# Patient Record
Sex: MALE | Race: WHITE | NOT HISPANIC OR LATINO | Employment: UNEMPLOYED | URBAN - METROPOLITAN AREA
[De-identification: names, ages, dates, MRNs, and addresses within clinical notes are randomized per-mention and may not be internally consistent; named-entity substitution may affect disease eponyms.]

---

## 2018-08-14 ENCOUNTER — OFFICE VISIT (OUTPATIENT)
Dept: URGENT CARE | Facility: CLINIC | Age: 78
End: 2018-08-14
Payer: MEDICARE

## 2018-08-14 VITALS
HEART RATE: 78 BPM | DIASTOLIC BLOOD PRESSURE: 97 MMHG | RESPIRATION RATE: 18 BRPM | BODY MASS INDEX: 32.93 KG/M2 | SYSTOLIC BLOOD PRESSURE: 194 MMHG | OXYGEN SATURATION: 96 % | WEIGHT: 230 LBS | TEMPERATURE: 100 F | HEIGHT: 70 IN

## 2018-08-14 DIAGNOSIS — M62.838 MUSCLE SPASM: Primary | ICD-10-CM

## 2018-08-14 DIAGNOSIS — Z87.448: ICD-10-CM

## 2018-08-14 DIAGNOSIS — F10.10 ALCOHOL ABUSE: ICD-10-CM

## 2018-08-14 LAB
GLUCOSE SERPL-MCNC: 118 MG/DL (ref 70–110)
POC ANION GAP: 13 MMOL/L (ref 10–20)
POC BUN: 15 MMOL/L (ref 8–26)
POC CHLORIDE: 103 MMOL/L (ref 98–109)
POC CREATININE: 1.1 MG/DL (ref 0.6–1.3)
POC HEMATOCRIT: 45 %PCV (ref 42–52)
POC HEMOGLOBIN: 15.3 G/DL (ref 13.5–18)
POC ICA: 1.16 MMOL/L (ref 1.12–1.32)
POC POTASSIUM: 3.8 MMOL/L (ref 3.5–4.9)
POC SODIUM: 137 MMOL/L (ref 138–146)
POC TCO2: 25 MMOL/L (ref 24–29)

## 2018-08-14 PROCEDURE — 96372 THER/PROPH/DIAG INJ SC/IM: CPT | Mod: S$GLB,,, | Performed by: SURGERY

## 2018-08-14 PROCEDURE — 80047 BASIC METABLC PNL IONIZED CA: CPT | Mod: QW,S$GLB,, | Performed by: SURGERY

## 2018-08-14 PROCEDURE — 99203 OFFICE O/P NEW LOW 30 MIN: CPT | Mod: 25,S$GLB,, | Performed by: SURGERY

## 2018-08-14 RX ORDER — FUROSEMIDE 40 MG/1
40 TABLET ORAL DAILY
Refills: 1 | Status: ON HOLD | COMMUNITY
Start: 2018-05-09 | End: 2022-06-02 | Stop reason: SDUPTHER

## 2018-08-14 RX ORDER — AMLODIPINE AND BENAZEPRIL HYDROCHLORIDE 5; 10 MG/1; MG/1
CAPSULE ORAL
Refills: 0 | Status: ON HOLD | COMMUNITY
Start: 2018-06-04 | End: 2022-06-02 | Stop reason: HOSPADM

## 2018-08-14 RX ORDER — LOVASTATIN 20 MG/1
20 TABLET ORAL NIGHTLY
Status: ON HOLD | COMMUNITY
End: 2023-01-28 | Stop reason: HOSPADM

## 2018-08-14 RX ORDER — CYCLOBENZAPRINE HCL 5 MG
5 TABLET ORAL 3 TIMES DAILY PRN
Qty: 21 TABLET | Refills: 0 | Status: SHIPPED | OUTPATIENT
Start: 2018-08-14 | End: 2018-08-21

## 2018-08-14 RX ORDER — BETAMETHASONE SODIUM PHOSPHATE AND BETAMETHASONE ACETATE 3; 3 MG/ML; MG/ML
9 INJECTION, SUSPENSION INTRA-ARTICULAR; INTRALESIONAL; INTRAMUSCULAR; SOFT TISSUE ONCE
Status: COMPLETED | OUTPATIENT
Start: 2018-08-14 | End: 2018-08-14

## 2018-08-14 RX ADMIN — BETAMETHASONE SODIUM PHOSPHATE AND BETAMETHASONE ACETATE 9 MG: 3; 3 INJECTION, SUSPENSION INTRA-ARTICULAR; INTRALESIONAL; INTRAMUSCULAR; SOFT TISSUE at 08:08

## 2018-08-14 NOTE — PROGRESS NOTES
"Subjective:       Patient ID: Timothy Roldan is a 78 y.o. male.    Vitals:  height is 5' 10" (1.778 m) and weight is 104.3 kg (230 lb). His tympanic temperature is 100.1 °F (37.8 °C). His blood pressure is 194/97 (abnormal) and his pulse is 78. His respiration is 18 and oxygen saturation is 96%.     Chief Complaint: Back Pain and Abdominal Pain    Back and abdominal pain in spasms. Similar episode a year ago with extensive workup at [facility] reported to have stomach and kidney problems. Treated with pain medications. Started on diuretics by primary care. Reports chronic alcohol abuse with beer and whiskey. No nausea, vomiting, diarrhea or constipation.       Back Pain   This is a recurrent problem. The current episode started today. The problem occurs intermittently. The problem has been rapidly worsening since onset. The pain is present in the lumbar spine. The quality of the pain is described as cramping and stabbing. The pain is at a severity of 9/10. The pain is severe. The symptoms are aggravated by standing. Associated symptoms include abdominal pain. Pertinent negatives include no bladder incontinence, bowel incontinence, chest pain, dysuria, fever or numbness.   Abdominal Pain   This is a new problem. The current episode started today. The onset quality is sudden. The problem has been rapidly worsening. The pain is located in the periumbilical region and generalized abdominal region. The pain is at a severity of 9/10. The pain is severe. The quality of the pain is cramping. The abdominal pain radiates to the back. Pertinent negatives include no constipation, diarrhea, dysuria, fever, hematochezia, hematuria, melena, nausea or vomiting. The pain is aggravated by certain positions and movement. The pain is relieved by being still. He has tried antacids for the symptoms. The treatment provided no relief.     Review of Systems   Constitution: Negative for chills, fever and malaise/fatigue.   Cardiovascular: " Negative for chest pain.   Respiratory: Negative for shortness of breath.    Skin: Negative for rash.   Musculoskeletal: Positive for back pain and muscle cramps. Negative for muscle weakness and stiffness.   Gastrointestinal: Positive for abdominal pain. Negative for bowel incontinence, constipation, diarrhea, hematochezia, melena, nausea and vomiting.   Genitourinary: Negative for bladder incontinence, dysuria, hematuria and urgency.   Neurological: Negative for disturbances in coordination and numbness.       Objective:      Physical Exam   Constitutional: He is oriented to person, place, and time. He appears well-developed and well-nourished. He is cooperative.  Non-toxic appearance. He does not appear ill. No distress.   HENT:   Head: Normocephalic and atraumatic.   Right Ear: Hearing, tympanic membrane, external ear and ear canal normal.   Left Ear: Hearing, tympanic membrane, external ear and ear canal normal.   Nose: Nose normal. No mucosal edema, rhinorrhea or nasal deformity. No epistaxis. Right sinus exhibits no maxillary sinus tenderness and no frontal sinus tenderness. Left sinus exhibits no maxillary sinus tenderness and no frontal sinus tenderness.   Mouth/Throat: Uvula is midline, oropharynx is clear and moist and mucous membranes are normal. No trismus in the jaw. Normal dentition. No uvula swelling. No posterior oropharyngeal erythema.   Eyes: Conjunctivae and lids are normal. No scleral icterus.   Sclera clear bilat   Neck: Trachea normal, full passive range of motion without pain and phonation normal. Neck supple.   Cardiovascular: Normal rate, regular rhythm, normal heart sounds, intact distal pulses and normal pulses.   Pulmonary/Chest: Effort normal and breath sounds normal. No respiratory distress.   Abdominal: Soft. Normal appearance and bowel sounds are normal. He exhibits no distension. There is no tenderness.   Musculoskeletal: Normal range of motion. He exhibits no edema or deformity.    Neurological: He is alert and oriented to person, place, and time. He exhibits normal muscle tone. Coordination normal.   Skin: Skin is warm, dry and intact. He is not diaphoretic. No pallor.   Psychiatric: He has a normal mood and affect. His speech is normal and behavior is normal. Judgment and thought content normal. Cognition and memory are normal.   Nursing note and vitals reviewed.      Assessment:       1. Muscle spasm    2. Hx of renal insufficiency syndrome    3. Alcohol abuse        Plan:         Muscle spasm  -     POCT Chemistry Panel  -     betamethasone acetate-betamethasone sodium phosphate injection 9 mg; Inject 1.5 mLs (9 mg total) into the muscle once.  -     cyclobenzaprine (FLEXERIL) 5 MG tablet; Take 1 tablet (5 mg total) by mouth 3 (three) times daily as needed for Muscle spasms.  Dispense: 21 tablet; Refill: 0    Hx of renal insufficiency syndrome    Alcohol abuse      Results for orders placed or performed in visit on 08/14/18   POCT Chemistry Panel   Result Value Ref Range    POC Sodium 137 (A) 138 - 146 MMOL/L    POC Potassium 3.8 3.5 - 4.9 MMOL/L    POC Chloride 103 98 - 109 MMOL/L    POC BUN 15 8 - 26 MMOL/L    POC Glucose 118 (A) 70 - 110 MG/DL    POC Creatinine 1.1 0.6 - 1.3 mg/dL    POC iCA 1.16 1.12 - 1.32 MMOL/L    POC TCO2 25 24 - 29 MMOL/L    POC Hematocrit 45 42 - 52 %PCV    POC Hemoglobin 15.3 13.5 - 18 g/dL    POC Anion Gap 13 10.0 - 20 MMOL/L           Patient Instructions     Muscle Spasm  A muscle spasm is a sudden tightening of the muscle you cant control. This may be caused by strain, overworking the muscle, or injury. It can also be caused by dehydration, electrolyte imbalance, diabetes, alcohol use, and certain medicines. If it goes on long enough the muscle spasm causes pain. Common areas for muscle spasm are the legs, neck, and back.  Home care  · Heat, massage, and stretching will help relax muscle spasm.  · When the spasm is in your arm or leg, stretch the  muscle passively. To do this, have someone bend or straighten the joint above or below the muscle until you feel the stretch on the sore muscle. You can stretch the muscle actively by moving the affected body part. This will stretch the muscle that is in spasm. For example, if the spasm is in your calf, bend the ankle so your toes point upward toward your knee. This will stretch your calf muscle.  · You may use over-the-counter pain medicine to control pain, unless another medicine was prescribed. If you have chronic liver or kidney disease or ever had a stomach ulcer or GI bleeding, talk with your healthcare provider before using these medicines.  Follow-up care  Follow up with your healthcare provider, or as advised.    When to seek medical advice  Call your healthcare provider right away if any of the following occur:  · Fingers or toes become swollen, cold, blue, numb, or tingly  · You develop weakness in the affected arm or leg  · Pain increases and is not controlled by the above measures  Date Last Reviewed: 11/21/2015  © 4288-0198 [employer] [place]. [address] [place] [address], [place] [address]. All rights reserved. This information is not intended as a substitute for professional medical care. Always follow your healthcare professional's instructions.

## 2018-08-15 NOTE — PATIENT INSTRUCTIONS
Muscle Spasm  A muscle spasm is a sudden tightening of the muscle you cant control. This may be caused by strain, overworking the muscle, or injury. It can also be caused by dehydration, electrolyte imbalance, diabetes, alcohol use, and certain medicines. If it goes on long enough the muscle spasm causes pain. Common areas for muscle spasm are the legs, neck, and back.  Home care  · Heat, massage, and stretching will help relax muscle spasm.  · When the spasm is in your arm or leg, stretch the muscle passively. To do this, have someone bend or straighten the joint above or below the muscle until you feel the stretch on the sore muscle. You can stretch the muscle actively by moving the affected body part. This will stretch the muscle that is in spasm. For example, if the spasm is in your calf, bend the ankle so your toes point upward toward your knee. This will stretch your calf muscle.  · You may use over-the-counter pain medicine to control pain, unless another medicine was prescribed. If you have chronic liver or kidney disease or ever had a stomach ulcer or GI bleeding, talk with your healthcare provider before using these medicines.  Follow-up care  Follow up with your healthcare provider, or as advised.    When to seek medical advice  Call your healthcare provider right away if any of the following occur:  · Fingers or toes become swollen, cold, blue, numb, or tingly  · You develop weakness in the affected arm or leg  · Pain increases and is not controlled by the above measures  Date Last Reviewed: 11/21/2015  © 2189-7178 [employer]. [address] [place] [address], [place] [address]. All rights reserved. This information is not intended as a substitute for professional medical care. Always follow your healthcare professional's instructions.

## 2021-12-31 ENCOUNTER — HOSPITAL ENCOUNTER (INPATIENT)
Facility: HOSPITAL | Age: 81
LOS: 10 days | Discharge: HOME HEALTH | DRG: 690 | End: 2022-01-10
Attending: EMERGENCY MEDICINE | Admitting: INTERNAL MEDICINE
Payer: MEDICARE

## 2021-12-31 DIAGNOSIS — N30.01 ACUTE CYSTITIS WITH HEMATURIA: ICD-10-CM

## 2021-12-31 DIAGNOSIS — R53.1 WEAKNESS: ICD-10-CM

## 2021-12-31 DIAGNOSIS — F02.80 ALZHEIMER DISEASE: Primary | ICD-10-CM

## 2021-12-31 DIAGNOSIS — E04.1 THYROID NODULE GREATER THAN OR EQUAL TO 1 CM IN DIAMETER INCIDENTALLY NOTED ON IMAGING STUDY: ICD-10-CM
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
## 2021-12-31 DIAGNOSIS — T14.8XXA MULTIPLE SKIN TEARS: ICD-10-CM

## 2021-12-31 DIAGNOSIS — G30.9 ALZHEIMER DISEASE: Primary | ICD-10-CM

## 2021-12-31 DIAGNOSIS — I48.0 PAROXYSMAL ATRIAL FIBRILLATION: ICD-10-CM

## 2021-12-31 DIAGNOSIS — R53.1 RAPIDLY PROGRESSIVE WEAKNESS: ICD-10-CM

## 2021-12-31 PROBLEM — N17.9 ACUTE KIDNEY INJURY SUPERIMPOSED ON CKD: Status: ACTIVE | Noted: 2021-12-31

## 2021-12-31 PROBLEM — I10 PRIMARY HYPERTENSION: Status: ACTIVE | Noted: 2021-12-31

## 2021-12-31 PROBLEM — R29.898 WEAKNESS OF BOTH LOWER EXTREMITIES: Status: ACTIVE | Noted: 2021-12-31

## 2021-12-31 PROBLEM — N18.9 ACUTE KIDNEY INJURY SUPERIMPOSED ON CKD: Status: ACTIVE | Noted: 2021-12-31

## 2021-12-31 PROBLEM — N30.00 ACUTE CYSTITIS WITHOUT HEMATURIA: Status: ACTIVE | Noted: 2021-12-31

## 2021-12-31 LAB
ALBUMIN SERPL BCP-MCNC: 3.5 G/DL (ref 3.5–5.2)
ALP SERPL-CCNC: 103 U/L (ref 55–135)
ALT SERPL W/O P-5'-P-CCNC: 24 U/L (ref 10–44)
ANION GAP SERPL CALC-SCNC: 9 MMOL/L (ref 8–16)
AST SERPL-CCNC: 58 U/L (ref 10–40)
BACTERIA #/AREA URNS HPF: ABNORMAL /HPF
BASOPHILS # BLD AUTO: 0.01 K/UL (ref 0–0.2)
BASOPHILS NFR BLD: 0.1 % (ref 0–1.9)
BILIRUB SERPL-MCNC: 1 MG/DL (ref 0.1–1)
BILIRUB UR QL STRIP: NEGATIVE
BUN SERPL-MCNC: 27 MG/DL (ref 8–23)
CALCIUM SERPL-MCNC: 9.9 MG/DL (ref 8.7–10.5)
CHLORIDE SERPL-SCNC: 105 MMOL/L (ref 95–110)
CLARITY UR: ABNORMAL
CO2 SERPL-SCNC: 29 MMOL/L (ref 23–29)
COLOR UR: ABNORMAL
CREAT SERPL-MCNC: 1.5 MG/DL (ref 0.5–1.4)
CTP QC/QA: YES
CTP QC/QA: YES
DIFFERENTIAL METHOD: ABNORMAL
EOSINOPHIL # BLD AUTO: 0 K/UL (ref 0–0.5)
EOSINOPHIL NFR BLD: 0 % (ref 0–8)
ERYTHROCYTE [DISTWIDTH] IN BLOOD BY AUTOMATED COUNT: 13.5 % (ref 11.5–14.5)
EST. GFR  (AFRICAN AMERICAN): 50 ML/MIN/1.73 M^2
EST. GFR  (NON AFRICAN AMERICAN): 44 ML/MIN/1.73 M^2
GLUCOSE SERPL-MCNC: 110 MG/DL (ref 70–110)
GLUCOSE UR QL STRIP: NEGATIVE
HCT VFR BLD AUTO: 45.3 % (ref 40–54)
HGB BLD-MCNC: 14.6 G/DL (ref 14–18)
HGB UR QL STRIP: ABNORMAL
HYALINE CASTS #/AREA URNS LPF: 0 /LPF
IMM GRANULOCYTES # BLD AUTO: 0.04 K/UL (ref 0–0.04)
IMM GRANULOCYTES NFR BLD AUTO: 0.4 % (ref 0–0.5)
KETONES UR QL STRIP: ABNORMAL
LACTATE SERPL-SCNC: 1 MMOL/L (ref 0.5–2.2)
LEUKOCYTE ESTERASE UR QL STRIP: ABNORMAL
LYMPHOCYTES # BLD AUTO: 0.9 K/UL (ref 1–4.8)
LYMPHOCYTES NFR BLD: 9.2 % (ref 18–48)
MCH RBC QN AUTO: 29.6 PG (ref 27–31)
MCHC RBC AUTO-ENTMCNC: 32.2 G/DL (ref 32–36)
MCV RBC AUTO: 92 FL (ref 82–98)
MICROSCOPIC COMMENT: ABNORMAL
MONOCYTES # BLD AUTO: 0.6 K/UL (ref 0.3–1)
MONOCYTES NFR BLD: 6.1 % (ref 4–15)
NEUTROPHILS # BLD AUTO: 8 K/UL (ref 1.8–7.7)
NEUTROPHILS NFR BLD: 84.2 % (ref 38–73)
NITRITE UR QL STRIP: NEGATIVE
NRBC BLD-RTO: 0 /100 WBC
PH UR STRIP: 6 [PH] (ref 5–8)
PLATELET # BLD AUTO: 148 K/UL (ref 150–450)
PMV BLD AUTO: 11.1 FL (ref 9.2–12.9)
POC MOLECULAR INFLUENZA A AGN: NEGATIVE
POC MOLECULAR INFLUENZA B AGN: NEGATIVE
POTASSIUM SERPL-SCNC: 3.7 MMOL/L (ref 3.5–5.1)
PROCALCITONIN SERPL IA-MCNC: 0.2 NG/ML
PROT SERPL-MCNC: 7.4 G/DL (ref 6–8.4)
PROT UR QL STRIP: ABNORMAL
RBC # BLD AUTO: 4.94 M/UL (ref 4.6–6.2)
RBC #/AREA URNS HPF: >100 /HPF (ref 0–4)
SARS-COV-2 RDRP RESP QL NAA+PROBE: NEGATIVE
SODIUM SERPL-SCNC: 143 MMOL/L (ref 136–145)
SP GR UR STRIP: 1.02 (ref 1–1.03)
URN SPEC COLLECT METH UR: ABNORMAL
UROBILINOGEN UR STRIP-ACNC: NEGATIVE EU/DL
WBC # BLD AUTO: 9.53 K/UL (ref 3.9–12.7)
WBC #/AREA URNS HPF: >100 /HPF (ref 0–5)

## 2021-12-31 PROCEDURE — 93010 ELECTROCARDIOGRAM REPORT: CPT | Mod: ,,, | Performed by: INTERNAL MEDICINE

## 2021-12-31 PROCEDURE — 96365 THER/PROPH/DIAG IV INF INIT: CPT

## 2021-12-31 PROCEDURE — 83605 ASSAY OF LACTIC ACID: CPT | Performed by: EMERGENCY MEDICINE

## 2021-12-31 PROCEDURE — 82607 VITAMIN B-12: CPT | Performed by: INTERNAL MEDICINE

## 2021-12-31 PROCEDURE — 81000 URINALYSIS NONAUTO W/SCOPE: CPT | Performed by: EMERGENCY MEDICINE

## 2021-12-31 PROCEDURE — 85025 COMPLETE CBC W/AUTO DIFF WBC: CPT | Performed by: EMERGENCY MEDICINE

## 2021-12-31 PROCEDURE — 11000001 HC ACUTE MED/SURG PRIVATE ROOM

## 2021-12-31 PROCEDURE — 63600175 PHARM REV CODE 636 W HCPCS: Performed by: EMERGENCY MEDICINE

## 2021-12-31 PROCEDURE — 93005 ELECTROCARDIOGRAM TRACING: CPT

## 2021-12-31 PROCEDURE — 87040 BLOOD CULTURE FOR BACTERIA: CPT | Performed by: EMERGENCY MEDICINE

## 2021-12-31 PROCEDURE — 80053 COMPREHEN METABOLIC PANEL: CPT | Performed by: EMERGENCY MEDICINE

## 2021-12-31 PROCEDURE — 36415 COLL VENOUS BLD VENIPUNCTURE: CPT | Performed by: INTERNAL MEDICINE

## 2021-12-31 PROCEDURE — 63600175 PHARM REV CODE 636 W HCPCS: Performed by: INTERNAL MEDICINE

## 2021-12-31 PROCEDURE — 84145 PROCALCITONIN (PCT): CPT | Performed by: EMERGENCY MEDICINE

## 2021-12-31 PROCEDURE — 87502 INFLUENZA DNA AMP PROBE: CPT

## 2021-12-31 PROCEDURE — U0002 COVID-19 LAB TEST NON-CDC: HCPCS | Performed by: EMERGENCY MEDICINE

## 2021-12-31 PROCEDURE — 87086 URINE CULTURE/COLONY COUNT: CPT | Performed by: EMERGENCY MEDICINE

## 2021-12-31 PROCEDURE — 96366 THER/PROPH/DIAG IV INF ADDON: CPT

## 2021-12-31 PROCEDURE — 25000003 PHARM REV CODE 250: Performed by: INTERNAL MEDICINE

## 2021-12-31 PROCEDURE — 93010 EKG 12-LEAD: ICD-10-PCS | Mod: ,,, | Performed by: INTERNAL MEDICINE

## 2021-12-31 PROCEDURE — 25000003 PHARM REV CODE 250: Performed by: EMERGENCY MEDICINE

## 2021-12-31 PROCEDURE — 83605 ASSAY OF LACTIC ACID: CPT | Mod: 91 | Performed by: INTERNAL MEDICINE

## 2021-12-31 PROCEDURE — 99291 CRITICAL CARE FIRST HOUR: CPT | Mod: 25

## 2021-12-31 PROCEDURE — 82550 ASSAY OF CK (CPK): CPT | Performed by: INTERNAL MEDICINE

## 2021-12-31 PROCEDURE — 82746 ASSAY OF FOLIC ACID SERUM: CPT | Performed by: INTERNAL MEDICINE

## 2021-12-31 RX ORDER — TAMSULOSIN HYDROCHLORIDE 0.4 MG/1
0.4 CAPSULE ORAL DAILY
Status: DISCONTINUED | OUTPATIENT
Start: 2022-01-01 | End: 2022-01-10 | Stop reason: HOSPADM

## 2021-12-31 RX ORDER — IBUPROFEN 200 MG
24 TABLET ORAL
Status: DISCONTINUED | OUTPATIENT
Start: 2021-12-31 | End: 2022-01-10 | Stop reason: HOSPADM

## 2021-12-31 RX ORDER — IBUPROFEN 200 MG
16 TABLET ORAL
Status: DISCONTINUED | OUTPATIENT
Start: 2021-12-31 | End: 2022-01-10 | Stop reason: HOSPADM

## 2021-12-31 RX ORDER — GLUCAGON 1 MG
1 KIT INJECTION
Status: DISCONTINUED | OUTPATIENT
Start: 2021-12-31 | End: 2022-01-10 | Stop reason: HOSPADM

## 2021-12-31 RX ORDER — AMLODIPINE BESYLATE 5 MG/1
5 TABLET ORAL DAILY
Status: DISCONTINUED | OUTPATIENT
Start: 2022-01-01 | End: 2022-01-10 | Stop reason: HOSPADM

## 2021-12-31 RX ORDER — PRAVASTATIN SODIUM 10 MG/1
20 TABLET ORAL DAILY
Status: DISCONTINUED | OUTPATIENT
Start: 2022-01-01 | End: 2022-01-10 | Stop reason: HOSPADM

## 2021-12-31 RX ORDER — LANOLIN ALCOHOL/MO/W.PET/CERES
800 CREAM (GRAM) TOPICAL
Status: DISCONTINUED | OUTPATIENT
Start: 2021-12-31 | End: 2022-01-10 | Stop reason: HOSPADM

## 2021-12-31 RX ORDER — DONEPEZIL HYDROCHLORIDE 10 MG/1
10 TABLET, FILM COATED ORAL NIGHTLY
Status: DISCONTINUED | OUTPATIENT
Start: 2021-12-31 | End: 2022-01-10 | Stop reason: HOSPADM

## 2021-12-31 RX ORDER — MEMANTINE HYDROCHLORIDE 10 MG/1
10 TABLET ORAL 2 TIMES DAILY
Status: DISCONTINUED | OUTPATIENT
Start: 2022-01-01 | End: 2022-01-10 | Stop reason: HOSPADM

## 2021-12-31 RX ORDER — HEPARIN SODIUM 5000 [USP'U]/ML
5000 INJECTION, SOLUTION INTRAVENOUS; SUBCUTANEOUS EVERY 8 HOURS
Status: DISCONTINUED | OUTPATIENT
Start: 2021-12-31 | End: 2022-01-10 | Stop reason: HOSPADM

## 2021-12-31 RX ORDER — SODIUM CHLORIDE 0.9 % (FLUSH) 0.9 %
10 SYRINGE (ML) INJECTION EVERY 12 HOURS PRN
Status: DISCONTINUED | OUTPATIENT
Start: 2021-12-31 | End: 2022-01-10 | Stop reason: HOSPADM

## 2021-12-31 RX ADMIN — PIPERACILLIN AND TAZOBACTAM 4.5 G: 4; .5 INJECTION, POWDER, LYOPHILIZED, FOR SOLUTION INTRAVENOUS; PARENTERAL at 03:12

## 2021-12-31 RX ADMIN — DONEPEZIL HYDROCHLORIDE 10 MG: 10 TABLET ORAL at 10:12

## 2021-12-31 RX ADMIN — SODIUM CHLORIDE 2000 ML: 0.9 INJECTION, SOLUTION INTRAVENOUS at 02:12

## 2021-12-31 RX ADMIN — HEPARIN SODIUM 5000 UNITS: 5000 INJECTION INTRAVENOUS; SUBCUTANEOUS at 10:12

## 2021-12-31 RX ADMIN — CEFTRIAXONE 1 G: 1 INJECTION, SOLUTION INTRAVENOUS at 10:12

## 2021-12-31 NOTE — ED PROVIDER NOTES
EM PHYSICIAN NOTE    SCRIBE NOTE: KODAK Minnie Vern, am scribing for, and in the presence of, Guero Box MD.    HPI  This patient presents with a complaint of   Chief Complaint   Patient presents with    Fall     The patient presents to the ED via [facility] from home. Per ems, the patient had 2 unwitnessed falls last night and was found face down on bathroom floor by family members. Patient states that he is unsure if he hit his head. Skin tears noted to left hand.        HPI:   This 81 year old male with history of Alzheimer's, hyperlipidemia, and hypertension presents to the ED via EMS s/p fall for evaluation of left shoulder pain. Per EMS, the patient had two unwitnessed falls last night with unknown down time. He was found face down in his bathroom this morning. In addition to the shoulder pain, he also endorses skin tears to his bilateral hands and his left elbow. He denies use of blood thinners. No chest pain, shortness of breath, or other symptoms reported at this time. No pain medications were taken prior to arrival. Per EMS, patient's blood pressure was 80/50 en route. All other vital signs were normal.     REVIEW of PMH, SOC History and Family History:  Past Medical History:   Diagnosis Date    Hyperlipidemia     Hypertension      Social history noncontributory  Family history noncontributory  Review of patient's allergies indicates:  No Known Allergies        REVIEW of SYSTEMS  Source: Patient, Family, EMS  The nurse's notes and triage vital signs were reviewed.  GENERAL/CONSTITUTIONAL: There is no report of fever, fatigue, weakness, or unexplained weight loss.  CARDIOVASCULAR: There is no report of chest pain   RESPIRATORY: There is no report of cough or SOB  GASTROINTESTINAL: There is no report of nausea, vomiting, diarrhea  MUSCULOSKELETAL: See HPI.  SKIN AND BREASTS: See HPI  HEMATOLOGIC/LYMPHATIC: There is no report of anemia, bleeding or clotting defects. There is no report of  "anticoagulant use.  The remainder of the ROS is negative.         PHYSICAL EXAMINATION    ED Triage Vitals [12/31/21 1139]   Enc Vitals Group      BP (!) 80/50      Pulse 102      Resp 18      Temp 98.2 °F (36.8 °C)      Temp src Oral      SpO2 98 %      Weight 210 lb      Height 5' 10"      Head Circumference       Peak Flow       Pain Score       Pain Loc       Pain Edu?       Excl. in GC?      Vital signs and Pulse Ox reviewed in clinical context. Abnormalities noted: Hypotension  Pt's level of consciousness is awake, and the patient is in mild distress.  Skin: warm, pink and dry.  Capillary refill is less than 2 seconds. Skin tears to the dorsum of the left and right hand and the left elbow.  Mucosa: dry  Head and Neck:  No JVD.  Patient endorses some minor midline C-spine tenderness to palpation.  Cardiac exam: Distant heart tones.  Occasional skipped beat otherwise regular. Normal rate.   Pulmonary exam:  Unlabored, clear  Abd Exam: soft nontender   Musculoskeletal:  Patient has pain with range of motion of the left shoulder.  Neurologic: GCS: GCS 15; 4 over 5 strength in all 4 extremities cranial nerves intact.  Patient is oriented.  He has an intention tremor which he relates to Parkinson's.       Initial Impression:  Shoulder pain, hip pain, Hypotension, Dehydration, fall, skin tears, malnutrition  Plan:  Imaging, hydration and reassess, sepsis workup, CT head and C-spine  Micelle SD Box MD, 12:16 PM 12/31/2021      Medical decision making:   Nurses notes and Vital Signs reviewed.  Orders Placed This Encounter   Procedures    Blood culture x two cultures. Draw prior to antibiotics.    Urine culture    X-Ray Shoulder Trauma Left    X-Ray Chest AP Portable    X-Ray Hips Bilateral 2 View Incl AP Pelvis    CT Head Without Contrast    CT Cervical Spine Without Contrast    CBC auto differential    Comprehensive metabolic panel    Lactic acid, plasma #1    Lactic acid, plasma #2    Urinalysis, " Reflex to Urine Culture Urine, Clean Catch    Procalcitonin    Urinalysis, Reflex to Urine Culture Urine, Clean Catch    Urinalysis Microscopic    ED Preference List Used to Initiate Sepsis Orders    Vital signs    Cardiac Monitoring - Adult    Strict intake and output    Dressing - wet to dry change    Measure post void residual    Inpatient consult to Neurology    Pulse Oximetry Continuous    POCT Influenza A/B Molecular    POCT COVID-19 Rapid Screening    EKG 12-lead       ED Course as of 12/31/21 1755   Fri Dec 31, 2021   1600 Chest x-ray negative [MH]   1607 UA reveals greater than 100 red cells white cells and many bacteria [MH]   1607 CMP reveals an elevated BUN and creatinine at 27 and 1.5 [MH]   1608 CBC is normal [MH]   1608 COVID is negative [MH]   1608 Influenza is negative [MH]   1608 DJD  noted on the hip films without fracture [MH]   1608 DJD noted on shoulder films with no fracture [MH]   1609 No acute cervical fracture.     Mild cervical spondylosis.     1.6 cm hypoattenuating nodule in the right lobe of the thyroid [MH]   1609 Impression:     No CT evidence of acute intracranial abnormality.     Generalized cerebral volume loss with ex vacuo dilation of the ventricles and sulci, though ventricular slides is more pronounced than expected for degree of volume loss.  This could be related to central volume loss or potentially normal pressure hydrocephalus in the appropriate clinical setting. [MH]   1611 Chart review reveals that the head CT was similar in 2019:  1.   Densely predominant pattern of atrophy. The temporal horns are mildly prominent although sulci are neither effaced. A component of mild or normal pressure hydrocephalus cannot be excluded. Correlation with clinical symptoms may be helpful.   2.   Supratentorial white matter chronic microvascular ischemic change confluent about the frontal horns and atria.   3.   Widening of extra-axial fluid along the posterior margin of the  posterior fossa, extending greater to the left of midline consistent with developmental magna cisterna magna.   [MH]   1646 Niece: Lakesha Ohara, checks on him daily.  Mobility fine until this month,  Fall x 4 this month.  Dx with dementia.  [phone] [MH]   1657 Platelets(!): 148  CBC wnl [MH]   Consult to Neurology: Normal pressure high hydrocephaly    Will obtain post void residual and consult hospitalist for progressive weakness and UTI  Patient is not able to walk even with assistance at presents due to generalized weakness.   ED Course User Index  [MH] Guero Box MD       Diagnoses that have been ruled out:   None   Diagnoses that are still under consideration:   Normal pressure hydrocephaly   Final diagnoses:   Fall   Weakness   Multiple skin tears   Thyroid nodule greater than or equal to 1 cm in diameter incidentally noted on imaging study   Acute cystitis with hematuria   Rapidly progressive weakness           This note was created using [employer].  This program may occasionally misinterpret certain words and phrases.        Scribe Attestation:   Scribe #1: I performed the above scribed service and the documentation accurately describes the services I performed. I attest to the accuracy of the note.      SCRIBE ATTESTATION NOTE: I attest that I personally performed the services documented by the scribe and acknowledged and confirm the content of the note.   Nurses notes were reviewed.  Gueor Box        Nurses notes were reviewed.      CRITICAL CARE TIME:  These services included the following: chart data review, reviewing nursing notes and researching old charts from internal and external sources, documentation time, consultant collaboration regarding findings and treatment options, medication orders and management, direct patient care, vital sign assessments, physical exam reassessments, and ordering, interpreting and reviewing diagnostic studies/lab tests.    Aggregate critical  care time was approximately 35 minutes, which includes only time during which I was engaged in work directly related to the patient's care, as described above, whether at the bedside or elsewhere in the Emergency Department.  It did not include time spent performing other reported procedures or the services of residents, students, nurses or physician assistants.     6:19 PM  The medical management of Timothy Roldan has been transferred to the admitting team.  At this time, the patient's condition is unchanged, and this was relayed to the accepting team.  Please see notes from the admitting team for continued care.  Guero Box 6:19 PM             Guero Box MD  12/31/21 1556       Guero Box MD  12/31/21 1604       Guero Box MD  12/31/21 1624       Guero Box MD  12/31/21 1757       Guero Box MD  12/31/21 1817       Guero Box MD  12/31/21 1817

## 2022-01-01 LAB
ALBUMIN SERPL BCP-MCNC: 2.8 G/DL (ref 3.5–5.2)
ALP SERPL-CCNC: 79 U/L (ref 55–135)
ALT SERPL W/O P-5'-P-CCNC: 27 U/L (ref 10–44)
ANION GAP SERPL CALC-SCNC: 10 MMOL/L (ref 8–16)
AST SERPL-CCNC: 46 U/L (ref 10–40)
BILIRUB SERPL-MCNC: 0.7 MG/DL (ref 0.1–1)
BUN SERPL-MCNC: 23 MG/DL (ref 8–23)
CALCIUM SERPL-MCNC: 8.8 MG/DL (ref 8.7–10.5)
CHLORIDE SERPL-SCNC: 109 MMOL/L (ref 95–110)
CK SERPL-CCNC: 2090 U/L (ref 20–200)
CK SERPL-CCNC: 879 U/L (ref 20–200)
CO2 SERPL-SCNC: 27 MMOL/L (ref 23–29)
CREAT SERPL-MCNC: 1.3 MG/DL (ref 0.5–1.4)
EST. GFR  (AFRICAN AMERICAN): 60 ML/MIN/1.73 M^2
EST. GFR  (NON AFRICAN AMERICAN): 52 ML/MIN/1.73 M^2
FOLATE SERPL-MCNC: 5.2 NG/ML (ref 4–24)
GLUCOSE SERPL-MCNC: 91 MG/DL (ref 70–110)
LACTATE SERPL-SCNC: 0.8 MMOL/L (ref 0.5–2.2)
POTASSIUM SERPL-SCNC: 4.1 MMOL/L (ref 3.5–5.1)
PROT SERPL-MCNC: 6.1 G/DL (ref 6–8.4)
SODIUM SERPL-SCNC: 146 MMOL/L (ref 136–145)
VIT B12 SERPL-MCNC: >2000 PG/ML (ref 210–950)

## 2022-01-01 PROCEDURE — 63600175 PHARM REV CODE 636 W HCPCS: Performed by: INTERNAL MEDICINE

## 2022-01-01 PROCEDURE — 36415 COLL VENOUS BLD VENIPUNCTURE: CPT | Performed by: INTERNAL MEDICINE

## 2022-01-01 PROCEDURE — 80053 COMPREHEN METABOLIC PANEL: CPT | Performed by: INTERNAL MEDICINE

## 2022-01-01 PROCEDURE — 82550 ASSAY OF CK (CPK): CPT | Performed by: INTERNAL MEDICINE

## 2022-01-01 PROCEDURE — 25000003 PHARM REV CODE 250: Performed by: INTERNAL MEDICINE

## 2022-01-01 PROCEDURE — 11000001 HC ACUTE MED/SURG PRIVATE ROOM

## 2022-01-01 RX ORDER — SODIUM CHLORIDE 9 MG/ML
INJECTION, SOLUTION INTRAVENOUS CONTINUOUS
Status: DISCONTINUED | OUTPATIENT
Start: 2022-01-01 | End: 2022-01-02

## 2022-01-01 RX ADMIN — PRAVASTATIN SODIUM 20 MG: 10 TABLET ORAL at 09:01

## 2022-01-01 RX ADMIN — MEMANTINE 10 MG: 10 TABLET ORAL at 09:01

## 2022-01-01 RX ADMIN — AMLODIPINE BESYLATE 5 MG: 5 TABLET ORAL at 09:01

## 2022-01-01 RX ADMIN — MEMANTINE 10 MG: 10 TABLET ORAL at 08:01

## 2022-01-01 RX ADMIN — CEFTRIAXONE 1 G: 1 INJECTION, SOLUTION INTRAVENOUS at 08:01

## 2022-01-01 RX ADMIN — HEPARIN SODIUM 5000 UNITS: 5000 INJECTION INTRAVENOUS; SUBCUTANEOUS at 05:01

## 2022-01-01 RX ADMIN — SODIUM CHLORIDE: 0.9 INJECTION, SOLUTION INTRAVENOUS at 10:01

## 2022-01-01 RX ADMIN — DONEPEZIL HYDROCHLORIDE 10 MG: 10 TABLET ORAL at 08:01

## 2022-01-01 RX ADMIN — HEPARIN SODIUM 5000 UNITS: 5000 INJECTION INTRAVENOUS; SUBCUTANEOUS at 02:01

## 2022-01-01 RX ADMIN — HEPARIN SODIUM 5000 UNITS: 5000 INJECTION INTRAVENOUS; SUBCUTANEOUS at 09:01

## 2022-01-01 RX ADMIN — TAMSULOSIN HYDROCHLORIDE 0.4 MG: 0.4 CAPSULE ORAL at 09:01

## 2022-01-01 NOTE — PLAN OF CARE
Problem: Adult Inpatient Plan of Care  Goal: Plan of Care Review  Outcome: Ongoing, Progressing  Flowsheets (Taken 1/1/2022 0343)  Plan of Care Reviewed With: patient       Pt free from falls, injury or any further trauma throughout shift. Pt AAO to self and time. Continued medications as ordered. No complaints of pain throughout shift. Pt removing IV and cardiac monitoring throughout shift. Needing frequent reorientation. Pt in no distress. Will cont to monitor.

## 2022-01-01 NOTE — NURSING
Pt transferred to [facility] via stretcher. VSS. White board updated and explained. PT AAO to self and time only. PT with multiple skin tears to bilateral upper extremities and blanchable redness to sacrum. Mepilex foam applied to skin tears and sacrum. Pt oriented to room and call light, bed locked and in lowest position, bedside table and call light in reach. Pt in no distress. Will cont to monitor.

## 2022-01-01 NOTE — PROGRESS NOTES
"[facility] Medicine  Progress Note    Patient Name: Timothy Roldan  MRN: [id number]  Patient Class: IP- Inpatient   Admission Date: 12/31/2021  Length of Stay: 1 days  Attending Physician: Amber Fonseca MD  Primary Care Provider: Martine Garces MD        Subjective:     Principal Problem:Acute cystitis without hematuria        HPI:  82 yo male with a PMHx of Alzheimer's disease (+/- Parkinson?), HTN, BPH with condom catheter who was brought here by his niece due to a ground level fall and significant weakness. Patient is a poor historian due to dementia. Was confused about "blood thinners" being a "type of animal". Per niece he has been progressively getting weaker over the last month. Went from walking without much help to using a cane to having multiple falls. He is now to the point that he needs almost full assistance to move. Otherwise, they haven't noticed much other symptoms. Confusion seems to be around his baseline.    In the ED, labs were remarkable for a UTI on U/A with extreme sediment in his urine and a mild CLEM (sCr 1.5 with BUN 27; baseline sCr is 1.2-1.3 per [facility] labs). CTH showing no acute abnormality but it was read as possible NPH. Neurology consulted and will see tomorrow. He was initially hypotensive but has become hypertensive with IVF boluses. Given IV Zosyn and admitted to floor.        Overview/Hospital Course:  No notes on file    Interval History: cultures so far no growth    Review of Systems   Respiratory: Negative.    Cardiovascular: Negative.    Gastrointestinal: Negative.      Objective:     Vital Signs (Most Recent):  Temp: 98.6 °F (37 °C) (01/01/22 1547)  Pulse: 97 (01/01/22 1547)  Resp: 17 (01/01/22 1547)  BP: 136/75 (01/01/22 1547)  SpO2: 98 % (01/01/22 1547) Vital Signs (24h Range):  Temp:  [98 °F (36.7 °C)-98.7 °F (37.1 °C)] 98.6 °F (37 °C)  Pulse:  [] 97  Resp:  [14-28] 17  SpO2:  [93 %-100 %] 98 %  BP: (110-173)/() 136/75     Weight: 76.1 kg " (167 lb 12.3 oz)  Body mass index is 24.07 kg/m².    Intake/Output Summary (Last 24 hours) at 1/1/2022 1634  Last data filed at 1/1/2022 1300  Gross per 24 hour   Intake 148.49 ml   Output 200 ml   Net -51.51 ml      Physical Exam  Vitals and nursing note reviewed.   Constitutional:       General: He is not in acute distress.     Appearance: He is not toxic-appearing.   Cardiovascular:      Rate and Rhythm: Normal rate. Rhythm irregular.      Pulses: Normal pulses.      Heart sounds: Normal heart sounds. No murmur heard.  No gallop.    Pulmonary:      Effort: Pulmonary effort is normal.      Breath sounds: Normal breath sounds. No wheezing or rales.   Abdominal:      General: Abdomen is flat. Bowel sounds are normal.      Palpations: Abdomen is soft.      Tenderness: There is no right CVA tenderness or left CVA tenderness.   Musculoskeletal:      Right lower leg: No edema.      Left lower leg: No edema.   Skin:     General: Skin is warm.      Capillary Refill: Capillary refill takes 2 to 3 seconds.      Findings: Bruising and rash present.   Neurological:      Mental Status: He is alert.      Comments: Disoriented to place, time and situation. Oriented to person         Significant Labs: All pertinent labs within the past 24 hours have been reviewed.    Significant Imaging: I have reviewed all pertinent imaging results/findings within the past 24 hours.  I have reviewed and interpreted all pertinent imaging results/findings within the past 24 hours.      Assessment/Plan:      * Acute cystitis without hematuria  Agree with empiric ceftriaxone pending urine and blood culture results        Paroxysmal atrial fibrillation  - ECG appears to be coarse Afib but rate controlled. However, lots of artifact and no history of it  - Hold on AC given falls and would benefit from discussion with family      Alzheimer disease  - Continue home donepezil and memantine      Acute kidney injury superimposed on CKD  With rhabdomyolysis.  Pending labs for today. Continue intravenous fluids      Primary hypertension  - Continue Norvasc 5mg daily  - Holding ACEi for CLEM  - PRNs for SBP > 180      Weakness of both lower extremities  - Having what sounds to be subacute lower extremity weakness. Now to the point of 100% assistance. In the ED, staff tried to walk patient, but he could not even stand with two people helping. Low concern for inflammatory disorder. Could be 2/2 UTI but prudent to discuss NPH (given CTh findings) and check reversible causes (CK, B12, folate)  - CPK high. B12 not low.   - Treat UTI as above  - Hold on autoimmune work-up  - Neurology consulted in ED; appreciate assistance  - High fall risk rpecautions        VTE Risk Mitigation (From admission, onward)         Ordered     heparin (porcine) injection 5,000 Units  Every 8 hours         12/31/21 1847     IP VTE HIGH RISK PATIENT  Once         12/31/21 1847     Place sequential compression device  Until discontinued         12/31/21 1847                Discharge Planning   JACQUELINE:      Code Status: Full Code   Is the patient medically ready for discharge?:     Reason for patient still in hospital (select all that apply): Treatment  Discharge Plan A: Home                  Amber So MD  Department of Hospital Medicine   [facility]

## 2022-01-01 NOTE — NURSING
Pt belonging noted at bedside. Necklace, 2 empty medication bottles and ID placed in pt valuable envelope. Pt also had multiple medications at the bedside. Pt unaware of what meds he is still taking. Medications collected and placed in medication envelope. Both envelopes signed and witness by 2nd nurse ROBBIN Herrera. House supervisor notified for .

## 2022-01-01 NOTE — ASSESSMENT & PLAN NOTE
- Hypotensive initially, U/A with WBC/many bacteria/RBC. Urine with visible sediment and cloudy   > Low concern for pyelonephritis or sepsis at this time  - S/p Zosyn in ED  - Start IV Rocephin daily  - UCx, BCx x 2 pending  - S/p 2L IVF bolus in ED  - Telemetry  - Lactate negative

## 2022-01-01 NOTE — NURSING
""Act of General Mandate and Medical Mandate and Procuration (Durable POA)"  document faxed from patient's POA, Lakesha Gonzáles. Document placed in chart. Ms. Crowder is asking that she be notified of patient's plan of care and be made aware of all changes in patient's status.  "

## 2022-01-01 NOTE — ED NOTES
80 yo male to ED via EMS for falls. Pt had multiple falls and was found face down by family. VSS, NAD

## 2022-01-01 NOTE — H&P
"[facility]  [facility] & Physical    Patient Name: Timothy Roldan  MRN: [id number]  Patient Class: IP- Inpatient  Admission Date: 12/31/2021  Attending Physician: Amber Fonseca MD   Primary Care Provider: Primary Doctor No         Patient information was obtained from relative(s), past medical records and ER records.     Subjective:     Principal Problem:Acute cystitis without hematuria    Chief Complaint:   Chief Complaint   Patient presents with    Fall     The patient presents to the ED via [place] ems from home. Per ems, the patient had 2 unwitnessed falls last night and was found face down on bathroom floor by family members. Patient states that he is unsure if he hit his head. Skin tears noted to left hand.         HPI: 82 yo male with a PMHx of Alzheimer's disease (+/- Parkinson?), HTN, BPH with condom catheter who was brought here by his niece due to a ground level fall and significant weakness. Patient is a poor historian due to dementia. Was confused about "blood thinners" being a "type of animal". Per niece he has been progressively getting weaker over the last month. Went from walking without much help to using a cane to having multiple falls. He is now to the point that he needs almost full assistance to move. Otherwise, they haven't noticed much other symptoms. Confusion seems to be around his baseline.    In the ED, labs were remarkable for a UTI on U/A with extreme sediment in his urine and a mild CLEM (sCr 1.5 with BUN 27; baseline sCr is 1.2-1.3 per [facility] labs). CTH showing no acute abnormality but it was read as possible NPH. Neurology consulted and will see tomorrow. He was initially hypotensive but has become hypertensive with IVF boluses. Given IV Zosyn and admitted to floor.        Past Medical History:   Diagnosis Date    Hyperlipidemia     Hypertension        Past Surgical History:   Procedure Laterality Date    TONSILLECTOMY      TOTAL KNEE ARTHROPLASTY   "       Review of patient's allergies indicates:  No Known Allergies    No current facility-administered medications on file prior to encounter.     Current Outpatient Medications on File Prior to Encounter   Medication Sig    amlodipine-benazepril 5-10 mg (LOTREL) 5-10 mg per capsule TK 1 C PO D    furosemide (LASIX) 40 MG tablet Take 40 mg by mouth once daily.    lovastatin (MEVACOR) 20 MG tablet Take 20 mg by mouth every evening.     Family History    None       Tobacco Use    Smoking status: Never Smoker    Smokeless tobacco: Never Used   Substance and Sexual Activity    Alcohol use: Not on file    Drug use: Not on file    Sexual activity: Not on file     Review of Systems   Unable to perform ROS: Dementia     Objective:     Vital Signs (Most Recent):  Temp: 98.2 °F (36.8 °C) (12/31/21 1139)  Pulse: 104 (12/31/21 1915)  Resp: 20 (12/31/21 1915)  BP: (!) 168/97 (12/31/21 1915)  SpO2: 98 % (12/31/21 1915) Vital Signs (24h Range):  Temp:  [98.2 °F (36.8 °C)] 98.2 °F (36.8 °C)  Pulse:  [] 104  Resp:  [14-28] 20  SpO2:  [87 %-99 %] 98 %  BP: ()/() 168/97     Weight: 95.3 kg (210 lb)  Body mass index is 30.13 kg/m².    Physical Exam  Vitals and nursing note reviewed.   Constitutional:       General: He is not in acute distress.     Appearance: He is normal weight. He is not toxic-appearing.   HENT:      Head: Normocephalic and atraumatic.      Mouth/Throat:      Mouth: Mucous membranes are dry.      Pharynx: Oropharynx is clear.   Eyes:      Extraocular Movements: Extraocular movements intact.      Pupils: Pupils are equal, round, and reactive to light.   Cardiovascular:      Rate and Rhythm: Normal rate. Rhythm irregular.      Pulses: Normal pulses.      Heart sounds: Normal heart sounds. No murmur heard.  No gallop.    Pulmonary:      Effort: Pulmonary effort is normal.      Breath sounds: Normal breath sounds. No wheezing or rales.   Abdominal:      General: Abdomen is flat. Bowel sounds are  normal.      Palpations: Abdomen is soft.      Tenderness: There is no right CVA tenderness or left CVA tenderness.      Comments: Positive suprapubic tenderness   Musculoskeletal:      Cervical back: Normal range of motion and neck supple. No rigidity.      Right lower leg: No edema.      Left lower leg: No edema.   Skin:     General: Skin is warm.      Capillary Refill: Capillary refill takes 2 to 3 seconds.      Findings: Bruising and rash present.   Neurological:      Mental Status: Mental status is at baseline.           CRANIAL NERVES     CN III, IV, VI   Pupils are equal, round, and reactive to light.       Significant Labs: All pertinent labs within the past 24 hours have been reviewed.    Significant Imaging: I have reviewed all pertinent imaging results/findings within the past 24 hours.    Assessment/Plan:     * Acute cystitis without hematuria  - Hypotensive initially, U/A with WBC/many bacteria/RBC. Urine with visible sediment and cloudy   > Low concern for pyelonephritis or sepsis at this time  - S/p Zosyn in ED  - Start IV Rocephin daily  - UCx, BCx x 2 pending  - S/p 2L IVF bolus in ED  - Telemetry  - Lactate negative      Weakness of both lower extremities  - Having what sounds to be subacute lower extremity weakness. Now to the point of 100% assistance. In the ED, staff tried to walk patient, but he could not even stand with two people helping. Low concern for inflammatory disorder. Could be 2/2 UTI but prudent to discuss NPH (given CTh findings) and check reversible causes (CK, B12, folate)  - Will obtain CK, B12, folate levels  - Treat UTI as above  - Hold on autoimmune work-up  - Neurology consulted in ED; appreciate assistance  - High fall risk rpecautions      Acute kidney injury superimposed on CKD  - Likely pre-renal; BUN 27/sCr 1.5. Baseline appears to be 1.2 based on care everywhere labs  - Now s/p 2L IVF in ED  - Repeat BMP in AM  - Avoiding nephrotoxic drugs and ACEi for now  - Treat UTI  as above      Paroxysmal atrial fibrillation  - ECG appears to be coarse Afib but rate controlled. However, lots of artifact and no history of it  - Will repeat ECG  - Telemetry  - Hold on AC given falls and would benefit from discussion with family      Alzheimer disease  - Continue home donepezil and memantine      Primary hypertension  - Continue Norvasc 5mg daily  - Holding ACEi for CLEM  - PRNs for SBP > 180      VTE Risk Mitigation (From admission, onward)         Ordered     heparin (porcine) injection 5,000 Units  Every 8 hours         12/31/21 1847     IP VTE HIGH RISK PATIENT  Once         12/31/21 1847     Place sequential compression device  Until discontinued         12/31/21 1847                   Oswaldo Roberto MD  Department of Hospital Medicine   [facility] [place] - Emergency Dept

## 2022-01-01 NOTE — ASSESSMENT & PLAN NOTE
- ECG appears to be coarse Afib but rate controlled. However, lots of artifact and no history of it  - Will repeat ECG  - Telemetry  - Hold on AC given falls and would benefit from discussion with family

## 2022-01-01 NOTE — SUBJECTIVE & OBJECTIVE
Past Medical History:   Diagnosis Date    Hyperlipidemia     Hypertension        Past Surgical History:   Procedure Laterality Date    TONSILLECTOMY      TOTAL KNEE ARTHROPLASTY         Review of patient's allergies indicates:  No Known Allergies    No current facility-administered medications on file prior to encounter.     Current Outpatient Medications on File Prior to Encounter   Medication Sig    amlodipine-benazepril 5-10 mg (LOTREL) 5-10 mg per capsule TK 1 C PO D    furosemide (LASIX) 40 MG tablet Take 40 mg by mouth once daily.    lovastatin (MEVACOR) 20 MG tablet Take 20 mg by mouth every evening.     Family History    None       Tobacco Use    Smoking status: Never Smoker    Smokeless tobacco: Never Used   Substance and Sexual Activity    Alcohol use: Not on file    Drug use: Not on file    Sexual activity: Not on file     Review of Systems   Unable to perform ROS: Dementia     Objective:     Vital Signs (Most Recent):  Temp: 98.2 °F (36.8 °C) (12/31/21 1139)  Pulse: 104 (12/31/21 1915)  Resp: 20 (12/31/21 1915)  BP: (!) 168/97 (12/31/21 1915)  SpO2: 98 % (12/31/21 1915) Vital Signs (24h Range):  Temp:  [98.2 °F (36.8 °C)] 98.2 °F (36.8 °C)  Pulse:  [] 104  Resp:  [14-28] 20  SpO2:  [87 %-99 %] 98 %  BP: ()/() 168/97     Weight: 95.3 kg (210 lb)  Body mass index is 30.13 kg/m².    Physical Exam  Vitals and nursing note reviewed.   Constitutional:       General: He is not in acute distress.     Appearance: He is normal weight. He is not toxic-appearing.   HENT:      Head: Normocephalic and atraumatic.      Mouth/Throat:      Mouth: Mucous membranes are dry.      Pharynx: Oropharynx is clear.   Eyes:      Extraocular Movements: Extraocular movements intact.      Pupils: Pupils are equal, round, and reactive to light.   Cardiovascular:      Rate and Rhythm: Normal rate. Rhythm irregular.      Pulses: Normal pulses.      Heart sounds: Normal heart sounds. No murmur heard.  No  gallop.    Pulmonary:      Effort: Pulmonary effort is normal.      Breath sounds: Normal breath sounds. No wheezing or rales.   Abdominal:      General: Abdomen is flat. Bowel sounds are normal.      Palpations: Abdomen is soft.      Tenderness: There is no right CVA tenderness or left CVA tenderness.      Comments: Positive suprapubic tenderness   Musculoskeletal:      Cervical back: Normal range of motion and neck supple. No rigidity.      Right lower leg: No edema.      Left lower leg: No edema.   Skin:     General: Skin is warm.      Capillary Refill: Capillary refill takes 2 to 3 seconds.      Findings: Bruising and rash present.   Neurological:      Mental Status: Mental status is at baseline.           CRANIAL NERVES     CN III, IV, VI   Pupils are equal, round, and reactive to light.       Significant Labs: All pertinent labs within the past 24 hours have been reviewed.    Significant Imaging: I have reviewed all pertinent imaging results/findings within the past 24 hours.

## 2022-01-01 NOTE — ASSESSMENT & PLAN NOTE
- Having what sounds to be subacute lower extremity weakness. Now to the point of 100% assistance. In the ED, staff tried to walk patient, but he could not even stand with two people helping. Low concern for inflammatory disorder. Could be 2/2 UTI but prudent to discuss NPH (given CTh findings) and check reversible causes (CK, B12, folate)  - Will obtain CK, B12, folate levels  - Treat UTI as above  - Hold on autoimmune work-up  - Neurology consulted in ED; appreciate assistance  - High fall risk rpecautions

## 2022-01-01 NOTE — PLAN OF CARE
[place] [facility]  Initial Discharge Assessment       Primary Care Provider: Martine Garces MD    Admission Diagnosis: Weakness [R53.1]  Fall [W19.XXXA]  Rapidly progressive weakness [R53.1]  Acute cystitis with hematuria [N30.01]  Multiple skin tears [T14.8XXA]  Thyroid nodule greater than or equal to 1 cm in diameter incidentally noted on imaging study [E04.1]    Admission Date: 12/31/2021  Expected Discharge Date:     Discharge Barriers Identified: None    Payor: "[employer] MEDICARE / Plan: [employer] MEDICARE HMO / Product Type: Capitation /     Extended Emergency Contact Information  Primary Emergency Contact: beryl gerrado  Mobile Phone: [phone]  Relation: Relative  Preferred language: English   needed? No    Discharge Plan A: Home  Discharge Plan B:  (tbd)      [id number] - [place] - [address] AT [facility] [address]  [address]  [place] [address]  Phone: [phone] Fax: [phone]      Initial Assessment (most recent)     Adult Discharge Assessment - 01/01/22 1556        Discharge Assessment    Assessment Type Discharge Planning Assessment     Confirmed/corrected address, phone number and insurance Yes     Confirmed Demographics Correct on Facesheet     Source of Information family     Communicated JACQUELINE with patient/caregiver Date not available/Unable to determine     Reason For Admission Acute cystitis without hematuria     Lives With alone     Do you expect to return to your current living situation? Yes     Do you have help at home or someone to help you manage your care at home? Yes     Who are your caregiver(s) and their phone number(s)? Beryl Gerardo (HCPOA) [phone]     Prior to hospitilization cognitive status: Alert/Oriented     Current cognitive status: Alert/Oriented     Walking or Climbing Stairs Difficulty ambulation difficulty, assistance 1 person     Mobility Management multiple falls     Dressing/Bathing  Difficulty bathing difficulty, assistance 1 person     Equipment Currently Used at Home none     Patient currently being followed by outpatient case management? No     Do you currently have service(s) that help you manage your care at home? No     Do you take prescription medications? Yes     Do you have prescription coverage? Yes     Coverage [employer]     Do you have any problems affording any of your prescribed medications? No     Is the patient taking medications as prescribed? yes     Who is going to help you get home at discharge? Family     How do you get to doctors appointments? family or friend will provide     Are you on dialysis? No     Discharge Plan A Home     Discharge Plan B --   tbd    DME Needed Upon Discharge  --   tbd    Discharge Plan discussed with: POA     Name(s) and Number(s) beryl Gonzáles [phone]     Discharge Barriers Identified None

## 2022-01-01 NOTE — CONSULTS
[facility]  Neurology  Consult Note    Patient Name: Timothy Roldan  MRN: [id number]  Admission Date: 12/31/2021  Hospital Length of Stay: 1 days  Code Status: Full Code   Attending Provider: Amber Fonseca MD   Consulting Provider: Alok Abraham MD  Primary Care Physician: Primary Doctor No  Principal Problem:Acute cystitis without hematuria    Inpatient consult to Neurology  Consult performed by: Alok Abraham MD  Consult ordered by: Guero Box MD        Subjective:     Chief Complaint: AMS     HPI: 82 y/o male was brought here by relative after falling at home. At baseline t has difficulty walking but it is reported that after fall he has not been able to stand. Ms. Roldan has had a decline in function for at least a month. No reported seizures, unilateral weakness, hallucinations    Past Medical History:   Diagnosis Date    Hyperlipidemia     Hypertension        Past Surgical History:   Procedure Laterality Date    TONSILLECTOMY      TOTAL KNEE ARTHROPLASTY         Review of patient's allergies indicates:  No Known Allergies    Current Neurological Medications:     No current facility-administered medications on file prior to encounter.     Current Outpatient Medications on File Prior to Encounter   Medication Sig    amlodipine-benazepril 5-10 mg (LOTREL) 5-10 mg per capsule TK 1 C PO D    furosemide (LASIX) 40 MG tablet Take 40 mg by mouth once daily.    lovastatin (MEVACOR) 20 MG tablet Take 20 mg by mouth every evening.      Family History    None       Tobacco Use    Smoking status: Never Smoker    Smokeless tobacco: Never Used   Substance and Sexual Activity    Alcohol use: Not on file    Drug use: Not on file    Sexual activity: Not on file     Review of Systems   Unable to perform ROS: Mental status change     Objective:     Vital Signs (Most Recent):  Temp: 98.3 °F (36.8 °C) (01/01/22 1120)  Pulse: 84 (01/01/22 1120)  Resp: 17 (01/01/22 1120)  BP: 110/70 (01/01/22  1120)  SpO2: 97 % (01/01/22 1120) Vital Signs (24h Range):  Temp:  [98 °F (36.7 °C)-98.7 °F (37.1 °C)] 98.3 °F (36.8 °C)  Pulse:  [] 84  Resp:  [14-28] 17  SpO2:  [87 %-100 %] 97 %  BP: ()/() 110/70     Weight: 76.1 kg (167 lb 12.3 oz)  Body mass index is 24.07 kg/m².    Physical Exam  Constitutional:       General: He is not in acute distress.  HENT:      Head: Normocephalic.      Right Ear: External ear normal.      Left Ear: External ear normal.   Eyes:      General:         Right eye: No discharge.         Left eye: No discharge.   Cardiovascular:      Rate and Rhythm: Normal rate.   Pulmonary:      Breath sounds: Normal breath sounds.   Abdominal:      Palpations: Abdomen is soft.   Musculoskeletal:         General: No tenderness.      Cervical back: Neck supple.   Skin:     Findings: Bruising present.         NEUROLOGICAL EXAMINATION:     MENTAL STATUS   Level of consciousness: alert       Oriented to self  Speech is unintelligible at times  Follows simple commands inconsistently     CRANIAL NERVES     CN III, IV, VI   Right pupil: Size: 2 mm.   Left pupil: Size: 2 mm.   Nystagmus: none   Conjugate gaze: present    CN VII   Right facial weakness: none  Left facial weakness: none    CN XII   Tongue deviation: none    MOTOR EXAM        AROM of UE's partially against gravity     SENSORY EXAM        Grimaces and partial withdraws UE's and LE's       Significant Labs:   CBC:   Recent Labs   Lab 12/31/21  1446   WBC 9.53   HGB 14.6   HCT 45.3   *     CMP:   Recent Labs   Lab 12/31/21  1446         K 3.7      CO2 29   BUN 27*   CREATININE 1.5*   CALCIUM 9.9   PROT 7.4   ALBUMIN 3.5   BILITOT 1.0   ALKPHOS 103   AST 58*   ALT 24   ANIONGAP 9   EGFRNONAA 44*     Urine Culture:   Recent Labs   Lab 12/31/21  1448   LABURIN No significant isolate to date     Urine Studies:   Recent Labs   Lab 12/31/21  1448   COLORU Orange*   APPEARANCEUA Cloudy*   PHUR 6.0   SPECGRAV 1.020    PROTEINUA 2+*   GLUCUA Negative   KETONESU 1+*   BILIRUBINUA Negative   OCCULTUA 3+*   NITRITE Negative   UROBILINOGEN Negative   LEUKOCYTESUR 3+*   RBCUA >100*   WBCUA >100*   BACTERIA Many*   HYALINECASTS 0       Significant Imaging: I have reviewed all pertinent imaging results/findings within the past 24 hours.    Head CT  Impression:     No CT evidence of acute intracranial abnormality.     Generalized cerebral volume loss with ex vacuo dilation of the ventricles and sulci, though ventricular slides is more pronounced than expected for degree of volume loss.  This could be related to central volume loss or potentially normal pressure hydrocephalus in the appropriate clinical setting.        Electronically signed by: Oswaldo Keita MD  Date:                                            12/31/2021  Time:                                           13:18    Assessment and Plan:     80 y/o male consulted for AMS    1. Acute encephalopathy: possibly from UTI.    Head CT showed no acute abnormalities but generalized volume loss and large ventricles. This would correlate with his Hx of dementia.   Exam is limited due AMS.   -Antibiotics per primary team.   -Avoid benzodiazepines, opioids.               Active Diagnoses:    Diagnosis Date Noted POA    PRINCIPAL PROBLEM:  Acute cystitis without hematuria [N30.00] 12/31/2021 Unknown    Weakness of both lower extremities [R29.898] 12/31/2021 Unknown    Primary hypertension [I10] 12/31/2021 Unknown    Acute kidney injury superimposed on CKD [N17.9, N18.9] 12/31/2021 Unknown    Alzheimer disease [G30.9, F02.80] 12/31/2021 Unknown    Paroxysmal atrial fibrillation [I48.0] 12/31/2021 Unknown      Problems Resolved During this Admission:       VTE Risk Mitigation (From admission, onward)         Ordered     heparin (porcine) injection 5,000 Units  Every 8 hours         12/31/21 1847     IP VTE HIGH RISK PATIENT  Once         12/31/21 1847     Place sequential compression  device  Until discontinued         12/31/21 4715                Thank you for your consult. I will follow-up with patient. Please contact us if you have any additional questions.    Alok Abraham MD  Neurology  [facility]

## 2022-01-01 NOTE — SUBJECTIVE & OBJECTIVE
Interval History: cultures so far no growth    Review of Systems   Respiratory: Negative.    Cardiovascular: Negative.    Gastrointestinal: Negative.      Objective:     Vital Signs (Most Recent):  Temp: 98.6 °F (37 °C) (01/01/22 1547)  Pulse: 97 (01/01/22 1547)  Resp: 17 (01/01/22 1547)  BP: 136/75 (01/01/22 1547)  SpO2: 98 % (01/01/22 1547) Vital Signs (24h Range):  Temp:  [98 °F (36.7 °C)-98.7 °F (37.1 °C)] 98.6 °F (37 °C)  Pulse:  [] 97  Resp:  [14-28] 17  SpO2:  [93 %-100 %] 98 %  BP: (110-173)/() 136/75     Weight: 76.1 kg (167 lb 12.3 oz)  Body mass index is 24.07 kg/m².    Intake/Output Summary (Last 24 hours) at 1/1/2022 1634  Last data filed at 1/1/2022 1300  Gross per 24 hour   Intake 148.49 ml   Output 200 ml   Net -51.51 ml      Physical Exam  Vitals and nursing note reviewed.   Constitutional:       General: He is not in acute distress.     Appearance: He is not toxic-appearing.   Cardiovascular:      Rate and Rhythm: Normal rate. Rhythm irregular.      Pulses: Normal pulses.      Heart sounds: Normal heart sounds. No murmur heard.  No gallop.    Pulmonary:      Effort: Pulmonary effort is normal.      Breath sounds: Normal breath sounds. No wheezing or rales.   Abdominal:      General: Abdomen is flat. Bowel sounds are normal.      Palpations: Abdomen is soft.      Tenderness: There is no right CVA tenderness or left CVA tenderness.   Musculoskeletal:      Right lower leg: No edema.      Left lower leg: No edema.   Skin:     General: Skin is warm.      Capillary Refill: Capillary refill takes 2 to 3 seconds.      Findings: Bruising and rash present.   Neurological:      Mental Status: He is alert.      Comments: Disoriented to place, time and situation. Oriented to person         Significant Labs: All pertinent labs within the past 24 hours have been reviewed.    Significant Imaging: I have reviewed all pertinent imaging results/findings within the past 24 hours.  I have reviewed and  interpreted all pertinent imaging results/findings within the past 24 hours.

## 2022-01-01 NOTE — ASSESSMENT & PLAN NOTE
- Likely pre-renal; BUN 27/sCr 1.5. Baseline appears to be 1.2 based on care everywhere labs  - Now s/p 2L IVF in ED  - Repeat BMP in AM  - Avoiding nephrotoxic drugs and ACEi for now  - Treat UTI as above

## 2022-01-02 LAB
ALBUMIN SERPL BCP-MCNC: 2.8 G/DL (ref 3.5–5.2)
ALP SERPL-CCNC: 82 U/L (ref 55–135)
ALT SERPL W/O P-5'-P-CCNC: 26 U/L (ref 10–44)
ANION GAP SERPL CALC-SCNC: 10 MMOL/L (ref 8–16)
AST SERPL-CCNC: 38 U/L (ref 10–40)
BACTERIA UR CULT: NORMAL
BILIRUB SERPL-MCNC: 0.7 MG/DL (ref 0.1–1)
BUN SERPL-MCNC: 22 MG/DL (ref 8–23)
CALCIUM SERPL-MCNC: 8.8 MG/DL (ref 8.7–10.5)
CHLORIDE SERPL-SCNC: 110 MMOL/L (ref 95–110)
CK SERPL-CCNC: 547 U/L (ref 20–200)
CO2 SERPL-SCNC: 27 MMOL/L (ref 23–29)
CREAT SERPL-MCNC: 1.1 MG/DL (ref 0.5–1.4)
EST. GFR  (AFRICAN AMERICAN): >60 ML/MIN/1.73 M^2
EST. GFR  (NON AFRICAN AMERICAN): >60 ML/MIN/1.73 M^2
GLUCOSE SERPL-MCNC: 95 MG/DL (ref 70–110)
POTASSIUM SERPL-SCNC: 3.7 MMOL/L (ref 3.5–5.1)
PROT SERPL-MCNC: 6.3 G/DL (ref 6–8.4)
SODIUM SERPL-SCNC: 147 MMOL/L (ref 136–145)

## 2022-01-02 PROCEDURE — 94761 N-INVAS EAR/PLS OXIMETRY MLT: CPT

## 2022-01-02 PROCEDURE — 36415 COLL VENOUS BLD VENIPUNCTURE: CPT | Performed by: INTERNAL MEDICINE

## 2022-01-02 PROCEDURE — 97161 PT EVAL LOW COMPLEX 20 MIN: CPT

## 2022-01-02 PROCEDURE — 25000003 PHARM REV CODE 250: Performed by: INTERNAL MEDICINE

## 2022-01-02 PROCEDURE — 97535 SELF CARE MNGMENT TRAINING: CPT

## 2022-01-02 PROCEDURE — 82550 ASSAY OF CK (CPK): CPT | Performed by: INTERNAL MEDICINE

## 2022-01-02 PROCEDURE — 11000001 HC ACUTE MED/SURG PRIVATE ROOM

## 2022-01-02 PROCEDURE — 92610 EVALUATE SWALLOWING FUNCTION: CPT

## 2022-01-02 PROCEDURE — 97165 OT EVAL LOW COMPLEX 30 MIN: CPT

## 2022-01-02 PROCEDURE — 63600175 PHARM REV CODE 636 W HCPCS: Performed by: INTERNAL MEDICINE

## 2022-01-02 PROCEDURE — 80053 COMPREHEN METABOLIC PANEL: CPT | Performed by: INTERNAL MEDICINE

## 2022-01-02 RX ORDER — AMOXICILLIN 250 MG
1 CAPSULE ORAL DAILY
Status: DISCONTINUED | OUTPATIENT
Start: 2022-01-02 | End: 2022-01-10 | Stop reason: HOSPADM

## 2022-01-02 RX ORDER — DEXTROSE MONOHYDRATE 50 MG/ML
INJECTION, SOLUTION INTRAVENOUS CONTINUOUS
Status: ACTIVE | OUTPATIENT
Start: 2022-01-02 | End: 2022-01-02

## 2022-01-02 RX ORDER — QUETIAPINE FUMARATE 25 MG/1
25 TABLET, FILM COATED ORAL NIGHTLY
Status: DISCONTINUED | OUTPATIENT
Start: 2022-01-02 | End: 2022-01-02

## 2022-01-02 RX ORDER — TALC
6 POWDER (GRAM) TOPICAL NIGHTLY
Status: DISCONTINUED | OUTPATIENT
Start: 2022-01-02 | End: 2022-01-10 | Stop reason: HOSPADM

## 2022-01-02 RX ADMIN — TAMSULOSIN HYDROCHLORIDE 0.4 MG: 0.4 CAPSULE ORAL at 09:01

## 2022-01-02 RX ADMIN — AMLODIPINE BESYLATE 5 MG: 5 TABLET ORAL at 09:01

## 2022-01-02 RX ADMIN — CEFTRIAXONE 1 G: 1 INJECTION, SOLUTION INTRAVENOUS at 08:01

## 2022-01-02 RX ADMIN — SENNOSIDES AND DOCUSATE SODIUM 1 TABLET: 50; 8.6 TABLET ORAL at 09:01

## 2022-01-02 RX ADMIN — HEPARIN SODIUM 5000 UNITS: 5000 INJECTION INTRAVENOUS; SUBCUTANEOUS at 10:01

## 2022-01-02 RX ADMIN — QUETIAPINE 12.5 MG: 25 TABLET ORAL at 08:01

## 2022-01-02 RX ADMIN — MEMANTINE 10 MG: 10 TABLET ORAL at 08:01

## 2022-01-02 RX ADMIN — HEPARIN SODIUM 5000 UNITS: 5000 INJECTION INTRAVENOUS; SUBCUTANEOUS at 02:01

## 2022-01-02 RX ADMIN — PRAVASTATIN SODIUM 20 MG: 10 TABLET ORAL at 09:01

## 2022-01-02 RX ADMIN — HEPARIN SODIUM 5000 UNITS: 5000 INJECTION INTRAVENOUS; SUBCUTANEOUS at 05:01

## 2022-01-02 RX ADMIN — MEMANTINE 10 MG: 10 TABLET ORAL at 09:01

## 2022-01-02 RX ADMIN — DEXTROSE: 5 SOLUTION INTRAVENOUS at 11:01

## 2022-01-02 RX ADMIN — SODIUM CHLORIDE: 0.9 INJECTION, SOLUTION INTRAVENOUS at 06:01

## 2022-01-02 RX ADMIN — Medication 6 MG: at 08:01

## 2022-01-02 RX ADMIN — DONEPEZIL HYDROCHLORIDE 10 MG: 10 TABLET ORAL at 08:01

## 2022-01-02 NOTE — ASSESSMENT & PLAN NOTE
With rhabdomyolysis. Renal function stable. CPK improving. Continue intravenous fluids. Hold diuretics. I cannot find reason for furosemide use at home. HCPOA is not sure either. Will look at [facility] documentation for an Echo

## 2022-01-02 NOTE — PT/OT/SLP EVAL
Physical Therapy Evaluation    Patient Name:  Timothy Roldan   MRN:  [id number]    Recommendations:     Discharge Recommendations:  nursing facility, skilled   Discharge Equipment Recommendations:  (Ongoing assessment pending pt progress)   Barriers to discharge: Inaccessible home, Decreased caregiver support and Pt is not functioning at baseline and is at increased risk fo falls, morbidity, and readmission if he returns home alone at present time    Assessment:     Timothy Roldan is a 81 y.o. male admitted with a medical diagnosis of Acute cystitis without hematuria.  He presents with the following impairments/functional limitations:  weakness,impaired functional mobilty,decreased safety awareness,impaired coordination,impaired cognition,impaired endurance,gait instability,decreased coordination,pain,impaired fine motor,impaired balance,decreased upper extremity function,impaired self care skills,decreased lower extremity function,decreased ROM .    Rehab Prognosis: Good; patient would benefit from acute skilled PT services to address these deficits and reach maximum level of function.    Recent Surgery: * No surgery found *      Plan:     During this hospitalization, patient to be seen 5 x/week to address the identified rehab impairments via gait training,therapeutic activities,therapeutic exercises and progress toward the following goals:    · Plan of Care Expires:  01/02/22    Subjective     Chief Complaint: pain in back from being mishandled by Nursing staff.  Patient/Family Comments/goals: Pt agreeable to eval  Pain/Comfort:  Pain Rating 1:  (Unable to rate)  Location 1: back  Pain Addressed 2: Reposition,Distraction,Cessation of Activity,Nurse notified    Patients cultural, spiritual, Gnosticism conflicts given the current situation: no    Living Environment:  Pt lives alone?  Son and Niece come and check on him? Pt is a poor historian  Prior to admission, patients level of function was unknown, pt states that  he has been getting weaker the last month.  Equipment used at home: rollator,cane, straight,walker, rolling.  DME owned (not currently used): single point cane.  Upon discharge, patient will have assistance from son and niece?.    Objective:     Communicated with nsg prior to session.  Patient found HOB elevated with bed alarm,peripheral IV,telemetry  upon PT entry to room.    General Precautions: Standard, fall   Orthopedic Precautions:N/A   Braces: N/A  Respiratory Status: Room air    Exams:  · Cognitive Exam:  Patient is oriented to Person and Place  · Fine Motor Coordination:    · -       Impaired  rapid alt toe tap  · Gross Motor Coordination:  impaired 2/2 rigidity, weakness, and deconditioning  · Postural Exam:  Patient presented with the following abnormalities:    · -       Rounded shoulders  · -       Forward head  · -       Abnormal trunk flexion  · -       B knee flexion  · Sensation:    · -       Intact  light/touch B LE's  · Skin Integrity/Edema:      · -       Skin integrity: Visible skin intact  · RLE ROM: knee ext limited approx 25*  · RLE Strength: WFL  · LLE ROM: knee ext limited approx 25*  · LLE Strength: WFL    Functional Mobility:  · Bed Mobility:     · Scooting: moderate assistance  · Supine to Sit: moderate assistance  · Sit to Supine: moderate assistance  · Transfers:     · Sit to Stand:  moderate assistance with rolling walker  · Gait: Approx 5 sidesteps at OB with RW and Mod A with VC/TC for distribution of weight through UE's to walker and icnreased trunk ext  · Balance: Fair+ sit, Fair/Fair- stand     Therapeutic Activities and Exercises:   Donned Heel protector boots    AM-PAC 6 CLICK MOBILITY  Total Score:10     Patient left HOB elevated with all lines intact, call button in reach, bed alarm on and nsg notified.    GOALS:   Multidisciplinary Problems     Physical Therapy Goals        Problem: Physical Therapy Goal    Goal Priority Disciplines Outcome Goal Variances Interventions    Physical Therapy Goal     PT, PT/OT Ongoing, Progressing     Description: Goals to be met by: 21     Patient will increase functional independence with mobility by performin. Supine to sit with Contact Guard Assistance  2. Sit to stand transfer with Contact Guard Assistance  3. Bed to chair transfer with Contact Guard Assistance   4. Gait  x 10 feet with Contact Guard Assistance using Rolling Walker.   5. Lower extremity exercise program x10 reps per handout, with assistance as needed                     History:     Past Medical History:   Diagnosis Date    Hyperlipidemia     Hypertension        Past Surgical History:   Procedure Laterality Date    TONSILLECTOMY      TOTAL KNEE ARTHROPLASTY         Time Tracking:     PT Received On: 22  PT Start Time: 1154     PT Stop Time: 1225  PT Total Time (min): 31 min     Billable Minutes: Evaluation 15 Co-evaluation with OT      2022

## 2022-01-02 NOTE — ASSESSMENT & PLAN NOTE
With dementia. Evaluated by neurology. No LP recommended   I am concerned about use of antihypertensives and diuretics at home contributing to his function. Also, sleep/wake cycle is off (sleeps most of the day, eats brunch and drinks coffee late a night. Goes to sleep after 1 AM usually). Will start melatonin and seroquel tonight to assist with sleep and hopefully help with appetite/function during daytime. On amlodipine for BP but holding diuretics. B12 WNL. Thiamine pending

## 2022-01-02 NOTE — PT/OT/SLP EVAL
"Occupational Therapy   Evaluation    Name: Timothy Roldan  MRN: [id number]  Admitting Diagnosis:  Acute cystitis without hematuria  Recent Surgery: * No surgery found *      Recommendations:     Discharge Recommendations: nursing facility, skilled  Discharge Equipment Recommendations:   (TBD)  Barriers to discharge:   (requires assist with self care and mobility)    Assessment:     Timothy Roldan is a 81 y.o. male with a medical diagnosis of Acute cystitis without hematuria. Performance deficits affecting function: weakness,impaired endurance,impaired self care skills,impaired functional mobilty,gait instability,impaired balance,decreased upper extremity function,decreased lower extremity function,decreased safety awareness,impaired skin,decreased ROM,impaired cardiopulmonary response to activity.    The patient participated in OT eval with encouragement. The patient required increased time to perform tasks and c/o "pain all over" from being turned from side to side. The patient required mod assist overall for mobility. The patient was able to feed himself with set up assist with tremors present in BUE. The patient will benefit from OT to address functional deficits.    Rehab Prognosis: Good and Fair; patient would benefit from acute skilled OT services to address these deficits and reach maximum level of function.       Plan:     Patient to be seen 5 x/week to address the above listed problems via self-care/home management,therapeutic activities,therapeutic exercises  · Plan of Care Expires: 01/16/22  · Plan of Care Reviewed with: patient    Subjective     Chief Complaint: sore from being rolled in bed  Patient/Family Comments/goals: agreeable to OT eval    Occupational Profile:  Living Environment: lives alone in a SS HOUSE, no [place]?  Previous level of function: Patient had difficulty giving a functional hx. The patient has assist from his niece or son for meals and shopping. The patient reports dressing " himself.  Roles and Routines: does not drive, history of falls  Equipment Used at Home:  cane, straight,rollator,walker, rolling  Assistance upon Discharge: son or niece    Pain/Comfort:  · Pain Rating 1: 0/10    Patients cultural, spiritual, Jew conflicts given the current situation: no    Objective:     Communicated with: Amy badillo prior to session.  Patient found HOB elevated with bed alarm,brower catheter,telemetry,peripheral IV upon OT entry to room.    General Precautions: Standard, fall   Orthopedic Precautions:N/A   Braces: N/A  Respiratory Status: Room air    Occupational Performance:    Bed Mobility:    · Patient completed Rolling/Turning to Left with  moderate assistance  · Patient completed Rolling/Turning to Right with moderate assistance  · Patient completed Scooting/Bridging with moderate assistance  · Patient completed Supine to Sit with moderate assistance  · Patient completed Sit to Supine with moderate assistance    Functional Mobility/Transfers:  · Patient completed Sit <> Stand Transfer with moderate assistance and of 2 persons  with  rolling walker   · Functional Mobility: The patient side stepped along the EOB with a RW and mod assist    Activities of Daily Living:  · Feeding:  set up assist and verbal cues to eat lunch in elevated bed    · Grooming: set up assist to wipe hands    · Upper Body Dressing: maximal assistance to don/doff back gown  · Lower Body Dressing: dependence to don socks    Cognitive/Visual Perceptual:  Cognitive/Psychosocial Skills:     -       Oriented to: Person and Place   -       Follows Commands/attention:Follows one-step commands and Follows two-step commands  -       Communication: clear/fluent  -       Memory: Impaired STM  -       Safety awareness/insight to disability: impaired   -       Mood/Affect/Coping skills/emotional control: Appropriate to situation    Physical Exam:  Balance: -       fair+/fair  Postural examination/scapula alignment:    -        Rounded shoulders  -       Forward head  Skin integrity: wounds with dressing to B elbow  Edema:  B elbows  Sensation:    -       Intact  BUE  Upper Extremity Range of Motion:     -       Right Upper Extremity: WFL  -       Left Upper Extremity: WFL  Upper Extremity Strength:    -       Right Upper Extremity: WFL  -       Left Upper Extremity: WFL   Strength:    -       Right Upper Extremity: WFL  -       Left Upper Extremity: WFL  Fine Motor Coordination:    -       Intact but delayed with tremors present    AMPAC 6 Click ADL:  AMPAC Total Score: 12    Treatment & Education:  · Participated in OT eval  · Educated re: OT role and POC  · Discussed assistance and DME availalable at home.   · Participated in self care tasks as noted above   Education:    Patient left HOB elevated with all lines intact, call button in reach, bed alarm on and nurseAmy notified    GOALS:   Multidisciplinary Problems     Occupational Therapy Goals        Problem: Occupational Therapy Goal    Goal Priority Disciplines Outcome Interventions   Occupational Therapy Goal     OT, PT/OT Ongoing, Progressing    Description: Goals to be met by: 1/16/22    Patient will increase functional independence with ADLs by performing:    Feeding with Modified [place].  UE Dressing with Modified [place].  LE Dressing with Modified [place].  Grooming while seated with Modified [place].  Toileting from toilet with Modified [place] and Set-up Assistance for hygiene and clothing management.   Rolling to Bilateral with Modified [place].   Supine to sit with Modified [place].  Step transfer with Modified [place]  Toilet transfer to toilet with Modified [place].  Upper extremity exercise program x15 reps per handout, with independence.                     History:     Past Medical History:   Diagnosis Date    Hyperlipidemia     Hypertension        Past Surgical History:   Procedure Laterality Date     TONSILLECTOMY      TOTAL KNEE ARTHROPLASTY         Time Tracking:     OT Date of Treatment: 01/02/22  OT Start Time: 1152  OT Stop Time: 1225  OT Total Time (min): 33 min    Billable Minutes:Evaluation 20 (with PT)  Self Care/Home Management 13  Total Time 33    1/2/2022

## 2022-01-02 NOTE — NURSING
Report received from Ana SIEGEL. Patient remains free from falls and injury. No distress noted. VSS.  Bed locked and in low position; safety maintained. Call light in reach. White board updated, and explained. No complaint of pain, n/v, diarrhea, or SOB.  Will continue to monitor, will continue with plan of care.

## 2022-01-02 NOTE — PROGRESS NOTES
"[facility]  Progress Note    Patient Name: Timothy Roldan  MRN: [id number]  Patient Class: IP- Inpatient   Admission Date: 12/31/2021  Length of Stay: 2 days  Attending Physician: Amber Fonseca MD  Primary Care Provider: Martine Garces MD        Subjective:     Principal Problem:Acute cystitis without hematuria        HPI:  82 yo male with a PMHx of Alzheimer's disease (+/- Parkinson?), HTN, BPH with condom catheter who was brought here by his niece due to a ground level fall and significant weakness. Patient is a poor historian due to dementia. Was confused about "blood thinners" being a "type of animal". Per niece he has been progressively getting weaker over the last month. Went from walking without much help to using a cane to having multiple falls. He is now to the point that he needs almost full assistance to move. Otherwise, they haven't noticed much other symptoms. Confusion seems to be around his baseline.    In the ED, labs were remarkable for a UTI on U/A with extreme sediment in his urine and a mild CLEM (sCr 1.5 with BUN 27; baseline sCr is 1.2-1.3 per [facility] labs). CTH showing no acute abnormality but it was read as possible NPH. Neurology consulted and will see tomorrow. He was initially hypotensive but has become hypertensive with IVF boluses. Given IV Zosyn and admitted to floor.        Overview/Hospital Course:  No notes on file    Interval History: more alert and conversational but unaware he is in the hospital nor what brought him here. Has no complaints. Ate breakfast    Review of Systems   Constitutional: Negative.    Respiratory: Negative.    Cardiovascular: Negative.    Gastrointestinal: Negative.      Objective:     Vital Signs (Most Recent):  Temp: 98 °F (36.7 °C) (01/02/22 1131)  Pulse: 91 (01/02/22 1131)  Resp: 16 (01/02/22 1131)  BP: (!) 161/81 (01/02/22 1131)  SpO2: 98 % (01/02/22 1244) Vital Signs (24h Range):  Temp:  [97.3 °F (36.3 °C)-98.6 °F (37 °C)] 98 " °F (36.7 °C)  Pulse:  [80-98] 91  Resp:  [14-17] 16  SpO2:  [96 %-100 %] 98 %  BP: (133-175)/(75-87) 161/81     Weight: 76.1 kg (167 lb 12.3 oz)  Body mass index is 24.07 kg/m².    Intake/Output Summary (Last 24 hours) at 1/2/2022 1531  Last data filed at 1/2/2022 1230  Gross per 24 hour   Intake 2806.12 ml   Output 1450 ml   Net 1356.12 ml      Physical Exam  Vitals and nursing note reviewed.   Constitutional:       General: He is not in acute distress.     Appearance: He is not toxic-appearing.   Cardiovascular:      Rate and Rhythm: Normal rate and regular rhythm.      Pulses: Normal pulses.      Heart sounds: Normal heart sounds. No murmur heard.  No gallop.    Pulmonary:      Effort: Pulmonary effort is normal.      Breath sounds: Normal breath sounds. No wheezing or rales.   Abdominal:      General: Abdomen is flat. Bowel sounds are normal.      Palpations: Abdomen is soft.      Tenderness: There is no right CVA tenderness or left CVA tenderness.   Musculoskeletal:      Right lower leg: No edema.      Left lower leg: No edema.   Skin:     General: Skin is warm.      Capillary Refill: Capillary refill takes less than 2 seconds.      Findings: Bruising present.   Neurological:      Mental Status: He is alert.      Comments: Disoriented to place, time and situation. Oriented to person   Psychiatric:         Attention and Perception: Attention normal.         Mood and Affect: Mood normal.         Speech: Speech normal.         Behavior: Behavior normal.         Thought Content: Thought content normal.         Cognition and Memory: Cognition is impaired. Memory is impaired.      Comments: Interactive and makes eye contact during conversation         Significant Labs: All pertinent labs within the past 24 hours have been reviewed.    Significant Imaging: I have reviewed all pertinent imaging results/findings within the past 24 hours.  I have reviewed and interpreted all pertinent imaging results/findings within the  past 24 hours.      Assessment/Plan:      * Acute cystitis without hematuria  Agree with empiric ceftriaxone pending urine and blood culture results. Thus far no growth        Paroxysmal atrial fibrillation  Currently NSR. Relative contraindication to anticoagulation given unstable ambulation and frequent falls at home, as per my conversation with NISHANT. Is on DVT prophylaxis      Alzheimer disease  - Continue home donepezil and memantine      Acute kidney injury superimposed on CKD  With rhabdomyolysis. Renal function stable. CPK improving. Continue intravenous fluids. Hold diuretics. I cannot find reason for furosemide use at home. NISHANT is not sure either. Will look at [facility] documentation for an Echo      Primary hypertension  - Continue Norvasc 5mg daily  - Holding ACEi for CLEM  - PRNs for SBP > 180      Weakness of both lower extremities  With dementia. Evaluated by neurology. No LP recommended   I am concerned about use of antihypertensives and diuretics at home contributing to his function. Also, sleep/wake cycle is off (sleeps most of the day, eats brunch and drinks coffee late a night. Goes to sleep after 1 AM usually). Will start melatonin and seroquel tonight to assist with sleep and hopefully help with appetite/function during daytime. On amlodipine for BP but holding diuretics. B12 WNL. Thiamine pending        VTE Risk Mitigation (From admission, onward)         Ordered     heparin (porcine) injection 5,000 Units  Every 8 hours         12/31/21 1847     IP VTE HIGH RISK PATIENT  Once         12/31/21 1847     Place sequential compression device  Until discontinued         12/31/21 1847                Discharge Planning   JACQUELINE:      Code Status: Full Code   Is the patient medically ready for discharge?:     Reason for patient still in hospital (select all that apply): Treatment  Discharge Plan A: Home            Discussed with Lakesha JENKINS) over the phone      Amber So MD  Department of Hospital  [facility]

## 2022-01-02 NOTE — PROGRESS NOTES
[facility] Surg  Neurology  Progress Note    Patient Name: Timothy Roldan  MRN: [id number]  Admission Date: 12/31/2021  Hospital Length of Stay: 2 days  Code Status: Full Code   Attending Provider: Amber Fonseca MD  Primary Care Physician: Martine Garces MD   Principal Problem:Acute cystitis without hematuria    Subjective:     Interval History: 82 y/o male was brought here by relative after falling at home. At baseline pt has difficulty walking but it is reported that after fall he has not been able to stand. Ms. Roldan has had a decline in function for at least a month. No reported seizures, unilateral weakness, hallucinations.    -1/2/22: Mr. Roldan is more responsive today. Tells e that he lives alone but has assistance from a relative.    Current Neurological Medications:     Current Facility-Administered Medications   Medication Dose Route Frequency Provider Last Rate Last Admin    amLODIPine tablet 5 mg  5 mg Oral Daily Oswaldo Roberto MD   5 mg at 01/02/22 0955    cefTRIAXone (ROCEPHIN) 1 g/50 mL D5W IVPB  1 g Intravenous Q24H Oswaldo Roberto MD   Stopped at 01/01/22 2121    dextrose 5 % infusion   Intravenous Continuous Amber Fonseca MD 75 mL/hr at 01/02/22 1148 New Bag at 01/02/22 1148    dextrose 50% injection 12.5 g  12.5 g Intravenous PRN Oswaldo Roberto MD        dextrose 50% injection 25 g  25 g Intravenous PRN Oswaldo Roberto MD        donepeziL tablet 10 mg  10 mg Oral QHS Oswaldo Roberto MD   10 mg at 01/01/22 2052    glucagon (human recombinant) injection 1 mg  1 mg Intramuscular PRN Oswaldo Roberto MD        glucose chewable tablet 16 g  16 g Oral PRN Oswaldo Roberto MD        glucose chewable tablet 24 g  24 g Oral PRN Oswaldo Roberto MD        heparin (porcine) injection 5,000 Units  5,000 Units Subcutaneous Q8H Oswaldo Roberto MD   5,000 Units at 01/02/22 1400    influenza (QUADRIVALENT ADJUVANTED PF) vaccine 0.5 mL  0.5 mL Intramuscular vaccine x 1  dose Amber Fonseca MD        magnesium oxide tablet 800 mg  800 mg Oral PRN Oswaldo Roberto MD        magnesium oxide tablet 800 mg  800 mg Oral PRN Oswaldo Roberto MD        melatonin tablet 6 mg  6 mg Oral Nightly Amber Fonseca MD        memantine tablet 10 mg  10 mg Oral BID Oswaldo Roberto MD   10 mg at 01/02/22 0955    pneumoc 13-anuj conj-dip cr(PF) (PREVNAR 13 (PF)) 0.5 mL  0.5 mL Intramuscular vaccine x 1 dose Amber Fonseca MD        potassium bicarbonate disintegrating tablet 35 mEq  35 mEq Oral PRN Oswaldo Roberto MD        potassium bicarbonate disintegrating tablet 50 mEq  50 mEq Oral PRN Oswaldo Roberto MD        pravastatin tablet 20 mg  20 mg Oral Daily Oswaldo Roberto MD   20 mg at 01/02/22 0955    quetiapine split tablet 12.5 mg  12.5 mg Oral QHS Amber Fonseca MD        senna-docusate 8.6-50 mg per tablet 1 tablet  1 tablet Oral Daily Amber Fonseca MD   1 tablet at 01/02/22 0900    sodium chloride 0.9% flush 10 mL  10 mL Intravenous Q12H PRN Oswaldo Roberto MD        tamsulosin 24 hr capsule 0.4 mg  0.4 mg Oral Daily Oswaldo Roberto MD   0.4 mg at 01/02/22 0955    Tdap (BOOSTRIX) vaccine injection 0.5 mL  0.5 mL Intramuscular vaccine x 1 dose Guero Box MD           Review of Systems   Constitutional: Negative for fever.   HENT: Negative for trouble swallowing.    Eyes: Negative for photophobia.   Respiratory: Negative for shortness of breath.    Cardiovascular: Negative for chest pain.   Gastrointestinal: Negative for abdominal pain.   Genitourinary: Negative for flank pain.   Musculoskeletal: Negative for myalgias.   Neurological: Negative for dizziness.     Objective:     Vital Signs (Most Recent):  Temp: 98 °F (36.7 °C) (01/02/22 1131)  Pulse: 91 (01/02/22 1131)  Resp: 16 (01/02/22 1131)  BP: (!) 161/81 (01/02/22 1131)  SpO2: 98 % (01/02/22 1244) Vital Signs (24h Range):  Temp:  [97.3 °F (36.3 °C)-98.6 °F (37 °C)] 98 °F (36.7 °C)  Pulse:   [80-98] 91  Resp:  [14-17] 16  SpO2:  [96 %-100 %] 98 %  BP: (133-175)/(75-87) 161/81     Weight: 76.1 kg (167 lb 12.3 oz)  Body mass index is 24.07 kg/m².    Physical Exam  Constitutional:       General: He is not in acute distress.  HENT:      Head: Normocephalic.      Right Ear: External ear normal.      Left Ear: External ear normal.   Eyes:      General:         Right eye: No discharge.         Left eye: No discharge.   Cardiovascular:      Rate and Rhythm: Normal rate.   Pulmonary:      Breath sounds: Normal breath sounds.   Abdominal:      Palpations: Abdomen is soft.   Musculoskeletal:         General: No tenderness.      Cervical back: Neck supple.   Skin:     Findings: Bruising present.          NEUROLOGICAL EXAMINATION:      MENTAL STATUS   Level of consciousness: alert       Oriented to self, place, time  Followscommands     CRANIAL NERVES      CN III, IV, VI   Right pupil: Size: 2 mm.   Left pupil: Size: 2 mm.   Nystagmus: none   Conjugate gaze: present     CN VII   Right facial weakness: none  Left facial weakness: none     CN XII   Tongue deviation: none     MOTOR EXAM        AROM of UE's partially against gravity      SENSORY EXAM        Grimaces and partial withdraws UE's and LE's        Significant Labs:   CBC: No results for input(s): WBC, HGB, HCT, PLT in the last 48 hours.  CMP:   Recent Labs   Lab 01/01/22  1705 01/02/22  0343   GLU 91 95   * 147*   K 4.1 3.7    110   CO2 27 27   BUN 23 22   CREATININE 1.3 1.1   CALCIUM 8.8 8.8   PROT 6.1 6.3   ALBUMIN 2.8* 2.8*   BILITOT 0.7 0.7   ALKPHOS 79 82   AST 46* 38   ALT 27 26   ANIONGAP 10 10   EGFRNONAA 52* >60       Significant Imaging: I have reviewed all pertinent imaging results/findings within the past 24 hours.    Assessment and Plan:     82 y/o male consulted for AMS     1. Acute encephalopathy: possibly from UTI. Much improved today.           Head CT showed no acute abnormalities but generalized volume loss and large ventricles.  This would correlate with his Hx of dementia.           Exam is limited due AMS.           -Antibiotics per primary team.           -Avoid benzodiazepines, opioids.       Active Diagnoses:    Diagnosis Date Noted POA    PRINCIPAL PROBLEM:  Acute cystitis without hematuria [N30.00] 12/31/2021 Yes    Weakness of both lower extremities [R29.898] 12/31/2021 Yes    Primary hypertension [I10] 12/31/2021 Yes    Acute kidney injury superimposed on CKD [N17.9, N18.9] 12/31/2021 Yes    Alzheimer disease [G30.9, F02.80] 12/31/2021 Yes    Paroxysmal atrial fibrillation [I48.0] 12/31/2021 Yes      Problems Resolved During this Admission:       VTE Risk Mitigation (From admission, onward)         Ordered     heparin (porcine) injection 5,000 Units  Every 8 hours         12/31/21 1847     IP VTE HIGH RISK PATIENT  Once         12/31/21 1847     Place sequential compression device  Until discontinued         12/31/21 1847                Alok Abraham MD  Neurology  [facility] - [place] - Med Surg

## 2022-01-02 NOTE — PT/OT/SLP EVAL
Speech Language Pathology Evaluation  Bedside Swallow    Patient Name:  Timothy Roldan   MRN:  [id number]  Admitting Diagnosis: Acute cystitis without hematuria    Recommendations:                 General Recommendations:  Dysphagia therapy  Diet recommendations:  Mechanical soft, Thin   Aspiration Precautions: 1 bite/sip at a time, Alternating bites/sips and Small bites/sips   General Precautions: Standard, other (see comments) (mechanical soft)  Communication strategies:  provide increased time to answer    History:     Past Medical History:   Diagnosis Date    Hyperlipidemia     Hypertension        Past Surgical History:   Procedure Laterality Date    TONSILLECTOMY      TOTAL KNEE ARTHROPLASTY         Social History: Patient reported living alone prior to hospitalization with people checking in on him during the day. Pt is poor historian 2/2 dementia, unsure accuracy of pt's report.    Chest X-Rays: 12/31/21: The lungs are symmetrically inflated with no mass, nodule, pneumothorax, airspace consolidation or pleural effusion.  The cardiomediastinal silhouette, osseous and soft tissue structures are normal.       Prior diet: soft foods per pt report 2/2 few dentition present      Subjective   Pt alert and awake upon SLP arrival. Pt reported hx of good appetite, eating soft foods and chewing slowly 2/2 few dentition present. Pt denied swallowing difficulties. No family present at time of eval. Pt agreeable to accept therapeutic trials for swallow eval with SLP.    Patient goals: To return home soon.    Pain/Comfort:  · Pain Rating 1: 0/10    Respiratory Status: Room air    Objective:     Oral Musculature Evaluation  · Oral Musculature: WFL  · Dentition: scattered dentition,other (see comments) (few dentition present)  · Secretion Management: adequate  · Mucosal Quality: good  · Mandibular Strength and Mobility: WFL  · Oral Labial Strength and Mobility: WFL  · Lingual Strength and Mobility: WFL  · Velar Elevation:  WFL  · Buccal Strength and Mobility: WFL  · Voice Prior to PO Intake: clear vocal quality; adequate volume  · Oral Musculature Comments: labial & lingual musculture is functional for mastication & deglutition    Bedside Swallow Eval:   Consistencies Assessed:  · Thin liquids via straw x4 oz  · Puree x3 oz applesauce  · Solids x 3 bites of cracker     Oral Phase:   · Prolonged mastication with solids  · Slow oral transit time wit pureed & solids  · Lingula pumping with pureed & solids      Pharyngeal Phase:   · delayed swallow initation with pureed & solids    Compensatory Strategies  · None    Treatment: Change diet to mechanical soft; con't thin liquids. ST will follow pt to ensure diet tolerance for 1-2 sessions  Please note silent aspiration cannot be ruled out at bedside.    Education: Patient  educated on aspiration precautions. (1 bite/sip at a time, Alternating bites/sips and Small bites/sips)    ROBBIN Reyes and Dr. So notified regarding diet recs. White board update in patient's room regarding diet recs.      Assessment:     Timothy Roldan is a 81 y.o. male with a dx of mild oropharyngeal dysphagia c/b slow mastication, slow A-P transport, and delayed swallow initiation of regular solids. ST will follow pt 1-2 session to ensure diet tolerance and provide ongoing education regarding aspiration precautions.    Goals:   Multidisciplinary Problems     SLP Goals        Problem: SLP Goal    Goal Priority Disciplines Outcome   SLP Goal     SLP Ongoing, Progressing   Description: ST. Pt will tolerate mechanical soft/thin liquids without overt s/s of aspiration over 1-2 sessions.                   Plan:     · Patient to be seen:  2 x/week,1 x/week   · Plan of Care expires:  22  · Plan of Care reviewed with:  patient   · SLP Follow-Up:  Yes       Discharge recommendations:  other (see comments) (No further ST is recommended post d/c from hospital)   Barriers to Discharge:  None    Time Tracking:     SLP  Treatment Date:   01/02/22  Speech Start Time:  1012  Speech Stop Time:  1036     Speech Total Time (min):  24 min    Billable Minutes: Eval Swallow and Oral Function 16 min and Self Care/Home Management Training 8 min    01/02/2022

## 2022-01-02 NOTE — PLAN OF CARE
Problem: Physical Therapy Goal  Goal: Physical Therapy Goal  Description: Goals to be met by: 21     Patient will increase functional independence with mobility by performin. Supine to sit with Contact Guard Assistance  2. Sit to stand transfer with Contact Guard Assistance  3. Bed to chair transfer with Contact Guard Assistance   4. Gait  x 10 feet with Contact Guard Assistance using Rolling Walker.   5. Lower extremity exercise program x10 reps per handout, with assistance as needed    Outcome: Ongoing, Progressing   Initial PT evaluation performed.  Pt could benefit from skilled PT services 5x/wk in order to maximize function prior to D/C.  SNF recommended

## 2022-01-02 NOTE — PLAN OF CARE
Problem: Adult Inpatient Plan of Care  Goal: Plan of Care Review  Outcome: Ongoing, Progressing  Goal: Patient-Specific Goal (Individualized)  Outcome: Ongoing, Progressing  Goal: Absence of Hospital-Acquired Illness or Injury  Outcome: Ongoing, Progressing  Goal: Optimal Comfort and Wellbeing  Outcome: Ongoing, Progressing  Goal: Readiness for Transition of Care  Outcome: Ongoing, Progressing     Problem: Fluid and Electrolyte Imbalance (Acute Kidney Injury/Impairment)  Goal: Fluid and Electrolyte Balance  Outcome: Ongoing, Progressing     Problem: Oral Intake Inadequate (Acute Kidney Injury/Impairment)  Goal: Optimal Nutrition Intake  Outcome: Ongoing, Progressing     Problem: Renal Function Impairment (Acute Kidney Injury/Impairment)  Goal: Effective Renal Function  Outcome: Ongoing, Progressing     Problem: Fall Injury Risk  Goal: Absence of Fall and Fall-Related Injury  Outcome: Ongoing, Progressing     Problem: Skin Injury Risk Increased  Goal: Skin Health and Integrity  Outcome: Ongoing, Progressing     Problem: Impaired Wound Healing  Goal: Optimal Wound Healing  Outcome: Ongoing, Progressing

## 2022-01-02 NOTE — SUBJECTIVE & OBJECTIVE
Interval History: more alert and conversational but unaware he is in the hospital nor what brought him here. Has no complaints. Ate breakfast    Review of Systems   Constitutional: Negative.    Respiratory: Negative.    Cardiovascular: Negative.    Gastrointestinal: Negative.      Objective:     Vital Signs (Most Recent):  Temp: 98 °F (36.7 °C) (01/02/22 1131)  Pulse: 91 (01/02/22 1131)  Resp: 16 (01/02/22 1131)  BP: (!) 161/81 (01/02/22 1131)  SpO2: 98 % (01/02/22 1244) Vital Signs (24h Range):  Temp:  [97.3 °F (36.3 °C)-98.6 °F (37 °C)] 98 °F (36.7 °C)  Pulse:  [80-98] 91  Resp:  [14-17] 16  SpO2:  [96 %-100 %] 98 %  BP: (133-175)/(75-87) 161/81     Weight: 76.1 kg (167 lb 12.3 oz)  Body mass index is 24.07 kg/m².    Intake/Output Summary (Last 24 hours) at 1/2/2022 1531  Last data filed at 1/2/2022 1230  Gross per 24 hour   Intake 2806.12 ml   Output 1450 ml   Net 1356.12 ml      Physical Exam  Vitals and nursing note reviewed.   Constitutional:       General: He is not in acute distress.     Appearance: He is not toxic-appearing.   Cardiovascular:      Rate and Rhythm: Normal rate and regular rhythm.      Pulses: Normal pulses.      Heart sounds: Normal heart sounds. No murmur heard.  No gallop.    Pulmonary:      Effort: Pulmonary effort is normal.      Breath sounds: Normal breath sounds. No wheezing or rales.   Abdominal:      General: Abdomen is flat. Bowel sounds are normal.      Palpations: Abdomen is soft.      Tenderness: There is no right CVA tenderness or left CVA tenderness.   Musculoskeletal:      Right lower leg: No edema.      Left lower leg: No edema.   Skin:     General: Skin is warm.      Capillary Refill: Capillary refill takes less than 2 seconds.      Findings: Bruising present.   Neurological:      Mental Status: He is alert.      Comments: Disoriented to place, time and situation. Oriented to person   Psychiatric:         Attention and Perception: Attention normal.         Mood and Affect:  Mood normal.         Speech: Speech normal.         Behavior: Behavior normal.         Thought Content: Thought content normal.         Cognition and Memory: Cognition is impaired. Memory is impaired.      Comments: Interactive and makes eye contact during conversation         Significant Labs: All pertinent labs within the past 24 hours have been reviewed.    Significant Imaging: I have reviewed all pertinent imaging results/findings within the past 24 hours.  I have reviewed and interpreted all pertinent imaging results/findings within the past 24 hours.

## 2022-01-02 NOTE — PLAN OF CARE
Problem: Occupational Therapy Goal  Goal: Occupational Therapy Goal  Description: Goals to be met by: 1/16/22    Patient will increase functional independence with ADLs by performing:    Feeding with Modified [place].  UE Dressing with Modified [place].  LE Dressing with Modified [place].  Grooming while seated with Modified [place].  Toileting from toilet with Modified [place] and Set-up Assistance for hygiene and clothing management.   Rolling to Bilateral with Modified [place].   Supine to sit with Modified [place].  Step transfer with Modified [place]  Toilet transfer to toilet with Modified [place].  Upper extremity exercise program x15 reps per handout, with independence.    Outcome: Ongoing, Progressing   The patient will benefit from SNF

## 2022-01-02 NOTE — ASSESSMENT & PLAN NOTE
Currently NSR. Relative contraindication to anticoagulation given unstable ambulation and frequent falls at home, as per my conversation with [facility]CP[facility]. Is on DVT prophylaxis

## 2022-01-02 NOTE — PLAN OF CARE
Problem: SLP Goal  Goal: SLP Goal  Description: ST. Pt will tolerate mechanical soft/thin liquids without overt s/s of aspiration over 1-2 sessions.  Outcome: Ongoing, Progressing     Swallow eval completed. Rec: change diet to mechanical soft/con't thin liquids. ST will follow.

## 2022-01-03 PROBLEM — M62.82 RHABDOMYOLYSIS: Status: ACTIVE | Noted: 2022-01-03

## 2022-01-03 LAB
ALBUMIN SERPL BCP-MCNC: 2.4 G/DL (ref 3.5–5.2)
ALP SERPL-CCNC: 71 U/L (ref 55–135)
ALT SERPL W/O P-5'-P-CCNC: 20 U/L (ref 10–44)
ANION GAP SERPL CALC-SCNC: 7 MMOL/L (ref 8–16)
AST SERPL-CCNC: 20 U/L (ref 10–40)
BILIRUB SERPL-MCNC: 0.6 MG/DL (ref 0.1–1)
BUN SERPL-MCNC: 18 MG/DL (ref 8–23)
CALCIUM SERPL-MCNC: 8.5 MG/DL (ref 8.7–10.5)
CHLORIDE SERPL-SCNC: 108 MMOL/L (ref 95–110)
CK SERPL-CCNC: 175 U/L (ref 20–200)
CO2 SERPL-SCNC: 27 MMOL/L (ref 23–29)
CREAT SERPL-MCNC: 1 MG/DL (ref 0.5–1.4)
EST. GFR  (AFRICAN AMERICAN): >60 ML/MIN/1.73 M^2
EST. GFR  (NON AFRICAN AMERICAN): >60 ML/MIN/1.73 M^2
GLUCOSE SERPL-MCNC: 104 MG/DL (ref 70–110)
POTASSIUM SERPL-SCNC: 3.4 MMOL/L (ref 3.5–5.1)
PROT SERPL-MCNC: 5.4 G/DL (ref 6–8.4)
SODIUM SERPL-SCNC: 142 MMOL/L (ref 136–145)

## 2022-01-03 PROCEDURE — 25000003 PHARM REV CODE 250: Performed by: INTERNAL MEDICINE

## 2022-01-03 PROCEDURE — 97535 SELF CARE MNGMENT TRAINING: CPT

## 2022-01-03 PROCEDURE — 97110 THERAPEUTIC EXERCISES: CPT

## 2022-01-03 PROCEDURE — 36415 COLL VENOUS BLD VENIPUNCTURE: CPT | Performed by: INTERNAL MEDICINE

## 2022-01-03 PROCEDURE — 11000001 HC ACUTE MED/SURG PRIVATE ROOM

## 2022-01-03 PROCEDURE — 92526 ORAL FUNCTION THERAPY: CPT

## 2022-01-03 PROCEDURE — 63600175 PHARM REV CODE 636 W HCPCS: Performed by: INTERNAL MEDICINE

## 2022-01-03 PROCEDURE — 97110 THERAPEUTIC EXERCISES: CPT | Mod: CQ

## 2022-01-03 PROCEDURE — 97530 THERAPEUTIC ACTIVITIES: CPT | Mod: CQ

## 2022-01-03 PROCEDURE — 82550 ASSAY OF CK (CPK): CPT | Performed by: INTERNAL MEDICINE

## 2022-01-03 PROCEDURE — 97530 THERAPEUTIC ACTIVITIES: CPT

## 2022-01-03 PROCEDURE — 80053 COMPREHEN METABOLIC PANEL: CPT | Performed by: INTERNAL MEDICINE

## 2022-01-03 PROCEDURE — 84425 ASSAY OF VITAMIN B-1: CPT | Performed by: INTERNAL MEDICINE

## 2022-01-03 RX ADMIN — TAMSULOSIN HYDROCHLORIDE 0.4 MG: 0.4 CAPSULE ORAL at 08:01

## 2022-01-03 RX ADMIN — MEMANTINE 10 MG: 10 TABLET ORAL at 08:01

## 2022-01-03 RX ADMIN — Medication 6 MG: at 08:01

## 2022-01-03 RX ADMIN — PRAVASTATIN SODIUM 20 MG: 10 TABLET ORAL at 08:01

## 2022-01-03 RX ADMIN — HEPARIN SODIUM 5000 UNITS: 5000 INJECTION INTRAVENOUS; SUBCUTANEOUS at 01:01

## 2022-01-03 RX ADMIN — HEPARIN SODIUM 5000 UNITS: 5000 INJECTION INTRAVENOUS; SUBCUTANEOUS at 10:01

## 2022-01-03 RX ADMIN — AMLODIPINE BESYLATE 5 MG: 5 TABLET ORAL at 08:01

## 2022-01-03 RX ADMIN — HEPARIN SODIUM 5000 UNITS: 5000 INJECTION INTRAVENOUS; SUBCUTANEOUS at 05:01

## 2022-01-03 RX ADMIN — SENNOSIDES AND DOCUSATE SODIUM 1 TABLET: 50; 8.6 TABLET ORAL at 08:01

## 2022-01-03 RX ADMIN — CEFTRIAXONE 1 G: 1 INJECTION, SOLUTION INTRAVENOUS at 08:01

## 2022-01-03 RX ADMIN — DONEPEZIL HYDROCHLORIDE 10 MG: 10 TABLET ORAL at 08:01

## 2022-01-03 RX ADMIN — QUETIAPINE 12.5 MG: 25 TABLET ORAL at 08:01

## 2022-01-03 NOTE — PLAN OF CARE
Problem: Adult Inpatient Plan of Care  Goal: Plan of Care Review  Outcome: Ongoing, Progressing  Goal: Patient-Specific Goal (Individualized)  Outcome: Ongoing, Progressing  Goal: Absence of Hospital-Acquired Illness or Injury  Outcome: Ongoing, Progressing  Goal: Optimal Comfort and Wellbeing  Outcome: Ongoing, Progressing  Goal: Readiness for Transition of Care  Outcome: Ongoing, Progressing     Problem: Fluid and Electrolyte Imbalance (Acute Kidney Injury/Impairment)  Goal: Fluid and Electrolyte Balance  Outcome: Ongoing, Progressing     Problem: Oral Intake Inadequate (Acute Kidney Injury/Impairment)  Goal: Optimal Nutrition Intake  Outcome: Ongoing, Progressing     Problem: Renal Function Impairment (Acute Kidney Injury/Impairment)  Goal: Effective Renal Function  Outcome: Ongoing, Progressing     Problem: Fall Injury Risk  Goal: Absence of Fall and Fall-Related Injury  Outcome: Ongoing, Progressing     Problem: Skin Injury Risk Increased  Goal: Skin Health and Integrity  Outcome: Ongoing, Progressing     Problem: Skin Injury Risk Increased  Goal: Skin Health and Integrity  Outcome: Ongoing, Progressing     Problem: Impaired Wound Healing  Goal: Optimal Wound Healing  Outcome: Ongoing, Progressing

## 2022-01-03 NOTE — SUBJECTIVE & OBJECTIVE
Interval History: stable. Very interactive and conversational     Review of Systems   Constitutional: Negative.    Respiratory: Negative.    Cardiovascular: Negative.    Gastrointestinal: Negative.      Objective:     Vital Signs (Most Recent):  Temp: 98 °F (36.7 °C) (01/03/22 1100)  Pulse: 88 (01/03/22 1100)  Resp: 19 (01/03/22 1100)  BP: 132/75 (01/03/22 1100)  SpO2: 100 % (01/03/22 1100) Vital Signs (24h Range):  Temp:  [97.6 °F (36.4 °C)-98.7 °F (37.1 °C)] 98 °F (36.7 °C)  Pulse:  [82-95] 88  Resp:  [17-19] 19  SpO2:  [96 %-100 %] 100 %  BP: (113-188)/() 132/75     Weight: 76.1 kg (167 lb 12.3 oz)  Body mass index is 24.07 kg/m².    Intake/Output Summary (Last 24 hours) at 1/3/2022 1457  Last data filed at 1/3/2022 0600  Gross per 24 hour   Intake 240 ml   Output 751 ml   Net -511 ml      Physical Exam  Vitals and nursing note reviewed.   Constitutional:       General: He is not in acute distress.     Appearance: He is not toxic-appearing.   Cardiovascular:      Rate and Rhythm: Normal rate and regular rhythm.      Pulses: Normal pulses.      Heart sounds: Normal heart sounds. No murmur heard.  No gallop.    Pulmonary:      Effort: Pulmonary effort is normal.      Breath sounds: Normal breath sounds. No wheezing or rales.   Abdominal:      General: Abdomen is flat. Bowel sounds are normal.      Palpations: Abdomen is soft.      Tenderness: There is no right CVA tenderness or left CVA tenderness.   Musculoskeletal:      Right lower leg: No edema.      Left lower leg: No edema.   Skin:     General: Skin is warm.      Capillary Refill: Capillary refill takes less than 2 seconds.      Findings: Bruising present.   Neurological:      Mental Status: He is alert.      Comments: Oriented to place and person   Psychiatric:         Attention and Perception: Attention normal.         Mood and Affect: Mood normal.         Speech: Speech normal.         Behavior: Behavior normal.         Thought Content: Thought  content normal.         Cognition and Memory: Cognition is impaired. Memory is impaired.      Comments: Interactive and makes eye contact during conversation         Significant Labs: All pertinent labs within the past 24 hours have been reviewed.    Significant Imaging: I have reviewed all pertinent imaging results/findings within the past 24 hours.  I have reviewed and interpreted all pertinent imaging results/findings within the past 24 hours.

## 2022-01-03 NOTE — PLAN OF CARE
Pt with poor PO intake for lunch, and he reports difficulty chewing mech soft diet items.  Pt agreeable to downgrade diet to puree, and receive assistance with meals as needed.  Pt tolerated puree and thin liquids via straw without overt s/s aspiration and adequate oral clearance.  Communicated recs with Pt's MD and RN.  to follow for diet tolerance.

## 2022-01-03 NOTE — PLAN OF CARE
Problem: Adult Inpatient Plan of Care  Goal: Plan of Care Review  Outcome: Ongoing, Progressing  Flowsheets (Taken 1/3/2022 0815)  Plan of Care Reviewed With:   patient   other (see comments)  Goal: Patient-Specific Goal (Individualized)  Outcome: Ongoing, Progressing     Problem: Fall Injury Risk  Goal: Absence of Fall and Fall-Related Injury  Outcome: Ongoing, Progressing  Intervention: Identify and Manage Contributors  Flowsheets (Taken 1/3/2022 0815)  Self-Care Promotion:   independence encouraged   BADL personal objects within reach   meal set-up provided  Medication Review/Management: medications reviewed  Intervention: Promote Injury-Free Environment  Flowsheets (Taken 1/3/2022 0815)  Safety Promotion/Fall Prevention:   bed alarm set   Fall Risk reviewed with patient/family   lighting adjusted   room near unit station   side rails raised x 2   instructed to call staff for mobility     Problem: Adult Inpatient Plan of Care  Goal: Plan of Care Review  Outcome: Ongoing, Progressing  Flowsheets (Taken 1/3/2022 0815)  Plan of Care Reviewed With:   patient   other (see comments)     Problem: Adult Inpatient Plan of Care  Goal: Plan of Care Review  Outcome: Ongoing, Progressing  Flowsheets (Taken 1/3/2022 0815)  Plan of Care Reviewed With:   patient   other (see comments)     Problem: Adult Inpatient Plan of Care  Goal: Patient-Specific Goal (Individualized)  Outcome: Ongoing, Progressing     Problem: Adult Inpatient Plan of Care  Goal: Patient-Specific Goal (Individualized)  Outcome: Ongoing, Progressing     Problem: Fall Injury Risk  Goal: Absence of Fall and Fall-Related Injury  Outcome: Ongoing, Progressing  Intervention: Identify and Manage Contributors  Flowsheets (Taken 1/3/2022 0815)  Self-Care Promotion:   independence encouraged   BADL personal objects within reach   meal set-up provided  Medication Review/Management: medications reviewed  Intervention: Promote Injury-Free Environment  Flowsheets (Taken  1/3/2022 0815)  Safety Promotion/Fall Prevention:   bed alarm set   Fall Risk reviewed with patient/family   lighting adjusted   room near unit station   side rails raised x 2   instructed to call staff for mobility     Problem: Fall Injury Risk  Goal: Absence of Fall and Fall-Related Injury  Outcome: Ongoing, Progressing     Problem: Fall Injury Risk  Goal: Absence of Fall and Fall-Related Injury  Intervention: Identify and Manage Contributors  Flowsheets (Taken 1/3/2022 0815)  Self-Care Promotion:   independence encouraged   BADL personal objects within reach   meal set-up provided  Medication Review/Management: medications reviewed     Problem: Fall Injury Risk  Goal: Absence of Fall and Fall-Related Injury  Intervention: Identify and Manage Contributors  Flowsheets (Taken 1/3/2022 0815)  Self-Care Promotion:   independence encouraged   BADL personal objects within reach   meal set-up provided  Medication Review/Management: medications reviewed     Problem: Fall Injury Risk  Goal: Absence of Fall and Fall-Related Injury  Intervention: Promote Injury-Free Environment  Flowsheets (Taken 1/3/2022 0815)  Safety Promotion/Fall Prevention:   bed alarm set   Fall Risk reviewed with patient/family   lighting adjusted   room near unit station   side rails raised x 2   instructed to call staff for mobility     Problem: Fall Injury Risk  Goal: Absence of Fall and Fall-Related Injury  Intervention: Promote Injury-Free Environment  Flowsheets (Taken 1/3/2022 0815)  Safety Promotion/Fall Prevention:   bed alarm set   Fall Risk reviewed with patient/family   lighting adjusted   room near unit station   side rails raised x 2   instructed to call staff for mobility

## 2022-01-03 NOTE — HOSPITAL COURSE
Mr Louis presented with frequent falls. Has inconsistent PO intake at home, goes to sleep late and dosing off during daytime, is on furosemide at home, rhabdomyolysis and potential with a UTI. Patient has dementia. Started on empiric ceftriaxone. Blood and urine cultures NGTD. Held furosemide and given fluids instead. No hypoxia or pulmonary edema with fluids. CLEM resolved and rhabdo resolved. PT/OT recommend SNF. Family, however, will have the patient discharged on 1/8 and take to assisted living. Will need hospital bed on discharge and H/H orders.  Will d/c to home - activity as tolerated. Diet low NA. Follow up with PCP in one week

## 2022-01-03 NOTE — PT/OT/SLP PROGRESS
Occupational Therapy   Treatment    Name: Timothy Roldan  MRN: [id number]  Admitting Diagnosis:  Acute cystitis without hematuria       Recommendations:     Discharge Recommendations: nursing facility, skilled   Discharge Equipment Recommendations:   (TBD)  Barriers to discharge:   (pt was ambulatory PTA; now, pt is MOD to MAX A with all aspects of functional mobility and increased assist with ADLs; high risk of falls, unplanned readmission, and morbidity if d/c home)    Assessment:     Timothy Roldan is a 81 y.o. male with a medical diagnosis of Acute cystitis without hematuria. Performance deficits affecting function are weakness,gait instability,decreased upper extremity function,decreased ROM,impaired endurance,impaired balance,decreased lower extremity function,impaired coordination,decreased safety awareness,impaired fine motor,pain,impaired cognition,impaired self care skills,impaired skin,impaired functional mobilty.     L posterior lean with static and dynamic sitting activities. Pt took 5 small sidesteps at EOB using RW with MOD A. Pt participatory with BUE AROM seated EOB focusing on big movements with loud counting.     Rehab Prognosis:  Fair +; patient would benefit from acute skilled OT services to address these deficits and reach maximum level of function.       Plan:     Patient to be seen 5 x/week to address the above listed problems via self-care/home management,therapeutic activities,therapeutic exercises,neuromuscular re-education  · Plan of Care Expires: 01/16/22  · Plan of Care Reviewed with: patient     Subjective     Chief complaint: back pain in bed   Patient/family comments/ goals: tired, but knows he needs to work with therapy to get stronger; hard to eat mechanical soft lunch (chicken, peas) d/t few teeth - dietary came and OT assisted pt with asking for soup for dinner then OT updated RENATO Amador      Pain/Comfort:  · Pain Rating 1:  (low back pain - did not rate)  · Pain Addressed 1:  Reposition,Distraction,Cessation of Activity    Objective:     Communicated with: nurseGwen, prior to session.  Patient found HOB elevated with bed alarm,peripheral IV,Condom Catheter upon OT entry to room.    General Precautions: Standard, fall   Orthopedic Precautions:N/A   Braces: N/A  Respiratory Status: Room air     Occupational Performance:   Pt presented with delayed processing, slow movements, and mild BUE tremors with session    Bed Mobility:    · Patient completed Rolling/Turning to Right with moderate assistance  · Patient completed Scooting anteriorly to EOB with minimum assistance  · Patient completed Supine to Sit with maximal assistance, with side rail and HOB elevated  · Patient completed Sit to Supine with maximal assistance   · Patient completed Scooting in supine to HOB with maximal assistance with bed in trendelenburg position     Functional Mobility/Transfers:  · Patient completed Sit <> Stand Transfer with moderate assistance and bed elevated  with  rolling walker - 2 trials   · Functional Mobility: pt demo'd forward flexed posture with flexed elbows and knees with both standing trials. Some improved upright posture on second stand with max cueing. Pt took 5 small sidesteps on 2nd stand to the R with MOD A using RW.     Activities of Daily Living:  · Feeding:  pt found with lunch tray in front of pt with chicken bits and peas on his gown. pt declined eating with OT at EOB     · Grooming: MIN to MOD A for dynamic sit balance while pt used BUE to open deodorant twist open cap. MIN A for balance while pt supported self with one UE on side rail while using other UE to don deodorant; max verbal cueing for anterior trunk lean to upright midline posture while pt brushed his hair in unsupported sit- MIN A at times for correction   · Upper Body Dressing: moderate assistance to change out of dirty gown into clean gown seated EOB   · Lower Body Dressing: total assistance to doff socks at end of session        [facility] 6 Click ADL: 11    Treatment & Education:  · Pt re-educated on OT role/POC  · Importance of OOB activity with therapy assistance  · Safety during functional t/f and mobility   · Seated EOB in unsupported sit, pt completed the following:   · x12 BUE simultaneous: elbow flex/ext AROM; shoulder horizontal ab/dduction AAROM (d/t difficulty copying movement demo'd by OT); shoulder flex/ext AAROM to ~90*   · focusing on big movements and loud counting   · Multiple self-care tasks/functional mobility completed- assistance level noted above   · R hand positioned on side rail frequently with MAX cueing for lateral lean to sit in upright position   · All questions/concerns answered within OT scope of practice       Patient left HOB elevated with B/L pressure relief boots on with all lines intact, call button in reach, bed alarm on, PCT notified and all needs met/within reach. bedside table in front of pt. PCT notified that pt will benefit from a wedge for pressure reliefEducation:      GOALS:   Multidisciplinary Problems     Occupational Therapy Goals        Problem: Occupational Therapy Goal    Goal Priority Disciplines Outcome Interventions   Occupational Therapy Goal     OT, PT/OT Ongoing, Progressing    Description: Goals to be met by: 1/16/22    Patient will increase functional independence with ADLs by performing:    Feeding with Modified [place].  UE Dressing with Modified [place].  LE Dressing with Modified [place].  Grooming while seated with Modified [place].  Toileting from toilet with Modified [place] and Set-up Assistance for hygiene and clothing management.   Rolling to Bilateral with Modified [place].   Supine to sit with Modified [place].  Step transfer with Modified [place]  Toilet transfer to toilet with Modified [place].  Upper extremity exercise program x15 reps per handout, with independence.                     Time Tracking:     OT Date of  Treatment: 01/03/22  OT Start Time: 1352  OT Stop Time: 1430  OT Total Time (min): 38 min    Billable Minutes:Self Care/Home Management 8 min  Therapeutic Activity 15 min  Therapeutic Exercise 15 min  Total Time 38 min    OT/STEWART: OT          1/3/2022

## 2022-01-03 NOTE — PLAN OF CARE
Problem: Occupational Therapy Goal  Goal: Occupational Therapy Goal  Description: Goals to be met by: 1/16/22    Patient will increase functional independence with ADLs by performing:    Feeding with Modified [place].  UE Dressing with Modified [place].  LE Dressing with Modified [place].  Grooming while seated with Modified [place].  Toileting from toilet with Modified [place] and Set-up Assistance for hygiene and clothing management.   Rolling to Bilateral with Modified [place].   Supine to sit with Modified [place].  Step transfer with Modified [place]  Toilet transfer to toilet with Modified [place].  Upper extremity exercise program x15 reps per handout, with independence.    Outcome: Ongoing, Progressing     L posterior lean with static and dynamic sitting activities. Pt took 5 small sidesteps at EOB using RW with MOD A.

## 2022-01-03 NOTE — ASSESSMENT & PLAN NOTE
With dementia. Evaluated by neurology. No LP recommended   I am concerned about use of antihypertensives and diuretics at home contributing to his function. Also, sleep/wake cycle is off (sleeps most of the day, eats brunch and drinks coffee late a night. Goes to sleep after 1 AM usually). Continue melatonin and seroquel QHS to assist with sleep and hopefully help with appetite/function during daytime. On amlodipine for BP but holding diuretics. B12 WNL. Thiamine pending  PT/OT: rec SNF. HCPOA will like to see how he does with therapy today to determine if HH vs SNF

## 2022-01-03 NOTE — PROGRESS NOTES
"[facility] Medicine  Progress Note    Patient Name: Timothy Roldan  MRN: [id number]  Patient Class: IP- Inpatient   Admission Date: 12/31/2021  Length of Stay: 3 days  Attending Physician: Amber Fonesca MD  Primary Care Provider: Martine Garces MD        Subjective:     Principal Problem:Acute cystitis without hematuria        HPI:  82 yo male with a PMHx of Alzheimer's disease (+/- Parkinson?), HTN, BPH with condom catheter who was brought here by his niece due to a ground level fall and significant weakness. Patient is a poor historian due to dementia. Was confused about "blood thinners" being a "type of animal". Per niece he has been progressively getting weaker over the last month. Went from walking without much help to using a cane to having multiple falls. He is now to the point that he needs almost full assistance to move. Otherwise, they haven't noticed much other symptoms. Confusion seems to be around his baseline.    In the ED, labs were remarkable for a UTI on U/A with extreme sediment in his urine and a mild CLEM (sCr 1.5 with BUN 27; baseline sCr is 1.2-1.3 per [facility] labs). CTH showing no acute abnormality but it was read as possible NPH. Neurology consulted and will see tomorrow. He was initially hypotensive but has become hypertensive with IVF boluses. Given IV Zosyn and admitted to floor.        Overview/Hospital Course:  Mr Louis presented with frequent falls. Has inconsistent PO intake at home, goes to sleep late and dosing off during daytime, is on furosemide at home, rhabdomyolysis and potential with a UTI. Patient has dementia. Started on empiric ceftriaxone. Blood and urine cultures NGTD. Held furosemide and given fluids instead. No hypoxia or pulmonary edema with fluids. CLEM resolved and rhabdo resolved. PT/OT recommend SNF. HCPOA thinking about SNF vs HH.       Interval History: stable. Very interactive and conversational     Review of Systems   Constitutional: Negative. "    Respiratory: Negative.    Cardiovascular: Negative.    Gastrointestinal: Negative.      Objective:     Vital Signs (Most Recent):  Temp: 98 °F (36.7 °C) (01/03/22 1100)  Pulse: 88 (01/03/22 1100)  Resp: 19 (01/03/22 1100)  BP: 132/75 (01/03/22 1100)  SpO2: 100 % (01/03/22 1100) Vital Signs (24h Range):  Temp:  [97.6 °F (36.4 °C)-98.7 °F (37.1 °C)] 98 °F (36.7 °C)  Pulse:  [82-95] 88  Resp:  [17-19] 19  SpO2:  [96 %-100 %] 100 %  BP: (113-188)/() 132/75     Weight: 76.1 kg (167 lb 12.3 oz)  Body mass index is 24.07 kg/m².    Intake/Output Summary (Last 24 hours) at 1/3/2022 1457  Last data filed at 1/3/2022 0600  Gross per 24 hour   Intake 240 ml   Output 751 ml   Net -511 ml      Physical Exam  Vitals and nursing note reviewed.   Constitutional:       General: He is not in acute distress.     Appearance: He is not toxic-appearing.   Cardiovascular:      Rate and Rhythm: Normal rate and regular rhythm.      Pulses: Normal pulses.      Heart sounds: Normal heart sounds. No murmur heard.  No gallop.    Pulmonary:      Effort: Pulmonary effort is normal.      Breath sounds: Normal breath sounds. No wheezing or rales.   Abdominal:      General: Abdomen is flat. Bowel sounds are normal.      Palpations: Abdomen is soft.      Tenderness: There is no right CVA tenderness or left CVA tenderness.   Musculoskeletal:      Right lower leg: No edema.      Left lower leg: No edema.   Skin:     General: Skin is warm.      Capillary Refill: Capillary refill takes less than 2 seconds.      Findings: Bruising present.   Neurological:      Mental Status: He is alert.      Comments: Oriented to place and person   Psychiatric:         Attention and Perception: Attention normal.         Mood and Affect: Mood normal.         Speech: Speech normal.         Behavior: Behavior normal.         Thought Content: Thought content normal.         Cognition and Memory: Cognition is impaired. Memory is impaired.      Comments: Interactive  and makes eye contact during conversation         Significant Labs: All pertinent labs within the past 24 hours have been reviewed.    Significant Imaging: I have reviewed all pertinent imaging results/findings within the past 24 hours.  I have reviewed and interpreted all pertinent imaging results/findings within the past 24 hours.      Assessment/Plan:      * Acute cystitis without hematuria  Agree with empiric ceftriaxone pending urine and blood culture results. Thus far no growth        Rhabdomyolysis  Resolved       Paroxysmal atrial fibrillation  Currently NSR. Relative contraindication to anticoagulation given unstable ambulation and frequent falls at home, as per my conversation with GLORIASARAH. Is on DVT prophylaxis      Alzheimer disease  - Continue home donepezil and memantine      Acute kidney injury superimposed on CKD  With rhabdomyolysis. Renal function stable. CPK improving. Continue intravenous fluids. Hold diuretics. I cannot find reason for furosemide use at home. NISHANT is not sure either. [place] [facility] [place]      Primary hypertension  - Continue Norvasc 5mg daily  - Holding ACEi for CLEM  - PRNs for SBP > 180      Weakness of both lower extremities  With dementia. Evaluated by neurology. No LP recommended   I am concerned about use of antihypertensives and diuretics at home contributing to his function. Also, sleep/wake cycle is off (sleeps most of the day, eats brunch and drinks coffee late a night. Goes to sleep after 1 AM usually). Continue melatonin and seroquel QHS to assist with sleep and hopefully help with appetite/function during daytime. On amlodipine for BP but holding diuretics. B12 WNL. Thiamine pending  PT/OT: rec SNF. GLORIASARAH will like to see how he does with therapy today to determine if  vs SNF      VTE Risk Mitigation (From admission, onward)         Ordered     heparin (porcine) injection 5,000 Units  Every 8 hours         12/31/21 6644     IP VTE HIGH RISK  PATIENT  Once         12/31/21 1847     Place sequential compression device  Until discontinued         12/31/21 1847                Discharge Planning   JACQUELINE:      Code Status: Full Code   Is the patient medically ready for discharge?:     Reason for patient still in hospital (select all that apply): Treatment  Discharge Plan A: Home          Discussed with HCPSARAH Palma) at bedside.         Amber So MD  Department of Hospital Medicine   [facility]

## 2022-01-03 NOTE — PT/OT/SLP PROGRESS
Occupational Therapy      Patient Name:  Timothy Roldan   MRN:  [id number]    Patient not seen today secondary to Unavailable (working with PTA). Will follow-up later as able.    1/3/2022

## 2022-01-03 NOTE — PT/OT/SLP PROGRESS
"Physical Therapy Treatment    Patient Name:  Timothy Roldan   MRN:  [id number]    Recommendations:     Discharge Recommendations:  nursing facility, skilled   Discharge Equipment Recommendations:  (Ongoing assessment pending pt progress)   Barriers to discharge: Inaccessible home, Decreased caregiver support and decreased mobility,  high fall risk, not to baseline    Assessment:     Timothy Roldan is a 81 y.o. male admitted with a medical diagnosis of Acute cystitis without hematuria.  He presents with the following impairments/functional limitations:  weakness,impaired endurance,impaired self care skills,impaired functional mobilty,gait instability,impaired balance,decreased upper extremity function,decreased lower extremity function,decreased safety awareness,pain,decreased ROM .    Rehab Prognosis: Good; patient would benefit from acute skilled PT services to address these deficits and reach maximum level of function.    Recent Surgery: * No surgery found *      Plan:     During this hospitalization, patient to be seen 5 x/week to address the identified rehab impairments via gait training,therapeutic activities,therapeutic exercises and progress toward the following goals:    · Plan of Care Expires:  01/02/22    Subjective     Chief Complaint: pain  Patient/Family Comments/goals: pt reports LBP and rib pain with movement, "it's from them being too rough when turning me."  Pain/Comfort:  · Pain Rating 1: 8/10  · Location - Side 1: Bilateral  · Location - Orientation 1: lower  · Location 1: back  · Pain Addressed 1: Pre-medicate for activity,Reposition,Distraction,Cessation of Activity,Nurse notified  · Pain Rating Post-Intervention 1: 8/10      Objective:     Communicated with nurse prior to session.  Patient found HOB elevated with telemetry,peripheral IV,Condom Catheter,pressure relief boots,bed alarm upon PT entry to room.     General Precautions: Standard, fall   Orthopedic Precautions:N/A   Braces:  "   Respiratory Status: Room air     Functional Mobility:  · Bed Mobility:     · Rolling Right: maximal assistance  · Scooting: stand by assistance to HOB, Min A to EOB  · Supine to Sit: moderate assistance  · Sit to Supine: moderate assistance  · Transfers:     · Sit to Stand:  minimum assistance with rolling walker  · Balance: F with sitting, F with standing      AM-PAC 6 CLICK MOBILITY          Therapeutic Activities and Exercises:   BLE TE sup x 10; HS, ABD/ADD; seated X 10; LAQ, HR, TT   Sit to stand x 3 trials with rw with min A  Scooting to HOB x 10 with SBA  Patient left HOB elevated with all lines intact, call button in reach, bed alarm on and niece present..    GOALS:   Multidisciplinary Problems     Physical Therapy Goals        Problem: Physical Therapy Goal    Goal Priority Disciplines Outcome Goal Variances Interventions   Physical Therapy Goal     PT, PT/OT Ongoing, Progressing     Description: Goals to be met by: 21     Patient will increase functional independence with mobility by performin. Supine to sit with Contact Guard Assistance  2. Sit to stand transfer with Contact Guard Assistance  3. Bed to chair transfer with Contact Guard Assistance   4. Gait  x 10 feet with Contact Guard Assistance using Rolling Walker.   5. Lower extremity exercise program x10 reps per handout, with assistance as needed                     Time Tracking:     PT Received On: 22  PT Start Time: 1145     PT Stop Time: 1240  PT Total Time (min): 55 min     Billable Minutes: Therapeutic Activity 28 and Therapeutic Exercise 27    Treatment Type: Treatment  PT/PTA: PTA     PTA Visit Number: 2022

## 2022-01-03 NOTE — PT/OT/SLP PROGRESS
"Speech Language Pathology Treatment    Patient Name:  Timothy Roldan   MRN:  [id number]  Admitting Diagnosis: Acute cystitis without hematuria    Recommendations:                 General Recommendations:  Dysphagia therapy  Diet recommendations:  Puree, Liquid Diet Level: Thin   Aspiration Precautions: 1 bite/sip at a time, Alternating bites/sips, Assistance with meals, Eliminate distractions, Meds whole 1 at a time, Small bites/sips and Standard aspiration precautions   General Precautions: Standard, other (see comments) (mechanical soft)  Communication strategies:  none    Subjective   Pt awake, alert, pleasant and agreeable to ST this date.  Report received that Pt with poor PO intake with lunch 2/2 difficulty chewing; Pt endorsed this report.      "The softer the better" Pt stated when discussing diet level options.     Patient goals: recover, gain strength     Pain/Comfort:  · Pain Rating 1: 0/10 (no indication of pain with swallowing)        Objective:   Pt agreeable to puree trials and assistance with feeding. Pt benefited from assistance with feeding, as when attempting to self-feed, he completed only 2 bites of puree within 5 minutes; tremors noted. Provided assistance with feeding, Pt's efficiency with intake improved. Pt tolerated 4 oz puree and 8 oz thin liquids via straw without overt s/s aspiration and adequate oral clearance, provided additional time.  Occasionally, multiple swallows per tsp puree bolus noted.  Discussed diet options and patient agreeable to downgrade diet to puree to increase overall amount and efficiency of PO intake; Pt's sparse dentition noted.    SLP provided skilled education related to self-care/home management dysphagia topics, including role of ST, ST POC, s/s aspiration, risk of aspiration and sequelae, determining most appropriate diet level at this phase in recovery considering his sparse dentition, swallow safety precautions.  Pt accurately answered basic questions related " to swallow safety precautions.    Recommendations communicated to Pt's RN and MD; Pt room whiteboard updated.    Has the patient been evaluated by SLP for swallowing?   Yes  Keep patient NPO? No       Assessment:     Timothy Roldan is a 81 y.o. male with an SLP diagnosis of oropharyngeal dysphagia.  Pt with limited PO intake with lunch meal 2/2 difficulty masticating mech soft diet items.  Pt agreeable to recommendation to downgrade diet to puree/thin liquids.  Pt tolerated puree and thin liquids via straw without overt s/s aspiration and adequate oral clearance, provided additional time. Recommend assistance with meals.     Goals:   Multidisciplinary Problems     SLP Goals        Problem: SLP Goal    Goal Priority Disciplines Outcome   SLP Goal     SLP Ongoing, Progressing   Description: ST. Pt will tolerate mechanical soft/thin liquids without overt s/s of aspiration over 1-2 sessions.                   Plan:     · Patient to be seen:   (f/u x 1-2 for diet tolerance)   · Plan of Care expires:  22  · Plan of Care reviewed with:  patient   · SLP Follow-Up:  Yes       Discharge recommendations:   (TBD, per PT/OT)   Barriers to Discharge:  None    Time Tracking:     SLP Treatment Date:   22  Speech Start Time:  1427  Speech Stop Time:  1500     Speech Total Time (min):  33 min    Billable Minutes: Treatment Swallowing Dysfunction 23 min, Self Care/Home Management Training 10 min and Total Time 33 min    2022

## 2022-01-03 NOTE — PLAN OF CARE
Problem: Physical Therapy Goal  Goal: Physical Therapy Goal  Description: Goals to be met by: 21     Patient will increase functional independence with mobility by performin. Supine to sit with Contact Guard Assistance  2. Sit to stand transfer with Contact Guard Assistance  3. Bed to chair transfer with Contact Guard Assistance   4. Gait  x 10 feet with Contact Guard Assistance using Rolling Walker.   5. Lower extremity exercise program x10 reps per handout, with assistance as needed    Outcome: Ongoing, Progressing   Pt very rigid with movement. Pt performed BLE TE supine with vcs/tcs for proper technique.  Pt move very slowly needing extra time. Sup to sit with MOD A. Sat EOB with SBA sometimes with posterior lean with both feet off floor, vcs to come to midline.  Pt able to come to midline without asst.  Sit to stand x 3 trials with rw with min A, vcs for hand placement on bed rail and one on rw for safety. Pt needed several trials to push self up to stand. Pt with flexed posture and difficulty moving feet to take steps.  Pt would benefit from further acute skilled therapy and would benefit from SNF upon discharge.

## 2022-01-03 NOTE — PLAN OF CARE
Problem: Adult Inpatient Plan of Care  Goal: Plan of Care Review  Outcome: Ongoing, Progressing  Goal: Patient-Specific Goal (Individualized)  Outcome: Ongoing, Progressing  Goal: Optimal Comfort and Wellbeing  Outcome: Ongoing, Progressing  Goal: Readiness for Transition of Care  Outcome: Ongoing, Progressing  Pt free from falls, injury or any further trauma throughout shift. Pt calm and cooperative. Continued medications as ordered. No complaints of pain. Condom catheter in place.  Pt in no distress. Will cont to monitor.

## 2022-01-04 LAB
BACTERIA BLD CULT: NORMAL
BACTERIA BLD CULT: NORMAL

## 2022-01-04 PROCEDURE — 11000001 HC ACUTE MED/SURG PRIVATE ROOM

## 2022-01-04 PROCEDURE — 63600175 PHARM REV CODE 636 W HCPCS: Performed by: INTERNAL MEDICINE

## 2022-01-04 PROCEDURE — 25000003 PHARM REV CODE 250: Performed by: INTERNAL MEDICINE

## 2022-01-04 PROCEDURE — 97110 THERAPEUTIC EXERCISES: CPT | Mod: CQ

## 2022-01-04 PROCEDURE — 97116 GAIT TRAINING THERAPY: CPT | Mod: CQ

## 2022-01-04 PROCEDURE — 97110 THERAPEUTIC EXERCISES: CPT

## 2022-01-04 PROCEDURE — 97530 THERAPEUTIC ACTIVITIES: CPT

## 2022-01-04 PROCEDURE — 97530 THERAPEUTIC ACTIVITIES: CPT | Mod: CQ

## 2022-01-04 RX ORDER — HYDRALAZINE HYDROCHLORIDE 25 MG/1
25 TABLET, FILM COATED ORAL EVERY 8 HOURS
Status: DISCONTINUED | OUTPATIENT
Start: 2022-01-04 | End: 2022-01-05

## 2022-01-04 RX ADMIN — HYDRALAZINE HYDROCHLORIDE 25 MG: 25 TABLET, FILM COATED ORAL at 01:01

## 2022-01-04 RX ADMIN — MEMANTINE 10 MG: 10 TABLET ORAL at 08:01

## 2022-01-04 RX ADMIN — HYDRALAZINE HYDROCHLORIDE 25 MG: 25 TABLET, FILM COATED ORAL at 09:01

## 2022-01-04 RX ADMIN — HEPARIN SODIUM 5000 UNITS: 5000 INJECTION INTRAVENOUS; SUBCUTANEOUS at 01:01

## 2022-01-04 RX ADMIN — HEPARIN SODIUM 5000 UNITS: 5000 INJECTION INTRAVENOUS; SUBCUTANEOUS at 05:01

## 2022-01-04 RX ADMIN — HEPARIN SODIUM 5000 UNITS: 5000 INJECTION INTRAVENOUS; SUBCUTANEOUS at 09:01

## 2022-01-04 RX ADMIN — SENNOSIDES AND DOCUSATE SODIUM 1 TABLET: 50; 8.6 TABLET ORAL at 08:01

## 2022-01-04 RX ADMIN — TAMSULOSIN HYDROCHLORIDE 0.4 MG: 0.4 CAPSULE ORAL at 08:01

## 2022-01-04 RX ADMIN — PRAVASTATIN SODIUM 20 MG: 10 TABLET ORAL at 08:01

## 2022-01-04 RX ADMIN — Medication 6 MG: at 08:01

## 2022-01-04 RX ADMIN — DONEPEZIL HYDROCHLORIDE 10 MG: 10 TABLET ORAL at 08:01

## 2022-01-04 RX ADMIN — AMLODIPINE BESYLATE 5 MG: 5 TABLET ORAL at 08:01

## 2022-01-04 RX ADMIN — QUETIAPINE 12.5 MG: 25 TABLET ORAL at 08:01

## 2022-01-04 NOTE — PT/OT/SLP PROGRESS
Occupational Therapy   Treatment    Name: Timothy Roldan  MRN: [id number]  Admitting Diagnosis:  Acute cystitis without hematuria       Recommendations:     Discharge Recommendations: nursing facility, skilled  Discharge Equipment Recommendations:   (TBD)  Barriers to discharge:   (pt was ambulatory PTA; now, pt is MOD to MAX A with all aspects of functional mobility and increased assist with ADLs; high risk of falls, unplanned readmission, and morbidity if d/c home)    Assessment:     Timothy Roldan is a 81 y.o. male with a medical diagnosis of Acute cystitis without hematuria. Performance deficits affecting function are weakness,impaired endurance,impaired balance,decreased lower extremity function,decreased upper extremity function,gait instability,decreased ROM,decreased safety awareness,impaired cognition,impaired self care skills,impaired skin,impaired functional mobilty,impaired coordination.     MAX A sit>stand from the chair. MOD A for ~6ft with RW with forward flexed posture. Pt motivated and engaged with therapeutic exercises seated EOB    Rehab Prognosis:  Fair +; patient would benefit from acute skilled OT services to address these deficits and reach maximum level of function.       Plan:     Patient to be seen 5 x/week to address the above listed problems via self-care/home management,therapeutic activities,therapeutic exercises  · Plan of Care Expires: 01/16/22  · Plan of Care Reviewed with: patient    Subjective     Chief complaint: tremors make it hard to do things   Patient/family comments/ goals: happy to have eaten out of bed and in the chair; feels better while doing therapy      Pain/Comfort:  · Pain Rating 1: 0/10    Objective:     Patient found up in chair with peripheral IV,Condom Catheter upon OT entry to room.    General Precautions: Standard, fall   Orthopedic Precautions:N/A   Braces: N/A  Respiratory Status: Room air     Occupational Performance:     Bed Mobility:    · Patient completed  Scooting laterally with minimum assistance  · Patient completed Sit to Supine with maximal assistance   · Patient completed Scooting in supine with bed in Trendelenburg position with maximal assistance     Functional Mobility/Transfers:  · Patient completed Sit <> Stand Transfer with maximal assistance  with  rolling walker   · Patient completed Chair > Bed Transfer using Step Transfer technique with moderate assistance with rolling walker  · Functional Mobility: MOD A for ~6ft with RW with forward flexed posture. Frequent verbal cueing for safety and body mechanics within RW.     Activities of Daily Living:  · Feeding:  pt found upright in the chair just finishing lunch- pt had an empty plate with only 2 small crumbs in his lap. supervision in supported sitting position upright in the chair to drink soda    · Grooming: supervision seated in the chair to wash face and hands and brush hair    · Lower Body Dressing: total assistance to doff socks at end of session      [facility] 6 Click ADL: 13    Treatment & Education:  · Pt re-educated on OT role/POC.   · Importance of OOB activity with therapy assistance.  · Safety during functional t/f and mobility   · Seated upright unsupported at EOB, pt completed the following:   · BUE AROM simultaneous elbow flex/ext x12   · Min A 2x for posterior lean   · Anterior trunk lean with functional reach to target x12 (SBA/CGA for balance)   · Shoulder horizontal abduction with functional reach and moving target by OT to address cervical rotation and visual attention x12 to L then x12 to R   · Multiple self-care tasks/functional mobility completed- assistance level noted above   · All questions/concerns answered within OT scope of practice       Patient left HOB elevated R sidelying with B/L pressure relief boots on with all lines intact, call button in reach, bed alarm on, nurseGemini, notified and all needs met/within reach; bedside table near bed; tv on and door left open  Education:      GOALS:   Multidisciplinary Problems     Occupational Therapy Goals        Problem: Occupational Therapy Goal    Goal Priority Disciplines Outcome Interventions   Occupational Therapy Goal     OT, PT/OT Ongoing, Progressing    Description: Goals to be met by: 1/16/22    Patient will increase functional independence with ADLs by performing:    Feeding with Modified [place].  UE Dressing with Modified [place].  LE Dressing with Minimal Assistance.  Grooming while seated with Modified [place].  Toileting from toilet with Minimal Assistance for hygiene and clothing management.   Rolling to Bilateral with Modified [place].   Supine to sit with Modified [place].  Step transfer with Contact Guard Assistance   Toilet transfer to toilet with Contact Guard Assistance.  Upper extremity exercise program x15 reps per handout, with independence.                     Time Tracking:     OT Date of Treatment: 01/04/22  OT Start Time: 1330  OT Stop Time: 1358  OT Total Time (min): 28 min    Billable Minutes:Therapeutic Activity 14 min  Therapeutic Exercise 14 min  Total Time 28 min    OT/STEWART: OT          1/4/2022

## 2022-01-04 NOTE — PLAN OF CARE
Recommendations    1) Pt to receive feeding assistance during meal time.  2) Continue IDDSI Level 4 texture per SLP recommendations.  3) Add Boost (+) - Chocolate to assist pt in meeting needs  4) Monitor Renal Labs    Goals:   1) Patient to meet > 50% EEN/EPN via PO/ONS intake  2) Monitored labs to trend toward target ranges    Nutrition Goal Status: new  Communication of RD Recs:  (POC)

## 2022-01-04 NOTE — PLAN OF CARE
ANNETTE contacted patient's HCPOA to discuss discharge planning. There was no answer. ANNETTE left a message.    Normal rate, regular rhythm, normal S1, S2 heart sounds heard.

## 2022-01-04 NOTE — PROGRESS NOTES
"[facility] - Med Surg  Adult Nutrition  Progress Note    SUMMARY       Recommendations    1) Pt to receive feeding assistance during meal time.  2) Continue IDDSI Level 4 texture per SLP recommendations.  3) Add Boost (+) - Chocolate to assist pt in meeting needs  4) Monitor Renal Labs    Goals:   1) Patient to meet > 50% EEN/EPN via PO/ONS intake  2) Monitored labs to trend toward target ranges    Nutrition Goal Status: new  Communication of RD Recs:  (POC)    Assessment and Plan  Nutrition Problem  Inadequate oral intake    Related to (etiology):  Self feeding difficulty    Signs and Symptoms (as evidenced by):   PO intake  < 50%  Weight loss: -27% UBW in 1 year, 63# per pt family member report    Interventions/Recommendations (treatment strategy):  Texture modified diet (IDDSI level 4)  Commercial Beverage (Boost +)  Collaboration of care with other providers    Nutrition Diagnosis Status:   New      Reason for Assessment    Reason For Assessment: identified at risk by screening criteria  Diagnosis:  (Acute cystits w/o hematuria)  Relevant Medical History: HTN, HLD, CLEM on CKD, Alzheimers    Nutrition Risk Screen    Nutrition Risk Screen: (P) dysphagia or difficulty swallowing    Nutrition/Diet History    Patient Reported Diet/Restrictions/Preferences: general  Spiritual, Cultural Beliefs, Cheondoism Practices, Values that Affect Care: no  Food Allergies: NKFA  Factors Affecting Nutritional Intake: impaired cognitive status/motor control,decreased appetite,difficulty/impaired swallowing    Anthropometrics    Temp: 97.5 °F (36.4 °C)  Height Method: Stated  Height: 5' 10" (177.8 cm)  Height (inches): 70 in  Weight Method: Bed Scale  Weight: 76.1 kg (167 lb 12.3 oz)  Weight (lb): 167.77 lb  Ideal Body Weight (IBW), Male: 166 lb  % Ideal Body Weight, Male (lb): 101.07 %  BMI (Calculated): 24.1  BMI Grade: 18.5-24.9 - normal  Weight Loss: unintentional  Usual Body Weight (UBW), k.5 kg  % Usual Body Weight: " 72.98  % Weight Change From Usual Weight: -27.18 %       Lab/Procedures/Meds    Pertinent Labs Reviewed: reviewed  CMP:  Recent Labs   Lab 01/03/22  0437   CALCIUM 8.5*   ALBUMIN 2.4*   PROT 5.4*      K 3.4*   CO2 27      BUN 18   CREATININE 1.0   ALKPHOS 71   ALT 20   AST 20   BILITOT 0.6     BMP  Lab Results   Component Value Date     01/03/2022    K 3.4 (L) 01/03/2022     01/03/2022    CO2 27 01/03/2022    BUN 18 01/03/2022    CREATININE 1.0 01/03/2022    CALCIUM 8.5 (L) 01/03/2022    ANIONGAP 7 (L) 01/03/2022    ESTGFRAFRICA >60 01/03/2022    EGFRNONAA >60 01/03/2022     Pertinent Medications Reviewed: reviewed  Scheduled Meds:   amLODIPine  5 mg Oral Daily    cefTRIAXone (ROCEPHIN) IVPB  1 g Intravenous Q24H    donepeziL  10 mg Oral QHS    heparin (porcine)  5,000 Units Subcutaneous Q8H    melatonin  6 mg Oral Nightly    memantine  10 mg Oral BID    pravastatin  20 mg Oral Daily    QUEtiapine  12.5 mg Oral QHS    senna-docusate 8.6-50 mg  1 tablet Oral Daily    tamsulosin  0.4 mg Oral Daily     Continuous Infusions:  PRN Meds:.dextrose 50%, dextrose 50%, glucagon (human recombinant), glucose, glucose, influenza, magnesium oxide, magnesium oxide, pneumoc 13-anuj conj-dip cr(PF), potassium bicarbonate, potassium bicarbonate, sodium chloride 0.9%, DIPH,PERTUSS(ACELL),TET VACCINE (ADULT)(BOOSTRIX,ADACEL)      Estimated/Assessed Needs    Weight Used For Calorie Calculations: 76.1 kg (167 lb 12.3 oz)  Energy Calorie Requirements (kcal): 1902  Energy Need Method: Kcal/kg (25)  Protein Requirements: 76 - 91g (1 - 1.2g/kg)  Weight Used For Protein Calculations: 76.1 kg (167 lb 12.3 oz)     Estimated Fluid Requirement Method: RDA Method (or per MD)  RDA Method (mL): 1902  CHO Requirement: 238g      Nutrition Prescription Ordered    Current Diet Order: IDDSI Level 4    Evaluation of Received Nutrient/Fluid Intake    Energy Calories Required: not meeting needs  Protein Required: not meeting  needs  Fluid Required: meeting needs  % Intake of Estimated Energy Needs: 50 - 75 %  % Meal Intake: 25 - 50 %    Nutrition Risk    Level of Risk/Frequency of Follow-up:  (1-2x/week)     Monitor and Evaluation    Food and Nutrient Intake: energy intake,food and beverage intake  Food and Nutrient Adminstration: diet order  Physical Activity and Function: nutrition-related ADLs and IADLs  Anthropometric Measurements: weight,weight change,body mass index  Biochemical Data, Medical Tests and Procedures: electrolyte and renal panel,gastrointestinal profile,glucose/endocrine profile,inflammatory profile,lipid profile  Nutrition-Focused Physical Findings: overall appearance,extremities, muscles and bones,head and eyes,skin     Nutrition Follow-Up    RD Follow-up?: Yes

## 2022-01-04 NOTE — PLAN OF CARE
Problem: Occupational Therapy Goal  Goal: Occupational Therapy Goal  Description: Goals to be met by: 1/16/22    Patient will increase functional independence with ADLs by performing:    Feeding with Modified [place].  UE Dressing with Modified [place].  LE Dressing with Modified [place].  Grooming while seated with Modified [place].  Toileting from toilet with Modified [place] and Set-up Assistance for hygiene and clothing management.   Rolling to Bilateral with Modified [place].   Supine to sit with Modified [place].  Step transfer with Modified [place]  Toilet transfer to toilet with Modified [place].  Upper extremity exercise program x15 reps per handout, with independence.    Outcome: Ongoing, Progressing     MAX A sit>stand from the chair. MOD A for ~6ft with RW with forward flexed posture. Pt motivated and engaged with therapeutic exercises seated EOB.

## 2022-01-04 NOTE — PROGRESS NOTES
"[facility] Medicine  Progress Note    Patient Name: Timothy Roldan  MRN: [id number]  Patient Class: IP- Inpatient   Admission Date: 12/31/2021  Length of Stay: 4 days  Attending Physician: Luis Manuel Hammonds MD  Primary Care Provider: Martine Garces MD        Subjective:     Principal Problem:Acute cystitis without hematuria        HPI:  80 yo male with a PMHx of Alzheimer's disease (+/- Parkinson?), HTN, BPH with condom catheter who was brought here by his niece due to a ground level fall and significant weakness. Patient is a poor historian due to dementia. Was confused about "blood thinners" being a "type of animal". Per niece he has been progressively getting weaker over the last month. Went from walking without much help to using a cane to having multiple falls. He is now to the point that he needs almost full assistance to move. Otherwise, they haven't noticed much other symptoms. Confusion seems to be around his baseline.    In the ED, labs were remarkable for a UTI on U/A with extreme sediment in his urine and a mild CLEM (sCr 1.5 with BUN 27; baseline sCr is 1.2-1.3 per [facility] labs). CTH showing no acute abnormality but it was read as possible NPH. Neurology consulted and will see tomorrow. He was initially hypotensive but has become hypertensive with IVF boluses. Given IV Zosyn and admitted to floor.        Overview/Hospital Course:  Mr Louis presented with frequent falls. Has inconsistent PO intake at home, goes to sleep late and dosing off during daytime, is on furosemide at home, rhabdomyolysis and potential with a UTI. Patient has dementia. Started on empiric ceftriaxone. Blood and urine cultures NGTD. Held furosemide and given fluids instead. No hypoxia or pulmonary edema with fluids. CLEM resolved and rhabdo resolved. PT/OT recommend SNF. HCPOA thinking about SNF vs HH.       Interval History: No new issues     Review of Systems   Unable to perform ROS: Dementia     Objective: "     Vital Signs (Most Recent):  Temp: 98.1 °F (36.7 °C) (01/04/22 0813)  Pulse: 92 (01/04/22 0813)  Resp: 16 (01/04/22 0813)  BP: (!) 180/98 (01/04/22 0813)  SpO2: 99 % (01/04/22 0813) Vital Signs (24h Range):  Temp:  [97 °F (36.1 °C)-98.7 °F (37.1 °C)] 98.1 °F (36.7 °C)  Pulse:  [62-92] 92  Resp:  [16-19] 16  SpO2:  [94 %-100 %] 99 %  BP: (128-180)/(75-98) 180/98     Weight: 76.1 kg (167 lb 12.3 oz)  Body mass index is 24.07 kg/m².    Intake/Output Summary (Last 24 hours) at 1/4/2022 0926  Last data filed at 1/4/2022 0000  Gross per 24 hour   Intake --   Output 725 ml   Net -725 ml      Physical Exam  Vitals and nursing note reviewed.   Constitutional:       General: He is not in acute distress.     Appearance: He is not toxic-appearing.   Cardiovascular:      Rate and Rhythm: Normal rate and regular rhythm.      Pulses: Normal pulses.   Pulmonary:      Effort: Pulmonary effort is normal.      Breath sounds: Normal breath sounds.   Musculoskeletal:      Right lower leg: No edema.      Left lower leg: No edema.   Skin:     General: Skin is warm.      Capillary Refill: Capillary refill takes less than 2 seconds.      Findings: Bruising present.   Neurological:      Mental Status: He is alert.      Comments: Oriented to place and person   Psychiatric:         Attention and Perception: Attention normal.         Mood and Affect: Mood normal.         Speech: Speech normal.         Behavior: Behavior normal.         Thought Content: Thought content normal.         Cognition and Memory: Cognition is impaired. Memory is impaired.      Comments: Interactive and makes eye contact during conversation         Significant Labs:   All pertinent labs within the past 24 hours have been reviewed.  BMP:   Recent Labs   Lab 01/03/22  0437         K 3.4*      CO2 27   BUN 18   CREATININE 1.0   CALCIUM 8.5*     CBC: No results for input(s): WBC, HGB, HCT, PLT in the last 48 hours.        Assessment/Plan:      * Acute  cystitis without hematuria  Agree with empiric ceftriaxone pending urine and blood culture results. Thus far no growth- will d/c Abx        Rhabdomyolysis  Resolved       Paroxysmal atrial fibrillation  Currently NSR. Relative contraindication to anticoagulation given unstable ambulation and frequent falls at home, as per my conversation with HCPSARAH. Is on DVT prophylaxis      Alzheimer disease  - Continue home donepezil and memantine      Acute kidney injury superimposed on CKD  With rhabdomyolysis. Renal function stable. CPK improving. Continue intravenous fluids. Hold diuretics. I cannot find reason for furosemide use at home. HCPSARAH is not sure either. Will look at [facility] documentation for an Echo      Primary hypertension  - Continue Norvasc 5mg daily  - Holding ACEi for CLEM  - PRNs for SBP > 180      Weakness of both lower extremities  With dementia. Evaluated by neurology. No LP recommended   I am concerned about use of antihypertensives and diuretics at home contributing to his function. Also, sleep/wake cycle is off (sleeps most of the day, eats brunch and drinks coffee late a night. Goes to sleep after 1 AM usually). Continue melatonin and seroquel QHS to assist with sleep and hopefully help with appetite/function during daytime. On amlodipine for BP but holding diuretics. B12 WNL. Thiamine pending  PT/OT: rec SNF. NISHANT will like to see how he does with therapy today to determine if HH vs SNF      hypertension benign essential- added hydralazine     VTE Risk Mitigation (From admission, onward)         Ordered     heparin (porcine) injection 5,000 Units  Every 8 hours         12/31/21 1847     IP VTE HIGH RISK PATIENT  Once         12/31/21 1847     Place sequential compression device  Until discontinued         12/31/21 1847                Discharge Planning   JACQUELINE:      Code Status: Full Code   Is the patient medically ready for discharge?:     Reason for patient still in hospital (select all that apply):  Patient unstable  Discharge Plan A: Home        To SNF vs. H/H          Luis Manuel Tejeda MD  Department of Hospital Medicine   [facility]

## 2022-01-04 NOTE — SUBJECTIVE & OBJECTIVE
Interval History: No new issues     Review of Systems   Unable to perform ROS: Dementia     Objective:     Vital Signs (Most Recent):  Temp: 98.1 °F (36.7 °C) (01/04/22 0813)  Pulse: 92 (01/04/22 0813)  Resp: 16 (01/04/22 0813)  BP: (!) 180/98 (01/04/22 0813)  SpO2: 99 % (01/04/22 0813) Vital Signs (24h Range):  Temp:  [97 °F (36.1 °C)-98.7 °F (37.1 °C)] 98.1 °F (36.7 °C)  Pulse:  [62-92] 92  Resp:  [16-19] 16  SpO2:  [94 %-100 %] 99 %  BP: (128-180)/(75-98) 180/98     Weight: 76.1 kg (167 lb 12.3 oz)  Body mass index is 24.07 kg/m².    Intake/Output Summary (Last 24 hours) at 1/4/2022 0926  Last data filed at 1/4/2022 0000  Gross per 24 hour   Intake --   Output 725 ml   Net -725 ml      Physical Exam  Vitals and nursing note reviewed.   Constitutional:       General: He is not in acute distress.     Appearance: He is not toxic-appearing.   Cardiovascular:      Rate and Rhythm: Normal rate and regular rhythm.      Pulses: Normal pulses.   Pulmonary:      Effort: Pulmonary effort is normal.      Breath sounds: Normal breath sounds.   Musculoskeletal:      Right lower leg: No edema.      Left lower leg: No edema.   Skin:     General: Skin is warm.      Capillary Refill: Capillary refill takes less than 2 seconds.      Findings: Bruising present.   Neurological:      Mental Status: He is alert.      Comments: Oriented to place and person   Psychiatric:         Attention and Perception: Attention normal.         Mood and Affect: Mood normal.         Speech: Speech normal.         Behavior: Behavior normal.         Thought Content: Thought content normal.         Cognition and Memory: Cognition is impaired. Memory is impaired.      Comments: Interactive and makes eye contact during conversation         Significant Labs:   All pertinent labs within the past 24 hours have been reviewed.  BMP:   Recent Labs   Lab 01/03/22  0437         K 3.4*      CO2 27   BUN 18   CREATININE 1.0   CALCIUM 8.5*     CBC:  No results for input(s): WBC, HGB, HCT, PLT in the last 48 hours.

## 2022-01-04 NOTE — NURSING
Pt A&Ox3 (disoriented to situation), free from falls/injury, and able to make needs known during shift. VSS on 2L per NC. IV fluids continued per orders. Pt had no c/o pain during shift.  Telemetry monitoring continued on box #[id number], visible and audible on monitor at nurses' station, no acute distress noted, telesitter at the bedside. Bed locked and in lowest position. Bedside table and call light in reach. Will cont to monitor.

## 2022-01-04 NOTE — PLAN OF CARE
Problem: Adult Inpatient Plan of Care  Goal: Plan of Care Review  Outcome: Ongoing, Progressing  Goal: Patient-Specific Goal (Individualized)  Outcome: Ongoing, Progressing  Goal: Optimal Comfort and Wellbeing  Outcome: Ongoing, Progressing  Goal: Readiness for Transition of Care  Outcome: Ongoing, Progressing     Problem: Fall Injury Risk  Goal: Absence of Fall and Fall-Related Injury  Outcome: Ongoing, Progressing      Pt alert, calm and cooperative. Pt free from falls, injury or any further trauma throughout shift. Continued medications as ordered. No complaints of pain. Pt on room air. Condom catheter in place. Pt turned. Pt in no distress. Will cont to monitor.

## 2022-01-04 NOTE — ASSESSMENT & PLAN NOTE
Agree with empiric ceftriaxone pending urine and blood culture results. Thus far no growth- will d/c Abx

## 2022-01-05 LAB — SARS-COV-2 RDRP RESP QL NAA+PROBE: NEGATIVE

## 2022-01-05 PROCEDURE — 92526 ORAL FUNCTION THERAPY: CPT

## 2022-01-05 PROCEDURE — 25000003 PHARM REV CODE 250: Performed by: INTERNAL MEDICINE

## 2022-01-05 PROCEDURE — 11000001 HC ACUTE MED/SURG PRIVATE ROOM

## 2022-01-05 PROCEDURE — 97116 GAIT TRAINING THERAPY: CPT | Mod: CQ

## 2022-01-05 PROCEDURE — 97110 THERAPEUTIC EXERCISES: CPT | Mod: CQ

## 2022-01-05 PROCEDURE — U0002 COVID-19 LAB TEST NON-CDC: HCPCS | Performed by: INTERNAL MEDICINE

## 2022-01-05 PROCEDURE — 97530 THERAPEUTIC ACTIVITIES: CPT | Mod: CQ

## 2022-01-05 PROCEDURE — 63600175 PHARM REV CODE 636 W HCPCS: Performed by: INTERNAL MEDICINE

## 2022-01-05 PROCEDURE — 86580 TB INTRADERMAL TEST: CPT | Performed by: INTERNAL MEDICINE

## 2022-01-05 PROCEDURE — 97530 THERAPEUTIC ACTIVITIES: CPT

## 2022-01-05 PROCEDURE — 30200315 PPD INTRADERMAL TEST REV CODE 302: Performed by: INTERNAL MEDICINE

## 2022-01-05 RX ORDER — HYDRALAZINE HYDROCHLORIDE 25 MG/1
50 TABLET, FILM COATED ORAL EVERY 8 HOURS
Status: DISCONTINUED | OUTPATIENT
Start: 2022-01-05 | End: 2022-01-10 | Stop reason: HOSPADM

## 2022-01-05 RX ADMIN — Medication 6 MG: at 09:01

## 2022-01-05 RX ADMIN — MEMANTINE 10 MG: 10 TABLET ORAL at 09:01

## 2022-01-05 RX ADMIN — QUETIAPINE 12.5 MG: 25 TABLET ORAL at 09:01

## 2022-01-05 RX ADMIN — TAMSULOSIN HYDROCHLORIDE 0.4 MG: 0.4 CAPSULE ORAL at 08:01

## 2022-01-05 RX ADMIN — SENNOSIDES AND DOCUSATE SODIUM 1 TABLET: 50; 8.6 TABLET ORAL at 08:01

## 2022-01-05 RX ADMIN — TUBERCULIN PURIFIED PROTEIN DERIVATIVE 5 UNITS: 5 INJECTION, SOLUTION INTRADERMAL at 03:01

## 2022-01-05 RX ADMIN — HYDRALAZINE HYDROCHLORIDE 50 MG: 25 TABLET, FILM COATED ORAL at 03:01

## 2022-01-05 RX ADMIN — HEPARIN SODIUM 5000 UNITS: 5000 INJECTION INTRAVENOUS; SUBCUTANEOUS at 05:01

## 2022-01-05 RX ADMIN — HEPARIN SODIUM 5000 UNITS: 5000 INJECTION INTRAVENOUS; SUBCUTANEOUS at 09:01

## 2022-01-05 RX ADMIN — HYDRALAZINE HYDROCHLORIDE 25 MG: 25 TABLET, FILM COATED ORAL at 05:01

## 2022-01-05 RX ADMIN — AMLODIPINE BESYLATE 5 MG: 5 TABLET ORAL at 08:01

## 2022-01-05 RX ADMIN — DONEPEZIL HYDROCHLORIDE 10 MG: 10 TABLET ORAL at 09:01

## 2022-01-05 RX ADMIN — HYDRALAZINE HYDROCHLORIDE 50 MG: 25 TABLET, FILM COATED ORAL at 09:01

## 2022-01-05 RX ADMIN — HEPARIN SODIUM 5000 UNITS: 5000 INJECTION INTRAVENOUS; SUBCUTANEOUS at 03:01

## 2022-01-05 RX ADMIN — PRAVASTATIN SODIUM 20 MG: 10 TABLET ORAL at 08:01

## 2022-01-05 RX ADMIN — MEMANTINE 10 MG: 10 TABLET ORAL at 08:01

## 2022-01-05 NOTE — PLAN OF CARE
ANNETTE spoke with Kathy at [facility]. Kathy informed SW that they would not be able to take patient because he has not fully vaccinated.     SW contacted patient's POALakesha to inform that [facility] declined because patient is not fully vaccinated. Lakesha informed SW that patient is fully vaccinated. SW instructed Lakesha to email the vaccination card so it could be sent to facility.     ANNETTE contacted Kathy at [facility] to inform that patient is fully vaccinated and the card can be found in [facility] once received.     SW uploaded Vaccination Card to [facility].

## 2022-01-05 NOTE — PLAN OF CARE
Problem: Occupational Therapy Goal  Goal: Occupational Therapy Goal  Description: Goals to be met by: 1/16/22    Patient will increase functional independence with ADLs by performing:    Feeding with Modified [place].  UE Dressing with Modified [place].  LE Dressing with Minimal Assistance.  Grooming while seated with Modified [place].  Toileting from toilet with Minimal Assistance for hygiene and clothing management.   Rolling to Bilateral with Modified [place].   Supine to sit with Modified [place].  Step transfer with Contact Guard Assistance   Toilet transfer to toilet with Contact Guard Assistance.  Upper extremity exercise program x15 reps per handout, with independence.    Outcome: Ongoing, Progressing     MAX A sit>stand 2 attempts from the chair. MIN A for ~8 ft functional mobility within the room. MAX A sit>supine. Pt motivated to participate.

## 2022-01-05 NOTE — PHYSICIAN QUERY
"PT Name: Timothy Roldan  MR #: [id number]    DOCUMENTATION CLARIFICATION     CDS/: Veronica Bravo RN               Contact information:[email or web address]  This form is a permanent document in the medical record.     Query Date: January 5, 2022    By submitting this query, we are merely seeking further clarification of documentation. Please utilize your independent clinical judgment when addressing the question(s) below.    The Medical Record contains the following:   Indicators   Supporting Clinical Findings Location in Medical Record   x AMS, Confusion,  LOC, etc.  GCS 15; 4 over 5 strength in all 4 extremities cranial nerves intact.  Patient is oriented.  He has an intention tremor which he relates to Parkinson's.       PT AAO to self and time only    Patient is a poor historian due to dementia. Was confused about "blood thinners" being a "type of animal".     Mental status is at baseline    Disoriented to place, time and situation. Oriented to person     more alert and conversational but unaware he is in the hospital nor what brought him here    Cognition is impaired. Memory is impaired.   Interactive and makes eye contact during conversation  ED Prov note 12/31        Nurse note 12/31 4349    Hosp Med H&P 12/31          Hosp Med PN 1/1    Hosp Med PN 1/2     x Acute/Chronic Illness Acute encephalopathy: possibly from UTI.    Head CT showed no acute abnormalities but generalized volume loss and large ventricles. This would correlate with his Hx of dementia.           Exam is limited due AMS.           -Antibiotics per primary team.           -Avoid benzodiazepines, opioids.    Acute cystitis without hematuria  - Hypotensive initially, U/A with WBC/many bacteria/RBC. Urine with visible sediment and cloudy          > Low concern for pyelonephritis or sepsis at this time  - S/p Zosyn in ED  - Start IV Rocephin daily  - UCx, BCx x 2 pending  - S/p 2L IVF bolus in ED  - Telemetry  - Lactate negative Neuro CN " 1/1                  Hosp Med H&P 12/31    Radiology Findings      Electrolyte Imbalance     x Medication - S/p Zosyn in ED  - Start IV Rocephin daily Hosp Med H&P 12/31    Treatment              Other       The noted clinical guidelines are only system guidelines and do not replace the providers clinical judgment.    The National [place] of Neurologic Disorders and Stroke (NINDS) of the NIH describes encephalopathy as any diffuse disease of the brain that alters brain function or structure.    Provider, please clarify the acute encephalopathy diagnosis associated with above clinical findings.  [   ] Metabolic Encephalopathy - Due to electrolyte imbalance, metabolic derangements, or infectious processes, includes Septic Encephalopathy, Uremic Encephalopathy   [  x ] Acute encephalopathy ruled out   [   ] Encephalopathy, unspecified      [   ] Other Encephalopathy (please specify): ____________________   [   ] Other neurological condition- Includes Post-ictal altered mental status (please specify condition): __________   [   ]  Clinically Undetermined           Please document in your progress notes daily for the duration of treatment until resolved, and include in your discharge summary.    References:  MCKAY Kahn RN, CCDS. (2018, June 9). Notes from the Instructor: Encephalopathy tips. Retrieved October 22, 2020, from [email or web address]    ICD-9-CM Coding Clinic First Quarter 2013, Effective with discharges: October 21, 2013 [facility] § Seizure with encephalopathy due to postictal state (2013).    ICD-10-CM/PCS GL 2ours Integrated Codebook (Version V.20.8.10.0) [Computer software]. (2020). Retrieved October 21, 2020.    National [place] of Neurological Disorders and Stroke. (2019, March 27). Retrieved October 22, 2020, from https://www.ninds.nih.gov/Disorders/All-Disorders/Iwxnxkwqkzifvq-Tppqpmmclaz-Smuu    Form No. 64418

## 2022-01-05 NOTE — PT/OT/SLP PROGRESS
Physical Therapy Treatment    Patient Name:  Timothy Roldan   MRN:  [id number]    Recommendations:     Discharge Recommendations:  nursing facility, skilled   Discharge Equipment Recommendations:  (Ongoing assessment pending pt progress)   Barriers to discharge: decreased mobility, not at baseline, increased fall risk    Assessment:     Timothy Roldan is a 81 y.o. male admitted with a medical diagnosis of Acute cystitis without hematuria.  He presents with the following impairments/functional limitations:  weakness,impaired endurance,impaired self care skills,impaired functional mobilty,gait instability,impaired balance,decreased lower extremity function,decreased upper extremity function,decreased coordination,decreased safety awareness,decreased ROM,impaired coordination .  Pt is progressing towards goals.  Pt able to amb short distance in room with rw x 15 ft with CGA with decreased jojo, decreased step length and height, decreased wt shifting, decreased foot clearance, flexed posture, increased time in double stance, decrease velocity, shuffle gt pattern. Pt would benefit from physical therapy /p discharge.            Rehab Prognosis: Good; patient would benefit from acute skilled PT services to address these deficits and reach maximum level of function.    Recent Surgery: * No surgery found *      Plan:     During this hospitalization, patient to be seen 5 x/week to address the identified rehab impairments via gait training,therapeutic activities,therapeutic exercises and progress toward the following goals:    · Plan of Care Expires:  01/02/22    Subjective     Chief Complaint: none  Patient/Family Comments/goals: pt afreed to therapy  Pain/Comfort:  · Pain Rating 1: 0/10  · Pain Rating Post-Intervention 1: 0/10      Objective:     Communicated with nurse prior to session.  Patient found HOB elevated with telemetry,peripheral IV,pressure relief boots,Condom Catheter upon PT entry to room.     General  Precautions: Standard, fall   Orthopedic Precautions:N/A   Braces:    Respiratory Status: Room air     Functional Mobility:  · Bed Mobility:     · Rolling Right: minimum assistance  · Scooting: minimum assistance  · Supine to Sit: moderate assistance  · Transfers:     · Sit to Stand:  minimum assistance with rolling walker  · Bed to Chair: contact guard assistance with  rolling walker  using  Step Transfer  · Gait: 15 ft with rw with CGA  · Balance: F sitting and standing      AM-PAC 6 CLICK MOBILITY          Therapeutic Activities and Exercises:   BLE TE seated X10; LAQ, Marches, HS, HR, TT  Static stand x 3 min with rw with SBA    Patient left up in chair with all lines intact, call button in reach and nurse notified..    GOALS:   Multidisciplinary Problems     Physical Therapy Goals        Problem: Physical Therapy Goal    Goal Priority Disciplines Outcome Goal Variances Interventions   Physical Therapy Goal     PT, PT/OT Ongoing, Progressing     Description: Goals to be met by: 21     Patient will increase functional independence with mobility by performin. Supine to sit with Contact Guard Assistance  2. Sit to stand transfer with Contact Guard Assistance  3. Bed to chair transfer with Contact Guard Assistance   4. Gait  x 10 feet with Contact Guard Assistance using Rolling Walker.   5. Lower extremity exercise program x10 reps per handout, with assistance as needed                     Time Tracking:     PT Received On: 22  PT Start Time: 1148     PT Stop Time: 1226  PT Total Time (min): 38 min     Billable Minutes: Gait Training 15, Therapeutic Activity 15 and Therapeutic Exercise 8    Treatment Type: Treatment  PT/PTA: PTA     PTA Visit Number: 2     2022

## 2022-01-05 NOTE — SUBJECTIVE & OBJECTIVE
Interval History: No new issues     Review of Systems   Unable to perform ROS: Dementia     Objective:     Vital Signs (Most Recent):  Temp: 98.7 °F (37.1 °C) (01/05/22 0537)  Pulse: 77 (01/05/22 0457)  Resp: 17 (01/05/22 0457)  BP: (!) 162/89 (scheduled hydralazine  given) (01/05/22 0457)  SpO2: 97 % (01/05/22 0457) Vital Signs (24h Range):  Temp:  [98.3 °F (36.8 °C)-98.7 °F (37.1 °C)] 98.7 °F (37.1 °C)  Pulse:  [] 77  Resp:  [16-17] 17  SpO2:  [97 %-100 %] 97 %  BP: (117-162)/(63-90) 162/89     Weight: 76.1 kg (167 lb 12.3 oz)  Body mass index is 24.07 kg/m².    Intake/Output Summary (Last 24 hours) at 1/5/2022 0902  Last data filed at 1/5/2022 0516  Gross per 24 hour   Intake 480 ml   Output 550 ml   Net -70 ml      Physical Exam  Vitals and nursing note reviewed.   Constitutional:       General: He is not in acute distress.     Appearance: He is not toxic-appearing.   Cardiovascular:      Rate and Rhythm: Normal rate and regular rhythm.      Pulses: Normal pulses.   Pulmonary:      Effort: Pulmonary effort is normal.      Breath sounds: Normal breath sounds.   Musculoskeletal:      Right lower leg: No edema.      Left lower leg: No edema.   Skin:     General: Skin is warm.      Capillary Refill: Capillary refill takes less than 2 seconds.      Findings: Bruising present.   Neurological:      Mental Status: He is alert.      Comments: Oriented to place and person   Psychiatric:         Attention and Perception: Attention normal.         Mood and Affect: Mood normal.         Speech: Speech normal.         Behavior: Behavior normal.         Thought Content: Thought content normal.         Cognition and Memory: Cognition is impaired. Memory is impaired.      Comments: Interactive and makes eye contact during conversation         Significant Labs: All pertinent labs within the past 24 hours have been reviewed.  BMP: No results for input(s): GLU, NA, K, CL, CO2, BUN, CREATININE, CALCIUM, MG in the last 48  hours.  CBC: No results for input(s): WBC, HGB, HCT, PLT in the last 48 hours.    Significant Imaging:

## 2022-01-05 NOTE — PLAN OF CARE
Problem: Physical Therapy Goal  Goal: Physical Therapy Goal  Description: Goals to be met by: 21     Patient will increase functional independence with mobility by performin. Supine to sit with Contact Guard Assistance  2. Sit to stand transfer with Contact Guard Assistance  3. Bed to chair transfer with Contact Guard Assistance   4. Gait  x 10 feet with Contact Guard Assistance using Rolling Walker.   5. Lower extremity exercise program x10 reps per handout, with assistance as needed    Outcome: Ongoing, Progressing     Pt tolerated treatment good today. Improved ambulation distance this date. Pt ambulated ~40ft with RW, CGA with increased fwd trunk flexion. Pt up in [place] at end of treatment session. Pt would continue to benefit from skilled PT services to address pt's deficits and improve current level of function.

## 2022-01-05 NOTE — PROGRESS NOTES
"[facility] Medicine  Progress Note    Patient Name: Timothy Roldan  MRN: [id number]  Patient Class: IP- Inpatient   Admission Date: 12/31/2021  Length of Stay: 5 days  Attending Physician: Luis Manuel Hammonds MD  Primary Care Provider: Martine Garces MD        Subjective:     Principal Problem:Acute cystitis without hematuria        HPI:  82 yo male with a PMHx of Alzheimer's disease (+/- Parkinson?), HTN, BPH with condom catheter who was brought here by his niece due to a ground level fall and significant weakness. Patient is a poor historian due to dementia. Was confused about "blood thinners" being a "type of animal". Per niece he has been progressively getting weaker over the last month. Went from walking without much help to using a cane to having multiple falls. He is now to the point that he needs almost full assistance to move. Otherwise, they haven't noticed much other symptoms. Confusion seems to be around his baseline.    In the ED, labs were remarkable for a UTI on U/A with extreme sediment in his urine and a mild CLEM (sCr 1.5 with BUN 27; baseline sCr is 1.2-1.3 per [facility] labs). CTH showing no acute abnormality but it was read as possible NPH. Neurology consulted and will see tomorrow. He was initially hypotensive but has become hypertensive with IVF boluses. Given IV Zosyn and admitted to floor.        Overview/Hospital Course:  Mr Louis presented with frequent falls. Has inconsistent PO intake at home, goes to sleep late and dosing off during daytime, is on furosemide at home, rhabdomyolysis and potential with a UTI. Patient has dementia. Started on empiric ceftriaxone. Blood and urine cultures NGTD. Held furosemide and given fluids instead. No hypoxia or pulmonary edema with fluids. CLEM resolved and rhabdo resolved. PT/OT recommend SNF. HCPOA thinking about SNF vs HH.       Interval History: No new issues     Review of Systems   Unable to perform ROS: Dementia     Objective: "     Vital Signs (Most Recent):  Temp: 98.7 °F (37.1 °C) (01/05/22 0537)  Pulse: 77 (01/05/22 0457)  Resp: 17 (01/05/22 0457)  BP: (!) 162/89 (scheduled hydralazine  given) (01/05/22 0457)  SpO2: 97 % (01/05/22 0457) Vital Signs (24h Range):  Temp:  [98.3 °F (36.8 °C)-98.7 °F (37.1 °C)] 98.7 °F (37.1 °C)  Pulse:  [] 77  Resp:  [16-17] 17  SpO2:  [97 %-100 %] 97 %  BP: (117-162)/(63-90) 162/89     Weight: 76.1 kg (167 lb 12.3 oz)  Body mass index is 24.07 kg/m².    Intake/Output Summary (Last 24 hours) at 1/5/2022 0902  Last data filed at 1/5/2022 0516  Gross per 24 hour   Intake 480 ml   Output 550 ml   Net -70 ml      Physical Exam  Vitals and nursing note reviewed.   Constitutional:       General: He is not in acute distress.     Appearance: He is not toxic-appearing.   Cardiovascular:      Rate and Rhythm: Normal rate and regular rhythm.      Pulses: Normal pulses.   Pulmonary:      Effort: Pulmonary effort is normal.      Breath sounds: Normal breath sounds.   Musculoskeletal:      Right lower leg: No edema.      Left lower leg: No edema.   Skin:     General: Skin is warm.      Capillary Refill: Capillary refill takes less than 2 seconds.      Findings: Bruising present.   Neurological:      Mental Status: He is alert.      Comments: Oriented to place and person   Psychiatric:         Attention and Perception: Attention normal.         Mood and Affect: Mood normal.         Speech: Speech normal.         Behavior: Behavior normal.         Thought Content: Thought content normal.         Cognition and Memory: Cognition is impaired. Memory is impaired.      Comments: Interactive and makes eye contact during conversation         Significant Labs: All pertinent labs within the past 24 hours have been reviewed.  BMP: No results for input(s): GLU, NA, K, CL, CO2, BUN, CREATININE, CALCIUM, MG in the last 48 hours.  CBC: No results for input(s): WBC, HGB, HCT, PLT in the last 48 hours.    Significant Imaging:        Assessment/Plan:      * Acute cystitis without hematuria  Agree with empiric ceftriaxone pending urine and blood culture results. Thus far no growth- will d/c Abx        Rhabdomyolysis  Resolved       Paroxysmal atrial fibrillation  Currently NSR. Relative contraindication to anticoagulation given unstable ambulation and frequent falls at home, as per my conversation with HCPSARAH. Is on DVT prophylaxis      Alzheimer disease  - Continue home donepezil and memantine      Acute kidney injury superimposed on CKD  With rhabdomyolysis. Renal function stable. CPK improving. Continue intravenous fluids. Hold diuretics. I cannot find reason for furosemide use at home. HCPSARAH is not sure either. Will look at [facility] documentation for an Echo      Primary hypertension  - Continue Norvasc 5mg daily  - Holding ACEi for CLEM  - PRNs for SBP > 180      Weakness of both lower extremities  With dementia. Evaluated by neurology. No LP recommended   I am concerned about use of antihypertensives and diuretics at home contributing to his function. Also, sleep/wake cycle is off (sleeps most of the day, eats brunch and drinks coffee late a night. Goes to sleep after 1 AM usually). Continue melatonin and seroquel QHS to assist with sleep and hopefully help with appetite/function during daytime. On amlodipine for BP but holding diuretics. B12 WNL. Thiamine pending  PT/OT: rec SNF. NISHANT will like to see how he does with therapy today to determine if HH vs SNF      Debility- SNF vs. H/H.  Will discuss with  this am     VTE Risk Mitigation (From admission, onward)         Ordered     heparin (porcine) injection 5,000 Units  Every 8 hours         12/31/21 1847     IP VTE HIGH RISK PATIENT  Once         12/31/21 1847     Place sequential compression device  Until discontinued         12/31/21 1847                Discharge Planning   JACQUELINE:      Code Status: Full Code   Is the patient medically ready for discharge?:     Reason for  patient still in hospital (select all that apply): Patient unstable  Discharge Plan A: Home                  Luis Manuel Tejeda MD  Department of Hospital Medicine   [facility]

## 2022-01-05 NOTE — PT/OT/SLP PROGRESS
Occupational Therapy   Treatment    Name: Timothy Roldan  MRN: [id number]  Admitting Diagnosis:  Acute cystitis without hematuria       Recommendations:     Discharge Recommendations: nursing facility, skilled  Discharge Equipment Recommendations:   (TBD)  Barriers to discharge:   (high risk of falls, unplanned readmission, and morbidity if d/c home)    Assessment:     Timothy Roldan is a 81 y.o. male with a medical diagnosis of Acute cystitis without hematuria. Performance deficits affecting function are weakness,impaired endurance,decreased ROM,decreased coordination,impaired self care skills,decreased upper extremity function,decreased lower extremity function,impaired functional mobilty,decreased safety awareness,gait instability,impaired balance,impaired skin.     MAX A sit>stand 2 attempts from the chair. MIN A for ~8 ft functional mobility within the room. MAX A sit>supine. Pt motivated to participate    Rehab Prognosis:  Good; patient would benefit from acute skilled OT services to address these deficits and reach maximum level of function.       Plan:     Patient to be seen 5 x/week to address the above listed problems via self-care/home management,therapeutic activities,therapeutic exercises  · Plan of Care Expires: 01/16/22  · Plan of Care Reviewed with: patient    Subjective     Chief complaint: hard to get out of the chair   Patient/family comments/ goals: wants to regain PLOF     Pain/Comfort:  · Pain Rating 1: 0/10    Objective:     Patient found reclined in the chair with peripheral IV,telemetry,pressure relief boots,Condom Catheter,chair check upon OT entry to room.    General Precautions: Standard, fall   Orthopedic Precautions:N/A   Braces: N/A  Respiratory Status: Room air     Occupational Performance:     Bed Mobility:    · Patient completed Scooting laterally/posteriorly with minimum assistance  · Patient completed Sit to Supine with maximal assistance     Functional  Mobility/Transfers:  · Patient completed Sit <> Stand Transfer with maximal assistance  with  rolling walker - 2 trials   · Patient completed Chair>Bed Transfer using Step Transfer technique with minimum assistance with rolling walker  · Functional Mobility: MIN A for ~8 ft functional mobility within the room. Frequent verbal cueing for body mechanics within RW especially when turning directions.     Activities of Daily Living:  · Grooming: set-up with mouthwash with bed in chair position        AMPA 6 Click ADL: 15    Treatment & Education:  · Pt re-educated on OT role/POC.   · Importance of OOB activity with staff assistance.  · Safety during functional t/f and mobility   · Increased time required with standing trials with max verbal cueing and tactile cueing for safe hand placement  · Self-care tasks/functional mobility completed- assistance level noted above   · All questions/concerns answered within OT scope of practice       Patient left HOB elevated with B/L pressure relief boots on with all lines intact, call button in reach, bed alarm on, nurse, [place], notified and all needs met/within reach; bedside table within reach Education:      GOALS:   Multidisciplinary Problems     Occupational Therapy Goals        Problem: Occupational Therapy Goal    Goal Priority Disciplines Outcome Interventions   Occupational Therapy Goal     OT, PT/OT Ongoing, Progressing    Description: Goals to be met by: 1/16/22    Patient will increase functional independence with ADLs by performing:    Feeding with Modified [place].  UE Dressing with Modified [place].  LE Dressing with Minimal Assistance.  Grooming while seated with Modified [place].  Toileting from toilet with Minimal Assistance for hygiene and clothing management.   Rolling to Bilateral with Modified [place].   Supine to sit with Modified [place].  Step transfer with Contact Guard Assistance   Toilet transfer to toilet with Contact Guard  Assistance.  Upper extremity exercise program x15 reps per handout, with independence.                     Time Tracking:     OT Date of Treatment: 01/05/22  OT Start Time: 1333  OT Stop Time: 1356  OT Total Time (min): 23 min    Billable Minutes:Therapeutic Activity 23 min    OT/STEWART: OT          1/5/2022

## 2022-01-05 NOTE — PT/OT/SLP PROGRESS
Speech Language Pathology Treatment/Discharge    Patient Name:  Timothy Roldan   MRN:  [id number]  Admitting Diagnosis: Acute cystitis without hematuria    Recommendations:                 General Recommendations:  Follow-up not indicated  Diet recommendations:  Puree, Liquid Diet Level: Thin   Aspiration Precautions: 1 bite/sip at a time, Alternating bites/sips and Small bites/sips   General Precautions: Standard, pureed diet  Communication strategies:  none    Subjective     Pt awake in bed watching TV upon SLP arrival. Pt reported improved appetite and liking the pureed diet stating it was easier for him to consume without teeth.    Patient goals: To return home.     Pain/Comfort:  · Pain Rating 1: 0/10    Respiratory Status: Room air    Objective:     Has the patient been evaluated by SLP for swallowing?   Yes  Keep patient NPO? No   Current Respiratory Status:        Pt's head of bed elevated prior to administering PO trials. Pt accepted and consumed 6 oz of water vis straw and 4 trials of pudding. Swallow was timely, without overt s/s of aspiration. Pt stated he wanted to remain on pureed diet since it was easier for him to eat.    Assessment:     Timothy Roldan is a 81 y.o. male with a dx of Acute cystitis without hematuria. Pt presents with mild to moderate oral dysphagia negatively impacted by poor dentition. NO overt s/s of aspiration across consistencies. No further ST is warranted at this time.    Goals:   Multidisciplinary Problems     SLP Goals     Not on file          Multidisciplinary Problems (Resolved)        Problem: SLP Goal    Goal Priority Disciplines Outcome   SLP Goal   (Resolved)     SLP Met   Description: ST. Pt will tolerate mechanical soft/thin liquids without overt s/s of aspiration over 1-2 sessions.-discontinue goal 22  2. Pt will tolerate puree/thin liquids without overt s/s of aspiration over 1-2 sessions- GOAL MET 22                   Plan:     · Patient to be seen:    (f/u x 1-2 for diet tolerance)   · Plan of Care expires:  01/05/22  · Plan of Care reviewed with:  patient   · SLP Follow-Up:  No       Discharge recommendations:  other (see comments) (No further ST is recommended post d/c from hospital)   Barriers to Discharge:  None    Time Tracking:     SLP Treatment Date:   01/05/22  Speech Start Time:  1426  Speech Stop Time:  1443     Speech Total Time (min):  17 min    Billable Minutes: Treatment Swallowing Dysfunction 17 min    01/05/2022

## 2022-01-05 NOTE — PLAN OF CARE
Problem: Adult Inpatient Plan of Care  Goal: Patient-Specific Goal (Individualized)  Outcome: Ongoing, Progressing  Goal: Optimal Comfort and Wellbeing  Outcome: Ongoing, Progressing  Goal: Readiness for Transition of Care  Outcome: Ongoing, Progressing     Problem: Fall Injury Risk  Goal: Absence of Fall and Fall-Related Injury  Outcome: Ongoing, Progressing   Pt alert and oriented. Pt free from falls, injury or any further trauma throughout shift.  Continued medications as ordered. No complaints of pain.  Pt turned and position maintained. Dressing change done. Condom catheter in place. Pt in no distress. Will cont to monitor.

## 2022-01-05 NOTE — PLAN OF CARE
Problem: SLP Goal  Goal: SLP Goal  Description: ST. Pt will tolerate mechanical soft/thin liquids without overt s/s of aspiration over 1-2 sessions.-discontinue goal 22  2. Pt will tolerate puree/thin liquids without overt s/s of aspiration over 1-2 sessions- GOAL MET 22  Outcome: Met   Pt tolerating pureed diet safely w/o overt s/s of apsiration.

## 2022-01-05 NOTE — PLAN OF CARE
01/05/22 1050   Medicare Message   Important Message from Medicare regarding Discharge Appeal Rights Given to patient/caregiver;Explained to patient/caregiver;Other (comments)  (Explained IMM to Lakesha. Lakesha expressed understanding. Copy emailed to [email or web address])   Date IMM was signed 01/05/22   Time IMM was signed 1047

## 2022-01-05 NOTE — PLAN OF CARE
SW notified patient's niece, Lakesha that [facility] declined placement. SW inquired if referral could be sent to [facility], [facility]  and [facility]. Lakesha said that [facility] is their first preference.

## 2022-01-05 NOTE — PT/OT/SLP PROGRESS
Physical Therapy Treatment    Patient Name:  Timothy Roldan   MRN:  [id number]    Recommendations:     Discharge Recommendations:  nursing facility, skilled   Discharge Equipment Recommendations:  (ongoing assessment pending pt progress)   Barriers to discharge: Inaccessible home, Decreased caregiver support and Pt is not functioning at baseline and is at increased risk fo falls, morbidity, and readmission if he returns home alone at present time    Assessment:     Timothy Roldan is a 81 y.o. male admitted with a medical diagnosis of Acute cystitis without hematuria.  He presents with the following impairments/functional limitations:  weakness,impaired endurance,gait instability,impaired self care skills,decreased lower extremity function,decreased ROM,impaired skin,decreased upper extremity function,impaired balance,decreased coordination,impaired functional mobilty,impaired coordination,impaired joint extensibility,impaired muscle length.    Pt tolerated treatment good today. Improved ambulation distance this date. Pt ambulated ~40ft with RW, CGA with increased fwd trunk flexion. Pt up in BSchair at end of treatment session. Pt would continue to benefit from skilled PT services to address pt's deficits and improve current level of function.     Rehab Prognosis: Good; patient would benefit from acute skilled PT services to address these deficits and reach maximum level of function.    Recent Surgery: * No surgery found *      Plan:     During this hospitalization, patient to be seen 5 x/week to address the identified rehab impairments via gait training,therapeutic activities,therapeutic exercises and progress toward the following goals:    · Plan of Care Expires:  01/16/22 (Goals to be met 1/16- in PT note (Documentation error from previous writer))    Subjective     Chief Complaint: not being able to walk too far  Patient/Family Comments/goals: Pt agreeable and motivated to participate  Pain/Comfort:  · Pain Rating  1: 0/10      Objective:     Communicated with pt's nursePatti prior to session.  Patient found HOB elevated with telemetry,peripheral IV,pressure relief boots,Condom Catheter upon PT entry to room.     General Precautions: Standard, fall   Orthopedic Precautions:N/A   Braces: N/A  Respiratory Status: Room air     Functional Mobility: Requires extra time due to max decreased pace with mobility.   · Bed Mobility:     · Scooting: Mod A to scoot to EOB for foot placement  · Supine to Sit: moderate assistance with use of HR   · Transfers: x2 trials from EOB    · Sit to Stand:  minimum assistance with rolling walker  · Gait: Pt ambulated ~40ft with RW, CGA with shuffling gait and increased fwd trunk flexion. Max decreased pace, requires extra time. Pt requires cues to increase step length and for upright posture. Verbal/tactile cues for RW balance/mgt/safety. Decreased jojo, step length, velocity of limb, postural control, safety, endurance and balance  · Balance: fair sitting and fair standing      AM-PAC 6 CLICK MOBILITY  Turning over in bed (including adjusting bedclothes, sheets and blankets)?: 2  Sitting down on and standing up from a chair with arms (e.g., wheelchair, bedside commode, etc.): 3  Moving from lying on back to sitting on the side of the bed?: 2  Moving to and from a bed to a chair (including a wheelchair)?: 3  Need to walk in hospital room?: 3  Climbing 3-5 steps with a railing?: 2  Basic Mobility Total Score: 15       Therapeutic Activities and Exercises:   Seated therex to BLEs x10 reps AROM: Marching, LAQs, Heel Raises, Toe raises, hip abduction, hip adduction     · Pt educated on PTA role/POC.   · Importance of OOB activity with staff assistance.  · Importance of sitting up in the chair throughout the day as tolerated, especially for meals   · Safety during functional t/f and mobility with use of AD with nursing/rehab staff  · Multiple self-care tasks/functional mobility completed-  assistance level noted above   · All questions/concerns answered within scope of practice    Pt encouraged to use call button for assistance for all OOB/OOchair activity 2/2 fall risk. Pt verbalized understanding. Tray table and all needs within reach.    Patient left reclined in BSchair with B pressure boots,pressure cushion, lunch tray setup, all needs within reach with all lines intact, call button in reach, chair alarm on and pt's nursePatti notified..    GOALS:   Multidisciplinary Problems     Physical Therapy Goals        Problem: Physical Therapy Goal    Goal Priority Disciplines Outcome Goal Variances Interventions   Physical Therapy Goal     PT, PT/OT Ongoing, Progressing     Description: Goals to be met by: 21     Patient will increase functional independence with mobility by performin. Supine to sit with Contact Guard Assistance  2. Sit to stand transfer with Contact Guard Assistance  3. Bed to chair transfer with Contact Guard Assistance   4. Gait  x 10 feet with Contact Guard Assistance using Rolling Walker.   5. Lower extremity exercise program x10 reps per handout, with assistance as needed                     Time Tracking:     PT Received On: 22  PT Start Time: 1133     PT Stop Time: 1211  PT Total Time (min): 38 min     Billable Minutes: Gait Training 25, Therapeutic Activity 13 and Therapeutic Exercise 10    Treatment Type: Treatment  PT/PTA: PTA     PTA Visit Number: 3     2022

## 2022-01-05 NOTE — PLAN OF CARE
Problem: Physical Therapy Goal  Goal: Physical Therapy Goal  Description: Goals to be met by: 21     Patient will increase functional independence with mobility by performin. Supine to sit with Contact Guard Assistance  2. Sit to stand transfer with Contact Guard Assistance  3. Bed to chair transfer with Contact Guard Assistance   4. Gait  x 10 feet with Contact Guard Assistance using Rolling Walker.   5. Lower extremity exercise program x10 reps per handout, with assistance as needed    Outcome: Ongoing, Progressing   Pt is progressing towards goals.  Pt able to amb short distance in room with rw x 15 ft with CGA with decreased jojo, decreased step length and height, decreased wt shifting, decreased foot clearance, flexed posture, increased time in double stance, decrease velocity, shuffle gt pattern. Pt would benefit from physical therapy /p discharge.

## 2022-01-06 LAB
ANION GAP SERPL CALC-SCNC: 7 MMOL/L (ref 8–16)
BUN SERPL-MCNC: 20 MG/DL (ref 8–23)
CALCIUM SERPL-MCNC: 8.6 MG/DL (ref 8.7–10.5)
CHLORIDE SERPL-SCNC: 108 MMOL/L (ref 95–110)
CO2 SERPL-SCNC: 25 MMOL/L (ref 23–29)
CREAT SERPL-MCNC: 0.9 MG/DL (ref 0.5–1.4)
EST. GFR  (AFRICAN AMERICAN): >60 ML/MIN/1.73 M^2
EST. GFR  (NON AFRICAN AMERICAN): >60 ML/MIN/1.73 M^2
GLUCOSE SERPL-MCNC: 101 MG/DL (ref 70–110)
POTASSIUM SERPL-SCNC: 4.5 MMOL/L (ref 3.5–5.1)
SODIUM SERPL-SCNC: 140 MMOL/L (ref 136–145)
VIT B1 BLD-MCNC: 56 UG/L (ref 38–122)

## 2022-01-06 PROCEDURE — 25000003 PHARM REV CODE 250: Performed by: INTERNAL MEDICINE

## 2022-01-06 PROCEDURE — 36415 COLL VENOUS BLD VENIPUNCTURE: CPT | Performed by: INTERNAL MEDICINE

## 2022-01-06 PROCEDURE — 97530 THERAPEUTIC ACTIVITIES: CPT | Mod: CQ

## 2022-01-06 PROCEDURE — 63600175 PHARM REV CODE 636 W HCPCS: Performed by: INTERNAL MEDICINE

## 2022-01-06 PROCEDURE — 11000001 HC ACUTE MED/SURG PRIVATE ROOM

## 2022-01-06 PROCEDURE — 80048 BASIC METABOLIC PNL TOTAL CA: CPT | Performed by: INTERNAL MEDICINE

## 2022-01-06 RX ADMIN — PRAVASTATIN SODIUM 20 MG: 10 TABLET ORAL at 09:01

## 2022-01-06 RX ADMIN — HEPARIN SODIUM 5000 UNITS: 5000 INJECTION INTRAVENOUS; SUBCUTANEOUS at 09:01

## 2022-01-06 RX ADMIN — HEPARIN SODIUM 5000 UNITS: 5000 INJECTION INTRAVENOUS; SUBCUTANEOUS at 05:01

## 2022-01-06 RX ADMIN — TAMSULOSIN HYDROCHLORIDE 0.4 MG: 0.4 CAPSULE ORAL at 09:01

## 2022-01-06 RX ADMIN — MEMANTINE 10 MG: 10 TABLET ORAL at 08:01

## 2022-01-06 RX ADMIN — QUETIAPINE 12.5 MG: 25 TABLET ORAL at 08:01

## 2022-01-06 RX ADMIN — Medication 6 MG: at 08:01

## 2022-01-06 RX ADMIN — HEPARIN SODIUM 5000 UNITS: 5000 INJECTION INTRAVENOUS; SUBCUTANEOUS at 02:01

## 2022-01-06 RX ADMIN — HYDRALAZINE HYDROCHLORIDE 50 MG: 25 TABLET, FILM COATED ORAL at 09:01

## 2022-01-06 RX ADMIN — DONEPEZIL HYDROCHLORIDE 10 MG: 10 TABLET ORAL at 08:01

## 2022-01-06 RX ADMIN — HYDRALAZINE HYDROCHLORIDE 50 MG: 25 TABLET, FILM COATED ORAL at 02:01

## 2022-01-06 RX ADMIN — HYDRALAZINE HYDROCHLORIDE 50 MG: 25 TABLET, FILM COATED ORAL at 05:01

## 2022-01-06 RX ADMIN — SENNOSIDES AND DOCUSATE SODIUM 1 TABLET: 50; 8.6 TABLET ORAL at 09:01

## 2022-01-06 RX ADMIN — AMLODIPINE BESYLATE 5 MG: 5 TABLET ORAL at 09:01

## 2022-01-06 RX ADMIN — MEMANTINE 10 MG: 10 TABLET ORAL at 09:01

## 2022-01-06 NOTE — SUBJECTIVE & OBJECTIVE
Interval History: No new issues     Review of Systems   Unable to perform ROS: Dementia     Objective:     Vital Signs (Most Recent):  Temp: 97.4 °F (36.3 °C) (01/06/22 0501)  Pulse: 88 (01/06/22 0501)  Resp: 17 (01/06/22 0501)  BP: 139/77 (01/06/22 0501)  SpO2: 97 % (01/06/22 0501) Vital Signs (24h Range):  Temp:  [97.4 °F (36.3 °C)-98.6 °F (37 °C)] 97.4 °F (36.3 °C)  Pulse:  [84-93] 88  Resp:  [16-17] 17  SpO2:  [95 %-98 %] 97 %  BP: (106-139)/(58-77) 139/77     Weight: 76.1 kg (167 lb 12.3 oz)  Body mass index is 24.07 kg/m².    Intake/Output Summary (Last 24 hours) at 1/6/2022 0642  Last data filed at 1/5/2022 1706  Gross per 24 hour   Intake 720 ml   Output --   Net 720 ml      Physical Exam  Vitals and nursing note reviewed.   Constitutional:       General: He is not in acute distress.     Appearance: He is not toxic-appearing.   Cardiovascular:      Rate and Rhythm: Normal rate and regular rhythm.      Pulses: Normal pulses.   Pulmonary:      Effort: Pulmonary effort is normal.      Breath sounds: Normal breath sounds.   Musculoskeletal:      Right lower leg: No edema.      Left lower leg: No edema.   Skin:     General: Skin is warm.      Capillary Refill: Capillary refill takes less than 2 seconds.      Findings: Bruising present.   Neurological:      Mental Status: He is alert.      Comments: Oriented to place and person   Psychiatric:         Attention and Perception: Attention normal.         Mood and Affect: Mood normal.         Speech: Speech normal.         Behavior: Behavior normal.         Thought Content: Thought content normal.         Cognition and Memory: Cognition is impaired. Memory is impaired.      Comments: Interactive and makes eye contact during conversation         Significant Labs: All pertinent labs within the past 24 hours have been reviewed.  BMP: No results for input(s): GLU, NA, K, CL, CO2, BUN, CREATININE, CALCIUM, MG in the last 48 hours.  CBC: No results for input(s): WBC, HGB,  HCT, PLT in the last 48 hours.    Significant Imaging:

## 2022-01-06 NOTE — PLAN OF CARE
[place] [facility]  Discharge Reassessment    Primary Care Provider: Martine Garces MD    Expected Discharge Date:       sent several referrals to SNF to find placement for patient. GEE Crowder sent more SNF preferences today.     Reassessment (most recent)       Discharge Reassessment - 01/06/22 1250          Discharge Reassessment    Assessment Type Discharge Planning Reassessment     Did the patient's condition or plan change since previous assessment? No     Discharge Plan discussed with: POA     Name(s) and Number(s) Lakesha [phone]     Communicated JACQUELINE with patient/caregiver Date not available/Unable to determine     Discharge Plan A Skilled Nursing Facility     Discharge Plan B Home Health     DME Needed Upon Discharge  none     Discharge Barriers Identified None     Why the patient remains in the hospital Placement issues        Post-Acute Status    Post-Acute Authorization Placement (P)      Post-Acute Placement Status Pending post-acute provider review/more information requested (P)      Coverage [employer] (P)      Patient choice form signed by patient/caregiver List from [facility] Post-Acute Care (P)      Discharge Delays Post-Acute Set-up (P)

## 2022-01-06 NOTE — PLAN OF CARE
Problem: Physical Therapy Goal  Goal: Physical Therapy Goal  Description: Goals to be met by: 21     Patient will increase functional independence with mobility by performin. Supine to sit with Contact Guard Assistance  2. Sit to stand transfer with Contact Guard Assistance  3. Bed to chair transfer with Contact Guard Assistance   4. Gait  x 10 feet with Contact Guard Assistance using Rolling Walker.   5. Lower extremity exercise program x10 reps per handout, with assistance as needed    Unable to perform transfers or ambulate this date due to increased LLE, gluteal and lower quadrant pain. Excoriation to gluteal region noted. Pt required Dep A for rolling L and R for pericare with increased pain. Pt declined OOB activity 2/2 the aforementioned. Pt left in R sidelying to assist with pressure relief. Hopeful to resume ambulation tomorrow. Pt would continue to benefit from skilled PT services to address pt's deficits and improve current level of function.

## 2022-01-06 NOTE — PLAN OF CARE
ANNETTE spoke with Lay at [facility] to follow up on referral. Lay informed SW that she had a long waiting list.     ANNETTE spoke with Meena at [facility]. Meena informed SW that she will look at the referral and call back with decision.    11:08 am    SW spoke with patient's Lakesha FLYNN. ANNETTE explained that [facility] has no beds and [facility] has not reviewed referral yet. ANNETTE inquired about other facilities. Lakesha inquired if a referral could be sent to [facility] and [facility].     [facility] informed SW that  SNF does not have any beds.     ANNETTE informed Lakesha that [facility] does not have any beds. Lakesha inquired if referrals could be sent to [facility] or [facility] or [facility]. ANNETTE informed that [facility] is in [place]. Lakesha inquired if a referral could be sent there. ANNETTE inquired if she would like referrals to be sent to [facility] and [facility]. Lakesha stated that she does not prefer to send patient there. ANNETTE informed that patient's options are limited. ANNETTE stated that patient will have to pursue other options if he can't be placed. Lakesha inquired about [facility] . ANNETTE informed that a referral was sent and a message left for admissions.     1:25 pm     SW contacted [facility]: No asnwer, left two messages. [facility] ; no beds. [facility] is not accepting any new referrals for SNF or long-term for an indefinitely. [facility] has no male beds and long waitlist. [facility] has no SNF beds. [facility] has no SNF beds. [facility], [facility], and [facility] are under one umbrella and [facility] has not called SW back.     ANNETTE contacted Lakesha and explained patient's placement issues and discussed plan b. ANNETTE explained that if placement has not been found by end of day, patient will have to go home with home health. Lakesha stated that she understood. ANNETTE inquired if referral could be sent to [facility]. Lakesha said yes.

## 2022-01-06 NOTE — PT/OT/SLP PROGRESS
Physical Therapy Treatment    Patient Name:  Timothy Roldan   MRN:  [id number]    Recommendations:     Discharge Recommendations:  nursing facility, skilled   Discharge Equipment Recommendations:  (ongoing assessment pending pt progress)   Barriers to discharge:  Inaccessible home, Decreased caregiver support and Pt is not functioning at baseline and is at increased risk fo falls, morbidity, and readmission if he returns home alone at present time    Assessment:     Timothy Roldan is a 81 y.o. male admitted with a medical diagnosis of Acute cystitis without hematuria.  He presents with the following impairments/functional limitations:  weakness,impaired functional mobilty,impaired endurance,gait instability,decreased coordination,impaired self care skills,decreased lower extremity function,impaired balance,decreased safety awareness,impaired cognition,pain,impaired skin,decreased ROM,decreased upper extremity function,impaired fine motor,impaired joint extensibility,impaired muscle length.     Unable to perform transfers or ambulate this date due to increased LLE, gluteal and lower quadrant pain. Excoriation to gluteal region noted. Pt required Dep A for rolling L and R for pericare with increased pain. Pt declined OOB activity 2/2 the aforementioned. Pt left in R sidelying to assist with pressure relief. Hopeful to resume ambulation tomorrow. Pt would continue to benefit from skilled PT services to address pt's deficits and improve current level of function.    Rehab Prognosis: Fair; patient would benefit from acute skilled PT services to address these deficits and reach maximum level of function.    Recent Surgery: * No surgery found *      Plan:     During this hospitalization, patient to be seen 5 x/week to address the identified rehab impairments via gait training,therapeutic activities,therapeutic exercises and progress toward the following goals:    · Plan of Care Expires:  01/16/22    Subjective     Chief  Complaint: increased pain  Patient/Family Comments/goals: Pt reports he is so uncomfortable, in so much pain  Pain/Comfort:  · Pain Rating 1: 10/10  · Location 1:  (back, LLE, and gluteal region)  · Pain Addressed 1: Distraction,Reposition,Cessation of Activity,Nurse notified      Objective:     Communicated with pt's nurseSamantha prior to session.  Patient found HOB elevated with telemetry,peripheral IV,bed alarm upon PT entry to room.     General Precautions: Standard, fall   Orthopedic Precautions:N/A   Braces: N/A  Respiratory Status: Room air     Functional Mobility: Requires extra time to perform 2/2 increased pain. Limited LLE ROM. Excoriation to perineal/gluteal region. Barrier cream applied. Pt left in L sidelying at end of session to assist with pain relief.   · Bed Mobility:     · Rolling Left:  Dependence (x2 trials)  · Rolling Right: dependence (x2 trials )  · Bridging: CGA x3 trials  · Scooting: maximal assistance to scoot towards HOB with bed in trendelenburg. Pt able to assist with BLEs      AM-PAC 6 CLICK MOBILITY  Turning over in bed (including adjusting bedclothes, sheets and blankets)?: 2  Sitting down on and standing up from a chair with arms (e.g., wheelchair, bedside commode, etc.): 3  Moving from lying on back to sitting on the side of the bed?: 2  Moving to and from a bed to a chair (including a wheelchair)?: 3  Need to walk in hospital room?: 3  Climbing 3-5 steps with a railing?: 2  Basic Mobility Total Score: 15       Therapeutic Activities and Exercises:    Pt requires dep A for doffing/donning of brief and pericare. Dep A of rolling L and R x2 trials. Requires extra time to perform 2/2 increased pain.     ·  Pt educated on PTA role/POC.   · Importance of OOB activity with staff assistance.  · Importance of sitting up in the chair throughout the day as tolerated, especially for meals   · Safety during functional t/f and mobility with use of AD with nursing/rehab staff  · Multiple  self-care tasks/functional mobility completed- assistance level noted above   · All questions/concerns answered within scope of practice    Pt's nurse, notified of increased pain this date.  Patient left R sidelying with wedge and pressure cushion, B pressure boots, tray table and all needs within reach with all lines intact, call button in reach, bed alarm on and pt's nurse, Samantha, notified..    GOALS:   Multidisciplinary Problems     Physical Therapy Goals        Problem: Physical Therapy Goal    Goal Priority Disciplines Outcome Goal Variances Interventions   Physical Therapy Goal     PT, PT/OT Ongoing, Progressing     Description: Goals to be met by: 21     Patient will increase functional independence with mobility by performin. Supine to sit with Contact Guard Assistance  2. Sit to stand transfer with Contact Guard Assistance  3. Bed to chair transfer with Contact Guard Assistance   4. Gait  x 10 feet with Contact Guard Assistance using Rolling Walker.   5. Lower extremity exercise program x10 reps per handout, with assistance as needed                     Time Tracking:     PT Received On: 22  PT Start Time: 1049     PT Stop Time: 1122  PT Total Time (min): 33 min     Billable Minutes: Therapeutic Activity 33    Treatment Type: Treatment  PT/PTA: PTA     PTA Visit Number: 4     2022

## 2022-01-06 NOTE — PT/OT/SLP PROGRESS
Occupational Therapy      Patient Name:  Timothy Roldan   MRN:  [id number]    Patient not seen today secondary to Pain; limited session with MAX to dependent with bed mobility with PTA during session. Will follow-up 01/07/21.    1/6/2022

## 2022-01-06 NOTE — PROGRESS NOTES
"[facility] Medicine  Progress Note    Patient Name: Timothy Roldan  MRN: [id number]  Patient Class: IP- Inpatient   Admission Date: 12/31/2021  Length of Stay: 6 days  Attending Physician: Luis Manuel Hammonds MD  Primary Care Provider: Martine Garces MD        Subjective:     Principal Problem:Acute cystitis without hematuria        HPI:  82 yo male with a PMHx of Alzheimer's disease (+/- Parkinson?), HTN, BPH with condom catheter who was brought here by his niece due to a ground level fall and significant weakness. Patient is a poor historian due to dementia. Was confused about "blood thinners" being a "type of animal". Per niece he has been progressively getting weaker over the last month. Went from walking without much help to using a cane to having multiple falls. He is now to the point that he needs almost full assistance to move. Otherwise, they haven't noticed much other symptoms. Confusion seems to be around his baseline.    In the ED, labs were remarkable for a UTI on U/A with extreme sediment in his urine and a mild CLEM (sCr 1.5 with BUN 27; baseline sCr is 1.2-1.3 per [facility] labs). CTH showing no acute abnormality but it was read as possible NPH. Neurology consulted and will see tomorrow. He was initially hypotensive but has become hypertensive with IVF boluses. Given IV Zosyn and admitted to floor.        Overview/Hospital Course:  Mr Louis presented with frequent falls. Has inconsistent PO intake at home, goes to sleep late and dosing off during daytime, is on furosemide at home, rhabdomyolysis and potential with a UTI. Patient has dementia. Started on empiric ceftriaxone. Blood and urine cultures NGTD. Held furosemide and given fluids instead. No hypoxia or pulmonary edema with fluids. CLEM resolved and rhabdo resolved. PT/OT recommend SNF.       Interval History: No new issues     Review of Systems   Unable to perform ROS: Dementia     Objective:     Vital Signs (Most Recent):  Temp: " 97.4 °F (36.3 °C) (01/06/22 0501)  Pulse: 88 (01/06/22 0501)  Resp: 17 (01/06/22 0501)  BP: 139/77 (01/06/22 0501)  SpO2: 97 % (01/06/22 0501) Vital Signs (24h Range):  Temp:  [97.4 °F (36.3 °C)-98.6 °F (37 °C)] 97.4 °F (36.3 °C)  Pulse:  [84-93] 88  Resp:  [16-17] 17  SpO2:  [95 %-98 %] 97 %  BP: (106-139)/(58-77) 139/77     Weight: 76.1 kg (167 lb 12.3 oz)  Body mass index is 24.07 kg/m².    Intake/Output Summary (Last 24 hours) at 1/6/2022 0642  Last data filed at 1/5/2022 1706  Gross per 24 hour   Intake 720 ml   Output --   Net 720 ml      Physical Exam  Vitals and nursing note reviewed.   Constitutional:       General: He is not in acute distress.     Appearance: He is not toxic-appearing.   Cardiovascular:      Rate and Rhythm: Normal rate and regular rhythm.      Pulses: Normal pulses.   Pulmonary:      Effort: Pulmonary effort is normal.      Breath sounds: Normal breath sounds.   Musculoskeletal:      Right lower leg: No edema.      Left lower leg: No edema.   Skin:     General: Skin is warm.      Capillary Refill: Capillary refill takes less than 2 seconds.      Findings: Bruising present.   Neurological:      Mental Status: He is alert.      Comments: Oriented to place and person   Psychiatric:         Attention and Perception: Attention normal.         Mood and Affect: Mood normal.         Speech: Speech normal.         Behavior: Behavior normal.         Thought Content: Thought content normal.         Cognition and Memory: Cognition is impaired. Memory is impaired.      Comments: Interactive and makes eye contact during conversation         Significant Labs: All pertinent labs within the past 24 hours have been reviewed.  BMP: No results for input(s): GLU, NA, K, CL, CO2, BUN, CREATININE, CALCIUM, MG in the last 48 hours.  CBC: No results for input(s): WBC, HGB, HCT, PLT in the last 48 hours.    Significant Imaging:      Assessment/Plan:      * Acute cystitis without hematuria  Agree with empiric  ceftriaxone pending urine and blood culture results. Thus far no growth- will d/c Abx        Rhabdomyolysis  Resolved       Paroxysmal atrial fibrillation  Currently NSR. Relative contraindication to anticoagulation given unstable ambulation and frequent falls at home, as per my conversation with NISHANT. Is on DVT prophylaxis      Alzheimer disease  - Continue home donepezil and memantine      Acute kidney injury superimposed on CKD  With rhabdomyolysis. Renal function stable. CPK improving. Continue intravenous fluids. Hold diuretics. I cannot find reason for furosemide use at home. HCPSARAH is not sure either. Will look at [facility] documentation for an Echo      Primary hypertension  - Continue Norvasc 5mg daily  - Holding ACEi for CLEM  - PRNs for SBP > 180      Weakness of both lower extremities  With dementia. Evaluated by neurology. No LP recommended   I am concerned about use of antihypertensives and diuretics at home contributing to his function. Also, sleep/wake cycle is off (sleeps most of the day, eats brunch and drinks coffee late a night. Goes to sleep after 1 AM usually). Continue melatonin and seroquel QHS to assist with sleep and hopefully help with appetite/function during daytime. On amlodipine for BP but holding diuretics. B12 WNL. Thiamine pending  PT/OT: rec SNF. NISHANT will like to see how he does with therapy today to determine if  vs SNF        VTE Risk Mitigation (From admission, onward)         Ordered     heparin (porcine) injection 5,000 Units  Every 8 hours         12/31/21 1847     IP VTE HIGH RISK PATIENT  Once         12/31/21 1847     Place sequential compression device  Until discontinued         12/31/21 1847                Discharge Planning   JACQUELINE:      Code Status: Full Code   Is the patient medically ready for discharge?:     Reason for patient still in hospital (select all that apply): Patient unstable  Discharge Plan A: Home        To SNF when arranged.             Luis Manuel Tejdea,  MD  Department of Hospital Medicine   [facility] - Med Surg

## 2022-01-07 PROCEDURE — 63600175 PHARM REV CODE 636 W HCPCS: Performed by: INTERNAL MEDICINE

## 2022-01-07 PROCEDURE — 11000001 HC ACUTE MED/SURG PRIVATE ROOM

## 2022-01-07 PROCEDURE — 94761 N-INVAS EAR/PLS OXIMETRY MLT: CPT

## 2022-01-07 PROCEDURE — 25000003 PHARM REV CODE 250: Performed by: INTERNAL MEDICINE

## 2022-01-07 RX ORDER — DONEPEZIL HYDROCHLORIDE 10 MG/1
10 TABLET, FILM COATED ORAL NIGHTLY
Qty: 30 TABLET | Refills: 5 | Status: ON HOLD | OUTPATIENT
Start: 2022-01-07 | End: 2023-01-28 | Stop reason: HOSPADM

## 2022-01-07 RX ORDER — QUETIAPINE FUMARATE 25 MG/1
25 TABLET, FILM COATED ORAL NIGHTLY
Qty: 30 TABLET | Refills: 5 | Status: ON HOLD | OUTPATIENT
Start: 2022-01-07 | End: 2022-12-28 | Stop reason: HOSPADM

## 2022-01-07 RX ORDER — HYDRALAZINE HYDROCHLORIDE 50 MG/1
50 TABLET, FILM COATED ORAL EVERY 8 HOURS
Qty: 90 TABLET | Refills: 5 | Status: SHIPPED | OUTPATIENT
Start: 2022-01-07 | End: 2022-06-02

## 2022-01-07 RX ORDER — TAMSULOSIN HYDROCHLORIDE 0.4 MG/1
0.4 CAPSULE ORAL DAILY
Qty: 30 CAPSULE | Refills: 5 | Status: ON HOLD | OUTPATIENT
Start: 2022-01-08 | End: 2023-01-28 | Stop reason: HOSPADM

## 2022-01-07 RX ADMIN — DONEPEZIL HYDROCHLORIDE 10 MG: 10 TABLET ORAL at 09:01

## 2022-01-07 RX ADMIN — HYDRALAZINE HYDROCHLORIDE 50 MG: 25 TABLET, FILM COATED ORAL at 09:01

## 2022-01-07 RX ADMIN — MEMANTINE 10 MG: 10 TABLET ORAL at 09:01

## 2022-01-07 RX ADMIN — HEPARIN SODIUM 5000 UNITS: 5000 INJECTION INTRAVENOUS; SUBCUTANEOUS at 05:01

## 2022-01-07 RX ADMIN — PRAVASTATIN SODIUM 20 MG: 10 TABLET ORAL at 08:01

## 2022-01-07 RX ADMIN — AMLODIPINE BESYLATE 5 MG: 5 TABLET ORAL at 08:01

## 2022-01-07 RX ADMIN — HYDRALAZINE HYDROCHLORIDE 50 MG: 25 TABLET, FILM COATED ORAL at 05:01

## 2022-01-07 RX ADMIN — HEPARIN SODIUM 5000 UNITS: 5000 INJECTION INTRAVENOUS; SUBCUTANEOUS at 02:01

## 2022-01-07 RX ADMIN — SENNOSIDES AND DOCUSATE SODIUM 1 TABLET: 50; 8.6 TABLET ORAL at 08:01

## 2022-01-07 RX ADMIN — HEPARIN SODIUM 5000 UNITS: 5000 INJECTION INTRAVENOUS; SUBCUTANEOUS at 09:01

## 2022-01-07 RX ADMIN — QUETIAPINE 12.5 MG: 25 TABLET ORAL at 09:01

## 2022-01-07 RX ADMIN — Medication 6 MG: at 09:01

## 2022-01-07 RX ADMIN — TAMSULOSIN HYDROCHLORIDE 0.4 MG: 0.4 CAPSULE ORAL at 08:01

## 2022-01-07 RX ADMIN — HYDRALAZINE HYDROCHLORIDE 50 MG: 25 TABLET, FILM COATED ORAL at 02:01

## 2022-01-07 RX ADMIN — MEMANTINE 10 MG: 10 TABLET ORAL at 08:01

## 2022-01-07 NOTE — PLAN OF CARE
ANNETTE spoke with Tiana, nurse at [facility]. ANNETTE inquired about what is needed for patient's discharge to the assisted living facility. Tiana inquired if ANNETTE could send PT notes and current medication list. Tiana stated that all medication can be escribed to [facility] in [place]. Tiana stated that when patient is ready for discharge, the nurse can call her with call report. ANNETTE informed that a referral packet will be sent to her with updated clinicals.     ANNETTE faxed clinicals to [phone].

## 2022-01-07 NOTE — PROGRESS NOTES
"[facility] Medicine  Progress Note    Patient Name: Timothy Roldan  MRN: [id number]  Patient Class: IP- Inpatient   Admission Date: 12/31/2021  Length of Stay: 7 days  Attending Physician: Luis Manuel Hammonds MD  Primary Care Provider: Martine Garces MD        Subjective:     Principal Problem:Acute cystitis without hematuria        HPI:  82 yo male with a PMHx of Alzheimer's disease (+/- Parkinson?), HTN, BPH with condom catheter who was brought here by his niece due to a ground level fall and significant weakness. Patient is a poor historian due to dementia. Was confused about "blood thinners" being a "type of animal". Per niece he has been progressively getting weaker over the last month. Went from walking without much help to using a cane to having multiple falls. He is now to the point that he needs almost full assistance to move. Otherwise, they haven't noticed much other symptoms. Confusion seems to be around his baseline.    In the ED, labs were remarkable for a UTI on U/A with extreme sediment in his urine and a mild CLEM (sCr 1.5 with BUN 27; baseline sCr is 1.2-1.3 per [facility] labs). CTH showing no acute abnormality but it was read as possible NPH. Neurology consulted and will see tomorrow. He was initially hypotensive but has become hypertensive with IVF boluses. Given IV Zosyn and admitted to floor.        Overview/Hospital Course:  Mr Louis presented with frequent falls. Has inconsistent PO intake at home, goes to sleep late and dosing off during daytime, is on furosemide at home, rhabdomyolysis and potential with a UTI. Patient has dementia. Started on empiric ceftriaxone. Blood and urine cultures NGTD. Held furosemide and given fluids instead. No hypoxia or pulmonary edema with fluids. CLEM resolved and rhabdo resolved. PT/OT recommend SNF. Family, however, will have the patient discharged on 1/8 and take to assisted living. Will need hospital bed on discharge and H/H orders.  "       Interval History: No new issues     Review of Systems   Unable to perform ROS: Dementia     Objective:     Vital Signs (Most Recent):  Temp: 98.5 °F (36.9 °C) (01/07/22 0722)  Pulse: 104 (01/07/22 0722)  Resp: 18 (01/07/22 0722)  BP: (!) 142/66 (01/07/22 0722)  SpO2: 96 % (01/07/22 0845) Vital Signs (24h Range):  Temp:  [97.6 °F (36.4 °C)-98.9 °F (37.2 °C)] 98.5 °F (36.9 °C)  Pulse:  [] 104  Resp:  [15-18] 18  SpO2:  [93 %-98 %] 96 %  BP: (106-142)/(55-67) 142/66     Weight: 76.1 kg (167 lb 12.3 oz)  Body mass index is 24.07 kg/m².    Intake/Output Summary (Last 24 hours) at 1/7/2022 1027  Last data filed at 1/7/2022 0600  Gross per 24 hour   Intake 660 ml   Output --   Net 660 ml      Physical Exam  Vitals and nursing note reviewed.   Constitutional:       General: He is not in acute distress.     Appearance: He is not toxic-appearing.   Cardiovascular:      Rate and Rhythm: Normal rate and regular rhythm.      Pulses: Normal pulses.   Pulmonary:      Effort: Pulmonary effort is normal.      Breath sounds: Normal breath sounds.   Musculoskeletal:      Right lower leg: No edema.      Left lower leg: No edema.   Skin:     General: Skin is warm.      Capillary Refill: Capillary refill takes less than 2 seconds.      Findings: Bruising present.   Neurological:      Mental Status: He is alert.      Comments: Oriented to place and person   Psychiatric:         Attention and Perception: Attention normal.         Mood and Affect: Mood normal.         Speech: Speech normal.         Behavior: Behavior normal.         Thought Content: Thought content normal.         Cognition and Memory: Cognition is impaired. Memory is impaired.      Comments: Interactive and makes eye contact during conversation         Significant Labs:   All pertinent labs within the past 24 hours have been reviewed.  Blood Culture: No results for input(s): LABBLOO in the last 48 hours.  BMP:   Recent Labs   Lab 01/06/22  0709      NA  140   K 4.5      CO2 25   BUN 20   CREATININE 0.9   CALCIUM 8.6*       Significant Imagin      Assessment/Plan:      * Acute cystitis without hematuria  Agree with empiric ceftriaxone pending urine and blood culture results. Thus far no growth- will d/c Abx        Rhabdomyolysis  Resolved       Paroxysmal atrial fibrillation  Currently NSR. Relative contraindication to anticoagulation given unstable ambulation and frequent falls at home, as per my conversation with NISHANT. Is on DVT prophylaxis      Alzheimer disease  - Continue home donepezil and memantine      Acute kidney injury superimposed on CKD  With rhabdomyolysis. Renal function stable. CPK improving. Continue intravenous fluids. Hold diuretics. I cannot find reason for furosemide use at home. NISHANT is not sure either. Will look at [facility] documentation for an Echo      Primary hypertension  - Continue Norvasc 5mg daily  - Holding ACEi for CLEM  - PRNs for SBP > 180      Weakness of both lower extremities  With dementia. Evaluated by neurology. No LP recommended   I am concerned about use of antihypertensives and diuretics at home contributing to his function. Also, sleep/wake cycle is off (sleeps most of the day, eats brunch and drinks coffee late a night. Goes to sleep after 1 AM usually). Continue melatonin and seroquel QHS to assist with sleep and hopefully help with appetite/function during daytime. On amlodipine for BP but holding diuretics. B12 WNL. Thiamine pending  PT/OT: rec SNF. NISHANT will like to see how he does with therapy today to determine if HH vs SNF      Debility- H/H on discharge with hospital bed     VTE Risk Mitigation (From admission, onward)         Ordered     heparin (porcine) injection 5,000 Units  Every 8 hours         12/31/21 1847     IP VTE HIGH RISK PATIENT  Once         12/31/21 1847     Place sequential compression device  Until discontinued         12/31/21 1847                Discharge Planning   JACQUELINE:      Code Status:  Full Code   Is the patient medically ready for discharge?:     Reason for patient still in hospital (select all that apply): Patient unstable  Discharge Plan A: Skilled Nursing Facility   Discharge Delays: (!) Post-Acute Set-up        Patient will be discharged to home on 1/8.  Family will take to assisted living.         Luis Manuel Tejeda MD  Department of Hospital Medicine   [facility] Surg

## 2022-01-07 NOTE — PLAN OF CARE
01/07/22 0836   Medicare Message   Important Message from Medicare regarding Discharge Appeal Rights Given to patient/caregiver;Explained to patient/caregiver;Other (comments)  (Explained IMM to patient's POA. POA expressed understanding. Copy emailed to [email or web address])   Date IMM was signed 01/07/22   Time IMM was signed 0835

## 2022-01-07 NOTE — PROGRESS NOTES
"[facility] - Med Surg  Adult Nutrition  Progress Note    SUMMARY     Recommendations  1) Add Boost Plus BID - chocolate   2) Continue Level 4 Pureed diet - ADAT per ST  3) Provide assistance at meals  4) RD to complete NFPE as appropriate on follow up    Goals: 1) Patient to meet > 50% EEN/EPN via PO/ONS intake  Nutrition Goal Status: goal met  Communication of RD Recs: reviewed with physician    Assessment and Plan  Nutrition Problem  Inadequate oral intake     Related to (etiology):  Self feeding difficulty     Signs and Symptoms (as evidenced by):   PO intake  < 50%  Weight loss: -27% UBW in 1 year, 63# per pt family member report     Interventions/Recommendations (treatment strategy):  Texture modified diet (IDDSI level 4)  Commercial Beverage (Boost +)  Collaboration of care with other providers     Nutrition Diagnosis Status:   Continues    Reason for Assessment  Reason For Assessment: RD follow-up  Diagnosis:  (Acute cystits w/o hematuria)  Relevant Medical History: HTN, HLD, CLEM on CKD, Alzheimers  Interdisciplinary Rounds: did not attend  General Information Comments: Remote assessment for coverage, unable to reach pt on room phone. Per chart, with good intake at meals, eating 75%, LBM 1/6. Suspect malnutrition, NFPE needed to confirm. Plan to discharge to assisted living 1/8.    Nutrition Risk Screen  Nutrition Risk Screen: dysphagia or difficulty swallowing    Nutrition/Diet History  Patient Reported Diet/Restrictions/Preferences: general  Spiritual, Cultural Beliefs, Hoahaoism Practices, Values that Affect Care: no  Food Allergies: NKFA  Factors Affecting Nutritional Intake: impaired cognitive status/motor control,decreased appetite,difficulty/impaired swallowing    Anthropometrics  Temp: 98.1 °F (36.7 °C)  Height Method: Stated  Height: 5' 10" (177.8 cm)  Height (inches): 70 in  Weight Method: Bed Scale  Weight: 76.1 kg (167 lb 12.3 oz)  Weight (lb): 167.77 lb  Ideal Body Weight (IBW), Male: 166 lb  % " Ideal Body Weight, Male (lb): 101.07 %  BMI (Calculated): 24.1  BMI Grade: 18.5-24.9 - normal  Weight Loss: unintentional  Usual Body Weight (UBW), k.5 kg  % Usual Body Weight: 72.98  % Weight Change From Usual Weight: -27.18 %     Lab/Procedures/Meds  Pertinent Labs Reviewed: reviewed  Pertinent Labs Comments: reviewed  Pertinent Medications Reviewed: reviewed  Pertinent Medications Comments: heparin, pravastatin, senna-docusate    Estimated/Assessed Needs  Weight Used For Calorie Calculations: 76.1 kg (167 lb 12.3 oz)  Energy Calorie Requirements (kcal): 1902  Energy Need Method: Kcal/kg (25)  Protein Requirements: 76 - 91g (1 - 1.2g/kg)  Weight Used For Protein Calculations: 76.1 kg (167 lb 12.3 oz)  Estimated Fluid Requirement Method: RDA Method (or per MD)  RDA Method (mL): 1902  CHO Requirement: 238g    Nutrition Prescription Ordered  Current Diet Order: Puree    Evaluation of Received Nutrient/Fluid Intake  Energy Calories Required: meeting needs  Protein Required: meeting needs  Fluid Required: meeting needs  Comments: LBM   Tolerance: tolerating  % Intake of Estimated Energy Needs: 50 - 75 %  % Meal Intake: 50 - 75 %    Nutrition Risk  Level of Risk/Frequency of Follow-up: low (follow up weekly)     Monitor and Evaluation  Food and Nutrient Intake: energy intake,food and beverage intake  Food and Nutrient Adminstration: diet order  Physical Activity and Function: nutrition-related ADLs and IADLs  Anthropometric Measurements: weight,weight change,body mass index  Biochemical Data, Medical Tests and Procedures: electrolyte and renal panel,gastrointestinal profile,glucose/endocrine profile,inflammatory profile,lipid profile  Nutrition-Focused Physical Findings: overall appearance,extremities, muscles and bones,head and eyes,skin     Nutrition Follow-Up  RD Follow-up?: Yes

## 2022-01-07 NOTE — PLAN OF CARE
ANNETTE spoke with patient's GEE Goel about placement. ANNETTE reviewed referrals sent and responses. Manasa informed ANNETTE that she found an assisted living facility called [facility]. Manasa stated that the room will not be ready for patient until tomorrow. Manasa stated that patient will need a hospital bed and home health PT.

## 2022-01-07 NOTE — SUBJECTIVE & OBJECTIVE
Interval History: No new issues     Review of Systems   Unable to perform ROS: Dementia     Objective:     Vital Signs (Most Recent):  Temp: 98.5 °F (36.9 °C) (01/07/22 0722)  Pulse: 104 (01/07/22 0722)  Resp: 18 (01/07/22 0722)  BP: (!) 142/66 (01/07/22 0722)  SpO2: 96 % (01/07/22 0845) Vital Signs (24h Range):  Temp:  [97.6 °F (36.4 °C)-98.9 °F (37.2 °C)] 98.5 °F (36.9 °C)  Pulse:  [] 104  Resp:  [15-18] 18  SpO2:  [93 %-98 %] 96 %  BP: (106-142)/(55-67) 142/66     Weight: 76.1 kg (167 lb 12.3 oz)  Body mass index is 24.07 kg/m².    Intake/Output Summary (Last 24 hours) at 1/7/2022 1027  Last data filed at 1/7/2022 0600  Gross per 24 hour   Intake 660 ml   Output --   Net 660 ml      Physical Exam  Vitals and nursing note reviewed.   Constitutional:       General: He is not in acute distress.     Appearance: He is not toxic-appearing.   Cardiovascular:      Rate and Rhythm: Normal rate and regular rhythm.      Pulses: Normal pulses.   Pulmonary:      Effort: Pulmonary effort is normal.      Breath sounds: Normal breath sounds.   Musculoskeletal:      Right lower leg: No edema.      Left lower leg: No edema.   Skin:     General: Skin is warm.      Capillary Refill: Capillary refill takes less than 2 seconds.      Findings: Bruising present.   Neurological:      Mental Status: He is alert.      Comments: Oriented to place and person   Psychiatric:         Attention and Perception: Attention normal.         Mood and Affect: Mood normal.         Speech: Speech normal.         Behavior: Behavior normal.         Thought Content: Thought content normal.         Cognition and Memory: Cognition is impaired. Memory is impaired.      Comments: Interactive and makes eye contact during conversation         Significant Labs:   All pertinent labs within the past 24 hours have been reviewed.  Blood Culture: No results for input(s): LABBLOO in the last 48 hours.  BMP:   Recent Labs   Lab 01/06/22  0709         K  4.5      CO2 25   BUN 20   CREATININE 0.9   CALCIUM 8.6*       Significant Imagin

## 2022-01-07 NOTE — PROGRESS NOTES
Recommendations  1) Add Boost Plus BID - chocolate   2) Continue Level 4 Pureed diet - ADAT per ST  3) Provide assistance at meals  4) RD to complete NFPE as appropriate on follow up    Goals: 1) Patient to meet > 50% EEN/EPN via PO/ONS intake  Nutrition Goal Status: goal met  Communication of RD Recs: reviewed with physician    Assessment and Plan  Nutrition Problem  Inadequate oral intake     Related to (etiology):  Self feeding difficulty     Signs and Symptoms (as evidenced by):   PO intake  < 50%  Weight loss: -27% UBW in 1 year, 63# per pt family member report     Interventions/Recommendations (treatment strategy):  Texture modified diet (IDDSI level 4)  Commercial Beverage (Boost +)  Collaboration of care with other providers     Nutrition Diagnosis Status:   Continues

## 2022-01-08 PROBLEM — N17.9 ACUTE KIDNEY INJURY SUPERIMPOSED ON CKD: Status: RESOLVED | Noted: 2021-12-31 | Resolved: 2022-01-08

## 2022-01-08 PROBLEM — N18.9 ACUTE KIDNEY INJURY SUPERIMPOSED ON CKD: Status: RESOLVED | Noted: 2021-12-31 | Resolved: 2022-01-08

## 2022-01-08 PROBLEM — N30.00 ACUTE CYSTITIS WITHOUT HEMATURIA: Status: RESOLVED | Noted: 2021-12-31 | Resolved: 2022-01-08

## 2022-01-08 LAB — TB INDURATION 48 - 72 HR READ: 0 MM

## 2022-01-08 PROCEDURE — 25000003 PHARM REV CODE 250: Performed by: INTERNAL MEDICINE

## 2022-01-08 PROCEDURE — 11000001 HC ACUTE MED/SURG PRIVATE ROOM

## 2022-01-08 PROCEDURE — 63600175 PHARM REV CODE 636 W HCPCS: Performed by: INTERNAL MEDICINE

## 2022-01-08 RX ADMIN — PRAVASTATIN SODIUM 20 MG: 10 TABLET ORAL at 08:01

## 2022-01-08 RX ADMIN — TAMSULOSIN HYDROCHLORIDE 0.4 MG: 0.4 CAPSULE ORAL at 08:01

## 2022-01-08 RX ADMIN — HEPARIN SODIUM 5000 UNITS: 5000 INJECTION INTRAVENOUS; SUBCUTANEOUS at 05:01

## 2022-01-08 RX ADMIN — AMLODIPINE BESYLATE 5 MG: 5 TABLET ORAL at 08:01

## 2022-01-08 RX ADMIN — DONEPEZIL HYDROCHLORIDE 10 MG: 10 TABLET ORAL at 08:01

## 2022-01-08 RX ADMIN — HYDRALAZINE HYDROCHLORIDE 50 MG: 25 TABLET, FILM COATED ORAL at 05:01

## 2022-01-08 RX ADMIN — HEPARIN SODIUM 5000 UNITS: 5000 INJECTION INTRAVENOUS; SUBCUTANEOUS at 02:01

## 2022-01-08 RX ADMIN — MEMANTINE 10 MG: 10 TABLET ORAL at 08:01

## 2022-01-08 RX ADMIN — SENNOSIDES AND DOCUSATE SODIUM 1 TABLET: 50; 8.6 TABLET ORAL at 08:01

## 2022-01-08 RX ADMIN — Medication 6 MG: at 08:01

## 2022-01-08 RX ADMIN — HEPARIN SODIUM 5000 UNITS: 5000 INJECTION INTRAVENOUS; SUBCUTANEOUS at 09:01

## 2022-01-08 RX ADMIN — QUETIAPINE 12.5 MG: 25 TABLET ORAL at 08:01

## 2022-01-08 RX ADMIN — HYDRALAZINE HYDROCHLORIDE 50 MG: 25 TABLET, FILM COATED ORAL at 02:01

## 2022-01-08 NOTE — PROGRESS NOTES
"[facility] Medicine  Progress Note    Patient Name: Timothy Roldan  MRN: [id number]  Patient Class: IP- Inpatient   Admission Date: 12/31/2021  Length of Stay: 8 days  Attending Physician: Luis Manuel Hammonds MD  Primary Care Provider: Martine Garces MD        Subjective:     Principal Problem:Acute cystitis without hematuria        HPI:  82 yo male with a PMHx of Alzheimer's disease (+/- Parkinson?), HTN, BPH with condom catheter who was brought here by his niece due to a ground level fall and significant weakness. Patient is a poor historian due to dementia. Was confused about "blood thinners" being a "type of animal". Per niece he has been progressively getting weaker over the last month. Went from walking without much help to using a cane to having multiple falls. He is now to the point that he needs almost full assistance to move. Otherwise, they haven't noticed much other symptoms. Confusion seems to be around his baseline.    In the ED, labs were remarkable for a UTI on U/A with extreme sediment in his urine and a mild CLEM (sCr 1.5 with BUN 27; baseline sCr is 1.2-1.3 per [facility] labs). CTH showing no acute abnormality but it was read as possible NPH. Neurology consulted and will see tomorrow. He was initially hypotensive but has become hypertensive with IVF boluses. Given IV Zosyn and admitted to floor.        Overview/Hospital Course:  Mr Louis presented with frequent falls. Has inconsistent PO intake at home, goes to sleep late and dosing off during daytime, is on furosemide at home, rhabdomyolysis and potential with a UTI. Patient has dementia. Started on empiric ceftriaxone. Blood and urine cultures NGTD. Held furosemide and given fluids instead. No hypoxia or pulmonary edema with fluids. CLEM resolved and rhabdo resolved. PT/OT recommend SNF. Family, however, will have the patient discharged on 1/8 and take to assisted living. Will need hospital bed on discharge and H/H orders.  Will " d/c to home - activity as tolerated. Diet low NA. Follow up with PCP in one week         Interval History: No new issues     Review of Systems   Unable to perform ROS: Dementia     Objective:     Vital Signs (Most Recent):  Temp: 98.1 °F (36.7 °C) (01/08/22 0715)  Pulse: 70 (01/08/22 0715)  Resp: 18 (01/08/22 0715)  BP: 122/69 (01/08/22 0715)  SpO2: 95 % (01/08/22 0715) Vital Signs (24h Range):  Temp:  [97.5 °F (36.4 °C)-98.4 °F (36.9 °C)] 98.1 °F (36.7 °C)  Pulse:  [70-94] 70  Resp:  [18-19] 18  SpO2:  [92 %-97 %] 95 %  BP: (110-137)/(57-78) 122/69     Weight: 76.1 kg (167 lb 12.3 oz)  Body mass index is 24.07 kg/m².  No intake or output data in the 24 hours ending 01/08/22 0950   Physical Exam  Vitals and nursing note reviewed.   Constitutional:       General: He is not in acute distress.     Appearance: He is not toxic-appearing.   Cardiovascular:      Rate and Rhythm: Normal rate and regular rhythm.      Pulses: Normal pulses.   Pulmonary:      Effort: Pulmonary effort is normal.      Breath sounds: Normal breath sounds.   Musculoskeletal:      Right lower leg: No edema.      Left lower leg: No edema.   Skin:     General: Skin is warm.      Capillary Refill: Capillary refill takes less than 2 seconds.      Findings: Bruising present.   Neurological:      Mental Status: He is alert.      Comments: Oriented to place and person   Psychiatric:         Attention and Perception: Attention normal.         Mood and Affect: Mood normal.         Speech: Speech normal.         Behavior: Behavior normal.         Thought Content: Thought content normal.         Cognition and Memory: Cognition is impaired. Memory is impaired.      Comments: Interactive and makes eye contact during conversation         Significant Labs: All pertinent labs within the past 24 hours have been reviewed.  BMP: No results for input(s): GLU, NA, K, CL, CO2, BUN, CREATININE, CALCIUM, MG in the last 48 hours.  CBC: No results for input(s): WBC, HGB,  HCT, PLT in the last 48 hours.        Assessment/Plan:      * Acute cystitis without hematuria  Agree with empiric ceftriaxone pending urine and blood culture results. Thus far no growth- will d/c Abx        Rhabdomyolysis  Resolved       Paroxysmal atrial fibrillation  Currently NSR. Relative contraindication to anticoagulation given unstable ambulation and frequent falls at home, as per my conversation with NISHANT. Is on DVT prophylaxis      Alzheimer disease  - Continue home donepezil and memantine      Acute kidney injury superimposed on CKD  With rhabdomyolysis. Renal function stable. CPK improving. Continue intravenous fluids. Hold diuretics. I cannot find reason for furosemide use at home. HCPSARAH is not sure either. [place] [facility] [place] an Echo      Primary hypertension  - Continue Norvasc 5mg daily  - Holding ACEi for CLEM  - PRNs for SBP > 180      Weakness of both lower extremities  With dementia. Evaluated by neurology. No LP recommended   I am concerned about use of antihypertensives and diuretics at home contributing to his function. Also, sleep/wake cycle is off (sleeps most of the day, eats brunch and drinks coffee late a night. Goes to sleep after 1 AM usually). Continue melatonin and seroquel QHS to assist with sleep and hopefully help with appetite/function during daytime. On amlodipine for BP but holding diuretics. B12 WNL. Thiamine pending  PT/OT: rec SNF. NISHANT will like to see how he does with therapy today to determine if  vs SNF        VTE Risk Mitigation (From admission, onward)         Ordered     heparin (porcine) injection 5,000 Units  Every 8 hours         12/31/21 1847     IP VTE HIGH RISK PATIENT  Once         12/31/21 1847     Place sequential compression device  Until discontinued         12/31/21 1847                Discharge Planning   JACQUELINE:      Code Status: Full Code   Is the patient medically ready for discharge?:     Reason for patient still in hospital (select all  that apply): Patient unstable  Discharge Plan A: Skilled Nursing Facility   Discharge Delays: (!) Post-Acute Set-up              Luis Manuel Tejeda MD  Department of Hospital Medicine   [facility]

## 2022-01-08 NOTE — SUBJECTIVE & OBJECTIVE
Interval History: No new issues     Review of Systems   Unable to perform ROS: Dementia     Objective:     Vital Signs (Most Recent):  Temp: 98.1 °F (36.7 °C) (01/08/22 0715)  Pulse: 70 (01/08/22 0715)  Resp: 18 (01/08/22 0715)  BP: 122/69 (01/08/22 0715)  SpO2: 95 % (01/08/22 0715) Vital Signs (24h Range):  Temp:  [97.5 °F (36.4 °C)-98.4 °F (36.9 °C)] 98.1 °F (36.7 °C)  Pulse:  [70-94] 70  Resp:  [18-19] 18  SpO2:  [92 %-97 %] 95 %  BP: (110-137)/(57-78) 122/69     Weight: 76.1 kg (167 lb 12.3 oz)  Body mass index is 24.07 kg/m².  No intake or output data in the 24 hours ending 01/08/22 0950   Physical Exam  Vitals and nursing note reviewed.   Constitutional:       General: He is not in acute distress.     Appearance: He is not toxic-appearing.   Cardiovascular:      Rate and Rhythm: Normal rate and regular rhythm.      Pulses: Normal pulses.   Pulmonary:      Effort: Pulmonary effort is normal.      Breath sounds: Normal breath sounds.   Musculoskeletal:      Right lower leg: No edema.      Left lower leg: No edema.   Skin:     General: Skin is warm.      Capillary Refill: Capillary refill takes less than 2 seconds.      Findings: Bruising present.   Neurological:      Mental Status: He is alert.      Comments: Oriented to place and person   Psychiatric:         Attention and Perception: Attention normal.         Mood and Affect: Mood normal.         Speech: Speech normal.         Behavior: Behavior normal.         Thought Content: Thought content normal.         Cognition and Memory: Cognition is impaired. Memory is impaired.      Comments: Interactive and makes eye contact during conversation         Significant Labs: All pertinent labs within the past 24 hours have been reviewed.  BMP: No results for input(s): GLU, NA, K, CL, CO2, BUN, CREATININE, CALCIUM, MG in the last 48 hours.  CBC: No results for input(s): WBC, HGB, HCT, PLT in the last 48 hours.

## 2022-01-08 NOTE — DISCHARGE SUMMARY
"[facility] Medicine  Discharge Summary      Patient Name: Timothy Roldan  MRN: [id number]  Patient Class: IP- Inpatient  Admission Date: 12/31/2021  Hospital Length of Stay: 8 days  Discharge Date and Time: 1/9/22  Attending Physician: Luis Manuel Hammonds MD   Discharging Provider: Luis Manuel Hammonds MD  Primary Care Provider: Martine Garces MD      HPI:   80 yo male with a PMHx of Alzheimer's disease (+/- Parkinson?), HTN, BPH with condom catheter who was brought here by his niece due to a ground level fall and significant weakness. Patient is a poor historian due to dementia. Was confused about "blood thinners" being a "type of animal". Per niece he has been progressively getting weaker over the last month. Went from walking without much help to using a cane to having multiple falls. He is now to the point that he needs almost full assistance to move. Otherwise, they haven't noticed much other symptoms. Confusion seems to be around his baseline.    In the ED, labs were remarkable for a UTI on U/A with extreme sediment in his urine and a mild CLEM (sCr 1.5 with BUN 27; baseline sCr is 1.2-1.3 per [facility] labs). CTH showing no acute abnormality but it was read as possible NPH. Neurology consulted and will see tomorrow. He was initially hypotensive but has become hypertensive with IVF boluses. Given IV Zosyn and admitted to floor.        * No surgery found *      Hospital Course:   Mr Louis presented with frequent falls. Has inconsistent PO intake at home, goes to sleep late and dosing off during daytime, is on furosemide at home, rhabdomyolysis and potential with a UTI. Patient has dementia. Started on empiric ceftriaxone. Blood and urine cultures NGTD. Held furosemide and given fluids instead. No hypoxia or pulmonary edema with fluids. CLEM resolved and rhabdo resolved. PT/OT recommend SNF. Family, however, will have the patient discharged on 1/8 and take to assisted living. Will need hospital bed " on discharge and H/H orders.  Will d/c to home - activity as tolerated. Diet low NA. Follow up with PCP in one week          Goals of Care Treatment Preferences:  Code Status: Full Code      Consults:   Consults (From admission, onward)        Status Ordering Provider     IP consult to case management  Once        Provider:  (Not yet assigned)    ABDIAZIZ Avila     Inpatient consult to Neurology  Once        Provider:  Alok Abraham MD    Completed JANELLE MANUEL          No new Assessment & Plan notes have been filed under this hospital service since the last note was generated.  Service: Hospital Medicine    Final Active Diagnoses:    Diagnosis Date Noted POA    Rhabdomyolysis [M62.82] 01/03/2022 Yes    Weakness of both lower extremities [R29.898] 12/31/2021 Yes    Primary hypertension [I10] 12/31/2021 Yes    Alzheimer disease [G30.9, F02.80] 12/31/2021 Yes    Paroxysmal atrial fibrillation [I48.0] 12/31/2021 Yes      Problems Resolved During this Admission:    Diagnosis Date Noted Date Resolved POA    PRINCIPAL PROBLEM:  Acute cystitis without hematuria [N30.00] 12/31/2021 01/08/2022 Yes    Acute kidney injury superimposed on CKD [N17.9, N18.9] 12/31/2021 01/08/2022 Yes       Discharged Condition    Disposition: [place]Health [place] [facility]    Follow Up:   Follow-up Information     Martine Garces MD In 1 week.    Specialty: Internal Medicine  Contact information:  [address] [facility] [address] [phone]             Gil Donnelly MD In 1 week.    Specialty: Neurology  Contact information:  [address]  [address]  [facility] [address] [phone]                       Patient Instructions:      HOSPITAL BED FOR HOME USE     Order Specific Question Answer Comments   Type: Semi-electric    Length of need (1-99 months): 99    Does patient have medical equipment at home? cane, straight    Does patient have medical equipment at home? walker, rolling    Does patient have medical equipment  "at home? rollator    Height: 5' 10" (1.778 m)    Weight: 76.1 kg (167 lb 12.3 oz)    Please check all that apply: Patient requires positioning of the body in ways not feasible in an ordinary bed due to a medical condition which is expected to last at least one month.    Please check all that apply: Patient requires, for the alleviation of pain, positioning of the body in ways not feasible in an ordinary bed.      HOSPITAL BED FOR HOME USE     Order Specific Question Answer Comments   Type: Semi-electric    Length of need (1-99 months): 99    Does patient have medical equipment at home? cane, straight    Does patient have medical equipment at home? walker, rolling    Does patient have medical equipment at home? rollator    Height: 5' 10" (1.778 m)    Weight: 76.1 kg (167 lb 12.3 oz)    Please check all that apply: Patient requires positioning of the body in ways not feasible in an ordinary bed due to a medical condition which is expected to last at least one month.    Please check all that apply: Patient requires, for the alleviation of pain, positioning of the body in ways not feasible in an ordinary bed.        Significant Diagnostic Studies:    Pending Diagnostic Studies:     None         Medications:  Reconciled Home Medications:      Medication List      START taking these medications    donepeziL 10 MG tablet  Commonly known as: ARICEPT  Take 1 tablet (10 mg total) by mouth every evening.     hydrALAZINE 50 MG tablet  Commonly known as: APRESOLINE  Take 1 tablet (50 mg total) by mouth every 8 (eight) hours.     QUEtiapine 25 MG Tab  Commonly known as: SEROQUEL  Take 1 tablet (25 mg total) by mouth every evening.     tamsulosin 0.4 mg Cap  Commonly known as: FLOMAX  Take 1 capsule (0.4 mg total) by mouth once daily.        CONTINUE taking these medications    amlodipine-benazepril 5-10 mg 5-10 mg per capsule  Commonly known as: LOTREL  TK 1 C PO D     furosemide 40 MG tablet  Commonly known as: LASIX  Take 40 mg " by mouth once daily.     lovastatin 20 MG tablet  Commonly known as: MEVACOR  Take 20 mg by mouth every evening.            Indwelling Lines/Drains at time of discharge:   Lines/Drains/Airways     Drain            Male External Urinary Catheter 01/01/22 0645 Small 7 days                Time spent on the discharge of patient:  > 35  minutes         Luis Manuel Tejeda MD  Department of Hospital Medicine  [facility]

## 2022-01-08 NOTE — PLAN OF CARE
[facility]      HOME HEALTH ORDERS  FACE TO FACE ENCOUNTER    Patient Name: Timothy Roldan  YOB: 1940    PCP: Martine Garces MD   PCP Address: [address] [place] [address] / [place] [id number]  PCP Phone Number: [phone]  PCP Fax: [phone]    Encounter Date:  1/9/22     Admit to Home Health    Diagnoses:  Weakness//dementia   Active Hospital Problems    Diagnosis  POA    Rhabdomyolysis [M62.82]  Yes    Weakness of both lower extremities [R29.898]  Yes    Primary hypertension [I10]  Yes    Alzheimer disease [G30.9, F02.80]  Yes    Paroxysmal atrial fibrillation [I48.0]  Yes      Resolved Hospital Problems    Diagnosis Date Resolved POA    *Acute cystitis without hematuria [N30.00] 01/08/2022 Yes    Acute kidney injury superimposed on CKD [N17.9, N18.9] 01/08/2022 Yes       Follow Up Appointments:  No future appointments.    Allergies:Review of patient's allergies indicates:  No Known Allergies    Medications: Review discharge medications with patient and family and provide education.    Current Facility-Administered Medications   Medication Dose Route Frequency Provider Last Rate Last Admin    amLODIPine tablet 5 mg  5 mg Oral Daily Oswaldo Roberto MD   5 mg at 01/08/22 0817    dextrose 50% injection 12.5 g  12.5 g Intravenous PRN Oswaldo Roberto MD        dextrose 50% injection 25 g  25 g Intravenous PRN Oswaldo Roberto MD        donepeziL tablet 10 mg  10 mg Oral QHS Oswaldo Roberto MD   10 mg at 01/07/22 2114    glucagon (human recombinant) injection 1 mg  1 mg Intramuscular PRN Oswaldo Roberto MD        glucose chewable tablet 16 g  16 g Oral PRN Oswaldo Roberto MD        glucose chewable tablet 24 g  24 g Oral PRN Oswaldo Roberto MD        heparin (porcine) injection 5,000 Units  5,000 Units Subcutaneous Q8H Oswaldo Roberto MD   5,000 Units at 01/08/22 0513    hydrALAZINE tablet 50 mg  50 mg Oral Q8H Luis Manuel Hammonds MD   50 mg at 01/08/22 0513     influenza (QUADRIVALENT ADJUVANTED PF) vaccine 0.5 mL  0.5 mL Intramuscular vaccine x 1 dose Amber Fonseca MD        magnesium oxide tablet 800 mg  800 mg Oral PRN Oswaldo Roberto MD        magnesium oxide tablet 800 mg  800 mg Oral PRN Oswaldo Roberto MD        melatonin tablet 6 mg  6 mg Oral Nightly Amber Fonseca MD   6 mg at 01/07/22 2113    memantine tablet 10 mg  10 mg Oral BID Oswaldo Roberto MD   10 mg at 01/08/22 0817    pneumoc 13-anuj conj-dip cr(PF) (PREVNAR 13 (PF)) 0.5 mL  0.5 mL Intramuscular vaccine x 1 dose Amber Fonseca MD        potassium bicarbonate disintegrating tablet 35 mEq  35 mEq Oral PRN Oswaldo Roberto MD        potassium bicarbonate disintegrating tablet 50 mEq  50 mEq Oral PRN Oswaldo Roberto MD        pravastatin tablet 20 mg  20 mg Oral Daily Oswaldo Roberto MD   20 mg at 01/08/22 0817    quetiapine split tablet 12.5 mg  12.5 mg Oral QHS Amber Fonseca MD   12.5 mg at 01/07/22 2114    senna-docusate 8.6-50 mg per tablet 1 tablet  1 tablet Oral Daily Amber Fonseca MD   1 tablet at 01/08/22 0817    sodium chloride 0.9% flush 10 mL  10 mL Intravenous Q12H PRN Oswaldo Roberto MD        tamsulosin 24 hr capsule 0.4 mg  0.4 mg Oral Daily Oswaldo Roberto MD   0.4 mg at 01/08/22 0817    Tdap (BOOSTRIX) vaccine injection 0.5 mL  0.5 mL Intramuscular vaccine x 1 dose Guero Box MD         Current Discharge Medication List      START taking these medications    Details   donepeziL (ARICEPT) 10 MG tablet Take 1 tablet (10 mg total) by mouth every evening.  Qty: 30 tablet, Refills: 5      hydrALAZINE (APRESOLINE) 50 MG tablet Take 1 tablet (50 mg total) by mouth every 8 (eight) hours.  Qty: 90 tablet, Refills: 5    Comments: .      QUEtiapine (SEROQUEL) 25 MG Tab Take 1 tablet (25 mg total) by mouth every evening.  Qty: 30 tablet, Refills: 5      tamsulosin (FLOMAX) 0.4 mg Cap Take 1 capsule (0.4 mg total) by mouth once daily.  Qty: 30  capsule, Refills: 5         CONTINUE these medications which have NOT CHANGED    Details   amlodipine-benazepril 5-10 mg (LOTREL) 5-10 mg per capsule TK 1 C PO D  Refills: 0      furosemide (LASIX) 40 MG tablet Take 40 mg by mouth once daily.  Refills: 1      lovastatin (MEVACOR) 20 MG tablet Take 20 mg by mouth every evening.               I have seen and examined this patient within the last 30 days. My clinical findings that support the need for the home health skilled services and home bound status are the following:no   Weakness/numbness causing balance and gait disturbance due to Weakness/Debility making it taxing to leave home.     Diet:   2 gram sodium diet    Referrals/ Consults  Physical Therapy to evaluate and treat. Evaluate for home safety and equipment needs; Establish/upgrade home exercise program. Perform / instruct on therapeutic exercises, gait training, transfer training, and Range of Motion.  Occupational Therapy to evaluate and treat. Evaluate home environment for safety and equipment needs. Perform/Instruct on transfers, ADL training, ROM, and therapeutic exercises.  Aide to provide assistance with personal care, ADLs, and vital signs.    Activities:   activity as tolerated    Nursing:   Agency to admit patient within 24 hours of hospital discharge unless specified on physician order or at patient request    SN to complete comprehensive assessment including routine vital signs. Instruct on disease process and s/s of complications to report to MD. Review/verify medication list sent home with the patient at time of discharge  and instruct patient/caregiver as needed. Frequency may be adjusted depending on start of care date.     Skilled nurse to perform up to 3 visits PRN for symptoms related to diagnosis    Notify MD if SBP > 160 or < 90; DBP > 90 or < 50; HR > 120 or < 50; Temp > 101; O2 < 88%; Other:       Ok to schedule additional visits based on staff availability and patient request on  consecutive days within the home health episode.    When multiple disciplines ordered:    Start of Care occurs on Sunday - Wednesday schedule remaining discipline evaluations as ordered on separate consecutive days following the start of care.    Thursday SOC -schedule subsequent evaluations Friday and Monday the following week.     Friday - Saturday SOC - schedule subsequent discipline evaluations on consecutive days starting Monday of the following week.    For all post-discharge communication and subsequent orders please contact patient's primary care physician.  unable to reach primary care physician or do not receiv    I certify that this patient is confined to his home and needs intermittent skilled nursing care, physical therapy and occupational therapy.

## 2022-01-08 NOTE — PLAN OF CARE
In bed for most of the shift. Turned every 2 hours and PRN Totally dependent on staff to meet ADLs  Problem: Skin Injury Risk Increased  Goal: Skin Health and Integrity  Intervention: Optimize Skin Protection  Flowsheets (Taken 1/8/2022 1624)  Pressure Reduction Techniques: sit time limited to 2 hours  Pressure Reduction Devices: pressure-redistributing mattress utilized  Skin Protection: tubing/devices free from skin contact  Head of Bed (HOB) Positioning: HOB at 30-45 degrees     Problem: Fall Injury Risk  Goal: Absence of Fall and Fall-Related Injury  Intervention: Identify and Manage Contributors  Flowsheets (Taken 1/8/2022 1624)  Self-Care Promotion:   meal set-up provided   BADL personal routines maintained   BADL personal objects within reach  Medication Review/Management:   medications reviewed   high-risk medications identified

## 2022-01-09 VITALS
SYSTOLIC BLOOD PRESSURE: 116 MMHG | TEMPERATURE: 98 F | HEART RATE: 89 BPM | OXYGEN SATURATION: 96 % | BODY MASS INDEX: 24.02 KG/M2 | WEIGHT: 167.75 LBS | HEIGHT: 70 IN | RESPIRATION RATE: 17 BRPM | DIASTOLIC BLOOD PRESSURE: 59 MMHG

## 2022-01-09 PROCEDURE — 0002A HC IMMUNIZ ADMIN, SARS-COV-2 COVID-19 VACC, 30MCG/0.3ML, 2ND DOSE: CPT | Performed by: INTERNAL MEDICINE

## 2022-01-09 PROCEDURE — 90670 PCV13 VACCINE IM: CPT | Performed by: INTERNAL MEDICINE

## 2022-01-09 PROCEDURE — 63600175 PHARM REV CODE 636 W HCPCS: Performed by: INTERNAL MEDICINE

## 2022-01-09 PROCEDURE — 25000003 PHARM REV CODE 250: Performed by: INTERNAL MEDICINE

## 2022-01-09 PROCEDURE — 91300 PHARM REV CODE 636 W HCPCS: CPT | Performed by: INTERNAL MEDICINE

## 2022-01-09 PROCEDURE — 11000001 HC ACUTE MED/SURG PRIVATE ROOM

## 2022-01-09 PROCEDURE — 90694 VACC AIIV4 NO PRSRV 0.5ML IM: CPT | Performed by: INTERNAL MEDICINE

## 2022-01-09 PROCEDURE — G0008 ADMIN INFLUENZA VIRUS VAC: HCPCS | Performed by: INTERNAL MEDICINE

## 2022-01-09 PROCEDURE — G0009 ADMIN PNEUMOCOCCAL VACCINE: HCPCS | Performed by: INTERNAL MEDICINE

## 2022-01-09 PROCEDURE — 90471 IMMUNIZATION ADMIN: CPT | Performed by: INTERNAL MEDICINE

## 2022-01-09 PROCEDURE — 90472 IMMUNIZATION ADMIN EACH ADD: CPT | Performed by: INTERNAL MEDICINE

## 2022-01-09 RX ADMIN — RNA INGREDIENT BNT-162B2 0.3 ML: 0.23 INJECTION, SUSPENSION INTRAMUSCULAR at 05:01

## 2022-01-09 RX ADMIN — Medication 6 MG: at 09:01

## 2022-01-09 RX ADMIN — MEMANTINE 10 MG: 10 TABLET ORAL at 09:01

## 2022-01-09 RX ADMIN — SENNOSIDES AND DOCUSATE SODIUM 1 TABLET: 50; 8.6 TABLET ORAL at 08:01

## 2022-01-09 RX ADMIN — QUETIAPINE 12.5 MG: 25 TABLET ORAL at 09:01

## 2022-01-09 RX ADMIN — PRAVASTATIN SODIUM 20 MG: 10 TABLET ORAL at 08:01

## 2022-01-09 RX ADMIN — MEMANTINE 10 MG: 10 TABLET ORAL at 08:01

## 2022-01-09 RX ADMIN — AMLODIPINE BESYLATE 5 MG: 5 TABLET ORAL at 08:01

## 2022-01-09 RX ADMIN — DONEPEZIL HYDROCHLORIDE 10 MG: 10 TABLET ORAL at 09:01

## 2022-01-09 RX ADMIN — HEPARIN SODIUM 5000 UNITS: 5000 INJECTION INTRAVENOUS; SUBCUTANEOUS at 05:01

## 2022-01-09 RX ADMIN — HYDRALAZINE HYDROCHLORIDE 50 MG: 25 TABLET, FILM COATED ORAL at 05:01

## 2022-01-09 RX ADMIN — HEPARIN SODIUM 5000 UNITS: 5000 INJECTION INTRAVENOUS; SUBCUTANEOUS at 09:01

## 2022-01-09 RX ADMIN — HYDRALAZINE HYDROCHLORIDE 50 MG: 25 TABLET, FILM COATED ORAL at 09:01

## 2022-01-09 RX ADMIN — TAMSULOSIN HYDROCHLORIDE 0.4 MG: 0.4 CAPSULE ORAL at 08:01

## 2022-01-09 RX ADMIN — INFLUENZA VACCINE, ADJUVANTED 0.5 ML: 15; 15; 15; 15 INJECTION, SUSPENSION INTRAMUSCULAR at 05:01

## 2022-01-09 RX ADMIN — PNEUMOCOCCAL 13-VALENT CONJUGATE VACCINE 0.5 ML: 2.2; 2.2; 2.2; 2.2; 2.2; 4.4; 2.2; 2.2; 2.2; 2.2; 2.2; 2.2; 2.2 INJECTION, SUSPENSION INTRAMUSCULAR at 05:01

## 2022-01-09 NOTE — NURSING
Report called to Tiana [phone] at [facility], will be awaiting for [facility] wheelchair for transport

## 2022-01-09 NOTE — PROGRESS NOTES
[facility] on-call staff notified of orders for a RW and BSC.  Update on delivery of hospital bed requested.  Per Abi with [facility], the bed will be delivered but no estimated delivery time is available. OK to pull RW and BSC from DME depot per Abi.

## 2022-01-09 NOTE — PROGRESS NOTES
ANNETTE met with patient's representative, Lakesha, at the bedside.  List of HH providers given.  Patient's choice is [facility] .  Discharge address obtained:  [facility]; [address] [place] [address]; [phone].    Patient will need transport to the facility;  Lakesha, report that she has an SUV and another family has a truck and she is not certain if patient will be able to get into either vehicle.  ANNETTE to obtain cost of wheelchair van transport.    Cost of wheelchair van transport obtained from [facility] with [facility].  Cost will be $98.00.  [phone].    Lakesha, is in agreement with cost.      Order for wheelchair van requested from Dr. Hammonds.

## 2022-01-09 NOTE — PROGRESS NOTES
"[facility] Medicine  Progress Note    Patient Name: Timothy Roldan  MRN: [id number]  Patient Class: IP- Inpatient   Admission Date: 12/31/2021  Length of Stay: 9 days  Attending Physician: Luis Manuel Hammonds MD  Primary Care Provider: Martine Garces MD        Subjective:     Principal Problem:Acute cystitis without hematuria        HPI:  82 yo male with a PMHx of Alzheimer's disease (+/- Parkinson?), HTN, BPH with condom catheter who was brought here by his niece due to a ground level fall and significant weakness. Patient is a poor historian due to dementia. Was confused about "blood thinners" being a "type of animal". Per niece he has been progressively getting weaker over the last month. Went from walking without much help to using a cane to having multiple falls. He is now to the point that he needs almost full assistance to move. Otherwise, they haven't noticed much other symptoms. Confusion seems to be around his baseline.    In the ED, labs were remarkable for a UTI on U/A with extreme sediment in his urine and a mild CLEM (sCr 1.5 with BUN 27; baseline sCr is 1.2-1.3 per [facility] labs). CTH showing no acute abnormality but it was read as possible NPH. Neurology consulted and will see tomorrow. He was initially hypotensive but has become hypertensive with IVF boluses. Given IV Zosyn and admitted to floor.        Overview/Hospital Course:  Mr Louis presented with frequent falls. Has inconsistent PO intake at home, goes to sleep late and dosing off during daytime, is on furosemide at home, rhabdomyolysis and potential with a UTI. Patient has dementia. Started on empiric ceftriaxone. Blood and urine cultures NGTD. Held furosemide and given fluids instead. No hypoxia or pulmonary edema with fluids. CLEM resolved and rhabdo resolved. PT/OT recommend SNF. Family, however, will have the patient discharged on 1/8 and take to assisted living. Will need hospital bed on discharge and H/H orders.  Will " d/c to home - activity as tolerated. Diet low NA. Follow up with PCP in one week         Interval History: No new issues     Review of Systems   Unable to perform ROS: Dementia     Objective:     Vital Signs (Most Recent):  Temp: 97.9 °F (36.6 °C) (01/09/22 0753)  Pulse: 99 (01/09/22 0753)  Resp: 18 (01/09/22 0753)  BP: (!) 124/56 (01/09/22 0753)  SpO2: 98 % (01/09/22 0753) Vital Signs (24h Range):  Temp:  [97.3 °F (36.3 °C)-98.7 °F (37.1 °C)] 97.9 °F (36.6 °C)  Pulse:  [75-99] 99  Resp:  [16-18] 18  SpO2:  [95 %-98 %] 98 %  BP: (101-140)/(52-73) 124/56     Weight: 76.1 kg (167 lb 12.3 oz)  Body mass index is 24.07 kg/m².    Intake/Output Summary (Last 24 hours) at 1/9/2022 1019  Last data filed at 1/8/2022 1800  Gross per 24 hour   Intake 840 ml   Output --   Net 840 ml      Physical Exam  Vitals and nursing note reviewed.   Constitutional:       General: He is not in acute distress.     Appearance: He is not toxic-appearing.   Cardiovascular:      Rate and Rhythm: Normal rate and regular rhythm.      Pulses: Normal pulses.   Pulmonary:      Effort: Pulmonary effort is normal.      Breath sounds: Normal breath sounds.   Musculoskeletal:      Right lower leg: No edema.      Left lower leg: No edema.   Skin:     General: Skin is warm.      Capillary Refill: Capillary refill takes less than 2 seconds.      Findings: Bruising present.   Neurological:      Mental Status: He is alert.      Comments: Oriented to place and person   Psychiatric:         Attention and Perception: Attention normal.         Mood and Affect: Mood normal.         Speech: Speech normal.         Behavior: Behavior normal.         Thought Content: Thought content normal.         Cognition and Memory: Cognition is impaired. Memory is impaired.      Comments: Interactive and makes eye contact during conversation         Significant Labs: All pertinent labs within the past 24 hours have been reviewed.  BMP: No results for input(s): GLU, NA, K, CL,  CO2, BUN, CREATININE, CALCIUM, MG in the last 48 hours.  CBC: No results for input(s): WBC, HGB, HCT, PLT in the last 48 hours.      Assessment/Plan:      Rhabdomyolysis  Resolved       Paroxysmal atrial fibrillation  Currently NSR. Relative contraindication to anticoagulation given unstable ambulation and frequent falls at home, as per my conversation with HCPOA. Is on DVT prophylaxis      Alzheimer disease  - Continue home donepezil and memantine      Primary hypertension  - Continue Norvasc 5mg daily  - Holding ACEi for CLEM  - PRNs for SBP > 180      Weakness of both lower extremities  With dementia. Evaluated by neurology. No LP recommended   I am concerned about use of antihypertensives and diuretics at home contributing to his function. Also, sleep/wake cycle is off (sleeps most of the day, eats brunch and drinks coffee late a night. Goes to sleep after 1 AM usually). Continue melatonin and seroquel QHS to assist with sleep and hopefully help with appetite/function during daytime. On amlodipine for BP but holding diuretics. B12 WNL. Thiamine pending  PT/OT: rec SNF. HCPSARAH will like to see how he does with therapy today to determine if  vs SNF        VTE Risk Mitigation (From admission, onward)         Ordered     heparin (porcine) injection 5,000 Units  Every 8 hours         12/31/21 1847     IP VTE HIGH RISK PATIENT  Once         12/31/21 1847     Place sequential compression device  Until discontinued         12/31/21 1847                Discharge Planning   JACQUELINE: 1/9/2022     Code Status: Full Code   Is the patient medically ready for discharge?:     Reason for patient still in hospital (select all that apply): Patient unstable  Discharge Plan A: Skilled Nursing Facility   Discharge Delays: (!) Post-Acute Set-up              Luis Manuel Tejeda MD  Department of Hospital Medicine   [facility]

## 2022-01-09 NOTE — PROGRESS NOTES
Message received from [facility] advising that patient  will be seen tomorrow.

## 2022-01-09 NOTE — NURSING
PER handoff received from ROBBIN Pritchard     Pt resting in bed quietly. NAD noted. No c/o pain.  Fall and safety precautions maintained. Bed alarm activated and audible.. Bed locked in lowest position, with side rails up x2. Call bell and personal items within reach

## 2022-01-09 NOTE — SUBJECTIVE & OBJECTIVE
Interval History: No new issues     Review of Systems   Unable to perform ROS: Dementia     Objective:     Vital Signs (Most Recent):  Temp: 97.9 °F (36.6 °C) (01/09/22 0753)  Pulse: 99 (01/09/22 0753)  Resp: 18 (01/09/22 0753)  BP: (!) 124/56 (01/09/22 0753)  SpO2: 98 % (01/09/22 0753) Vital Signs (24h Range):  Temp:  [97.3 °F (36.3 °C)-98.7 °F (37.1 °C)] 97.9 °F (36.6 °C)  Pulse:  [75-99] 99  Resp:  [16-18] 18  SpO2:  [95 %-98 %] 98 %  BP: (101-140)/(52-73) 124/56     Weight: 76.1 kg (167 lb 12.3 oz)  Body mass index is 24.07 kg/m².    Intake/Output Summary (Last 24 hours) at 1/9/2022 1019  Last data filed at 1/8/2022 1800  Gross per 24 hour   Intake 840 ml   Output --   Net 840 ml      Physical Exam  Vitals and nursing note reviewed.   Constitutional:       General: He is not in acute distress.     Appearance: He is not toxic-appearing.   Cardiovascular:      Rate and Rhythm: Normal rate and regular rhythm.      Pulses: Normal pulses.   Pulmonary:      Effort: Pulmonary effort is normal.      Breath sounds: Normal breath sounds.   Musculoskeletal:      Right lower leg: No edema.      Left lower leg: No edema.   Skin:     General: Skin is warm.      Capillary Refill: Capillary refill takes less than 2 seconds.      Findings: Bruising present.   Neurological:      Mental Status: He is alert.      Comments: Oriented to place and person   Psychiatric:         Attention and Perception: Attention normal.         Mood and Affect: Mood normal.         Speech: Speech normal.         Behavior: Behavior normal.         Thought Content: Thought content normal.         Cognition and Memory: Cognition is impaired. Memory is impaired.      Comments: Interactive and makes eye contact during conversation         Significant Labs: All pertinent labs within the past 24 hours have been reviewed.  BMP: No results for input(s): GLU, NA, K, CL, CO2, BUN, CREATININE, CALCIUM, MG in the last 48 hours.  CBC: No results for input(s): WBC,  HGB, HCT, PLT in the last 48 hours.

## 2022-01-09 NOTE — PROGRESS NOTES
SW spoke with patient's daughter, Lakesha, to advise of orders for DME and to advise that the hospital bed will be delivered today but no ETA for delivery.  Daughter advised of orders for a RW and BSC.  Daughter stated that patient has a walker and that if the bed is going to be delivered today, patient has a recliner to use at his [facility].  SW will request order for wheelchair.  When daughter arrives back at hospital, list of HH providers will be given for review.

## 2022-01-09 NOTE — PROGRESS NOTES
OK to pull w/c and BSC from DME closet obtained from [employer].    HH accepted by [facility] but cannot see patient until Thursday.  Dr. Hammonds notified.  Per Dr. Hammonds stated that it is up to the family.  Spoke with Lakesha who would like to try to find a provider who might be able to see patient earlier.    DME delivered to the bedside and delivery ticket(s) signed.

## 2022-01-09 NOTE — PROGRESS NOTES
Per patient flow center called to advise that if family is paying for the transport referral does not come through their department.  [facility] rep advised SW that [employer] Wheelchair vans do not run on Sundays.  [facility] will arrange transport via [facility] account if patient is able to situp safely in van.  SW verified that patient is able to sit up safely.  Advised that patient has heel protectors and that family will be available to help patient transfer.;

## 2022-01-09 NOTE — PLAN OF CARE
Problem: Adult Inpatient Plan of Care  Goal: Plan of Care Review  Outcome: Met  Goal: Patient-Specific Goal (Individualized)  Outcome: Met  Goal: Absence of Hospital-Acquired Illness or Injury  Outcome: Met  Goal: Optimal Comfort and Wellbeing  Outcome: Met  Goal: Readiness for Transition of Care  Outcome: Met     Problem: Fluid and Electrolyte Imbalance (Acute Kidney Injury/Impairment)  Goal: Fluid and Electrolyte Balance  Outcome: Met     Problem: Oral Intake Inadequate (Acute Kidney Injury/Impairment)  Goal: Optimal Nutrition Intake  Outcome: Met     Problem: Renal Function Impairment (Acute Kidney Injury/Impairment)  Goal: Effective Renal Function  Outcome: Met     Problem: Fall Injury Risk  Goal: Absence of Fall and Fall-Related Injury  Outcome: Met     Problem: Skin Injury Risk Increased  Goal: Skin Health and Integrity  Outcome: Met     Problem: Impaired Wound Healing  Goal: Optimal Wound Healing  Outcome: Met

## 2022-01-10 PROCEDURE — G0180 PR HOME HEALTH MD CERTIFICATION: ICD-10-PCS | Mod: ,,, | Performed by: INTERNAL MEDICINE

## 2022-01-10 PROCEDURE — G0180 MD CERTIFICATION HHA PATIENT: HCPCS | Mod: ,,, | Performed by: INTERNAL MEDICINE

## 2022-01-10 NOTE — NURSING
Iv d/c'd with cath intact, d/c'd with all belongings per ambulance, beryl called and notified that patient had left

## 2022-01-10 NOTE — NURSING
Spoke with Keith at [facility], states it will be a couple more hours before he is picked up, they have at least 2 patients ahead of him

## 2022-01-24 ENCOUNTER — EXTERNAL HOME HEALTH (OUTPATIENT)
Dept: HOME HEALTH SERVICES | Facility: HOSPITAL | Age: 82
End: 2022-01-24
Payer: MEDICARE

## 2022-02-11 ENCOUNTER — LAB VISIT (OUTPATIENT)
Dept: LAB | Facility: HOSPITAL | Age: 82
End: 2022-02-11
Attending: INTERNAL MEDICINE
Payer: MEDICARE

## 2022-02-11 DIAGNOSIS — G30.9 ALZHEIMER'S DISEASE: Primary | ICD-10-CM

## 2022-02-11 DIAGNOSIS — F02.80 ALZHEIMER'S DISEASE: Primary | ICD-10-CM

## 2022-02-11 LAB
BACTERIA #/AREA URNS AUTO: ABNORMAL /HPF
BILIRUB UR QL STRIP: NEGATIVE
CLARITY UR REFRACT.AUTO: ABNORMAL
COLOR UR AUTO: ABNORMAL
GLUCOSE UR QL STRIP: NEGATIVE
HGB UR QL STRIP: ABNORMAL
HYALINE CASTS UR QL AUTO: 0 /LPF
KETONES UR QL STRIP: NEGATIVE
LEUKOCYTE ESTERASE UR QL STRIP: ABNORMAL
MICROSCOPIC COMMENT: ABNORMAL
NITRITE UR QL STRIP: NEGATIVE
PH UR STRIP: 6 [PH] (ref 5–8)
PROT UR QL STRIP: ABNORMAL
RBC #/AREA URNS AUTO: >100 /HPF (ref 0–4)
SP GR UR STRIP: 1.01 (ref 1–1.03)
URN SPEC COLLECT METH UR: ABNORMAL
WBC #/AREA URNS AUTO: >100 /HPF (ref 0–5)
WBC CLUMPS UR QL AUTO: ABNORMAL

## 2022-02-11 PROCEDURE — 87086 URINE CULTURE/COLONY COUNT: CPT | Performed by: INTERNAL MEDICINE

## 2022-02-11 PROCEDURE — 81001 URINALYSIS AUTO W/SCOPE: CPT | Performed by: INTERNAL MEDICINE

## 2022-02-13 LAB
BACTERIA UR CULT: NORMAL
BACTERIA UR CULT: NORMAL

## 2022-02-18 ENCOUNTER — HOSPITAL ENCOUNTER (EMERGENCY)
Facility: HOSPITAL | Age: 82
Discharge: HOME | End: 2022-02-18
Attending: EMERGENCY MEDICINE
Payer: MEDICARE

## 2022-02-18 VITALS
RESPIRATION RATE: 17 BRPM | TEMPERATURE: 98 F | OXYGEN SATURATION: 97 % | BODY MASS INDEX: 24.11 KG/M2 | DIASTOLIC BLOOD PRESSURE: 60 MMHG | HEART RATE: 90 BPM | WEIGHT: 168 LBS | SYSTOLIC BLOOD PRESSURE: 127 MMHG

## 2022-02-18 DIAGNOSIS — S81.812A LACERATION OF LEFT LOWER EXTREMITY, INITIAL ENCOUNTER: Primary | ICD-10-CM
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
## 2022-02-18 PROCEDURE — 99284 EMERGENCY DEPT VISIT MOD MDM: CPT | Mod: 25,,, | Performed by: EMERGENCY MEDICINE

## 2022-02-18 PROCEDURE — 99283 EMERGENCY DEPT VISIT LOW MDM: CPT | Mod: 25

## 2022-02-18 PROCEDURE — 12005 RPR S/N/A/GEN/TRK12.6-20.0CM: CPT | Mod: ,,, | Performed by: EMERGENCY MEDICINE

## 2022-02-18 PROCEDURE — 12005 RPR S/N/A/GEN/TRK12.6-20.0CM: CPT

## 2022-02-18 PROCEDURE — 25000003 PHARM REV CODE 250: Performed by: PHYSICIAN ASSISTANT

## 2022-02-18 PROCEDURE — 12005 PR RESUPERF WND BODY 12.6-20 CM: ICD-10-PCS | Mod: ,,, | Performed by: EMERGENCY MEDICINE

## 2022-02-18 PROCEDURE — 99284 PR EMERGENCY DEPT VISIT,LEVEL IV: ICD-10-PCS | Mod: 25,,, | Performed by: EMERGENCY MEDICINE

## 2022-02-18 RX ORDER — LIDOCAINE HYDROCHLORIDE 10 MG/ML
5 INJECTION, SOLUTION EPIDURAL; INFILTRATION; INTRACAUDAL; PERINEURAL
Status: COMPLETED | OUTPATIENT
Start: 2022-02-18 | End: 2022-02-18

## 2022-02-18 RX ORDER — CEPHALEXIN 500 MG/1
500 CAPSULE ORAL 4 TIMES DAILY
Qty: 28 CAPSULE | Refills: 0 | Status: SHIPPED | OUTPATIENT
Start: 2022-02-18 | End: 2022-02-25

## 2022-02-18 RX ORDER — CEPHALEXIN 500 MG/1
500 CAPSULE ORAL 4 TIMES DAILY
Qty: 28 CAPSULE | Refills: 0 | Status: SHIPPED | OUTPATIENT
Start: 2022-02-18 | End: 2022-02-18 | Stop reason: SDUPTHER

## 2022-02-18 RX ADMIN — LIDOCAINE HYDROCHLORIDE 50 MG: 10 INJECTION, SOLUTION EPIDURAL; INFILTRATION; INTRACAUDAL at 09:02

## 2022-02-18 RX ADMIN — LIDOCAINE HYDROCHLORIDE 50 MG: 10 INJECTION, SOLUTION EPIDURAL; INFILTRATION; INTRACAUDAL at 08:02

## 2022-02-19 NOTE — DISCHARGE INSTRUCTIONS
-give antibiotics until complete, follow upwith  wound care clinic (referral placed). Suture removal in 7-9 days  -return for severe swelling or drainage of leg wound

## 2022-02-19 NOTE — ED NOTES
Pt to ED with c/o laceration to left lower extremity. Pt reports it was cut by a metal cart. Pt doesn't remember if he fell. Denies injury elsewhere. Pt oriented to self only. Unknown last tetanus.     Patient identifiers for Timothy Roldan 82 y.o. male checked and correct.  Chief Complaint   Patient presents with    Laceration     L shin laceration, patient bumped into wheelchair at nursing home, no fall/head injury. Bleeding controlled with dressing at this time. Hx of dementia, oriented to self      Past Medical History:   Diagnosis Date    Hyperlipidemia     Hypertension      Allergies reported: Review of patient's allergies indicates:  No Known Allergies      LOC: Patient is awake, alert, cooperative. Pt oriented  To self only.   APPEARANCE: Patient resting comfortably and in no acute distress. Patient is clean and well groomed, patient's clothing is properly fastened.  HEENT: mm pink, moist, intact.   SKIN: The skin is warm and dry. Patient has normal skin turgor and moist mucus membranes. Laceration noted to left shin, bleeding controlled. Also has wound covered on right shin but is  Unable to tell me how long it has been there.   MUSKULOSKELETAL: Patient is moving all extremities well, no obvious deformities noted. Pulses intact.   RESPIRATORY: Airway is open and patent. Respirations are spontaneous and non-labored with normal effort and rate.  CARDIAC: Patient has a normal rate and rhythm. No peripheral edema noted.   ABDOMEN: No distention noted. Soft and non-tender upon palpation.  NEUROLOGICAL: pupils 3mm, PERRL. Facial expression is symmetrical. Hand grasps are equal bilaterally. Normal sensation in all extremities when touched with finger.

## 2022-02-19 NOTE — ED NOTES
Laceration repair complete, providers at bedside discussing with family. Family reports understanding of follow up instructions.

## 2022-02-19 NOTE — ED PROVIDER NOTES
Encounter Date: 2/18/2022       History     Chief Complaint   Patient presents with    Laceration     L shin laceration, patient bumped into wheelchair at nursing home, no fall/head injury. Bleeding controlled with dressing at this time. Hx of dementia, oriented to self      Timothy roberson a pleasant 82 y.o with history of UTI, left knee replacement, HTN, Alzheimer's disease, questionable parkinson disease presented to the ED today from [facility] for a left shin laceration.Patient is alert and oriented to self, place, and year. Patient knows he is here for an injury to his left leg but is unclear on the events proceeding the injury. Patient is a poor historian due to his dementia. Per Nurse at the [facility], Patient sliced his left shin while getting into a wheel chair today around 1600.     Alena Zamora PA-C 8:34 PM   States father is at his baseline mentation. Recent diagnosis of UTI, started on Cipro yesterday. Patient reports current mild headache and pain near laceration, otherwise denies complaints.         Review of patient's allergies indicates:  No Known Allergies  Past Medical History:   Diagnosis Date    Hyperlipidemia     Hypertension      Past Surgical History:   Procedure Laterality Date    TONSILLECTOMY      TOTAL KNEE ARTHROPLASTY       No family history on file.  Social History     Tobacco Use    Smoking status: Never Smoker    Smokeless tobacco: Never Used     Review of Systems   Unable to perform ROS: Dementia       Physical Exam     Initial Vitals [02/18/22 1857]   BP Pulse Resp Temp SpO2   (!) 106/56 88 16 98.3 °F (36.8 °C) 99 %      MAP       --         Physical Exam    Nursing note and vitals reviewed.  Constitutional: He appears well-developed and well-nourished. He is not diaphoretic. No distress (nontoxic appearing).   HENT:   Head: Normocephalic and atraumatic.   Right Ear: External ear normal.   Left Ear: External ear normal.   Mouth/Throat: Oropharynx  "is clear and moist.   Eyes: Conjunctivae and EOM are normal. Pupils are equal, round, and reactive to light. No scleral icterus.   Neck: No tracheal deviation present. No JVD present.   No spinous process ttp   Cardiovascular: Normal rate, regular rhythm and intact distal pulses.   Pulmonary/Chest: Breath sounds normal. No stridor. No respiratory distress. He has no wheezes. He has no rhonchi. He has no rales. He exhibits no tenderness.   Abdominal: Abdomen is soft. Bowel sounds are normal. There is no abdominal tenderness. There is no rebound and no guarding.   Musculoskeletal:         General: No edema. Normal range of motion.      Comments: Comprehensive MSK exam without other traumatic findings, no bony ttp     Neurological: He is alert. He has normal strength. No cranial nerve deficit or sensory deficit.   Masked faces  No focal neuro deficits   Skin: Skin is warm. Capillary refill takes less than 2 seconds. No rash noted. No erythema.        Remote skin tears to BUE  ++approx 8 cm curved laceration to subQ tissue, hemostasis intact. No tendon/muscle involvement.  Muscle compartments soft, FROM joints, DP 2+, nml cap refill      Small venous ulcer medial aspect RLE, draining serous fluid, overlying bandage   Psychiatric:   A&Ox2, year "twenty two twenty two"  Flattened affect  Normal behavior  Pleasant          ED Course   Lac Repair    Date/Time: 2/19/2022 1:32 PM  Performed by: Alena Zamora PA-C  Authorized by: Kalyan Jauregui MD     Consent:     Consent obtained:  Verbal    Consent given by: son.    Risks, benefits, and alternatives were discussed: yes    Universal protocol:     Patient identity confirmed:  Arm band  Anesthesia:     Anesthesia method:  Local infiltration    Local anesthetic:  Lidocaine 1% w/o epi  Laceration details:     Location:  Leg    Leg location:  L lower leg    Length (cm):  14  Exploration:     Hemostasis achieved with:  Direct pressure    Wound exploration: wound explored " through full range of motion and entire depth of wound visualized      Wound extent: areolar tissue violated      Wound extent: no fascia violation noted, no foreign bodies/material noted, no muscle damage noted, no nerve damage noted, no tendon damage noted, no underlying fracture noted and no vascular damage noted      Contaminated: no    Treatment:     Area cleansed with:  Saline    Amount of cleaning:  Extensive    Irrigation solution:  Sterile saline    Irrigation method:  Pressure wash  Skin repair:     Repair method:  Sutures    Suture size:  4-0    Suture material:  Nylon    Suture technique:  Simple interrupted and vertical mattress    Number of sutures:  24  Approximation:     Approximation:  Close  Repair type:     Repair type:  Simple  Post-procedure details:     Dressing:  Bulky dressing    Procedure completion:  Tolerated well, no immediate complications      Labs Reviewed - No data to display       Imaging Results          X-Ray Tibia Fibula 2 View Left (Final result)  Result time 02/18/22 20:45:32    Final result by Kalyan Woodward MD (02/18/22 20:45:32)                 Impression:      Laceration in the lateral calf with no foreign body or fracture evident.      Electronically signed by: Kalyan Woodward  Date:    02/18/2022  Time:    20:45             Narrative:    EXAMINATION:  XR TIBIA FIBULA 2 VIEW LEFT    CLINICAL HISTORY:  Unspecified fall, initial encounter    TECHNIQUE:  AP and lateral views of the left tibia and fibula were performed.    COMPARISON:  None.    FINDINGS:  Soft tissue irregularity over the lateral calf suggests laceration with overlying bandage.  I see no evidence of radiopaque or radiolucent foreign body.  Total left knee arthroplasty changes are noted.  Mild thickening of the periosteum along the tibia is noted suggesting venous insufficiency.  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                                  Medications   LIDOcaine (PF) 10 mg/ml (1%) injection 50 mg (50 mg Infiltration Given 2/18/22 2001)   LIDOcaine (PF) 10 mg/ml (1%) injection 50 mg (50 mg Infiltration Given 2/18/22 2107)     Medical Decision Making:   History:   I obtained history from: someone other than patient.  Old Medical Records: I decided to obtain old medical records.  Old Records Summarized: records from clinic visits and records from previous admission(s).  Initial Assessment:   Patient at nursing home cut his left foreleg trying to get into wheelchair, large laceration down to subQ tissue, leg NV intact. VSS, afebrile  Differential Diagnosis:   DDx includes laceration to fatty layer. Physical exam and history taking lower clinical suspicion for muscle/tendon damage, nerve damage, open fx.  Independently Interpreted Test(s):   I have ordered and independently interpreted EKG Reading(s) - see prior notes  Clinical Tests:   Radiological Study: Ordered and Reviewed  ED Management:  Patient tolerated lac repair without issue. Hemostasis remains at procedure end. Discussed f/u wound care with family, they report patient is already seen by them for venous ulcer. Rx keflex to pharmacy. Suture care and f/u instructions given. Patient agreed to plan of care and voiced understanding. Discharged in stable condition with strict ED return precautions.    Alena Zamora PA-C  02/19/2022    I discussed the following case, diagnosis and plan of care with attending physician.              Attending Attestation:     Physician Attestation Statement for NP/PA:   I have conducted a face to face encounter with this patient in addition to the NP/PA, due to NP/PA Request    Other NP/PA Attestation Additions:      Medical Decision Making: Shin laceration, looks relatively clean, but in an infection-prone area.  Will close and d/c with abx.               ED Course as of 02/19/22 1336   Fri Feb 18, 202218, 2022 1947 Tdap 04/29/2015 on chart review [MF]   2048 X-Ray  Tibia Fibula 2 View Left  No radiopaque foreign bodies. No acute findings [MF]      ED Course User Index  [MF] Alena Zamora PA-C             Clinical Impression:   Final diagnoses:  [W19.XXXA] Fall  [S81.812A] Laceration of left lower extremity, initial encounter (Primary)          ED Disposition Condition    Discharge Stable        ED Prescriptions     Medication Sig Dispense Start Date End Date Auth. Provider    cephALEXin (KEFLEX) 500 MG capsule  (Status: Discontinued) Take 1 capsule (500 mg total) by mouth 4 (four) times daily. for 7 days 28 capsule 2/18/2022 2/18/2022 Alena Zamora PA-C    cephALEXin (KEFLEX) 500 MG capsule Take 1 capsule (500 mg total) by mouth 4 (four) times daily. for 7 days 28 capsule 2/18/2022 2/25/2022 Alena Zamora PA-C        Follow-up Information    None          Alena Zamora PA-C  02/19/22 1336       Kalyan Jauregui MD  02/19/22 1400

## 2022-05-21 ENCOUNTER — HOSPITAL ENCOUNTER (INPATIENT)
Facility: HOSPITAL | Age: 82
LOS: 4 days | Discharge: HOME HEALTH | DRG: 177 | End: 2022-05-26
Attending: FAMILY MEDICINE | Admitting: FAMILY MEDICINE
Payer: MEDICARE

## 2022-05-21 ENCOUNTER — HOSPITAL ENCOUNTER (EMERGENCY)
Facility: HOSPITAL | Age: 82
Discharge: ANOTHER HOSPITAL | End: 2022-05-21
Attending: EMERGENCY MEDICINE
Payer: MEDICARE

## 2022-05-21 VITALS
HEART RATE: 80 BPM | OXYGEN SATURATION: 95 % | WEIGHT: 170 LBS | DIASTOLIC BLOOD PRESSURE: 70 MMHG | SYSTOLIC BLOOD PRESSURE: 129 MMHG | BODY MASS INDEX: 24.39 KG/M2 | RESPIRATION RATE: 20 BRPM | TEMPERATURE: 100 F

## 2022-05-21 DIAGNOSIS — R06.02 SHORTNESS OF BREATH: ICD-10-CM

## 2022-05-21 DIAGNOSIS — R53.81 DEBILITY: ICD-10-CM

## 2022-05-21 DIAGNOSIS — U07.1 COVID: ICD-10-CM

## 2022-05-21 DIAGNOSIS — J96.01 ACUTE HYPOXEMIC RESPIRATORY FAILURE DUE TO COVID-19: Primary | ICD-10-CM

## 2022-05-21 DIAGNOSIS — U07.1 COVID-19: ICD-10-CM

## 2022-05-21 DIAGNOSIS — U07.1 ACUTE HYPOXEMIC RESPIRATORY FAILURE DUE TO COVID-19: Primary | ICD-10-CM

## 2022-05-21 DIAGNOSIS — M79.89 LEG SWELLING: ICD-10-CM

## 2022-05-21 DIAGNOSIS — U07.1 COVID: Primary | ICD-10-CM

## 2022-05-21 LAB
ALBUMIN SERPL BCP-MCNC: 2.9 G/DL (ref 3.5–5.2)
ALP SERPL-CCNC: 71 U/L (ref 55–135)
ALT SERPL W/O P-5'-P-CCNC: 11 U/L (ref 10–44)
ANION GAP SERPL CALC-SCNC: 10 MMOL/L (ref 8–16)
APTT BLDCRRT: 27.3 SEC (ref 21–32)
APTT BLDCRRT: 30.8 SEC (ref 21–32)
AST SERPL-CCNC: 14 U/L (ref 10–40)
BASOPHILS # BLD AUTO: 0 K/UL (ref 0–0.2)
BASOPHILS # BLD AUTO: 0.01 K/UL (ref 0–0.2)
BASOPHILS NFR BLD: 0 % (ref 0–1.9)
BASOPHILS NFR BLD: 0.1 % (ref 0–1.9)
BILIRUB SERPL-MCNC: 0.6 MG/DL (ref 0.1–1)
BNP SERPL-MCNC: 360 PG/ML (ref 0–99)
BUN SERPL-MCNC: 16 MG/DL (ref 8–23)
CALCIUM SERPL-MCNC: 8.7 MG/DL (ref 8.7–10.5)
CHLORIDE SERPL-SCNC: 107 MMOL/L (ref 95–110)
CO2 SERPL-SCNC: 23 MMOL/L (ref 23–29)
CREAT SERPL-MCNC: 1 MG/DL (ref 0.5–1.4)
CTP QC/QA: YES
D DIMER PPP IA.FEU-MCNC: 2.11 MG/L FEU
D DIMER PPP IA.FEU-MCNC: 3.41 MG/L FEU
DIFFERENTIAL METHOD: ABNORMAL
DIFFERENTIAL METHOD: ABNORMAL
EOSINOPHIL # BLD AUTO: 0 K/UL (ref 0–0.5)
EOSINOPHIL # BLD AUTO: 0 K/UL (ref 0–0.5)
EOSINOPHIL NFR BLD: 0 % (ref 0–8)
EOSINOPHIL NFR BLD: 0.1 % (ref 0–8)
ERYTHROCYTE [DISTWIDTH] IN BLOOD BY AUTOMATED COUNT: 13.6 % (ref 11.5–14.5)
ERYTHROCYTE [DISTWIDTH] IN BLOOD BY AUTOMATED COUNT: 13.6 % (ref 11.5–14.5)
EST. GFR  (AFRICAN AMERICAN): >60 ML/MIN/1.73 M^2
EST. GFR  (NON AFRICAN AMERICAN): >60 ML/MIN/1.73 M^2
GLUCOSE SERPL-MCNC: 124 MG/DL (ref 70–110)
HCT VFR BLD AUTO: 34.6 % (ref 40–54)
HCT VFR BLD AUTO: 37.5 % (ref 40–54)
HGB BLD-MCNC: 11.1 G/DL (ref 14–18)
HGB BLD-MCNC: 12 G/DL (ref 14–18)
IMM GRANULOCYTES # BLD AUTO: 0.01 K/UL (ref 0–0.04)
IMM GRANULOCYTES # BLD AUTO: 0.02 K/UL (ref 0–0.04)
IMM GRANULOCYTES NFR BLD AUTO: 0.2 % (ref 0–0.5)
IMM GRANULOCYTES NFR BLD AUTO: 0.3 % (ref 0–0.5)
INR PPP: 1.1 (ref 0.8–1.2)
LDH SERPL L TO P-CCNC: 186 U/L (ref 110–260)
LYMPHOCYTES # BLD AUTO: 0.1 K/UL (ref 1–4.8)
LYMPHOCYTES # BLD AUTO: 0.2 K/UL (ref 1–4.8)
LYMPHOCYTES NFR BLD: 1.9 % (ref 18–48)
LYMPHOCYTES NFR BLD: 2.8 % (ref 18–48)
MCH RBC QN AUTO: 29 PG (ref 27–31)
MCH RBC QN AUTO: 29.2 PG (ref 27–31)
MCHC RBC AUTO-ENTMCNC: 32 G/DL (ref 32–36)
MCHC RBC AUTO-ENTMCNC: 32.1 G/DL (ref 32–36)
MCV RBC AUTO: 90 FL (ref 82–98)
MCV RBC AUTO: 91 FL (ref 82–98)
MONOCYTES # BLD AUTO: 0.2 K/UL (ref 0.3–1)
MONOCYTES # BLD AUTO: 0.3 K/UL (ref 0.3–1)
MONOCYTES NFR BLD: 3.3 % (ref 4–15)
MONOCYTES NFR BLD: 4.3 % (ref 4–15)
NEUTROPHILS # BLD AUTO: 5.5 K/UL (ref 1.8–7.7)
NEUTROPHILS # BLD AUTO: 6.3 K/UL (ref 1.8–7.7)
NEUTROPHILS NFR BLD: 93.4 % (ref 38–73)
NEUTROPHILS NFR BLD: 93.6 % (ref 38–73)
NRBC BLD-RTO: 0 /100 WBC
NRBC BLD-RTO: 0 /100 WBC
PLATELET # BLD AUTO: 154 K/UL (ref 150–450)
PLATELET # BLD AUTO: 160 K/UL (ref 150–450)
PMV BLD AUTO: 9.8 FL (ref 9.2–12.9)
PMV BLD AUTO: 9.8 FL (ref 9.2–12.9)
POTASSIUM SERPL-SCNC: 3.9 MMOL/L (ref 3.5–5.1)
PROT SERPL-MCNC: 6.6 G/DL (ref 6–8.4)
PROTHROMBIN TIME: 11.1 SEC (ref 9–12.5)
RBC # BLD AUTO: 3.83 M/UL (ref 4.6–6.2)
RBC # BLD AUTO: 4.11 M/UL (ref 4.6–6.2)
SARS-COV-2 RDRP RESP QL NAA+PROBE: POSITIVE
SODIUM SERPL-SCNC: 140 MMOL/L (ref 136–145)
TROPONIN I SERPL DL<=0.01 NG/ML-MCNC: 0.01 NG/ML (ref 0–0.03)
TROPONIN I SERPL DL<=0.01 NG/ML-MCNC: <0.006 NG/ML (ref 0–0.03)
WBC # BLD AUTO: 5.84 K/UL (ref 3.9–12.7)
WBC # BLD AUTO: 6.7 K/UL (ref 3.9–12.7)

## 2022-05-21 PROCEDURE — 99291 CRITICAL CARE FIRST HOUR: CPT | Mod: 25

## 2022-05-21 PROCEDURE — 83615 LACTATE (LD) (LDH) ENZYME: CPT | Performed by: FAMILY MEDICINE

## 2022-05-21 PROCEDURE — 85730 THROMBOPLASTIN TIME PARTIAL: CPT | Mod: 91 | Performed by: FAMILY MEDICINE

## 2022-05-21 PROCEDURE — 85379 FIBRIN DEGRADATION QUANT: CPT | Mod: 91 | Performed by: FAMILY MEDICINE

## 2022-05-21 PROCEDURE — U0002 COVID-19 LAB TEST NON-CDC: HCPCS | Performed by: EMERGENCY MEDICINE

## 2022-05-21 PROCEDURE — 25000003 PHARM REV CODE 250: Performed by: EMERGENCY MEDICINE

## 2022-05-21 PROCEDURE — 84484 ASSAY OF TROPONIN QUANT: CPT | Mod: 91 | Performed by: FAMILY MEDICINE

## 2022-05-21 PROCEDURE — 27000221 HC OXYGEN, UP TO 24 HOURS

## 2022-05-21 PROCEDURE — 96375 TX/PRO/DX INJ NEW DRUG ADDON: CPT

## 2022-05-21 PROCEDURE — 93010 ELECTROCARDIOGRAM REPORT: CPT | Mod: ,,, | Performed by: INTERNAL MEDICINE

## 2022-05-21 PROCEDURE — 96372 THER/PROPH/DIAG INJ SC/IM: CPT | Performed by: FAMILY MEDICINE

## 2022-05-21 PROCEDURE — 84484 ASSAY OF TROPONIN QUANT: CPT | Performed by: EMERGENCY MEDICINE

## 2022-05-21 PROCEDURE — 94761 N-INVAS EAR/PLS OXIMETRY MLT: CPT

## 2022-05-21 PROCEDURE — 27100098 HC SPACER

## 2022-05-21 PROCEDURE — 63600175 PHARM REV CODE 636 W HCPCS: Performed by: FAMILY MEDICINE

## 2022-05-21 PROCEDURE — 93010 EKG 12-LEAD: ICD-10-PCS | Mod: ,,, | Performed by: INTERNAL MEDICINE

## 2022-05-21 PROCEDURE — 93005 ELECTROCARDIOGRAM TRACING: CPT

## 2022-05-21 PROCEDURE — 25000003 PHARM REV CODE 250: Performed by: FAMILY MEDICINE

## 2022-05-21 PROCEDURE — 85025 COMPLETE CBC W/AUTO DIFF WBC: CPT | Performed by: EMERGENCY MEDICINE

## 2022-05-21 PROCEDURE — 84484 ASSAY OF TROPONIN QUANT: CPT

## 2022-05-21 PROCEDURE — 83880 ASSAY OF NATRIURETIC PEPTIDE: CPT | Performed by: EMERGENCY MEDICINE

## 2022-05-21 PROCEDURE — 80053 COMPREHEN METABOLIC PANEL: CPT | Performed by: EMERGENCY MEDICINE

## 2022-05-21 PROCEDURE — 63600175 PHARM REV CODE 636 W HCPCS: Performed by: EMERGENCY MEDICINE

## 2022-05-21 PROCEDURE — 85025 COMPLETE CBC W/AUTO DIFF WBC: CPT | Mod: 91 | Performed by: EMERGENCY MEDICINE

## 2022-05-21 PROCEDURE — 99291 CRITICAL CARE FIRST HOUR: CPT | Mod: CR,CS,, | Performed by: EMERGENCY MEDICINE

## 2022-05-21 PROCEDURE — 96374 THER/PROPH/DIAG INJ IV PUSH: CPT

## 2022-05-21 PROCEDURE — G0378 HOSPITAL OBSERVATION PER HR: HCPCS

## 2022-05-21 PROCEDURE — 36415 COLL VENOUS BLD VENIPUNCTURE: CPT | Performed by: FAMILY MEDICINE

## 2022-05-21 PROCEDURE — 85379 FIBRIN DEGRADATION QUANT: CPT | Performed by: EMERGENCY MEDICINE

## 2022-05-21 PROCEDURE — 25000003 PHARM REV CODE 250: Performed by: NURSE PRACTITIONER

## 2022-05-21 PROCEDURE — 94640 AIRWAY INHALATION TREATMENT: CPT | Mod: XB

## 2022-05-21 PROCEDURE — 85730 THROMBOPLASTIN TIME PARTIAL: CPT | Performed by: EMERGENCY MEDICINE

## 2022-05-21 PROCEDURE — 99900035 HC TECH TIME PER 15 MIN (STAT)

## 2022-05-21 PROCEDURE — G0379 DIRECT REFER HOSPITAL OBSERV: HCPCS

## 2022-05-21 PROCEDURE — 85610 PROTHROMBIN TIME: CPT | Performed by: EMERGENCY MEDICINE

## 2022-05-21 PROCEDURE — 25000242 PHARM REV CODE 250 ALT 637 W/ HCPCS: Performed by: FAMILY MEDICINE

## 2022-05-21 PROCEDURE — 99291 PR CRITICAL CARE, E/M 30-74 MINUTES: ICD-10-PCS | Mod: CR,CS,, | Performed by: EMERGENCY MEDICINE

## 2022-05-21 RX ORDER — HEPARIN SODIUM,PORCINE/D5W 25000/250
0-24 INTRAVENOUS SOLUTION INTRAVENOUS CONTINUOUS
Status: DISCONTINUED | OUTPATIENT
Start: 2022-05-21 | End: 2022-05-21

## 2022-05-21 RX ORDER — HEPARIN SODIUM,PORCINE/D5W 25000/250
0-24 INTRAVENOUS SOLUTION INTRAVENOUS CONTINUOUS
Status: DISCONTINUED | OUTPATIENT
Start: 2022-05-21 | End: 2022-05-21 | Stop reason: HOSPADM

## 2022-05-21 RX ORDER — GLUCAGON 1 MG
1 KIT INJECTION
Status: DISCONTINUED | OUTPATIENT
Start: 2022-05-21 | End: 2022-05-26 | Stop reason: HOSPADM

## 2022-05-21 RX ORDER — DEXAMETHASONE SODIUM PHOSPHATE 4 MG/ML
8 INJECTION, SOLUTION INTRA-ARTICULAR; INTRALESIONAL; INTRAMUSCULAR; INTRAVENOUS; SOFT TISSUE
Status: COMPLETED | OUTPATIENT
Start: 2022-05-21 | End: 2022-05-21

## 2022-05-21 RX ORDER — ENOXAPARIN SODIUM 100 MG/ML
1 INJECTION SUBCUTANEOUS 2 TIMES DAILY
Status: DISCONTINUED | OUTPATIENT
Start: 2022-05-21 | End: 2022-05-21

## 2022-05-21 RX ORDER — TAMSULOSIN HYDROCHLORIDE 0.4 MG/1
0.4 CAPSULE ORAL DAILY
Status: DISCONTINUED | OUTPATIENT
Start: 2022-05-21 | End: 2022-05-26 | Stop reason: HOSPADM

## 2022-05-21 RX ORDER — HEPARIN SODIUM 5000 [USP'U]/ML
5000 INJECTION, SOLUTION INTRAVENOUS; SUBCUTANEOUS EVERY 8 HOURS
Status: DISCONTINUED | OUTPATIENT
Start: 2022-05-21 | End: 2022-05-26 | Stop reason: HOSPADM

## 2022-05-21 RX ORDER — ASCORBIC ACID 500 MG
500 TABLET ORAL 2 TIMES DAILY
Status: DISCONTINUED | OUTPATIENT
Start: 2022-05-21 | End: 2022-05-26 | Stop reason: HOSPADM

## 2022-05-21 RX ORDER — FUROSEMIDE 10 MG/ML
40 INJECTION INTRAMUSCULAR; INTRAVENOUS
Status: COMPLETED | OUTPATIENT
Start: 2022-05-21 | End: 2022-05-21

## 2022-05-21 RX ORDER — SODIUM CHLORIDE 0.9 % (FLUSH) 0.9 %
10 SYRINGE (ML) INJECTION
Status: DISCONTINUED | OUTPATIENT
Start: 2022-05-21 | End: 2022-05-26 | Stop reason: HOSPADM

## 2022-05-21 RX ORDER — IBUPROFEN 200 MG
16 TABLET ORAL
Status: DISCONTINUED | OUTPATIENT
Start: 2022-05-21 | End: 2022-05-26 | Stop reason: HOSPADM

## 2022-05-21 RX ORDER — QUETIAPINE FUMARATE 25 MG/1
25 TABLET, FILM COATED ORAL NIGHTLY
Status: DISCONTINUED | OUTPATIENT
Start: 2022-05-21 | End: 2022-05-26 | Stop reason: HOSPADM

## 2022-05-21 RX ORDER — ALBUTEROL SULFATE 90 UG/1
2 AEROSOL, METERED RESPIRATORY (INHALATION) EVERY 6 HOURS
Status: DISCONTINUED | OUTPATIENT
Start: 2022-05-21 | End: 2022-05-26 | Stop reason: HOSPADM

## 2022-05-21 RX ORDER — HYDRALAZINE HYDROCHLORIDE 25 MG/1
50 TABLET, FILM COATED ORAL EVERY 8 HOURS
Status: DISCONTINUED | OUTPATIENT
Start: 2022-05-21 | End: 2022-05-26 | Stop reason: HOSPADM

## 2022-05-21 RX ORDER — ACETAMINOPHEN 325 MG/1
650 TABLET ORAL
Status: COMPLETED | OUTPATIENT
Start: 2022-05-21 | End: 2022-05-21

## 2022-05-21 RX ORDER — IBUPROFEN 200 MG
24 TABLET ORAL
Status: DISCONTINUED | OUTPATIENT
Start: 2022-05-21 | End: 2022-05-26 | Stop reason: HOSPADM

## 2022-05-21 RX ORDER — METOPROLOL TARTRATE 1 MG/ML
5 INJECTION, SOLUTION INTRAVENOUS ONCE
Status: COMPLETED | OUTPATIENT
Start: 2022-05-21 | End: 2022-05-21

## 2022-05-21 RX ORDER — FUROSEMIDE 40 MG/1
40 TABLET ORAL DAILY
Status: DISCONTINUED | OUTPATIENT
Start: 2022-05-21 | End: 2022-05-26 | Stop reason: HOSPADM

## 2022-05-21 RX ADMIN — HEPARIN SODIUM 12 UNITS/KG/HR: 5000 INJECTION INTRAVENOUS; SUBCUTANEOUS at 11:05

## 2022-05-21 RX ADMIN — HYDRALAZINE HYDROCHLORIDE 50 MG: 25 TABLET, FILM COATED ORAL at 02:05

## 2022-05-21 RX ADMIN — ALBUTEROL SULFATE 2 PUFF: 90 AEROSOL, METERED RESPIRATORY (INHALATION) at 07:05

## 2022-05-21 RX ADMIN — TAMSULOSIN HYDROCHLORIDE 0.4 MG: 0.4 CAPSULE ORAL at 02:05

## 2022-05-21 RX ADMIN — FUROSEMIDE 40 MG: 10 INJECTION, SOLUTION INTRAMUSCULAR; INTRAVENOUS at 06:05

## 2022-05-21 RX ADMIN — THERA TABS 1 TABLET: TAB at 02:05

## 2022-05-21 RX ADMIN — METOROPROLOL TARTRATE 5 MG: 5 INJECTION, SOLUTION INTRAVENOUS at 09:05

## 2022-05-21 RX ADMIN — HEPARIN SODIUM 5000 UNITS: 5000 INJECTION INTRAVENOUS; SUBCUTANEOUS at 09:05

## 2022-05-21 RX ADMIN — ACETAMINOPHEN 650 MG: 325 TABLET ORAL at 07:05

## 2022-05-21 RX ADMIN — HEPARIN SODIUM 12 UNITS/KG/HR: 5000 INJECTION INTRAVENOUS; SUBCUTANEOUS at 01:05

## 2022-05-21 RX ADMIN — ALBUTEROL SULFATE 2 PUFF: 90 AEROSOL, METERED RESPIRATORY (INHALATION) at 02:05

## 2022-05-21 RX ADMIN — REMDESIVIR 200 MG: 100 INJECTION, POWDER, LYOPHILIZED, FOR SOLUTION INTRAVENOUS at 10:05

## 2022-05-21 RX ADMIN — QUETIAPINE FUMARATE 25 MG: 25 TABLET ORAL at 09:05

## 2022-05-21 RX ADMIN — DEXAMETHASONE SODIUM PHOSPHATE 8 MG: 4 INJECTION INTRA-ARTICULAR; INTRALESIONAL; INTRAMUSCULAR; INTRAVENOUS; SOFT TISSUE at 06:05

## 2022-05-21 RX ADMIN — HYDRALAZINE HYDROCHLORIDE 50 MG: 25 TABLET, FILM COATED ORAL at 09:05

## 2022-05-21 RX ADMIN — OXYCODONE HYDROCHLORIDE AND ACETAMINOPHEN 500 MG: 500 TABLET ORAL at 09:05

## 2022-05-21 NOTE — PLAN OF CARE
Called berna on the phone, Lakesha, for admission questions and information. Plan of care reviewed with Berna. She verbalized understanding to all instructions and information

## 2022-05-21 NOTE — ED TRIAGE NOTES
"[facility] and [facility]. Pt states, "I felt like I was going to die." EMS states the nurse called and said the pt appeared to be SOB. Upon EMS arrival, pt was 88% on RA. Currently, pt is 94% on 2L via NC. EMS states pt was in Afib in route. Pt denies N/V, fever. Pt has a hx of dementia.  "

## 2022-05-21 NOTE — H&P
[facility] Medicine  History & Physical    Patient Name: Timothy Roldan  MRN: [id number]  Patient Class: OP- Observation  Admission Date: 5/21/2022  Attending Physician: Josy Sandhu MD  Primary Care Provider: Martine Garces MD         Patient information was obtained from patient and ER records.     Subjective:     Principal Problem:Acute hypoxemic respiratory failure due to COVID-19    Chief Complaint:   Chief Complaint   Patient presents with    Shortness of Breath        HPI: Timothy Roldan is an 83 y/o M with PMH of dementia, afib not on AC. Alzheimer's disease (+/- Parkinson?), HTN, BPH presents for shortness of breath.  Patient tachycardic, hypoxic on room air.  Stable BP, afebrile. +accessory mm use. Tachypneic. Rales at right.  No lower extremity edema.  EKG AFib at 104 beats per minute, normal intervals, no STEMI. +COVID. +d-dimer. BNP mildly elevated. CXR bilateral pulm opacities with pleural effusion. Ordered lasix, decadron. D-dimer elevated, V/Q can be obtained at OSH on pt's arrival, place order (do not have CT contrast during national CT contrast shortage).  Recommended If patient decompensates would give tpa, but held pending . LLE USS with no evidence of deep venous thrombosis in the left thigh. Admitted with COVID 19 to r/o PE. Transferred from [facility] due to facility not admitting COVID pts at this time unless require specialty only available at this facility.        Past Medical History:   Diagnosis Date    Hyperlipidemia     Hypertension        Past Surgical History:   Procedure Laterality Date    TONSILLECTOMY      TOTAL KNEE ARTHROPLASTY         Review of patient's allergies indicates:  No Known Allergies    Current Facility-Administered Medications on File Prior to Encounter   Medication    [COMPLETED] acetaminophen tablet 650 mg    [COMPLETED] dexamethasone injection 8 mg    [COMPLETED] furosemide injection 40 mg    [COMPLETED] remdesivir 200 mg in  sodium chloride 0.9% 250 mL infusion    [DISCONTINUED] heparin 25,000 units in dextrose 5% 250 ml (100 units/mL) infusion MINIMAL INTENSITY nomogram - [facility]    [DISCONTINUED] remdesivir 100 mg in sodium chloride 0.9% 250 mL infusion     Current Outpatient Medications on File Prior to Encounter   Medication Sig    amlodipine-benazepril 5-10 mg (LOTREL) 5-10 mg per capsule TK 1 C PO D    donepeziL (ARICEPT) 10 MG tablet Take 1 tablet (10 mg total) by mouth every evening.    furosemide (LASIX) 40 MG tablet Take 40 mg by mouth once daily.    hydrALAZINE (APRESOLINE) 50 MG tablet Take 1 tablet (50 mg total) by mouth every 8 (eight) hours.    lovastatin (MEVACOR) 20 MG tablet Take 20 mg by mouth every evening.    QUEtiapine (SEROQUEL) 25 MG Tab Take 1 tablet (25 mg total) by mouth every evening.    tamsulosin (FLOMAX) 0.4 mg Cap Take 1 capsule (0.4 mg total) by mouth once daily.     Family History    None       Tobacco Use    Smoking status: Never Smoker    Smokeless tobacco: Never Used   Substance and Sexual Activity    Alcohol use: Not on file    Drug use: Not on file    Sexual activity: Not on file     Review of Systems   Constitutional:  Negative for fever.   HENT:  Negative for ear discharge, sinus pressure and tinnitus.    Eyes:  Negative for itching.   Respiratory:  Positive for shortness of breath. Negative for apnea and wheezing.    Cardiovascular:  Negative for chest pain.   Endocrine: Negative for heat intolerance, polydipsia and polyphagia.   Genitourinary:  Negative for dysuria.   Musculoskeletal:  Negative for back pain.   Neurological:  Negative for syncope and light-headedness.   Psychiatric/Behavioral:  Negative for agitation.    Objective:     Vital Signs (Most Recent):  Temp: 97.6 °F (36.4 °C) (05/21/22 1226)  Pulse: 87 (05/21/22 1403)  Resp: 18 (05/21/22 1403)  BP: 138/80 (05/21/22 1226)  SpO2: 99 % (05/21/22 1403) Vital Signs (24h Range):  Temp:  [97.6 °F (36.4 °C)-101.1 °F (38.4 °C)] 97.6  °F (36.4 °C)  Pulse:  [] 87  Resp:  [18-22] 18  SpO2:  [93 %-99 %] 99 %  BP: (129-173)/(65-94) 138/80     Weight: 79.6 kg (175 lb 7.8 oz)  Body mass index is 25.18 kg/m².    Physical Exam  Constitutional:       Appearance: He is well-developed.   HENT:      Head: Normocephalic and atraumatic.      Right Ear: There is no impacted cerumen.      Left Ear: There is no impacted cerumen.      Nose: No congestion or rhinorrhea.      Mouth/Throat:      Pharynx: No oropharyngeal exudate or posterior oropharyngeal erythema.   Cardiovascular:      Rate and Rhythm: Normal rate and regular rhythm.      Heart sounds: Normal heart sounds. No murmur heard.    No friction rub. No gallop.   Pulmonary:      Effort: Pulmonary effort is normal.      Breath sounds: Normal breath sounds. No wheezing or rhonchi.   Abdominal:      General: Bowel sounds are normal.      Palpations: Abdomen is soft.      Tenderness: There is no abdominal tenderness.   Musculoskeletal:         General: Normal range of motion.      Cervical back: Neck supple.      Right lower leg: Edema present.      Left lower leg: Edema present.      Comments: +1 edema   Skin:     General: Skin is warm and dry.      Comments: LLE with mild erythema   Neurological:      Mental Status: He is alert and oriented to person, place, and time.   Psychiatric:         Mood and Affect: Mood is not anxious or depressed.         Speech: Speech normal.         Behavior: Behavior normal.         Thought Content: Thought content normal.           Significant Labs: A1C: No results for input(s): HGBA1C in the last 4320 hours.  ABGs: No results for input(s): PH, PCO2, HCO3, POCSATURATED, BE, TOTALHB, COHB, METHB, O2HB, POCFIO2, PO2 in the last 48 hours.  Blood Culture: No results for input(s): LABBLOO in the last 48 hours.  CBC:   Recent Labs   Lab 05/21/22  0410 05/21/22  0838   WBC 6.70 5.84   HGB 12.0* 11.1*   HCT 37.5* 34.6*    154     CMP:   Recent Labs   Lab 05/21/22 0410       K 3.9      CO2 23   *   BUN 16   CREATININE 1.0   CALCIUM 8.7   PROT 6.6   ALBUMIN 2.9*   BILITOT 0.6   ALKPHOS 71   AST 14   ALT 11   ANIONGAP 10   EGFRNONAA >60.0     Coagulation:   Recent Labs   Lab 05/21/22  0838 05/21/22  1330   INR 1.1  --    APTT 27.3 30.8     Lactic Acid: No results for input(s): LACTATE in the last 48 hours.  Lipase: No results for input(s): LIPASE in the last 48 hours.  Lipid Panel: No results for input(s): CHOL, HDL, LDLCALC, TRIG, CHOLHDL in the last 48 hours.  Magnesium: No results for input(s): MG in the last 48 hours.  Troponin:   Recent Labs   Lab 05/21/22  0410 05/21/22  1330   TROPONINI <0.006 0.011     TSH: No results for input(s): TSH in the last 4320 hours.  Urine Culture: No results for input(s): LABURIN in the last 48 hours.  Urine Studies: No results for input(s): COLORU, APPEARANCEUA, PHUR, SPECGRAV, PROTEINUA, GLUCUA, KETONESU, BILIRUBINUA, OCCULTUA, NITRITE, UROBILINOGEN, LEUKOCYTESUR, RBCUA, WBCUA, BACTERIA, SQUAMEPITHEL, HYALINECASTS in the last 48 hours.    Invalid input(s): WRIGHTSUR    Significant Imaging: I have reviewed all pertinent imaging results/findings within the past 24 hours.    Assessment/Plan:     * Acute hypoxemic respiratory failure due to COVID-19  Dexamethasone 6 mg po Qday x 10 days  Remdisivir   D-dimer 2.11and if >3, will need to consider full anticoagulation if indicated. Switch heparin gtt to prophylaxis dose  [facility]    - Monitoring:          - Telemetry & Continuous Pulse Oximetry    - Nutrition:           - Multivitamin PO daily          - Add Boost supplement          - Vitamin D 1000IU daily if deficient          - Ascorbic acid 500mg PO bid    - Supportive Care:          - acetaminophen 650mg PO Q6hr PRN fever/headache          - loperamide PRN viral diarrhea          - IVF if indicated, restrictive strategy preferred, no maintenance IV if able          - VTE PPx: enoxaparin or  heparin SQ unless contraindicated    - Investigational Therapies:          - If patient meets criteria  - atorvastatin 40mg po daily after TG levels if needed  If patient does not improve, consideration for possible transition to comfort care reasonable   ESR  CRP      Paroxysmal atrial fibrillation  Not on meds  Stable on admit      Alzheimer disease  Continue Seorquel      Primary hypertension  On Lasix, Lotrel and hydralazine  Resume as BP permits        VTE Risk Mitigation (From admission, onward)         Ordered     heparin (porcine) injection 5,000 Units  Every 8 hours         05/21/22 1513                   Josy Sandhu MD  Department of Hospital Medicine   [place] - Telemetry

## 2022-05-21 NOTE — ED PROVIDER NOTES
"Encounter Date: 5/21/2022       History     Chief Complaint   Patient presents with    Shortness of Breath     [facility], pt admitted there for dementia. Nurse called as patient was sob and sitting on side of bed stating "I cant breath. I feel like I'm going to die" Per EMS, expiratory wheezing. Pt sating 89% on RA, placed on 2 L sating 95%. In a fib but no previous hx, not on blood thinners.      HPI   HPI, ROS, exam limited 2/2 pt condition: dementia    83 yo male, w/h/o pAF (no AC or rate control meds), HTN, dementia, presents for SOB.  This started while patient was at assisted living facility.  Associated with chest discomfort.  Also reporting left lower extremity leg pain.  Does not normally wear oxygen at baseline.  His sats on room air 89%.  Patient mouth breathing therefore nasal cannula placed in his mouth.  No known recent sick contacts.  No known fever.      Review of patient's allergies indicates:  No Known Allergies  Past Medical History:   Diagnosis Date    Hyperlipidemia     Hypertension      Past Surgical History:   Procedure Laterality Date    TONSILLECTOMY      TOTAL KNEE ARTHROPLASTY       History reviewed. No pertinent family history.  Social History     Tobacco Use    Smoking status: Never Smoker    Smokeless tobacco: Never Used     Review of Systems   HPI, ROS, exam limited 2/2 pt condition: dementia    General: No fever.  No chills.  Eyes: No visual changes.  Head: No headache.    Integument: No rashes or lesions.  Chest: +shortness of breath.  Cardiovascular: No chest pain.  Abdomen: No abdominal pain.  No nausea or vomiting.  Urinary: No abnormal urination.  Neurologic: No focal weakness.  No numbness.  Hematologic: No easy bruising.  Endocrine: No excessive thirst or urination.      Physical Exam     Initial Vitals [05/21/22 0333]   BP Pulse Resp Temp SpO2   (!) 172/88 (!) 115 (!) 22 98.2 °F (36.8 °C) 95 %      MAP       --         Physical Exam "   HPI, ROS, exam limited 2/2 pt condition: dementia    Appearance: No acute distress.  HEENT: Normocephalic. Atraumatic. No conjunctival injection. EOMI. PERRL. Oropharynx clear.    Neck: No JVD. No LN. Neck supple.    Chest: Non-tender. +accessory mm use. Tachypneic. Rales at right. No wheezes.  No rhonchi.  Cardiovascular: Regular rhythm. No murmurs. No gallops. No rubs. +2 radial/DP/DT pulses bilaterally.  No lower extremity edema  Abdomen: Soft. Not distended. Nontender. No guarding. No rebound. No Masses  Musculoskeletal: Good range of motion all joints. No deformities.    Neurologic: Alert and oriented x 3.  Equal strength in upper and lower extremities bilaterally. Normal sensation. No facial droop. Normal speech.   Psych:  Appropriate, conversant.    Integumentary: No rashes seen.  Good turgor.  No abrasions.  No ecchymoses.      ED Course   Procedures  Labs Reviewed   CBC W/ AUTO DIFFERENTIAL - Abnormal; Notable for the following components:       Result Value    RBC 4.11 (*)     Hemoglobin 12.0 (*)     Hematocrit 37.5 (*)     Lymph # 0.2 (*)     Mono # 0.2 (*)     Gran % 93.4 (*)     Lymph % 2.8 (*)     Mono % 3.3 (*)     All other components within normal limits   COMPREHENSIVE METABOLIC PANEL - Abnormal; Notable for the following components:    Glucose 124 (*)     Albumin 2.9 (*)     All other components within normal limits   B-TYPE NATRIURETIC PEPTIDE - Abnormal; Notable for the following components:     (*)     All other components within normal limits   D DIMER, QUANTITATIVE - Abnormal; Notable for the following components:    D-Dimer 2.11 (*)     All other components within normal limits   SARS-COV-2 RDRP GENE - Abnormal; Notable for the following components:    POC Rapid COVID Positive (*)     All other components within normal limits   TROPONIN I          Imaging Results          X-Ray Chest AP Portable (Final result)  Result time 05/21/22 05:24:17    Final result by Jan Patel MD  (05/21/22 05:24:17)                 Impression:      Bilateral pulmonary opacities consistent with bilateral pleural effusion, small to moderate, left greater than right with associated parenchymal change of atelectasis and possible superimposed infiltrate.      Electronically signed by: Jan Patel  Date:    05/21/2022  Time:    05:24             Narrative:    EXAMINATION:  XR CHEST AP PORTABLE    CLINICAL HISTORY:  shortness of breath;    TECHNIQUE:  Single frontal view of the chest was performed.    COMPARISON:  Chest radiograph December 31, 2021    FINDINGS:  Single portable chest view is submitted.  There is mild diminished depth of inspiration and there is mild rotation towards the right, this uncoils the aorta and exaggerates the appearance of the cardiomediastinal silhouette, when accounting for the aforementioned the cardiac size appears stable.  Aortic atherosclerotic change noted.    There is bilateral opacity involving the mid to lower hemithorax, left greater than right, the appearance of which is thought to represent bilateral pleural effusions, small to moderate, left greater than right with associated parenchymal change of atelectasis and possible superimposed infiltrate.  There is no evidence for pneumothorax.    The osseous structures demonstrate chronic change.                                 Medications   furosemide injection 40 mg (has no administration in time range)   dexamethasone injection 8 mg (has no administration in time range)                          Clinical Impression:   Final diagnoses:  [R06.02] Shortness of breath  [M79.89] Leg swelling  [U07.1] COVID (Primary)          82-year-old male presents for shortness of breath.  Patient tachycardic, hypoxic on room air.  Stable BP, afebrile. +accessory mm use. Tachypneic. Rales at right.  No lower extremity edema.  EKG AFib at 104 beats per minute, normal intervals, no STEMI. +COVID. +d-dimer. BNP mildly elevated. CXR bilateral pulm  opacities with pleural effusion. Ordered lasix, decadron. DVT US pending at time of transfer. This facility not admitting COVID pts at this time unless require specialty only available at this facility. Suspect COVID, but cannot rule out PE at this time. If pt decompensates would give tpa, but holding on AC until at least DVT US. V/Q can be obtained at OSH on pt's arrival, will place order (do not have CT contrast during national CT contrast shortage).      Critical Care Time    Critical care time was provided for 45 minutes exclusive of other billable procedures and teaching time for the support of cardiopulmonary organ system where the potential for death, shock, or further decline was possible. Critical care time can include documentation, discussion with consultants, developing a care plan, as well as direct patient care.         ED Disposition Condition    Transfer to Another Facility Stable              Lexii Gastelum MD  05/21/22 [id number]       Lexii Gastelum MD  05/21/22 [id number]

## 2022-05-21 NOTE — ASSESSMENT & PLAN NOTE
Dexamethasone 6 mg po Qday x 10 days  Remdisivir   D-dimer 2.11and if >3, will need to consider full anticoagulation if indicated. Switch heparin gtt to prophylaxis dose  Management: per [facility] COVID Treatment Protocol    - Monitoring:          - Telemetry & Continuous Pulse Oximetry    - Nutrition:           - Multivitamin PO daily          - Add Boost supplement          - Vitamin D 1000IU daily if deficient          - Ascorbic acid 500mg PO bid    - Supportive Care:          - acetaminophen 650mg PO Q6hr PRN fever/headache          - loperamide PRN viral diarrhea          - IVF if indicated, restrictive strategy preferred, no maintenance IV if able          - VTE PPx: enoxaparin or heparin SQ unless contraindicated    - Investigational Therapies:          - If patient meets criteria  - atorvastatin 40mg po daily after TG levels if needed  If patient does not improve, consideration for possible transition to comfort care reasonable   ESR  CRP

## 2022-05-21 NOTE — SUBJECTIVE & OBJECTIVE
Past Medical History:   Diagnosis Date    Hyperlipidemia     Hypertension        Past Surgical History:   Procedure Laterality Date    TONSILLECTOMY      TOTAL KNEE ARTHROPLASTY         Review of patient's allergies indicates:  No Known Allergies    Current Facility-Administered Medications on File Prior to Encounter   Medication    [COMPLETED] acetaminophen tablet 650 mg    [COMPLETED] dexamethasone injection 8 mg    [COMPLETED] furosemide injection 40 mg    [COMPLETED] remdesivir 200 mg in sodium chloride 0.9% 250 mL infusion    [DISCONTINUED] heparin 25,000 units in dextrose 5% 250 ml (100 units/mL) infusion MINIMAL INTENSITY nomogram - [facility]    [DISCONTINUED] remdesivir 100 mg in sodium chloride 0.9% 250 mL infusion     Current Outpatient Medications on File Prior to Encounter   Medication Sig    amlodipine-benazepril 5-10 mg (LOTREL) 5-10 mg per capsule TK 1 C PO D    donepeziL (ARICEPT) 10 MG tablet Take 1 tablet (10 mg total) by mouth every evening.    furosemide (LASIX) 40 MG tablet Take 40 mg by mouth once daily.    hydrALAZINE (APRESOLINE) 50 MG tablet Take 1 tablet (50 mg total) by mouth every 8 (eight) hours.    lovastatin (MEVACOR) 20 MG tablet Take 20 mg by mouth every evening.    QUEtiapine (SEROQUEL) 25 MG Tab Take 1 tablet (25 mg total) by mouth every evening.    tamsulosin (FLOMAX) 0.4 mg Cap Take 1 capsule (0.4 mg total) by mouth once daily.     Family History    None       Tobacco Use    Smoking status: Never Smoker    Smokeless tobacco: Never Used   Substance and Sexual Activity    Alcohol use: Not on file    Drug use: Not on file    Sexual activity: Not on file     Review of Systems   Constitutional:  Negative for fever.   HENT:  Negative for ear discharge, sinus pressure and tinnitus.    Eyes:  Negative for itching.   Respiratory:  Positive for shortness of breath. Negative for apnea and wheezing.    Cardiovascular:  Negative for chest pain.   Endocrine: Negative for heat intolerance, polydipsia  and polyphagia.   Genitourinary:  Negative for dysuria.   Musculoskeletal:  Negative for back pain.   Neurological:  Negative for syncope and light-headedness.   Psychiatric/Behavioral:  Negative for agitation.    Objective:     Vital Signs (Most Recent):  Temp: 97.6 °F (36.4 °C) (05/21/22 1226)  Pulse: 87 (05/21/22 1403)  Resp: 18 (05/21/22 1403)  BP: 138/80 (05/21/22 1226)  SpO2: 99 % (05/21/22 1403) Vital Signs (24h Range):  Temp:  [97.6 °F (36.4 °C)-101.1 °F (38.4 °C)] 97.6 °F (36.4 °C)  Pulse:  [] 87  Resp:  [18-22] 18  SpO2:  [93 %-99 %] 99 %  BP: (129-173)/(65-94) 138/80     Weight: 79.6 kg (175 lb 7.8 oz)  Body mass index is 25.18 kg/m².    Physical Exam  Constitutional:       Appearance: He is well-developed.   HENT:      Head: Normocephalic and atraumatic.      Right Ear: There is no impacted cerumen.      Left Ear: There is no impacted cerumen.      Nose: No congestion or rhinorrhea.      Mouth/Throat:      Pharynx: No oropharyngeal exudate or posterior oropharyngeal erythema.   Cardiovascular:      Rate and Rhythm: Normal rate and regular rhythm.      Heart sounds: Normal heart sounds. No murmur heard.    No friction rub. No gallop.   Pulmonary:      Effort: Pulmonary effort is normal.      Breath sounds: Normal breath sounds. No wheezing or rhonchi.   Abdominal:      General: Bowel sounds are normal.      Palpations: Abdomen is soft.      Tenderness: There is no abdominal tenderness.   Musculoskeletal:         General: Normal range of motion.      Cervical back: Neck supple.      Right lower leg: Edema present.      Left lower leg: Edema present.      Comments: +1 edema   Skin:     General: Skin is warm and dry.      Comments: LLE with mild erythema   Neurological:      Mental Status: He is alert and oriented to person, place, and time.   Psychiatric:         Mood and Affect: Mood is not anxious or depressed.         Speech: Speech normal.         Behavior: Behavior normal.         Thought  Content: Thought content normal.           Significant Labs: A1C: No results for input(s): HGBA1C in the last 4320 hours.  ABGs: No results for input(s): PH, PCO2, HCO3, POCSATURATED, BE, TOTALHB, COHB, METHB, O2HB, POCFIO2, PO2 in the last 48 hours.  Blood Culture: No results for input(s): LABBLOO in the last 48 hours.  CBC:   Recent Labs   Lab 05/21/22  0410 05/21/22  0838   WBC 6.70 5.84   HGB 12.0* 11.1*   HCT 37.5* 34.6*    154     CMP:   Recent Labs   Lab 05/21/22  0410      K 3.9      CO2 23   *   BUN 16   CREATININE 1.0   CALCIUM 8.7   PROT 6.6   ALBUMIN 2.9*   BILITOT 0.6   ALKPHOS 71   AST 14   ALT 11   ANIONGAP 10   EGFRNONAA >60.0     Coagulation:   Recent Labs   Lab 05/21/22  0838 05/21/22  1330   INR 1.1  --    APTT 27.3 30.8     Lactic Acid: No results for input(s): LACTATE in the last 48 hours.  Lipase: No results for input(s): LIPASE in the last 48 hours.  Lipid Panel: No results for input(s): CHOL, HDL, LDLCALC, TRIG, CHOLHDL in the last 48 hours.  Magnesium: No results for input(s): MG in the last 48 hours.  Troponin:   Recent Labs   Lab 05/21/22  0410 05/21/22  1330   TROPONINI <0.006 0.011     TSH: No results for input(s): TSH in the last 4320 hours.  Urine Culture: No results for input(s): LABURIN in the last 48 hours.  Urine Studies: No results for input(s): COLORU, APPEARANCEUA, PHUR, SPECGRAV, PROTEINUA, GLUCUA, KETONESU, BILIRUBINUA, OCCULTUA, NITRITE, UROBILINOGEN, LEUKOCYTESUR, RBCUA, WBCUA, BACTERIA, SQUAMEPITHEL, HYALINECASTS in the last 48 hours.    Invalid input(s): WRIGHTSUR    Significant Imaging: I have reviewed all pertinent imaging results/findings within the past 24 hours.

## 2022-05-21 NOTE — PROVIDER TRANSFER
Outside Transfer Acceptance Note / Regional Referral Center    Referring facility: [facility]   Referring provider: NESHA ARIAS SUAU, SALVADOR  Accepting facility: [facility]  Accepting provider: DANNI VARGAS  Admitting provider: DR. HONEYCUTT  Reason for transfer:  COVID-19  Transfer diagnosis: COVID-19  Transfer specialty requested: Hospital Medicine  Transfer specialty notified: no - not applicable  Transfer level: NUMBER 1-5: 2  Bed type requested: Med/Tele  Isolation status: Airborne and Contact and Droplet   Admission class or status: OP- Observation      Narrative     83yo man with dementia, afib not on AC, lives in an assisted living facility, presents to the ED with shortness of breath, fever 101.1F, found to have acute hypoxemia with SpO2 89% on ambient air. Positive for COVID. Vaccinated for COVID,  second booster given 5/20/22. Vaccinated for influenza 1/9/2022 & PCV 13 1/9/2022.    Managed with 3L supplemental O2 via NC with improvement in O2 sat. Labs notable for lymphopenia 0.2, elevated d-dimer 2.1. Electrolytes WNL. . Administered furosemide IV and dexamethasone IV.     EM has low pre-test probability for PE at this time. US lower extremities pending.     Objective     Vitals: Temp: (!) 101.1 °F (38.4 °C) (05/21/22 0647)  Pulse: 107 (05/21/22 0647)  Resp: 20 (05/21/22 0647)  BP: (!) 151/94 (05/21/22 0647)  SpO2: 95 % (05/21/22 0647)  Recent Labs:   All pertinent labs within the past 24 hours have been reviewed.  CBC:   Recent Labs   Lab 05/21/22  0410   WBC 6.70   HGB 12.0*   HCT 37.5*        CMP:   Recent Labs   Lab 05/21/22  0410      K 3.9      CO2 23   *   BUN 16   CREATININE 1.0   CALCIUM 8.7   PROT 6.6   ALBUMIN 2.9*   BILITOT 0.6   ALKPHOS 71   AST 14   ALT 11   ANIONGAP 10   EGFRNONAA >60.0       D-Dimer   Date Value Ref Range Status   05/21/2022 2.11 (H) <0.50 mg/L FEU Final     Comment:     The quantitative  D-dimer assay should be used as an aid in   the diagnosis of deep vein thrombosis and pulmonary embolism  in patients with the appropriate presentation and clinical  history. The upper limit of the reference interval and the clinical   cut off   point are identical. Causes of a positive (>0.50 mg/L FEU) D-Dimer   test  include, but are not limited to: DVT, PE, DIC, thrombolytic   therapy, anticoagulant therapy, recent surgery, trauma, or   pregnancy, disseminated malignancy, aortic aneurysm, cirrhosis,  and severe infection. False negative results may occur in   patients with distal DVT.       * NOTE: age-adjusted <0.8      Recent imaging:   Imaging Results          X-Ray Chest AP Portable (Final result)  Result time 05/21/22 05:24:17    Final result by Jan Patel MD (05/21/22 05:24:17)                 Impression:      Bilateral pulmonary opacities consistent with bilateral pleural effusion, small to moderate, left greater than right with associated parenchymal change of atelectasis and possible superimposed infiltrate.      Electronically signed by: Jan Patel  Date:    05/21/2022  Time:    05:24             Narrative:    EXAMINATION:  XR CHEST AP PORTABLE    CLINICAL HISTORY:  shortness of breath;    TECHNIQUE:  Single frontal view of the chest was performed.    COMPARISON:  Chest radiograph December 31, 2021    FINDINGS:  Single portable chest view is submitted.  There is mild diminished depth of inspiration and there is mild rotation towards the right, this uncoils the aorta and exaggerates the appearance of the cardiomediastinal silhouette, when accounting for the aforementioned the cardiac size appears stable.  Aortic atherosclerotic change noted.    There is bilateral opacity involving the mid to lower hemithorax, left greater than right, the appearance of which is thought to represent bilateral pleural effusions, small to moderate, left greater than right with associated parenchymal change of  atelectasis and possible superimposed infiltrate.  There is no evidence for pneumothorax.    The osseous structures demonstrate chronic change.                                 Airway:   Nasal cannula 3LPM  Vent settings:    n/a  IV access:        Peripheral IV - Single Lumen 05/21/22 0416 20 G Left Forearm (Active)   Site Assessment Clean;No swelling;Dry;Intact;No redness 05/21/22 0416   Extremity Assessment Distal to IV No warmth;No swelling;No abnormal discoloration;No redness 05/21/22 0416   Line Status Blood return noted 05/21/22 0416   Dressing Status Clean;Dry;Intact 05/21/22 0416   Dressing Intervention First dressing 05/21/22 0416   Reason Not Rotated Anticipated discharge 05/21/22 0416     Infusions: Heparin to start  Allergies: Review of patient's allergies indicates:  No Known Allergies   NPO: No      Anticoagulation:   Anticoagulants     None           Instructions      ED plans to start remdesivir, heparin infusion, do ambulatory sat.       [facility]  Admit to Hospital Medicine  Upon patient arrival to floor, please contact Hospital Medicine on call.     Samantha Donnelly M.D., M.P.H.  Department of Hospital Medicine  [facility]

## 2022-05-21 NOTE — NURSING
Spoke with Dr. Melendez to notify of patient arrival to the floor, MD working on placing required orders for patient.

## 2022-05-21 NOTE — ED NOTES
Adult Physical Assessment  LOC: Timothy Roldan, 82 y.o. male verified via two identifiers.  The patient is awake, alert, and oriented to person and time. Patient is speaking clearly, at this time.   APPEARANCE: Patient and appears to be in mild distress at this time. Patient is clean and well groomed, patient's clothing is properly fastened.  SKIN:The skin is warm and dry, color consistent with ethnicity, patient has normal skin turgor and moist mucus membranes. Patient has bandage noted to RLE and LLE.  MUSCULOSKELETAL: Patient moving all extremities with limited ROM, no obvious deformities noted.  RESPIRATORY: Airway is open, but patient has expiratory wheezing. Respirations are spontaneous, patient has a labored effort and increased rate, no accessory muscle use noted.  CARDIAC: Patient has an irregular rate and rhythm, +2 periphreal edema noted to bilateral lower extremities, capillary refill < 3 seconds in all extremities.  ABDOMEN: Soft and non tender to palpation, no abdominal distention noted. Bowel sounds present in all four quadrants.  NEUROLOGIC: Eyes open spontaneously, behavior appropriate to situation, follows commands, facial expression symmetrical, bilateral hand grasp equal and even, purposeful motor response noted, normal sensation in all extremities when touched with a finger.

## 2022-05-21 NOTE — HPI
Timothy Roldan is an 83 y/o M with PMH of dementia, afib not on AC. Alzheimer's disease (+/- Parkinson?), HTN, BPH presents for shortness of breath.  Patient tachycardic, hypoxic on room air.  Stable BP, afebrile. +accessory mm use. Tachypneic. Rales at right.  No lower extremity edema.  EKG AFib at 104 beats per minute, normal intervals, no STEMI. +COVID. +d-dimer. BNP mildly elevated. CXR bilateral pulm opacities with pleural effusion. Ordered lasix, decadron. D-dimer elevated, V/Q can be obtained at OSH on pt's arrival, place order (do not have CT contrast during national CT contrast shortage).  Recommended If patient decompensates would give tpa, but held pending . LLE USS with no evidence of deep venous thrombosis in the left thigh. Admitted with COVID 19 to r/o PE. Transferred from [facility] due to facility not admitting COVID pts at this time unless require specialty only available at this facility.

## 2022-05-22 LAB
ALBUMIN SERPL BCP-MCNC: 2.6 G/DL (ref 3.5–5.2)
ALP SERPL-CCNC: 59 U/L (ref 55–135)
ALT SERPL W/O P-5'-P-CCNC: 10 U/L (ref 10–44)
ANION GAP SERPL CALC-SCNC: 11 MMOL/L (ref 8–16)
AST SERPL-CCNC: 17 U/L (ref 10–40)
BILIRUB SERPL-MCNC: 0.4 MG/DL (ref 0.1–1)
BUN SERPL-MCNC: 20 MG/DL (ref 8–23)
CALCIUM SERPL-MCNC: 8.6 MG/DL (ref 8.7–10.5)
CHLORIDE SERPL-SCNC: 108 MMOL/L (ref 95–110)
CO2 SERPL-SCNC: 23 MMOL/L (ref 23–29)
CREAT SERPL-MCNC: 1 MG/DL (ref 0.5–1.4)
EST. GFR  (AFRICAN AMERICAN): >60 ML/MIN/1.73 M^2
EST. GFR  (NON AFRICAN AMERICAN): >60 ML/MIN/1.73 M^2
GLUCOSE SERPL-MCNC: 81 MG/DL (ref 70–110)
POTASSIUM SERPL-SCNC: 3.8 MMOL/L (ref 3.5–5.1)
PROT SERPL-MCNC: 5.5 G/DL (ref 6–8.4)
SODIUM SERPL-SCNC: 142 MMOL/L (ref 136–145)

## 2022-05-22 PROCEDURE — 80053 COMPREHEN METABOLIC PANEL: CPT | Performed by: FAMILY MEDICINE

## 2022-05-22 PROCEDURE — 94640 AIRWAY INHALATION TREATMENT: CPT

## 2022-05-22 PROCEDURE — 11000001 HC ACUTE MED/SURG PRIVATE ROOM

## 2022-05-22 PROCEDURE — 99900035 HC TECH TIME PER 15 MIN (STAT)

## 2022-05-22 PROCEDURE — 36415 COLL VENOUS BLD VENIPUNCTURE: CPT | Performed by: FAMILY MEDICINE

## 2022-05-22 PROCEDURE — 27000221 HC OXYGEN, UP TO 24 HOURS

## 2022-05-22 PROCEDURE — 27000207 HC ISOLATION

## 2022-05-22 PROCEDURE — 94760 N-INVAS EAR/PLS OXIMETRY 1: CPT

## 2022-05-22 PROCEDURE — 94761 N-INVAS EAR/PLS OXIMETRY MLT: CPT

## 2022-05-22 PROCEDURE — 63600175 PHARM REV CODE 636 W HCPCS: Mod: TB | Performed by: FAMILY MEDICINE

## 2022-05-22 PROCEDURE — 25000003 PHARM REV CODE 250: Performed by: FAMILY MEDICINE

## 2022-05-22 PROCEDURE — 27100098 HC SPACER

## 2022-05-22 PROCEDURE — 96372 THER/PROPH/DIAG INJ SC/IM: CPT | Performed by: FAMILY MEDICINE

## 2022-05-22 RX ORDER — AMLODIPINE BESYLATE 5 MG/1
10 TABLET ORAL DAILY
Status: DISCONTINUED | OUTPATIENT
Start: 2022-05-22 | End: 2022-05-26 | Stop reason: HOSPADM

## 2022-05-22 RX ADMIN — FUROSEMIDE 40 MG: 40 TABLET ORAL at 09:05

## 2022-05-22 RX ADMIN — REMDESIVIR 100 MG: 100 INJECTION, POWDER, LYOPHILIZED, FOR SOLUTION INTRAVENOUS at 09:05

## 2022-05-22 RX ADMIN — TAMSULOSIN HYDROCHLORIDE 0.4 MG: 0.4 CAPSULE ORAL at 09:05

## 2022-05-22 RX ADMIN — QUETIAPINE FUMARATE 25 MG: 25 TABLET ORAL at 09:05

## 2022-05-22 RX ADMIN — THERA TABS 1 TABLET: TAB at 09:05

## 2022-05-22 RX ADMIN — HEPARIN SODIUM 5000 UNITS: 5000 INJECTION INTRAVENOUS; SUBCUTANEOUS at 09:05

## 2022-05-22 RX ADMIN — ALBUTEROL SULFATE 2 PUFF: 90 AEROSOL, METERED RESPIRATORY (INHALATION) at 12:05

## 2022-05-22 RX ADMIN — ALBUTEROL SULFATE 2 PUFF: 90 AEROSOL, METERED RESPIRATORY (INHALATION) at 07:05

## 2022-05-22 RX ADMIN — ALBUTEROL SULFATE 2 PUFF: 90 AEROSOL, METERED RESPIRATORY (INHALATION) at 08:05

## 2022-05-22 RX ADMIN — HEPARIN SODIUM 5000 UNITS: 5000 INJECTION INTRAVENOUS; SUBCUTANEOUS at 04:05

## 2022-05-22 RX ADMIN — HYDRALAZINE HYDROCHLORIDE 50 MG: 25 TABLET, FILM COATED ORAL at 04:05

## 2022-05-22 RX ADMIN — DEXAMETHASONE 6 MG: 4 TABLET ORAL at 09:05

## 2022-05-22 RX ADMIN — HYDRALAZINE HYDROCHLORIDE 50 MG: 25 TABLET, FILM COATED ORAL at 05:05

## 2022-05-22 RX ADMIN — OXYCODONE HYDROCHLORIDE AND ACETAMINOPHEN 500 MG: 500 TABLET ORAL at 09:05

## 2022-05-22 RX ADMIN — HEPARIN SODIUM 5000 UNITS: 5000 INJECTION INTRAVENOUS; SUBCUTANEOUS at 05:05

## 2022-05-22 RX ADMIN — HYDRALAZINE HYDROCHLORIDE 50 MG: 25 TABLET, FILM COATED ORAL at 09:05

## 2022-05-22 RX ADMIN — AMLODIPINE BESYLATE 10 MG: 5 TABLET ORAL at 09:05

## 2022-05-22 NOTE — ASSESSMENT & PLAN NOTE
Dexamethasone 6 mg po Qday x 10 days  Remdisivir   D-dimer 2.11and if >3, will need to consider full anticoagulation if indicated. Switch heparin gtt to prophylaxis dose  Management: per [facility] COVID Treatment Protocol    - Monitoring:          - Telemetry & Continuous Pulse Oximetry    - Nutrition:           - Multivitamin PO daily          - Add Boost supplement          - Vitamin D 1000IU daily if deficient          - Ascorbic acid 500mg PO bid    - Supportive Care:          - acetaminophen 650mg PO Q6hr PRN fever/headache          - loperamide PRN viral diarrhea          - IVF if indicated, restrictive strategy preferred, no maintenance IV if able          - VTE PPx: enoxaparin or heparin SQ unless contraindicated    - Investigational Therapies:          - If patient meets criteria  - atorvastatin 40mg po daily after TG levels if needed  If patient does not improve, consideration for possible transition to comfort care reasonable

## 2022-05-22 NOTE — SUBJECTIVE & OBJECTIVE
Interval History: awake and alert, no reported event, patient feels better  On 3L NC - continue to wean as tolerated  Continue Remdesivir and dexa  BP add home Norvasc to present meds.    Review of Systems   Constitutional:  Negative for fever.   Respiratory:  Negative for shortness of breath and wheezing.    Cardiovascular:  Negative for chest pain.   Genitourinary:  Negative for dysuria.   Musculoskeletal:  Negative for back pain.   Neurological:  Negative for syncope and light-headedness.   Psychiatric/Behavioral:  Negative for agitation.    Objective:     Vital Signs (Most Recent):  Temp: 97.2 °F (36.2 °C) (05/22/22 0421)  Pulse: 78 (05/22/22 0421)  Resp: 18 (05/22/22 0421)  BP: (!) 161/85 (05/22/22 0421)  SpO2: 98 % (05/22/22 0421)   Vital Signs (24h Range):  Temp:  [96.7 °F (35.9 °C)-99.5 °F (37.5 °C)] 97.2 °F (36.2 °C)  Pulse:  [70-98] 78  Resp:  [18-20] 18  SpO2:  [93 %-99 %] 98 %  BP: (100-184)/(67-98) 161/85     Weight: 79.6 kg (175 lb 7.8 oz)  Body mass index is 25.18 kg/m².    Intake/Output Summary (Last 24 hours) at 5/22/2022 0705  Last data filed at 5/22/2022 0400  Gross per 24 hour   Intake 12.52 ml   Output 575 ml   Net -562.48 ml      Physical Exam  Constitutional:       Appearance: He is well-developed.   HENT:      Head: Normocephalic and atraumatic.   Cardiovascular:      Rate and Rhythm: Normal rate and regular rhythm.      Heart sounds: Normal heart sounds. No murmur heard.    No friction rub. No gallop.   Pulmonary:      Effort: Pulmonary effort is normal.      Breath sounds: Normal breath sounds. No wheezing or rhonchi.   Abdominal:      General: Bowel sounds are normal.      Palpations: Abdomen is soft.      Tenderness: There is no abdominal tenderness.   Musculoskeletal:         General: Normal range of motion.      Cervical back: Neck supple.      Right lower leg: No edema.      Left lower leg: No edema.      Comments: +1 edema   Skin:     General: Skin is warm and dry.      Comments: LE  with hyperpigmented.    Neurological:      Mental Status: He is alert and oriented to person, place, and time.   Psychiatric:         Mood and Affect: Mood is not anxious or depressed.         Speech: Speech normal.         Behavior: Behavior normal.         Thought Content: Thought content normal.       Significant Labs: A1C: No results for input(s): HGBA1C in the last 4320 hours.  Blood Culture: No results for input(s): LABBLOO in the last 48 hours.  CBC:   Recent Labs   Lab 05/21/22  0410 05/21/22  0838   WBC 6.70 5.84   HGB 12.0* 11.1*   HCT 37.5* 34.6*    154     CMP:   Recent Labs   Lab 05/21/22  0410      K 3.9      CO2 23   *   BUN 16   CREATININE 1.0   CALCIUM 8.7   PROT 6.6   ALBUMIN 2.9*   BILITOT 0.6   ALKPHOS 71   AST 14   ALT 11   ANIONGAP 10   EGFRNONAA >60.0     Coagulation:   Recent Labs   Lab 05/21/22  0838 05/21/22  1330   INR 1.1  --    APTT 27.3 30.8     Lactic Acid: No results for input(s): LACTATE in the last 48 hours.  Lipase: No results for input(s): LIPASE in the last 48 hours.  Troponin:   Recent Labs   Lab 05/21/22  0410 05/21/22  1330   TROPONINI <0.006 0.011     TSH: No results for input(s): TSH in the last 4320 hours.  Urine Culture: No results for input(s): LABURIN in the last 48 hours.  Urine Studies: No results for input(s): COLORU, APPEARANCEUA, PHUR, SPECGRAV, PROTEINUA, GLUCUA, KETONESU, BILIRUBINUA, OCCULTUA, NITRITE, UROBILINOGEN, LEUKOCYTESUR, RBCUA, WBCUA, BACTERIA, SQUAMEPITHEL, HYALINECASTS in the last 48 hours.    Invalid input(s): CEDRICK    Significant Imaging: I have reviewed all pertinent imaging results/findings within the past 24 hours.

## 2022-05-22 NOTE — PROGRESS NOTES
[facility] Medicine  Progress Note    Patient Name: Timothy Roldan  MRN: [id number]  Patient Class: OP- Observation   Admission Date: 5/21/2022  Length of Stay: 0 days  Attending Physician: Josy Sandhu*  Primary Care Provider: Martine Garces MD        Subjective:     Principal Problem:Acute hypoxemic respiratory failure due to COVID-19        HPI:  Timothy Roldan is an 81 y/o M with PMH of dementia, afib not on AC. Alzheimer's disease (+/- Parkinson?), HTN, BPH presents for shortness of breath.  Patient tachycardic, hypoxic on room air.  Stable BP, afebrile. +accessory mm use. Tachypneic. Rales at right.  No lower extremity edema.  EKG AFib at 104 beats per minute, normal intervals, no STEMI. +COVID. +d-dimer. BNP mildly elevated. CXR bilateral pulm opacities with pleural effusion. Ordered lasix, decadron. D-dimer elevated, V/Q can be obtained at OSH on pt's arrival, place order (do not have CT contrast during national CT contrast shortage).  Recommended If patient decompensates would give tpa, but held pending . LLE USS with no evidence of deep venous thrombosis in the left thigh. Admitted with COVID 19 to r/o PE. Transferred from [facility] due to facility not admitting COVID pts at this time unless require specialty only available at this facility.        Overview/Hospital Course:  No notes on file    Interval History: awake and alert, no reported event, patient feels better  On 3L NC - continue to wean as tolerated  Continue Remdesivir and dexa  BP add home Norvasc to present meds.    Review of Systems   Constitutional:  Negative for fever.   Respiratory:  Negative for shortness of breath and wheezing.    Cardiovascular:  Negative for chest pain.   Genitourinary:  Negative for dysuria.   Musculoskeletal:  Negative for back pain.   Neurological:  Negative for syncope and light-headedness.   Psychiatric/Behavioral:  Negative for agitation.    Objective:     Vital Signs (Most  Recent):  Temp: 97.2 °F (36.2 °C) (05/22/22 0421)  Pulse: 78 (05/22/22 0421)  Resp: 18 (05/22/22 0421)  BP: (!) 161/85 (05/22/22 0421)  SpO2: 98 % (05/22/22 0421)   Vital Signs (24h Range):  Temp:  [96.7 °F (35.9 °C)-99.5 °F (37.5 °C)] 97.2 °F (36.2 °C)  Pulse:  [70-98] 78  Resp:  [18-20] 18  SpO2:  [93 %-99 %] 98 %  BP: (100-184)/(67-98) 161/85     Weight: 79.6 kg (175 lb 7.8 oz)  Body mass index is 25.18 kg/m².    Intake/Output Summary (Last 24 hours) at 5/22/2022 0705  Last data filed at 5/22/2022 0400  Gross per 24 hour   Intake 12.52 ml   Output 575 ml   Net -562.48 ml      Physical Exam  Constitutional:       Appearance: He is well-developed.   HENT:      Head: Normocephalic and atraumatic.   Cardiovascular:      Rate and Rhythm: Normal rate and regular rhythm.      Heart sounds: Normal heart sounds. No murmur heard.    No friction rub. No gallop.   Pulmonary:      Effort: Pulmonary effort is normal.      Breath sounds: Normal breath sounds. No wheezing or rhonchi.   Abdominal:      General: Bowel sounds are normal.      Palpations: Abdomen is soft.      Tenderness: There is no abdominal tenderness.   Musculoskeletal:         General: Normal range of motion.      Cervical back: Neck supple.      Right lower leg: No edema.      Left lower leg: No edema.      Comments: +1 edema   Skin:     General: Skin is warm and dry.      Comments: LE with hyperpigmented.    Neurological:      Mental Status: He is alert and oriented to person, place, and time.   Psychiatric:         Mood and Affect: Mood is not anxious or depressed.         Speech: Speech normal.         Behavior: Behavior normal.         Thought Content: Thought content normal.       Significant Labs: A1C: No results for input(s): HGBA1C in the last 4320 hours.  Blood Culture: No results for input(s): LABBLOO in the last 48 hours.  CBC:   Recent Labs   Lab 05/21/22  0410 05/21/22  0838   WBC 6.70 5.84   HGB 12.0* 11.1*   HCT 37.5* 34.6*    154      CMP:   Recent Labs   Lab 05/21/22  0410      K 3.9      CO2 23   *   BUN 16   CREATININE 1.0   CALCIUM 8.7   PROT 6.6   ALBUMIN 2.9*   BILITOT 0.6   ALKPHOS 71   AST 14   ALT 11   ANIONGAP 10   EGFRNONAA >60.0     Coagulation:   Recent Labs   Lab 05/21/22  0838 05/21/22  1330   INR 1.1  --    APTT 27.3 30.8     Lactic Acid: No results for input(s): LACTATE in the last 48 hours.  Lipase: No results for input(s): LIPASE in the last 48 hours.  Troponin:   Recent Labs   Lab 05/21/22  0410 05/21/22  1330   TROPONINI <0.006 0.011     TSH: No results for input(s): TSH in the last 4320 hours.  Urine Culture: No results for input(s): LABURIN in the last 48 hours.  Urine Studies: No results for input(s): COLORU, APPEARANCEUA, PHUR, SPECGRAV, PROTEINUA, GLUCUA, KETONESU, BILIRUBINUA, OCCULTUA, NITRITE, UROBILINOGEN, LEUKOCYTESUR, RBCUA, WBCUA, BACTERIA, SQUAMEPITHEL, HYALINECASTS in the last 48 hours.    Invalid input(s): WRIGHTSUR    Significant Imaging: I have reviewed all pertinent imaging results/findings within the past 24 hours.      Assessment/Plan:      * Acute hypoxemic respiratory failure due to COVID-19  Dexamethasone 6 mg po Qday x 10 days  Remdisivir   D-dimer 2.11and if >3, will need to consider full anticoagulation if indicated. Switch heparin gtt to prophylaxis dose  [facility]    - Monitoring:          - Telemetry & Continuous Pulse Oximetry    - Nutrition:           - Multivitamin PO daily          - Add Boost supplement          - Vitamin D 1000IU daily if deficient          - Ascorbic acid 500mg PO bid    - Supportive Care:          - acetaminophen 650mg PO Q6hr PRN fever/headache          - loperamide PRN viral diarrhea          - IVF if indicated, restrictive strategy preferred, no maintenance IV if able          - VTE PPx: enoxaparin or heparin SQ unless contraindicated    - Investigational Therapies:          - If patient meets criteria  -  atorvastatin 40mg po daily after TG levels if needed  If patient does not improve, consideration for possible transition to comfort care reasonable         Paroxysmal atrial fibrillation  Not on meds  Stable on admit      Alzheimer disease  Continue Seorquel      Primary hypertension  On Lasix, Lotrel and hydralazine  Resume as BP permits        VTE Risk Mitigation (From admission, onward)         Ordered     heparin (porcine) injection 5,000 Units  Every 8 hours         05/21/22 1513                Discharge Planning   JACQUELINE:      Code Status: Full Code   Is the patient medically ready for discharge?:     Reason for patient still in hospital (select all that apply): Patient trending condition                     Josy Sandhu MD  Department of Hospital Medicine   [facility] - Telemetry

## 2022-05-23 LAB
D DIMER PPP IA.FEU-MCNC: 1.15 MG/L FEU
LDH SERPL L TO P-CCNC: 150 U/L (ref 110–260)

## 2022-05-23 PROCEDURE — 27000207 HC ISOLATION

## 2022-05-23 PROCEDURE — 83615 LACTATE (LD) (LDH) ENZYME: CPT | Performed by: FAMILY MEDICINE

## 2022-05-23 PROCEDURE — 97165 OT EVAL LOW COMPLEX 30 MIN: CPT

## 2022-05-23 PROCEDURE — 99900035 HC TECH TIME PER 15 MIN (STAT)

## 2022-05-23 PROCEDURE — 97535 SELF CARE MNGMENT TRAINING: CPT

## 2022-05-23 PROCEDURE — 85379 FIBRIN DEGRADATION QUANT: CPT | Performed by: FAMILY MEDICINE

## 2022-05-23 PROCEDURE — 94761 N-INVAS EAR/PLS OXIMETRY MLT: CPT

## 2022-05-23 PROCEDURE — 36415 COLL VENOUS BLD VENIPUNCTURE: CPT | Performed by: FAMILY MEDICINE

## 2022-05-23 PROCEDURE — 97161 PT EVAL LOW COMPLEX 20 MIN: CPT

## 2022-05-23 PROCEDURE — 63600175 PHARM REV CODE 636 W HCPCS: Performed by: FAMILY MEDICINE

## 2022-05-23 PROCEDURE — 11000001 HC ACUTE MED/SURG PRIVATE ROOM

## 2022-05-23 PROCEDURE — 94640 AIRWAY INHALATION TREATMENT: CPT

## 2022-05-23 PROCEDURE — 27000221 HC OXYGEN, UP TO 24 HOURS

## 2022-05-23 PROCEDURE — 97530 THERAPEUTIC ACTIVITIES: CPT

## 2022-05-23 PROCEDURE — 97110 THERAPEUTIC EXERCISES: CPT

## 2022-05-23 PROCEDURE — 94760 N-INVAS EAR/PLS OXIMETRY 1: CPT

## 2022-05-23 PROCEDURE — 25000003 PHARM REV CODE 250: Performed by: FAMILY MEDICINE

## 2022-05-23 RX ORDER — LISINOPRIL 5 MG/1
10 TABLET ORAL DAILY
Status: DISCONTINUED | OUTPATIENT
Start: 2022-05-23 | End: 2022-05-26 | Stop reason: HOSPADM

## 2022-05-23 RX ORDER — ACETAMINOPHEN 325 MG/1
650 TABLET ORAL EVERY 6 HOURS PRN
Status: DISCONTINUED | OUTPATIENT
Start: 2022-05-23 | End: 2022-05-26 | Stop reason: HOSPADM

## 2022-05-23 RX ADMIN — THERA TABS 1 TABLET: TAB at 08:05

## 2022-05-23 RX ADMIN — DEXAMETHASONE 6 MG: 4 TABLET ORAL at 08:05

## 2022-05-23 RX ADMIN — HEPARIN SODIUM 5000 UNITS: 5000 INJECTION INTRAVENOUS; SUBCUTANEOUS at 09:05

## 2022-05-23 RX ADMIN — REMDESIVIR 100 MG: 100 INJECTION, POWDER, LYOPHILIZED, FOR SOLUTION INTRAVENOUS at 09:05

## 2022-05-23 RX ADMIN — AMLODIPINE BESYLATE 10 MG: 5 TABLET ORAL at 08:05

## 2022-05-23 RX ADMIN — QUETIAPINE FUMARATE 25 MG: 25 TABLET ORAL at 08:05

## 2022-05-23 RX ADMIN — ALBUTEROL SULFATE 2 PUFF: 90 AEROSOL, METERED RESPIRATORY (INHALATION) at 01:05

## 2022-05-23 RX ADMIN — FUROSEMIDE 40 MG: 40 TABLET ORAL at 08:05

## 2022-05-23 RX ADMIN — TAMSULOSIN HYDROCHLORIDE 0.4 MG: 0.4 CAPSULE ORAL at 08:05

## 2022-05-23 RX ADMIN — OXYCODONE HYDROCHLORIDE AND ACETAMINOPHEN 500 MG: 500 TABLET ORAL at 08:05

## 2022-05-23 RX ADMIN — ALBUTEROL SULFATE 2 PUFF: 90 AEROSOL, METERED RESPIRATORY (INHALATION) at 12:05

## 2022-05-23 RX ADMIN — HEPARIN SODIUM 5000 UNITS: 5000 INJECTION INTRAVENOUS; SUBCUTANEOUS at 06:05

## 2022-05-23 RX ADMIN — HYDRALAZINE HYDROCHLORIDE 50 MG: 25 TABLET, FILM COATED ORAL at 09:05

## 2022-05-23 RX ADMIN — LISINOPRIL 10 MG: 5 TABLET ORAL at 10:05

## 2022-05-23 RX ADMIN — ALBUTEROL SULFATE 2 PUFF: 90 AEROSOL, METERED RESPIRATORY (INHALATION) at 08:05

## 2022-05-23 RX ADMIN — HEPARIN SODIUM 5000 UNITS: 5000 INJECTION INTRAVENOUS; SUBCUTANEOUS at 01:05

## 2022-05-23 RX ADMIN — ALBUTEROL SULFATE 2 PUFF: 90 AEROSOL, METERED RESPIRATORY (INHALATION) at 07:05

## 2022-05-23 RX ADMIN — HYDRALAZINE HYDROCHLORIDE 50 MG: 25 TABLET, FILM COATED ORAL at 06:05

## 2022-05-23 RX ADMIN — HYDRALAZINE HYDROCHLORIDE 50 MG: 25 TABLET, FILM COATED ORAL at 01:05

## 2022-05-23 RX ADMIN — ACETAMINOPHEN 650 MG: 325 TABLET ORAL at 09:05

## 2022-05-23 NOTE — PLAN OF CARE
ANNETTE called [facility] and there was no answer. ANNETTE left voice message for a return call.  [phone]       05/23/22 1452   Post-Acute Status   Post-Acute Authorization Placement

## 2022-05-23 NOTE — PLAN OF CARE
Pt on documented O2, no respiratory distress noted. Will continue to monitor.  The proper method of use, as well as anticipated side effects, of this metered-dose inhaler are discussed and demonstrated to the patient.

## 2022-05-23 NOTE — SUBJECTIVE & OBJECTIVE
Interval History: awake and alert, no reported event, patient feels better  On 3L NC - continue to wean as tolerated- ambulatory pulse   Continue Remdesivir and dexa  BP- resume all hoe meds.  PT/OT    Review of Systems   Constitutional:  Negative for fever.   Respiratory:  Negative for shortness of breath and wheezing.    Cardiovascular:  Negative for chest pain.   Genitourinary:  Negative for dysuria.   Musculoskeletal:  Negative for back pain.   Neurological:  Negative for syncope and light-headedness.   Psychiatric/Behavioral:  Negative for agitation.    Objective:     Vital Signs (Most Recent):  Temp: 96.9 °F (36.1 °C) (05/23/22 0638)  Pulse: 84 (05/23/22 0817)  Resp: 18 (05/23/22 0817)  BP: (!) 167/92 (05/23/22 0638)  SpO2: 97 % (05/23/22 0817)   Vital Signs (24h Range):  Temp:  [96.9 °F (36.1 °C)-97.8 °F (36.6 °C)] 96.9 °F (36.1 °C)  Pulse:  [] 84  Resp:  [16-18] 18  SpO2:  [96 %-98 %] 97 %  BP: (121-167)/(73-93) 167/92     Weight: 79.6 kg (175 lb 7.8 oz)  Body mass index is 25.18 kg/m².    Intake/Output Summary (Last 24 hours) at 5/23/2022 0910  Last data filed at 5/22/2022 1256  Gross per 24 hour   Intake 242.08 ml   Output --   Net 242.08 ml        Physical Exam  Constitutional:       Appearance: He is well-developed.   HENT:      Head: Normocephalic and atraumatic.   Cardiovascular:      Rate and Rhythm: Normal rate and regular rhythm.      Heart sounds: Normal heart sounds. No murmur heard.    No friction rub. No gallop.   Pulmonary:      Effort: Pulmonary effort is normal.      Breath sounds: Normal breath sounds. No wheezing or rhonchi.   Abdominal:      General: Bowel sounds are normal.      Palpations: Abdomen is soft.      Tenderness: There is no abdominal tenderness.   Musculoskeletal:         General: Normal range of motion.      Cervical back: Neck supple.      Right lower leg: No edema.      Left lower leg: No edema.   Skin:     General: Skin is warm and dry.      Comments: LE with  hyperpigmented.    Neurological:      Mental Status: He is alert and oriented to person, place, and time.   Psychiatric:         Mood and Affect: Mood is not anxious or depressed.         Speech: Speech normal.         Behavior: Behavior normal.         Thought Content: Thought content normal.       Significant Labs: A1C: No results for input(s): HGBA1C in the last 4320 hours.  Blood Culture: No results for input(s): LABBLOO in the last 48 hours.  CBC:   No results for input(s): WBC, HGB, HCT, PLT in the last 48 hours.    CMP:   Recent Labs   Lab 05/22/22  0731      K 3.8      CO2 23   GLU 81   BUN 20   CREATININE 1.0   CALCIUM 8.6*   PROT 5.5*   ALBUMIN 2.6*   BILITOT 0.4   ALKPHOS 59   AST 17   ALT 10   ANIONGAP 11   EGFRNONAA >60       Coagulation:   Recent Labs   Lab 05/21/22  1330   APTT 30.8       Lactic Acid: No results for input(s): LACTATE in the last 48 hours.  Lipase: No results for input(s): LIPASE in the last 48 hours.  Troponin:   Recent Labs   Lab 05/21/22  1330   TROPONINI 0.011       TSH: No results for input(s): TSH in the last 4320 hours.  Urine Culture: No results for input(s): LABURIN in the last 48 hours.  Urine Studies: No results for input(s): COLORU, APPEARANCEUA, PHUR, SPECGRAV, PROTEINUA, GLUCUA, KETONESU, BILIRUBINUA, OCCULTUA, NITRITE, UROBILINOGEN, LEUKOCYTESUR, RBCUA, WBCUA, BACTERIA, SQUAMEPITHEL, HYALINECASTS in the last 48 hours.    Invalid input(s): CEDRICK    Significant Imaging: I have reviewed all pertinent imaging results/findings within the past 24 hours.

## 2022-05-23 NOTE — CONSULTS
[facility] - Telemetry  Adult Nutrition  Consult Note    SUMMARY     Recommendations    Recommendation:   1. Continue current cardiac diet as tolerated.   2. Addition of Boost Plus BID to supplement protein intake.   3. Monitor weight/labs.   4. RD to follow and monitor intake    Goals:   Pt intake >/= 75% EEN/EPN by RD follow up    Nutrition Goal Status: new  Communication of RD Recs: other (comment) (POC)    Assessment and Plan    * Acute hypoxemic respiratory failure due to COVID-19  Contributing Nutrition Diagnosis  Increased nutrient needs, protein    Related to (etiology):   physiological state    Signs and Symptoms (as evidenced by):   Pt with altered skin integrity/skin tear on right arm, Lg Score: 17, COVID-19, and +1 BLE edema.      Interventions:  Collaboration with other providers  [employer]- Boost Plus BID    Nutrition Diagnosis Status:   New           Malnutrition Assessment     NFPE not performed, patient has been screened for possible COVID-19 and has been placed on airborne and contact precautions. Patient is noted as being positive for COVID-19.    Reason for Assessment    Reason For Assessment: consult (non-blanchable skin)  Diagnosis: other (see comments) (acute hypoxemic respiratory failure d/t to COVID-19)  Relevant Medical History: HTN, HLD, Total knee arthroplasty  Interdisciplinary Rounds: did not attend  General Information Comments: Pt receving cardiac diet with 100% intake, per chart Boost supplement to be ordered. If pt's condition worsens instead of improves C discussion to be had. PIV. Lg Score: 17 altered skin integrity right posterior upper arm skin tear NFPE not completed 2/2 pt on COVID isolation precautions  Nutrition Discharge Planning: d/c on cardiac diet with Boost Plus or similar BID    Nutrition Risk Screen    Nutrition Risk Screen: no indicators present    Nutrition/Diet History    Food Preferences: HANK  Food Allergies: NKFA  Factors Affecting Nutritional  "Intake: None identified at this time    Anthropometrics    Temp: 97.8 °F (36.6 °C)  Height Method: Stated  Height: 5' 10" (177.8 cm)  Height (inches): 70 in  Weight Method: Bed Scale  Weight: 79.6 kg (175 lb 7.8 oz)  Weight (lb): 175.49 lb  Ideal Body Weight (IBW), Male: 166 lb  % Ideal Body Weight, Male (lb): 105.72 %  BMI (Calculated): 25.2  BMI Grade: 25 - 29.9 - overweight       Lab/Procedures/Meds    Pertinent Labs Reviewed: reviewed  Pertinent Labs Comments: Ca 8.6, total pro 5.5, Alb 2.6  Pertinent Medications Reviewed: reviewed  Pertinent Medications Comments: amlodipine, ascorbic acid, dexamethasone, furosemide, heparin, hydralazine, MVI, quetiapine, remdesivir, tamsulosin    Estimated/Assessed Needs    Weight Used For Calorie Calculations: 79.6 kg (175 lb 7.8 oz)  Energy Calorie Requirements (kcal): 1965  Energy Need Method: [place] (x 1.3)  Protein Requirements: 80-96 (1.0-1.2 gm/kg)  Weight Used For Protein Calculations: 79.6 kg (175 lb 7.8 oz)     Estimated Fluid Requirement Method: RDA Method (or PER MD)  RDA Method (mL): 1965         Nutrition Prescription Ordered    Current Diet Order: Cardiac    Evaluation of Received Nutrient/Fluid Intake    I/O: +242.1  Energy Calories Required: meeting needs  Protein Required: not meeting needs  Fluid Required: meeting needs  Comments: LBM 5/21  % Intake of Estimated Energy Needs: 75 - 100 %  % Meal Intake: 75 - 100 %    Nutrition Risk    Level of Risk/Frequency of Follow-up:  (1x/week)       Monitor and Evaluation    Food and Nutrient Intake: energy intake, food and beverage intake  Food and Nutrient Adminstration: diet order  Knowledge/Beliefs/Attitudes: food and nutrition knowledge/skill  Physical Activity and Function: nutrition-related ADLs and IADLs  Anthropometric Measurements: weight, weight change, body mass index  Biochemical Data, Medical Tests and Procedures: electrolyte and renal panel, gastrointestinal profile, glucose/endocrine profile, " inflammatory profile, lipid profile       Nutrition Follow-Up    RD Follow-up?: Yes

## 2022-05-23 NOTE — PT/OT/SLP EVAL
Occupational Therapy   Evaluation    Name: Timothy Roldan  MRN: [id number]  Admitting Diagnosis:  Acute hypoxemic respiratory failure due to COVID-19  Recent Surgery: * No surgery found *      Recommendations:     Discharge Recommendations: nursing facility, skilled  Discharge Equipment Recommendations:  other (see comments) (defer to next level of care)  Barriers to discharge:  Decreased caregiver support, Other (Comment) (Pt requires increased level of assistance)    Assessment:     Timothy Roldan is a 82 y.o. male with a medical diagnosis of Acute hypoxemic respiratory failure due to COVID-19.  He presents with Diagnoses of COVID and COVID-19 were pertinent to this visit. Performance deficits affecting function: weakness, impaired functional mobilty, impaired cognition, decreased safety awareness, decreased coordination, gait instability, impaired endurance, decreased upper extremity function, decreased lower extremity function, impaired self care skills, impaired balance, impaired cardiopulmonary response to activity, decreased ROM, impaired fine motor.      Pt would benefit from cont OT services in order to maximize functional independence. Recommending SNF upon d/c. Pt initially very confused & lethargic upon therapy entrance to room, pt becoming more alert during session however continues to requires max cueing for tasks. Pt on 2L NC throughout session with O2 remaining 93% and above. Pt requires MaxA x2 for all bed mobility as well as t/f bed<>BSC with use of RW. Will progress as able.     Rehab Prognosis: Good; patient would benefit from acute skilled OT services to address these deficits and reach maximum level of function.       Plan:     Patient to be seen 3 x/week to address the above listed problems via self-care/home management, therapeutic activities, therapeutic exercises  · Plan of Care Expires: 06/23/22  · Plan of Care Reviewed with: patient    Subjective     Chief Complaint: Pt stating he needs to  have a BM  Patient/Family Comments/goals: Return to PLOF    Occupational Profile:  Living Environment: Pt lives at [facility]  Previous level of function: Per chart & pt power of  since January of this year pt has had assistance with functional mobility & RW 2/2 fall risk, staff at facility assists with dressing & bathing. The past two weeks pt has become increasingly weak.   Roles and Routines: Pt did not state  Equipment Used at Home:  walker, rolling, wheelchair  Assistance upon Discharge: Staff at facility     Pain/Comfort:  · Pain Rating 1: other (see comments) (pt did not state)  · Pain Rating Post-Intervention 1: other (see comments) (not rated)    Patients cultural, spiritual, Confucianist conflicts given the current situation: no    Objective:     Communicated with: nsg prior to session.  Patient found HOB elevated with Condom Catheter, telemetry, oxygen, peripheral IV, bed alarm upon OT entry to room.    General Precautions: Standard, fall, airborne, contact, droplet   Orthopedic Precautions:N/A   Braces: N/A  Respiratory Status: Nasal cannula, flow 2 L/min    Occupational Performance:    Bed Mobility:    · Patient completed Scooting/Bridging with maximal assistance and 2 persons to scoot to HOB with use of draw sheet   · Patient completed Supine to Sit with maximal assistance and 2 persons  · Patient completed Sit to Supine with maximal assistance and 2 persons    Functional Mobility/Transfers:  · Patient completed Sit <> Stand Transfer with maximal assistance and of 2 persons  with  rolling walker   · Patient completed Toilet Transfer Step Transfer technique with maximal assistance and of 2 persons with  rolling walker and max verbal & tactile cues. Pt with B knees flexed & head/trunk forward. Pt given increased verbal & tactile cues for upright posture with poor followthrough.    Activities of Daily Living:  · Grooming: minimum assistance seated EOB to brush teeth & wash face; pt  "requires max v/cs  · Lower Body Dressing: maximal assistance seated EOB  · Toileting: maximal assistance for hygiene & diaper managment    Cognitive/Visual Perceptual:  Cognitive/Psychosocial Skills:     -       Oriented to: Person and confused to location, stating "a bar room", pt able to state  after increased time & v/cs   -       Follows Commands/attention:Follows one-step commands  -       Memory: Impaired STM and Impaired LTM  -       Safety awareness/insight to disability: impaired   -       Mood/Affect/Coping skills/emotional control: Cooperative and Lethargic    Physical Exam:  Sensation:    -       Intact  pt reports intact light touch  Upper Extremity Range of Motion:     -       Right Upper Extremity: WFL except slight decreased shoulder flex  -       Left Upper Extremity: WFL except slight decreased shoulder flex  Upper Extremity Strength:    -       Right Upper Extremity: 3+/5  -       Left Upper Extremity: 3+/5   Strength:    -       Right Upper Extremity: Fair  -       Left Upper Extremity: Fair  Fine Motor Coordination:    -       Impaired  BUE resting tremor    AMPAC 6 Click ADL:  AMPAC Total Score: 14    Treatment & Education:  OT kieran performed this date with PT.   Pt initially very confused & lethargic upon therapy entrance to room, pt becoming more alert during session however continues to requires max cueing for tasks.   Pt on 2L NC throughout session with O2 remaining 93% and above.   Pt requires MaxA x2 for all bed mobility as well as t/f bed<>BSC with use of RW.   Will progress as able.   Education:    Patient left HOB elevated with all lines intact, call button in reach, bed alarm on and nsg notified    GOALS:   Multidisciplinary Problems     Occupational Therapy Goals        Problem: Occupational Therapy    Goal Priority Disciplines Outcome Interventions   Occupational Therapy Goal     OT, PT/OT Ongoing, Progressing    Description: Goals to be met by: 2022     Patient will " increase functional independence with ADLs by performing:    UE Dressing with Set-up Assistance.  LE Dressing with Set-up Assistance.  Grooming while standing with Set-up Assistance.  Toileting from bedside commode with Stand-by Assistance for hygiene and clothing management.   Supine to sit with Stand-by Assistance.  Step transfer with Minimal Assistance & appropriate AD.   Toilet transfer to bedside commode with Minimal Assistance & appropriate AD.                     History:     Past Medical History:   Diagnosis Date    Hyperlipidemia     Hypertension        Past Surgical History:   Procedure Laterality Date    TONSILLECTOMY      TOTAL KNEE ARTHROPLASTY         Time Tracking:     OT Date of Treatment: 05/23/22  OT Start Time: 1520  OT Stop Time: 1612  OT Total Time (min): 52 min    Billable Minutes:Evaluation 10  Self Care/Home Management 12    5/23/2022

## 2022-05-23 NOTE — PLAN OF CARE
Pt would benefit from cont OT services in order to maximize functional independence. Recommending SNF upon d/c. Pt initially very confused & lethargic upon therapy entrance to room, pt becoming more alert during session however continues to requires max cueing for tasks. Pt on 2L NC throughout session with O2 remaining 93% and above. Pt requires MaxA x2 for all bed mobility as well as t/f bed<>BSC with use of RW. Will progress as able.     Problem: Occupational Therapy  Goal: Occupational Therapy Goal  Description: Goals to be met by: 6/23/2022     Patient will increase functional independence with ADLs by performing:    UE Dressing with Set-up Assistance.  LE Dressing with Set-up Assistance.  Grooming while standing with Set-up Assistance.  Toileting from bedside commode with Stand-by Assistance for hygiene and clothing management.   Supine to sit with Stand-by Assistance.  Step transfer with Minimal Assistance & appropriate AD.   Toilet transfer to bedside commode with Minimal Assistance & appropriate AD.    Outcome: Ongoing, Progressing

## 2022-05-23 NOTE — ASSESSMENT & PLAN NOTE
Contributing Nutrition Diagnosis  Increased nutrient needs, protein    Related to (etiology):   physiological state    Signs and Symptoms (as evidenced by):   Pt with altered skin integrity/skin tear on right arm, Lg Score: 17, COVID-19, and +1 BLE edema.      Interventions:  Collaboration with other providers  [employer]- Boost Plus BID    Nutrition Diagnosis Status:   New

## 2022-05-23 NOTE — PLAN OF CARE
Recommendation:   1. Continue current cardiac diet as tolerated.   2. Addition of Boost Plus BID to supplement protein intake.   3. Monitor weight/labs.   4. RD to follow and monitor intake    Goals:   Pt intake >/= 75% EEN/EPN by RD follow up    Nutrition Goal Status: new  Communication of RD Recs: other (comment) (POC)      Problem: Oral Intake Inadequate (Hospitalized Older Adult)  Goal: Optimal Nutrition Intake  Outcome: Ongoing, Progressing

## 2022-05-23 NOTE — PLAN OF CARE
ANNETTE contacted beryl gerardo (Healthcare Power of ) [phone] to complete assessment. Beryl reported that she is the pts niece. Beryl answered all assessment questions.     All information was confirmed on chart. Please note stated address on chart is mailing address only. Pt resides at [facility] which is an Assisted Living facility. Assisted Living address is [address] [place] [address]. Pt has rollator and wheelchair at home. Pt receives assistance with adls from assistant living staff. Upon dc pt will need transport set to return to assist living facility. ANNETTE provided pts POA contact information for any questions or concerns. ANNETTE will continue to follow pt throughout his transitions of care and assist with any dc needs.     PCP requested.        05/23/22 7739   Discharge Assessment   Assessment Type Discharge Planning Assessment   Confirmed/corrected address, phone number and insurance Yes   Confirmed Demographics Correct on Facesheet   Source of Information family   Communicated JACQUELINE with patient/caregiver Yes   Reason For Admission Acute hypoxemic respiratory failure due to COVID-19   Lives With facility resident   Do you expect to return to your current living situation? Yes   Do you have help at home or someone to help you manage your care at home? Yes   Who are your caregiver(s) and their phone number(s)? Pt resides at Assisted Living   Prior to hospitilization cognitive status: Unable to Assess   Current cognitive status: Unable to Assess   Walking or Climbing Stairs Difficulty ambulation difficulty, requires equipment   Dressing/Bathing Difficulty bathing difficulty, requires equipment   Home Layout Able to live on 1st floor   Equipment Currently Used at Home rollator;wheelchair   Patient currently being followed by outpatient case management? No   Do you currently have service(s) that help you manage your care at home? No   Do you take prescription medications? Yes   Do you have  prescription coverage? Yes   Coverage [employer]   Do you have any problems affording any of your prescribed medications? No   Is the patient taking medications as prescribed? yes   Who is going to help you get home at discharge? SW will setup transport   How do you get to doctors appointments? family or friend will provide;agency   Are you on dialysis? No   Do you take coumadin? No   Discharge Plan A Assisted Living   DME Needed Upon Discharge    (TBD)   Discharge Plan discussed with: POA   Name(s) and Number(s) beryl gerardo (Healthcare Power of )   [phone]   Discharge Barriers Identified None   Relationship/Environment   Name(s) of Who Lives With Patient Pt resides at Assisted Living

## 2022-05-23 NOTE — CARE UPDATE
updated care given to care giver- Manasa.   At baseline patient was able to walk about 3 house down but now his movement is limited due to increase fall risk. He requires assistance with all ADLs, ambulate with walker and a wheelchair. Questions and concerns addressed

## 2022-05-23 NOTE — PROGRESS NOTES
[facility] Medicine  Progress Note    Patient Name: Timothy Roldan  MRN: [id number]  Patient Class: IP- Inpatient   Admission Date: 5/21/2022  Length of Stay: 1 days  Attending Physician: Josy Sandhu*  Primary Care Provider: Martine Garces MD        Subjective:     Principal Problem:Acute hypoxemic respiratory failure due to COVID-19        HPI:  Timothy Roldan is an 83 y/o M with PMH of dementia, afib not on AC. Alzheimer's disease (+/- Parkinson?), HTN, BPH presents for shortness of breath.  Patient tachycardic, hypoxic on room air.  Stable BP, afebrile. +accessory mm use. Tachypneic. Rales at right.  No lower extremity edema.  EKG AFib at 104 beats per minute, normal intervals, no STEMI. +COVID. +d-dimer. BNP mildly elevated. CXR bilateral pulm opacities with pleural effusion. Ordered lasix, decadron. D-dimer elevated, V/Q can be obtained at OSH on pt's arrival, place order (do not have CT contrast during national CT contrast shortage).  Recommended If patient decompensates would give tpa, but held pending . LLE USS with no evidence of deep venous thrombosis in the left thigh. Admitted with COVID 19 to r/o PE. Transferred from [facility] due to facility not admitting COVID pts at this time unless require specialty only available at this facility.        Overview/Hospital Course:  No notes on file    Interval History: awake and alert, no reported event, patient feels better  On 3L NC - continue to wean as tolerated- ambulatory pulse   Continue Remdesivir and dexa  BP- resume all hoe meds.  PT/OT    Review of Systems   Constitutional:  Negative for fever.   Respiratory:  Negative for shortness of breath and wheezing.    Cardiovascular:  Negative for chest pain.   Genitourinary:  Negative for dysuria.   Musculoskeletal:  Negative for back pain.   Neurological:  Negative for syncope and light-headedness.   Psychiatric/Behavioral:  Negative for agitation.    Objective:     Vital Signs (Most  Recent):  Temp: 96.9 °F (36.1 °C) (05/23/22 0638)  Pulse: 84 (05/23/22 0817)  Resp: 18 (05/23/22 0817)  BP: (!) 167/92 (05/23/22 0638)  SpO2: 97 % (05/23/22 0817)   Vital Signs (24h Range):  Temp:  [96.9 °F (36.1 °C)-97.8 °F (36.6 °C)] 96.9 °F (36.1 °C)  Pulse:  [] 84  Resp:  [16-18] 18  SpO2:  [96 %-98 %] 97 %  BP: (121-167)/(73-93) 167/92     Weight: 79.6 kg (175 lb 7.8 oz)  Body mass index is 25.18 kg/m².    Intake/Output Summary (Last 24 hours) at 5/23/2022 0910  Last data filed at 5/22/2022 1256  Gross per 24 hour   Intake 242.08 ml   Output --   Net 242.08 ml        Physical Exam  Constitutional:       Appearance: He is well-developed.   HENT:      Head: Normocephalic and atraumatic.   Cardiovascular:      Rate and Rhythm: Normal rate and regular rhythm.      Heart sounds: Normal heart sounds. No murmur heard.    No friction rub. No gallop.   Pulmonary:      Effort: Pulmonary effort is normal.      Breath sounds: Normal breath sounds. No wheezing or rhonchi.   Abdominal:      General: Bowel sounds are normal.      Palpations: Abdomen is soft.      Tenderness: There is no abdominal tenderness.   Musculoskeletal:         General: Normal range of motion.      Cervical back: Neck supple.      Right lower leg: No edema.      Left lower leg: No edema.   Skin:     General: Skin is warm and dry.      Comments: LE with hyperpigmented.    Neurological:      Mental Status: He is alert and oriented to person, place, and time.   Psychiatric:         Mood and Affect: Mood is not anxious or depressed.         Speech: Speech normal.         Behavior: Behavior normal.         Thought Content: Thought content normal.       Significant Labs: A1C: No results for input(s): HGBA1C in the last 4320 hours.  Blood Culture: No results for input(s): LABBLOO in the last 48 hours.  CBC:   No results for input(s): WBC, HGB, HCT, PLT in the last 48 hours.    CMP:   Recent Labs   Lab 05/22/22  0731      K 3.8      CO2 23    GLU 81   BUN 20   CREATININE 1.0   CALCIUM 8.6*   PROT 5.5*   ALBUMIN 2.6*   BILITOT 0.4   ALKPHOS 59   AST 17   ALT 10   ANIONGAP 11   EGFRNONAA >60       Coagulation:   Recent Labs   Lab 05/21/22  1330   APTT 30.8       Lactic Acid: No results for input(s): LACTATE in the last 48 hours.  Lipase: No results for input(s): LIPASE in the last 48 hours.  Troponin:   Recent Labs   Lab 05/21/22  1330   TROPONINI 0.011       TSH: No results for input(s): TSH in the last 4320 hours.  Urine Culture: No results for input(s): LABURIN in the last 48 hours.  Urine Studies: No results for input(s): COLORU, APPEARANCEUA, PHUR, SPECGRAV, PROTEINUA, GLUCUA, KETONESU, BILIRUBINUA, OCCULTUA, NITRITE, UROBILINOGEN, LEUKOCYTESUR, RBCUA, WBCUA, BACTERIA, SQUAMEPITHEL, HYALINECASTS in the last 48 hours.    Invalid input(s): WRIGHTSUR    Significant Imaging: I have reviewed all pertinent imaging results/findings within the past 24 hours.      Assessment/Plan:      * Acute hypoxemic respiratory failure due to COVID-19  Dexamethasone 6 mg po Qday x 10 days  Remdisivir   D-dimer 2.11and if >3, will need to consider full anticoagulation if indicated. Switch heparin gtt to prophylaxis dose  Management: per [facility] COVID Treatment Protocol    - Monitoring:          - Telemetry & Continuous Pulse Oximetry    - Nutrition:           - Multivitamin PO daily          - Add Boost supplement          - Vitamin D 1000IU daily if deficient          - Ascorbic acid 500mg PO bid    - Supportive Care:          - acetaminophen 650mg PO Q6hr PRN fever/headache          - loperamide PRN viral diarrhea          - IVF if indicated, restrictive strategy preferred, no maintenance IV if able          - VTE PPx: enoxaparin or heparin SQ unless contraindicated    - Investigational Therapies:          - If patient meets criteria  - atorvastatin 40mg po daily after TG levels if needed  If patient does not improve, consideration for possible transition to comfort  care reasonable         Paroxysmal atrial fibrillation  Not on meds  Stable on admit      Alzheimer disease  Continue Seorquel      Primary hypertension  On Lasix, Lotrel and hydralazine  Resume as BP permits        VTE Risk Mitigation (From admission, onward)         Ordered     heparin (porcine) injection 5,000 Units  Every 8 hours         05/21/22 1513                Discharge Planning   JACQUELINE:      Code Status: Full Code   Is the patient medically ready for discharge?:     Reason for patient still in hospital (select all that apply): Patient trending condition  Discharge Plan A: Assisted Living                  Josy Sandhu MD  Department of Hospital Medicine   [facility]

## 2022-05-24 PROBLEM — R53.81 DEBILITY: Status: ACTIVE | Noted: 2022-05-24

## 2022-05-24 LAB
POCT GLUCOSE: 121 MG/DL (ref 70–110)
POCT GLUCOSE: 155 MG/DL (ref 70–110)

## 2022-05-24 PROCEDURE — 63600175 PHARM REV CODE 636 W HCPCS: Mod: TB | Performed by: FAMILY MEDICINE

## 2022-05-24 PROCEDURE — 27000221 HC OXYGEN, UP TO 24 HOURS

## 2022-05-24 PROCEDURE — 11000001 HC ACUTE MED/SURG PRIVATE ROOM

## 2022-05-24 PROCEDURE — 94761 N-INVAS EAR/PLS OXIMETRY MLT: CPT

## 2022-05-24 PROCEDURE — 99900035 HC TECH TIME PER 15 MIN (STAT)

## 2022-05-24 PROCEDURE — 27000207 HC ISOLATION

## 2022-05-24 PROCEDURE — 25000003 PHARM REV CODE 250: Performed by: FAMILY MEDICINE

## 2022-05-24 PROCEDURE — 27100098 HC SPACER

## 2022-05-24 PROCEDURE — 97530 THERAPEUTIC ACTIVITIES: CPT | Mod: CQ

## 2022-05-24 PROCEDURE — 94640 AIRWAY INHALATION TREATMENT: CPT

## 2022-05-24 PROCEDURE — 97530 THERAPEUTIC ACTIVITIES: CPT

## 2022-05-24 PROCEDURE — 94760 N-INVAS EAR/PLS OXIMETRY 1: CPT

## 2022-05-24 PROCEDURE — 97535 SELF CARE MNGMENT TRAINING: CPT

## 2022-05-24 RX ADMIN — HEPARIN SODIUM 5000 UNITS: 5000 INJECTION INTRAVENOUS; SUBCUTANEOUS at 06:05

## 2022-05-24 RX ADMIN — AMLODIPINE BESYLATE 10 MG: 5 TABLET ORAL at 09:05

## 2022-05-24 RX ADMIN — THERA TABS 1 TABLET: TAB at 09:05

## 2022-05-24 RX ADMIN — ALBUTEROL SULFATE 2 PUFF: 90 AEROSOL, METERED RESPIRATORY (INHALATION) at 08:05

## 2022-05-24 RX ADMIN — OXYCODONE HYDROCHLORIDE AND ACETAMINOPHEN 500 MG: 500 TABLET ORAL at 09:05

## 2022-05-24 RX ADMIN — TAMSULOSIN HYDROCHLORIDE 0.4 MG: 0.4 CAPSULE ORAL at 09:05

## 2022-05-24 RX ADMIN — ALBUTEROL SULFATE 2 PUFF: 90 AEROSOL, METERED RESPIRATORY (INHALATION) at 01:05

## 2022-05-24 RX ADMIN — HYDRALAZINE HYDROCHLORIDE 50 MG: 25 TABLET, FILM COATED ORAL at 06:05

## 2022-05-24 RX ADMIN — HEPARIN SODIUM 5000 UNITS: 5000 INJECTION INTRAVENOUS; SUBCUTANEOUS at 10:05

## 2022-05-24 RX ADMIN — FUROSEMIDE 40 MG: 40 TABLET ORAL at 09:05

## 2022-05-24 RX ADMIN — LISINOPRIL 10 MG: 5 TABLET ORAL at 09:05

## 2022-05-24 RX ADMIN — HYDRALAZINE HYDROCHLORIDE 50 MG: 25 TABLET, FILM COATED ORAL at 10:05

## 2022-05-24 RX ADMIN — HEPARIN SODIUM 5000 UNITS: 5000 INJECTION INTRAVENOUS; SUBCUTANEOUS at 01:05

## 2022-05-24 RX ADMIN — REMDESIVIR 100 MG: 100 INJECTION, POWDER, LYOPHILIZED, FOR SOLUTION INTRAVENOUS at 09:05

## 2022-05-24 RX ADMIN — QUETIAPINE FUMARATE 25 MG: 25 TABLET ORAL at 10:05

## 2022-05-24 RX ADMIN — OXYCODONE HYDROCHLORIDE AND ACETAMINOPHEN 500 MG: 500 TABLET ORAL at 10:05

## 2022-05-24 RX ADMIN — DEXAMETHASONE 6 MG: 4 TABLET ORAL at 09:05

## 2022-05-24 RX ADMIN — ALBUTEROL SULFATE 2 PUFF: 90 AEROSOL, METERED RESPIRATORY (INHALATION) at 12:05

## 2022-05-24 NOTE — PT/OT/SLP PROGRESS
Physical Therapy Treatment    Patient Name:  Timothy Roldan   MRN:  [id number]    Recommendations:     Discharge Recommendations:   (post acute placement,24/7 assistance)   Discharge Equipment Recommendations: bedside commode   Barriers to discharge: decreased mobility,strength and endurance    Assessment:     Timothy Roldan is a 82 y.o. male admitted with a medical diagnosis of Acute hypoxemic respiratory failure due to COVID-19.  He presents with the following impairments/functional limitations:  weakness, impaired endurance, impaired functional mobilty, gait instability, impaired balance, decreased upper extremity function, decreased lower extremity function, decreased safety awareness, decreased ROM, impaired coordination, impaired cardiopulmonary response to activity,pt with good participation and requires A X 2 with all mobility and will benefit from post acute services and 24/7 assistance upon discharge.    Rehab Prognosis: Fair; patient would benefit from acute skilled PT services to address these deficits and reach maximum level of function.    Recent Surgery: * No surgery found *      Plan:     During this hospitalization, patient to be seen 3 x/week to address the identified rehab impairments via gait training, therapeutic activities, therapeutic exercises, neuromuscular re-education and progress toward the following goals:    · Plan of Care Expires:  06/23/22    Subjective     Chief Complaint: n/a  Patient/Family Comments/goals: pt agreeable to rx.  Pain/Comfort:  · Pain Rating 1: 0/10      Objective:     Communicated with nsg prior to session.  Patient found supine with bed alarm, oxygen, peripheral IV, telemetry upon PT entry to room.     General Precautions: Standard, contact, droplet, fall   Orthopedic Precautions:N/A   Braces: N/A  Respiratory Status: Nasal cannula, flow 2 L/min     Functional Mobility:  · Bed Mobility:     · Supine to Sit: moderate assistance and of 2 persons  · Sit to Supine:  maximal assistance and of 2 persons  · Transfers:     · Sit to Stand:  maximal assistance and of 2 persons with rolling walker  · Toilet Transfer: maximal assistance and of 2 persons with  rolling walker  using  Step Transfer  · Balance: fair sitting balance      AM-PAC 6 CLICK MOBILITY  Turning over in bed (including adjusting bedclothes, sheets and blankets)?: 2  Sitting down on and standing up from a chair with arms (e.g., wheelchair, bedside commode, etc.): 2  Moving from lying on back to sitting on the side of the bed?: 2  Moving to and from a bed to a chair (including a wheelchair)?: 2  Need to walk in hospital room?: 1  Climbing 3-5 steps with a railing?: 1  Basic Mobility Total Score: 10       Therapeutic Activities and Exercises: rest periods PRN during rx,changed pt's gown,soiled brief and bed pad.       Patient left supine with all lines intact, call button in reach and bed alarm on..    GOALS: see general POC  Multidisciplinary Problems     Physical Therapy Goals        Problem: Physical Therapy    Goal Priority Disciplines Outcome Goal Variances Interventions   Physical Therapy Goal     PT, PT/OT Ongoing, Progressing     Description: Goals to be met by: 2022     Patient will increase functional independence with mobility by performin. Supine to sit with MInimal Assistance  2. Sit to supine with MInimal Assistance  3. Rolling with Minimal Assistance.  4. Sit to stand transfer with Minimal Assistance using Rolling Walker  5. Bed to chair transfer with Minimal Assistance using Rolling Walker  6. Gait  x 50 feet with Minimal Assistance using Rolling Walker.   7. Lower extremity exercise program x10 reps with assistance as needed                     Time Tracking:     PT Received On: 22  PT Start Time: 1300     PT Stop Time: 1338  PT Total Time (min): 38 min     Billable Minutes: Therapeutic Activity 15 and Total Time 38       PT/PTA: PTA     PTA Visit Number: 1     2022

## 2022-05-24 NOTE — PLAN OF CARE
Problem: Physical Therapy  Goal: Physical Therapy Goal  Description: Goals to be met by: 2022     Patient will increase functional independence with mobility by performin. Supine to sit with MInimal Assistance  2. Sit to supine with MInimal Assistance  3. Rolling with Minimal Assistance.  4. Sit to stand transfer with Minimal Assistance using Rolling Walker  5. Bed to chair transfer with Minimal Assistance using Rolling Walker  6. Gait  x 50 feet with Minimal Assistance using Rolling Walker.   7. Lower extremity exercise program x10 reps with assistance as needed    Outcome: Ongoing, Progressing

## 2022-05-24 NOTE — PLAN OF CARE
ANNETTE spoke with ANNETTE spoke with beryl gerardo (Healthcare Power of ) [phone] to discuss dc planning. Beryl confirmed that pt receives 24hr care at assisted living.     ANNETTE called [phone] with [facility]. Tiana confirmed that pts receives assistance with all ADLs if needed. Tiana expressed that they assist with feeling, bathing, and transferring from wheelchair if needed. Tiana reported upon dc she will need facility transfer order to review. ANNETTE expressed understanding.     ANNETTE will continue to follow pt throughout her transitions of care and assist with any dc needs.      05/24/22 1524   Post-Acute Status   Post-Acute Authorization Placement

## 2022-05-24 NOTE — PLAN OF CARE
Patient received on    2Lpm NC with SpO2    96%. Pt with no apparent distress noted. Will continue to monitor.

## 2022-05-24 NOTE — PLAN OF CARE
Pt progressing towards goals, pt more alert this date upon therapy entrance to room. Pt agreeable to therapy, on 2L NC throughout session, O2 95% & HR 82 at rest. No c/o SOB throughout session. Pt requires ModA to doff soiled gown & amy clean gown. Pt requires ModA x2 for sup>sit. Pt requires MaxA x2 for sit<>stand from elevated bed height with use of RW & therapists blocking BLE. Pt requires MaxA x2 for t/f bed<>BSC with RW. Recommending post acute therapy/placement with 24/7 care & assistance upon d/c.     Problem: Occupational Therapy  Goal: Occupational Therapy Goal  Description: Goals to be met by: 6/23/2022     Patient will increase functional independence with ADLs by performing:    UE Dressing with Set-up Assistance.  LE Dressing with Set-up Assistance.  Grooming while standing with Set-up Assistance.  Toileting from bedside commode with Stand-by Assistance for hygiene and clothing management.   Supine to sit with Stand-by Assistance.  Step transfer with Minimal Assistance & appropriate AD.   Toilet transfer to bedside commode with Minimal Assistance & appropriate AD.    Outcome: Ongoing, Progressing

## 2022-05-24 NOTE — SUBJECTIVE & OBJECTIVE
Interval History: No acute events, reports he feels miserable with intermittent cough and rhinorrhea.  No real shortness of breath.  Currently on 2 L via nasal cannula.    Review of Systems   Constitutional:  Negative for chills and fever.   HENT:  Positive for rhinorrhea.    Respiratory:  Positive for cough.    Cardiovascular:  Negative for chest pain.   Objective:     Vital Signs (Most Recent):  Temp: 97.2 °F (36.2 °C) (05/24/22 1126)  Pulse: 86 (05/24/22 1322)  Resp: 18 (05/24/22 1322)  BP: 97/63 (05/24/22 1126)  SpO2: 96 % (05/24/22 1322) Vital Signs (24h Range):  Temp:  [97.2 °F (36.2 °C)-98.4 °F (36.9 °C)] 97.2 °F (36.2 °C)  Pulse:  [56-94] 86  Resp:  [16-18] 18  SpO2:  [93 %-97 %] 96 %  BP: ()/(55-68) 97/63     Weight: 79.6 kg (175 lb 7.8 oz)  Body mass index is 25.18 kg/m².  No intake or output data in the 24 hours ending 05/24/22 1425   Physical Exam  Constitutional:       Appearance: He is well-developed.   HENT:      Head: Normocephalic and atraumatic.   Cardiovascular:      Rate and Rhythm: Normal rate and regular rhythm.      Heart sounds: Normal heart sounds. No murmur heard.    No friction rub. No gallop.   Pulmonary:      Effort: Pulmonary effort is normal.      Breath sounds: Normal breath sounds. No wheezing or rhonchi.   Abdominal:      General: Bowel sounds are normal.      Palpations: Abdomen is soft.      Tenderness: There is no abdominal tenderness.   Musculoskeletal:         General: Normal range of motion.      Cervical back: Neck supple.      Right lower leg: No edema.      Left lower leg: No edema.   Skin:     General: Skin is warm and dry.      Comments: LE with hyperpigment bilaterally, no edema   Neurological:      Mental Status: He is alert and oriented to person, place, and time.   Psychiatric:         Mood and Affect: Mood is not anxious or depressed.         Speech: Speech normal.         Behavior: Behavior normal.         Thought Content: Thought content normal.        Significant Labs: All pertinent labs within the past 24 hours have been reviewed.    Significant Imaging: I have reviewed all pertinent imaging results/findings within the past 24 hours.

## 2022-05-24 NOTE — NURSING
Care plans reviewed. No c/o of SOB or chest pains, no acute distress noted. All medications given as ordered. Vitals WNL/ Safety maintained, bed in lowest position, bed alarm on, bed wheels locked, call light with in reach. Will continue to monitor.

## 2022-05-24 NOTE — ASSESSMENT & PLAN NOTE
- D-dimer 2.11and if >3, will need to consider full anticoagulation if indicated. Switch heparin gtt to prophylaxis dose  - Management: per [facility] COVID Treatment Protocol    - Monitoring:          - Telemetry & Continuous Pulse Oximetry    - Nutrition:           - Multivitamin PO daily          - Add Boost supplement          - Vitamin D 1000IU daily if deficient          - Ascorbic acid 500mg PO bid    - Supportive Care:          - acetaminophen 650mg PO Q6hr PRN fever/headache          - loperamide PRN viral diarrhea          - IVF if indicated, restrictive strategy preferred, no maintenance IV if able          - VTE PPx: enoxaparin or heparin SQ unless contraindicated    - Investigational Therapies:          - If patient meets criteria  - atorvastatin 40mg po daily after TG levels if needed  - Continue dexamethasone 6 mg po Qday, on day #4  - Continue remdisivir, on day #4  - Clinically improving, O2 weaned to 2L via NC   - Wean O2 as tolerated

## 2022-05-24 NOTE — PLAN OF CARE
Problem: Physical Therapy  Goal: Physical Therapy Goal  Description: Goals to be met by: 2022     Patient will increase functional independence with mobility by performin. Supine to sit with MInimal Assistance  2. Sit to supine with MInimal Assistance  3. Rolling with Minimal Assistance.  4. Sit to stand transfer with Minimal Assistance using Rolling Walker  5. Bed to chair transfer with Minimal Assistance using Rolling Walker  6. Gait  x 50 feet with Minimal Assistance using Rolling Walker.   7. Lower extremity exercise program x10 reps with assistance as needed    Outcome: Ongoing, Progressing   Patient evaluated with OT; full report to follow; will recommend SNF to maximize functional status; initially pt very confused and lethargic but more alert as session progressed; required maxA x 2 for bed mobility and transfers bed<>BSC with use of RW; required max VCs for technique throughout; O2 sats remained 93% and above during session; will cont with POC.

## 2022-05-24 NOTE — PLAN OF CARE
ANNETTE received call from pts Lakesha FLYNN to discuss dc planning. SW discussed PT/OT recommendations of SNF placement. Lakesha declined any post-acute placement. Lakesha reported she will like pt to dc back to assisted living with  services. SW expressed understanding. SW will continue to follow pt throughout his transitions of care and assist with any dc needs.        05/24/22 1150   Post-Acute Status   Post-Acute Authorization Placement   Discharge Delays None known at this time

## 2022-05-24 NOTE — PROGRESS NOTES
[facility] Medicine  Progress Note    Patient Name: Timothy Roldan  MRN: [id number]  Patient Class: IP- Inpatient   Admission Date: 5/21/2022  Length of Stay: 2 days  Attending Physician: Marge Szymanski MD  Primary Care Provider: Martine Garces MD        Subjective:     Principal Problem:Acute hypoxemic respiratory failure due to COVID-19        HPI:  Timothy Roldan is an 83 y/o M with PMH of dementia, afib not on AC. Alzheimer's disease (+/- Parkinson?), HTN, BPH presents for shortness of breath.  Patient tachycardic, hypoxic on room air.  Stable BP, afebrile. +accessory mm use. Tachypneic. Rales at right.  No lower extremity edema.  EKG AFib at 104 beats per minute, normal intervals, no STEMI. +COVID. +d-dimer. BNP mildly elevated. CXR bilateral pulm opacities with pleural effusion. Ordered lasix, decadron. D-dimer elevated, V/Q can be obtained at OSH on pt's arrival, place order (do not have CT contrast during national CT contrast shortage).  Recommended If patient decompensates would give tpa, but held pending . LLE USS with no evidence of deep venous thrombosis in the left thigh. Admitted with COVID 19 to r/o PE. Transferred from [facility] due to facility not admitting COVID pts at this time unless require specialty only available at this facility.        Overview/Hospital Course:  Mr. Roldan present with shortness of breath.  Admitted for COVID-19 pneumonia inducing respiratory failure.  Isolation protocol in place.  Treatment initiated with remdesivir and dexamethasone.      Interval History: No acute events, reports he feels miserable with intermittent cough and rhinorrhea.  No real shortness of breath.  Currently on 2 L via nasal cannula.    Review of Systems   Constitutional:  Negative for chills and fever.   HENT:  Positive for rhinorrhea.    Respiratory:  Positive for cough.    Cardiovascular:  Negative for chest pain.   Objective:     Vital Signs (Most Recent):  Temp: 97.2 °F (36.2 °C)  (05/24/22 1126)  Pulse: 86 (05/24/22 1322)  Resp: 18 (05/24/22 1322)  BP: 97/63 (05/24/22 1126)  SpO2: 96 % (05/24/22 1322) Vital Signs (24h Range):  Temp:  [97.2 °F (36.2 °C)-98.4 °F (36.9 °C)] 97.2 °F (36.2 °C)  Pulse:  [56-94] 86  Resp:  [16-18] 18  SpO2:  [93 %-97 %] 96 %  BP: ()/(55-68) 97/63     Weight: 79.6 kg (175 lb 7.8 oz)  Body mass index is 25.18 kg/m².  No intake or output data in the 24 hours ending 05/24/22 1425   Physical Exam  Constitutional:       Appearance: He is well-developed.   HENT:      Head: Normocephalic and atraumatic.   Cardiovascular:      Rate and Rhythm: Normal rate and regular rhythm.      Heart sounds: Normal heart sounds. No murmur heard.    No friction rub. No gallop.   Pulmonary:      Effort: Pulmonary effort is normal.      Breath sounds: Normal breath sounds. No wheezing or rhonchi.   Abdominal:      General: Bowel sounds are normal.      Palpations: Abdomen is soft.      Tenderness: There is no abdominal tenderness.   Musculoskeletal:         General: Normal range of motion.      Cervical back: Neck supple.      Right lower leg: No edema.      Left lower leg: No edema.   Skin:     General: Skin is warm and dry.      Comments: LE with hyperpigment bilaterally, no edema   Neurological:      Mental Status: He is alert and oriented to person, place, and time.   Psychiatric:         Mood and Affect: Mood is not anxious or depressed.         Speech: Speech normal.         Behavior: Behavior normal.         Thought Content: Thought content normal.       Significant Labs: All pertinent labs within the past 24 hours have been reviewed.    Significant Imaging: I have reviewed all pertinent imaging results/findings within the past 24 hours.      Assessment/Plan:      * Acute hypoxemic respiratory failure due to COVID-19  - D-dimer 2.11and if >3, will need to consider full anticoagulation if indicated. Switch heparin gtt to prophylaxis dose  - Management: per [facility] COVID Treatment  Protocol    - Monitoring:          - Telemetry & Continuous Pulse Oximetry    - Nutrition:           - Multivitamin PO daily          - Add Boost supplement          - Vitamin D 1000IU daily if deficient          - Ascorbic acid 500mg PO bid    - Supportive Care:          - acetaminophen 650mg PO Q6hr PRN fever/headache          - loperamide PRN viral diarrhea          - IVF if indicated, restrictive strategy preferred, no maintenance IV if able          - VTE PPx: enoxaparin or heparin SQ unless contraindicated    - Investigational Therapies:          - If patient meets criteria  - atorvastatin 40mg po daily after TG levels if needed  - Continue dexamethasone 6 mg po Qday, on day #4  - Continue remdisivir, on day #4  - Clinically improving, O2 weaned to 2L via NC   - Wean O2 as tolerated    Debility  - PT/OT  - Will need SNF placement    Paroxysmal atrial fibrillation  - Not on medications    Alzheimer disease  - Continue Seorquel    Primary hypertension  - Resumed Lasix, Lotrel and hydralazine      VTE Risk Mitigation (From admission, onward)         Ordered     heparin (porcine) injection 5,000 Units  Every 8 hours         05/21/22 8309                    Marge Szymanski MD  Department of Hospital Medicine   [place] - Telemetry

## 2022-05-24 NOTE — HOSPITAL COURSE
Mr. Roldan presented with shortness of breath.  Admitted for COVID-19 pneumonia inducing respiratory failure.  Isolation protocol in place.  Treatment initiated with remdesivir, dexamethasone, and O2 for support.  Patient completed full 5 days of remdesivir and 6 days of dexamethasone.  Oxygen was weaned to room air.  O2 assessment done with activity and he had no desaturations with sats of 93% on room air with activity.  Patient worked with PT and OT during hospitalization and home health arranged.  Patient has 24 hour care arranged at his assisted living facility.  Information communicated to son and healthcare power .  Follow-up arranged with primary care physician.    Acute hypoxemic respiratory failure due to COVID-19  - D-dimer 2.11and if >3, will need to consider full anticoagulation if indicated. Switch heparin gtt to prophylaxis dose  - Management: per [facility] COVID Treatment Protocol    - Monitoring:          - Telemetry & Continuous Pulse Oximetry    - Nutrition:           - Multivitamin PO daily          - Add Boost supplement          - Vitamin D 1000IU daily if deficient          - Ascorbic acid 500mg PO bid    - Supportive Care:          - acetaminophen 650mg PO Q6hr PRN fever/headache          - loperamide PRN viral diarrhea          - IVF if indicated, restrictive strategy preferred, no maintenance IV if able          - VTE PPx: enoxaparin or heparin SQ unless contraindicated    - Investigational Therapies:          - If patient meets criteria  - atorvastatin 40mg po daily after TG levels if needed  - Completed dexamethasone 6 mg po Qday for 6 days and he was no longer hypoxic  - Completed full 5 day course with remdesivir during hospitalization  - Clinically improved, and weaned off O2 with no desats with activity  - COVID monitoring program arranged with home health     Debility  - PT/OT  - Plan for return back to assisted living facility with total care     Paroxysmal atrial  fibrillation  - Not on any medications     Alzheimer disease  - Continued Seorquel     Primary hypertension  - Resumed Lasix, Lotrel and hydralazine

## 2022-05-24 NOTE — PT/OT/SLP EVAL
Physical Therapy Evaluation    Patient Name:  Timothy Roldan   MRN:  [id number]    Recommendations:     Discharge Recommendations:  nursing facility, skilled   Discharge Equipment Recommendations:  (defer to next level of care)   Barriers to discharge: Decreased caregiver support and requires increased level of assistance    Assessment:     Timothy Roldan is a 82 y.o. male admitted with a medical diagnosis of Acute hypoxemic respiratory failure due to COVID-19.  He presents with the following impairments/functional limitations:  weakness, impaired self care skills, impaired functional mobilty, gait instability, impaired endurance, impaired balance, decreased upper extremity function, decreased lower extremity function, decreased coordination, impaired cognition, decreased safety awareness, decreased ROM, impaired fine motor Patient evaluated with OT; will recommend SNF to maximize functional status; initially pt very confused and lethargic but more alert as session progressed; required maxA x 2 for bed mobility and transfers bed<>BSC with use of RW; required max VCs for technique throughout; O2 sats remained 93% and above during session; will cont with POC. .    Rehab Prognosis: Good; patient would benefit from acute skilled PT services to address these deficits and reach maximum level of function.    Recent Surgery: * No surgery found *      Plan:     During this hospitalization, patient to be seen 3 x/week to address the identified rehab impairments via gait training, therapeutic activities, therapeutic exercises, neuromuscular re-education and progress toward the following goals:    · Plan of Care Expires:  06/23/22    Subjective     Chief Complaint: Pt states needing to have BM  Patient/Family Comments/goals: to return home to [facility]  Pain/Comfort:  · Pain Rating 1:  (pt did not rate)  · Pain Rating Post-Intervention 1:  (no rated)    Patients cultural, spiritual, Sikhism conflicts given the current situation:  "no    Living Environment:  Pt lives at [facility]  Previous level of function: Per chart & pt power of  since January of this year pt has had assistance with functional mobility & RW 2/2 fall risk, staff at facility assists with dressing & bathing. The past two weeks pt has become increasingly weak.   Roles and Routines: Pt did not state  Equipment Used at Home:  walker, rolling, wheelchair  Assistance upon Discharge: Staff at facility     Objective:     Communicated with nurse prior to session.  Patient found HOB elevated with Condom Catheter, telemetry, peripheral IV, bed alarm, oxygen  upon PT entry to room.    General Precautions: Standard, airborne, contact, droplet, fall   Orthopedic Precautions:N/A   Braces: N/A  Respiratory Status: Nasal cannula, flow 2 L/min    Exams:  · Cognitive Exam:  Patient is oriented to Person and confused to location (stated "a bar room"), able to state  after increased time and verbal cues; follows one step commands; impaired long and short term memory  · Gross Motor Coordination:  Slow, rigid movement; resting tremor BUE  · Postural Exam:  Patient presented with the following abnormalities:    · -       Rounded shoulders  · -       Forward head  · -       Stands with flexed trunk, hips, knees  · Sensation:    · -       Intact to light touch BLEs  · Skin Integrity/Edema:      · -       Skin integrity: visibly intact, dry  · -       Edema: mild to mod lower legs/feet  · RLE ROM: WFL except lacking end range knee ext  · RLE Strength: ~4- grossly  · LLE ROM: WFL except lacking end range knee ext  · LLE Strength: ~4- grossly    Functional Mobility:  · Bed Mobility:     · Scooting: maximal assistance, of 2 persons and to scoot to HOB via drawsheet  · Supine to Sit: maximal assistance and of 2 persons  · Sit to Supine: maximal assistance and of 2 persons  · Transfers:     · Sit to Stand:  maximal assistance and of 2 persons with rolling walker  · Toilet " Transfer: maximal assistance and of 2 persons with  rolling walker and max VCs/TCs; B hips/knees and trunk flexed; increased VCs/TCs for erect posture with poor ability to carry out  using  Step Transfer  · Gait: NT  · Balance: sit~initial posterior lean which imroved to SBA; stand~poor static/dynamic    Therapeutic Activities and Exercises:   Pt educated on role of PT/POC; initially pt very confused and lethargic but more alert as session progressed; required maxA x 2 for bed mobility and transfers bed<>BSC with use of RW; required max VCs for technique throughout; O2 sats remained 93% on 2L and above during session; performed BLE A/AAROM ex in all ranges of motion ~10-12 reps    AM-PAC 6 CLICK MOBILITY  Total Score:11     Patient left HOB elevated with all lines intact, call button in reach, bed alarm on and nurse notified.    GOALS:   Multidisciplinary Problems     Physical Therapy Goals        Problem: Physical Therapy    Goal Priority Disciplines Outcome Goal Variances Interventions   Physical Therapy Goal     PT, PT/OT Ongoing, Progressing     Description: Goals to be met by: 2022     Patient will increase functional independence with mobility by performin. Supine to sit with MInimal Assistance  2. Sit to supine with MInimal Assistance  3. Rolling with Minimal Assistance.  4. Sit to stand transfer with Minimal Assistance using Rolling Walker  5. Bed to chair transfer with Minimal Assistance using Rolling Walker  6. Gait  x 50 feet with Minimal Assistance using Rolling Walker.   7. Lower extremity exercise program x10 reps with assistance as needed                     History:     Past Medical History:   Diagnosis Date    Hyperlipidemia     Hypertension        Past Surgical History:   Procedure Laterality Date    TONSILLECTOMY      TOTAL KNEE ARTHROPLASTY         Time Tracking:     PT Received On: 22  PT Start Time: 1520     PT Stop Time: 1612  PT Total Time (min): 52 min with  OT    Billable Minutes: Evaluation 10, Therapeutic Activity 15 and Therapeutic Exercise 15      05/23/2022

## 2022-05-24 NOTE — PT/OT/SLP PROGRESS
Occupational Therapy   Treatment    Name: Timothy Roldan  MRN: [id number]  Admitting Diagnosis:  Acute hypoxemic respiratory failure due to COVID-19       Recommendations:     Discharge Recommendations: other (see comments) (post acute therapy/placement with 24/7 care & assistance)  Discharge Equipment Recommendations:  bedside commode  Barriers to discharge:  Decreased caregiver support (Pt requires increased level of assistance at this time)    Assessment:     Timothy Roldan is a 82 y.o. male with a medical diagnosis of Acute hypoxemic respiratory failure due to COVID-19.  He presents with Diagnoses of COVID and COVID-19 were pertinent to this visit. Performance deficits affecting function are weakness, gait instability, decreased upper extremity function, decreased lower extremity function, impaired endurance, impaired balance, decreased safety awareness, impaired self care skills, impaired cognition, impaired functional mobilty, decreased coordination, decreased ROM, impaired coordination, impaired cardiopulmonary response to activity.     Pt progressing towards goals, pt more alert this date upon therapy entrance to room. Pt agreeable to therapy, on 2L NC throughout session, O2 95% & HR 82 at rest. No c/o SOB throughout session. Pt requires ModA to doff soiled gown & amy clean gown. Pt requires ModA x2 for sup>sit. Pt requires MaxA x2 for sit<>stand from elevated bed height with use of RW & therapists blocking BLE. Pt requires MaxA x2 for t/f bed<>BSC with RW. Recommending post acute therapy/placement with 24/7 care & assistance upon d/c.     Rehab Prognosis:  Fair; patient would benefit from acute skilled OT services to address these deficits and reach maximum level of function.       Plan:     Patient to be seen 3 x/week to address the above listed problems via self-care/home management, therapeutic activities, therapeutic exercises  · Plan of Care Expires: 06/23/22  · Plan of Care Reviewed with:  "patient    Subjective     Pain/Comfort:  · Pain Rating 1: 0/10  · Pain Rating Post-Intervention 1: other (see comments) (none stated)    Objective:     Communicated with: nsg prior to session.  Patient found HOB elevated with blood pressure cuff, peripheral IV, telemetry, oxygen upon OT entry to room.    General Precautions: Standard, airborne, contact, droplet, fall   Orthopedic Precautions:N/A   Braces: N/A  Respiratory Status: Nasal cannula, flow 2 L/min     Occupational Performance:     Bed Mobility:    · Patient completed Scooting/Bridging with minimum assistance and moderate assistance to EOB   · Patient completed Supine to Sit with moderate assistance and 2 persons  · Patient completed Sit to Supine with maximal assistance and 2 persons     Functional Mobility/Transfers:  · Patient completed Sit <> Stand Transfer with maximal assistance and of 2 persons  with  rolling walker and elevated bed height with therapists blocking BLE. Pt forward flexed at trunk with B knees flexed upon standing & requires Max cueing to correct for upright posture with fair carryover.   · Patient completed Toilet Transfer Stand Pivot and Step Transfer technique with maximal assistance and of 2 persons with  rolling walker & max verbal cues & tactile cues for RW management    Activities of Daily Living:  · Upper Body Dressing: moderate assistance to doff soiled gown & amy clean gown  · Toileting: pt unable to have BM on BSC reports it has been "too long" nsg notified. Pt requires MaxA for diaper management in static standing      [facility] 6 Click ADL: 14    Treatment & Education:  Pt progressing towards goals, pt more alert this date upon therapy entrance to room.   Pt agreeable to therapy, on 2L NC throughout session, O2 95% & HR 82 at rest.   No c/o SOB throughout session.   Pt requires increased time for processing commands.   Pt requires ModA to doff soiled gown & amy clean gown.   Pt requires ModA x2 for sup>sit.   Pt requires " MaxA x2 for sit<>stand from elevated bed height with use of RW & therapists blocking BLE.   Pt requires MaxA x2 for t/f bed<>BSC with RW.   Recommending post acute therapy/placement with 24/7 care & assistance upon d/c.     Patient left HOB elevated with all lines intact, call button in reach, bed alarm on, nsg notified and BLE elevated on pillow & heels floatedEducation:      GOALS:   Multidisciplinary Problems     Occupational Therapy Goals        Problem: Occupational Therapy    Goal Priority Disciplines Outcome Interventions   Occupational Therapy Goal     OT, PT/OT Ongoing, Progressing    Description: Goals to be met by: 6/23/2022     Patient will increase functional independence with ADLs by performing:    UE Dressing with Set-up Assistance.  LE Dressing with Set-up Assistance.  Grooming while standing with Set-up Assistance.  Toileting from bedside commode with Stand-by Assistance for hygiene and clothing management.   Supine to sit with Stand-by Assistance.  Step transfer with Minimal Assistance & appropriate AD.   Toilet transfer to bedside commode with Minimal Assistance & appropriate AD.                     Time Tracking:     OT Date of Treatment: 05/24/22  OT Start Time: 1300  OT Stop Time: 1338  OT Total Time (min): 38 min    Billable Minutes:Self Care/Home Management 11  Therapeutic Activity 12    OT/STEWART: OT     STEWART Visit Number: 0    5/24/2022

## 2022-05-24 NOTE — PLAN OF CARE
ANNETTE spoke with beryl gerardo (Healthcare Power of ) [phone] to discuss dc planning.. Beryl provided SW with intake staff member, Yusra [phone] and Nurse, Tiana  [phone] with [facility].       ANNETTE called [facility] and spoke with nurse Tiana. Tiana reported that she will need facility transfer orders faxed  For review before admitting pt back to facility. ANNETTE reported upon dc she will send facility transfer order for review.     Fax: [phone]    SW will continue to follow pt throughout his transitions of care and assist with any dc needs.        05/24/22 0912   Post-Acute Status   Post-Acute Authorization Placement

## 2022-05-25 LAB
D DIMER PPP IA.FEU-MCNC: 1.28 MG/L FEU
LDH SERPL L TO P-CCNC: 272 U/L (ref 110–260)

## 2022-05-25 PROCEDURE — 83615 LACTATE (LD) (LDH) ENZYME: CPT | Performed by: FAMILY MEDICINE

## 2022-05-25 PROCEDURE — 27100098 HC SPACER

## 2022-05-25 PROCEDURE — 25000003 PHARM REV CODE 250: Performed by: HOSPITALIST

## 2022-05-25 PROCEDURE — 25000003 PHARM REV CODE 250: Performed by: FAMILY MEDICINE

## 2022-05-25 PROCEDURE — 94640 AIRWAY INHALATION TREATMENT: CPT

## 2022-05-25 PROCEDURE — 27000207 HC ISOLATION

## 2022-05-25 PROCEDURE — 94761 N-INVAS EAR/PLS OXIMETRY MLT: CPT

## 2022-05-25 PROCEDURE — 36415 COLL VENOUS BLD VENIPUNCTURE: CPT | Performed by: FAMILY MEDICINE

## 2022-05-25 PROCEDURE — 11000001 HC ACUTE MED/SURG PRIVATE ROOM

## 2022-05-25 PROCEDURE — 85379 FIBRIN DEGRADATION QUANT: CPT | Performed by: FAMILY MEDICINE

## 2022-05-25 PROCEDURE — 63600175 PHARM REV CODE 636 W HCPCS: Mod: TB | Performed by: FAMILY MEDICINE

## 2022-05-25 PROCEDURE — 99900035 HC TECH TIME PER 15 MIN (STAT)

## 2022-05-25 PROCEDURE — 27000221 HC OXYGEN, UP TO 24 HOURS

## 2022-05-25 PROCEDURE — 94760 N-INVAS EAR/PLS OXIMETRY 1: CPT

## 2022-05-25 RX ORDER — SODIUM CHLORIDE 0.9 % (FLUSH) 0.9 %
.1-1 SYRINGE (ML) INJECTION
Status: DISCONTINUED | OUTPATIENT
Start: 2022-05-25 | End: 2022-05-26 | Stop reason: HOSPADM

## 2022-05-25 RX ORDER — SODIUM CHLORIDE 9 MG/ML
INJECTION, SOLUTION INTRAVENOUS
Status: DISCONTINUED | OUTPATIENT
Start: 2022-05-25 | End: 2022-05-26 | Stop reason: HOSPADM

## 2022-05-25 RX ADMIN — ALBUTEROL SULFATE 2 PUFF: 90 AEROSOL, METERED RESPIRATORY (INHALATION) at 12:05

## 2022-05-25 RX ADMIN — DEXAMETHASONE 6 MG: 4 TABLET ORAL at 08:05

## 2022-05-25 RX ADMIN — OXYCODONE HYDROCHLORIDE AND ACETAMINOPHEN 500 MG: 500 TABLET ORAL at 09:05

## 2022-05-25 RX ADMIN — FUROSEMIDE 40 MG: 40 TABLET ORAL at 08:05

## 2022-05-25 RX ADMIN — HYDRALAZINE HYDROCHLORIDE 50 MG: 25 TABLET, FILM COATED ORAL at 06:05

## 2022-05-25 RX ADMIN — HEPARIN SODIUM 5000 UNITS: 5000 INJECTION INTRAVENOUS; SUBCUTANEOUS at 01:05

## 2022-05-25 RX ADMIN — HYDRALAZINE HYDROCHLORIDE 50 MG: 25 TABLET, FILM COATED ORAL at 09:05

## 2022-05-25 RX ADMIN — HEPARIN SODIUM 5000 UNITS: 5000 INJECTION INTRAVENOUS; SUBCUTANEOUS at 09:05

## 2022-05-25 RX ADMIN — ALBUTEROL SULFATE 2 PUFF: 90 AEROSOL, METERED RESPIRATORY (INHALATION) at 07:05

## 2022-05-25 RX ADMIN — TAMSULOSIN HYDROCHLORIDE 0.4 MG: 0.4 CAPSULE ORAL at 08:05

## 2022-05-25 RX ADMIN — HYDRALAZINE HYDROCHLORIDE 50 MG: 25 TABLET, FILM COATED ORAL at 01:05

## 2022-05-25 RX ADMIN — AMLODIPINE BESYLATE 10 MG: 5 TABLET ORAL at 08:05

## 2022-05-25 RX ADMIN — OXYCODONE HYDROCHLORIDE AND ACETAMINOPHEN 500 MG: 500 TABLET ORAL at 08:05

## 2022-05-25 RX ADMIN — REMDESIVIR 100 MG: 100 INJECTION, POWDER, LYOPHILIZED, FOR SOLUTION INTRAVENOUS at 09:05

## 2022-05-25 RX ADMIN — SODIUM CHLORIDE: 0.9 INJECTION, SOLUTION INTRAVENOUS at 09:05

## 2022-05-25 RX ADMIN — LISINOPRIL 10 MG: 5 TABLET ORAL at 08:05

## 2022-05-25 RX ADMIN — THERA TABS 1 TABLET: TAB at 08:05

## 2022-05-25 RX ADMIN — QUETIAPINE FUMARATE 25 MG: 25 TABLET ORAL at 09:05

## 2022-05-25 RX ADMIN — HEPARIN SODIUM 5000 UNITS: 5000 INJECTION INTRAVENOUS; SUBCUTANEOUS at 06:05

## 2022-05-25 NOTE — PLAN OF CARE
ANNETTE spoke with beryl gerardo (Healthcare Power of ) [phone] to discuss dc planning.  SW discuss possible dc tomorrow. Pts niece reported that she will like HH upon dc. SW expressed understanding. Beryl reported that pt is active with [facility]  services. SW sent HH referral to [facility] to resume services. SW will continue to follow pt throughout his transitions of care and assist with any dc needs.     ANNETTE will send HH orders once they are placed by doctor. Please note that Beryl reported pt already has bsc at home.     [facility]Port Alert: YES response from [facility] re: Referral [id number] for patient in [facility] [id number] A: Yes, willing to accept patient patient is current with [facility] thanks       05/25/22 7254   Post-Acute Status   Post-Acute Authorization [facility]   Post-Acute Placement Status Referrals Sent

## 2022-05-25 NOTE — PLAN OF CARE
Patient received on    1 Lpm NC with SpO2    98%. Pt with no apparent distress noted. Will continue to monitor.

## 2022-05-26 VITALS
TEMPERATURE: 96 F | DIASTOLIC BLOOD PRESSURE: 73 MMHG | BODY MASS INDEX: 25.13 KG/M2 | HEIGHT: 70 IN | OXYGEN SATURATION: 95 % | WEIGHT: 175.5 LBS | HEART RATE: 80 BPM | RESPIRATION RATE: 16 BRPM | SYSTOLIC BLOOD PRESSURE: 129 MMHG

## 2022-05-26 PROCEDURE — 94640 AIRWAY INHALATION TREATMENT: CPT

## 2022-05-26 PROCEDURE — 25000003 PHARM REV CODE 250: Performed by: FAMILY MEDICINE

## 2022-05-26 PROCEDURE — 94761 N-INVAS EAR/PLS OXIMETRY MLT: CPT

## 2022-05-26 PROCEDURE — 63600175 PHARM REV CODE 636 W HCPCS: Performed by: FAMILY MEDICINE

## 2022-05-26 PROCEDURE — 99900035 HC TECH TIME PER 15 MIN (STAT)

## 2022-05-26 PROCEDURE — 27000221 HC OXYGEN, UP TO 24 HOURS

## 2022-05-26 RX ADMIN — ALBUTEROL SULFATE 2 PUFF: 90 AEROSOL, METERED RESPIRATORY (INHALATION) at 12:05

## 2022-05-26 RX ADMIN — AMLODIPINE BESYLATE 10 MG: 5 TABLET ORAL at 09:05

## 2022-05-26 RX ADMIN — HYDRALAZINE HYDROCHLORIDE 50 MG: 25 TABLET, FILM COATED ORAL at 01:05

## 2022-05-26 RX ADMIN — ALBUTEROL SULFATE 2 PUFF: 90 AEROSOL, METERED RESPIRATORY (INHALATION) at 02:05

## 2022-05-26 RX ADMIN — HYDRALAZINE HYDROCHLORIDE 50 MG: 25 TABLET, FILM COATED ORAL at 06:05

## 2022-05-26 RX ADMIN — HEPARIN SODIUM 5000 UNITS: 5000 INJECTION INTRAVENOUS; SUBCUTANEOUS at 01:05

## 2022-05-26 RX ADMIN — FUROSEMIDE 40 MG: 40 TABLET ORAL at 09:05

## 2022-05-26 RX ADMIN — TAMSULOSIN HYDROCHLORIDE 0.4 MG: 0.4 CAPSULE ORAL at 09:05

## 2022-05-26 RX ADMIN — HEPARIN SODIUM 5000 UNITS: 5000 INJECTION INTRAVENOUS; SUBCUTANEOUS at 06:05

## 2022-05-26 RX ADMIN — LISINOPRIL 10 MG: 5 TABLET ORAL at 09:05

## 2022-05-26 RX ADMIN — ALBUTEROL SULFATE 2 PUFF: 90 AEROSOL, METERED RESPIRATORY (INHALATION) at 08:05

## 2022-05-26 RX ADMIN — DEXAMETHASONE 6 MG: 4 TABLET ORAL at 09:05

## 2022-05-26 RX ADMIN — THERA TABS 1 TABLET: TAB at 09:05

## 2022-05-26 RX ADMIN — OXYCODONE HYDROCHLORIDE AND ACETAMINOPHEN 500 MG: 500 TABLET ORAL at 09:05

## 2022-05-26 NOTE — PROGRESS NOTES
[facility]                    Pharmacy       Discharge Medication Education    Patient ACCEPTED medication education. Pharmacy has provided education on the name, indication, and possible side effects of the medication(s) prescribed, using teach-back method.     The following medications have also been discussed, during this admission.        Medication List        START taking these medications      pulse oximeter device  Commonly known as: pulse oximeter  by Apply Externally route 2 (two) times a day. Use twice daily at 8 AM and 3 PM and record the value in MyChart as directed.            CONTINUE taking these medications      amlodipine-benazepril 5-10 mg 5-10 mg per capsule  Commonly known as: LOTREL     donepeziL 10 MG tablet  Commonly known as: ARICEPT  Take 1 tablet (10 mg total) by mouth every evening.     furosemide 40 MG tablet  Commonly known as: LASIX     hydrALAZINE 50 MG tablet  Commonly known as: APRESOLINE  Take 1 tablet (50 mg total) by mouth every 8 (eight) hours.     lovastatin 20 MG tablet  Commonly known as: MEVACOR     QUEtiapine 25 MG Tab  Commonly known as: SEROQUEL  Take 1 tablet (25 mg total) by mouth every evening.     tamsulosin 0.4 mg Cap  Commonly known as: FLOMAX  Take 1 capsule (0.4 mg total) by mouth once daily.               Where to Get Your Medications        These medications were sent to [facility]  [address], [place] [address]      Hours: Mon-Fri, 8a-5:30p Phone: [phone]   pulse oximeter device          Thank you  Se Mijares, PharmD  [phone]

## 2022-05-26 NOTE — PLAN OF CARE
[facility] - Telemetry      HOME HEALTH ORDERS  FACE TO FACE ENCOUNTER    Patient Name: Timothy Roldan  YOB: 1940    PCP: Martine Garces MD   PCP Address: [address] [place] [address] / [id number]  PCP Phone Number: [phone]  PCP Fax: [phone]    Encounter Date: 5/21/22    Admit to Home Health    Diagnoses:  Active Hospital Problems    Diagnosis  POA    *Acute hypoxemic respiratory failure due to COVID-19 [U07.1, J96.01]  Yes    Debility [R53.81]  Yes    Alzheimer disease [G30.9, F02.80]  Yes    Paroxysmal atrial fibrillation [I48.0]  Yes    Primary hypertension [I10]  Yes      Resolved Hospital Problems   No resolved problems to display.       Follow Up Appointments:  No future appointments.    Allergies:Review of patient's allergies indicates:  No Known Allergies    Medications: Review discharge medications with patient and family and provide education.    Current Discharge Medication List      START taking these medications    Details   pulse oximeter (PULSE OXIMETER) device by Apply Externally route 2 (two) times a day. Use twice daily at 8 AM and 3 PM and record the value in [employer] as directed.  Qty: 1 each, Refills: 0    Comments: This is a NO CHARGE item.  Please override price to zero.  DO NOT PRINT.  NORMAL MODE e-PRESCRIBE ONLY.         CONTINUE these medications which have NOT CHANGED    Details   amlodipine-benazepril 5-10 mg (LOTREL) 5-10 mg per capsule TK 1 C PO D  Refills: 0      donepeziL (ARICEPT) 10 MG tablet Take 1 tablet (10 mg total) by mouth every evening.  Qty: 30 tablet, Refills: 5      furosemide (LASIX) 40 MG tablet Take 40 mg by mouth once daily.  Refills: 1      hydrALAZINE (APRESOLINE) 50 MG tablet Take 1 tablet (50 mg total) by mouth every 8 (eight) hours.  Qty: 90 tablet, Refills: 5    Comments: .      lovastatin (MEVACOR) 20 MG tablet Take 20 mg by mouth every evening.      QUEtiapine (SEROQUEL) 25 MG Tab Take 1 tablet (25 mg total) by mouth every evening.  Qty:  30 tablet, Refills: 5      tamsulosin (FLOMAX) 0.4 mg Cap Take 1 capsule (0.4 mg total) by mouth once daily.  Qty: 30 capsule, Refills: 5               I have seen and examined this patient within the last 30 days. My clinical findings that support the need for the home health skilled services and home bound status are the following:no   Weakness/numbness causing balance and gait disturbance due to Weakness/Debility making it taxing to leave home.     Diet:   cardiac diet    Labs:  None    Referrals/ Consults  Physical Therapy to evaluate and treat. Evaluate for home safety and equipment needs; Establish/upgrade home exercise program. Perform / instruct on therapeutic exercises, gait training, transfer training, and Range of Motion.  Occupational Therapy to evaluate and treat. Evaluate home environment for safety and equipment needs. Perform/Instruct on transfers, ADL training, ROM, and therapeutic exercises.   to evaluate for community resources/long-range planning.  Aide to provide assistance with personal care, ADLs, and vital signs.    Activities:   activity as tolerated    Nursing:   Agency to admit patient within 24 hours of hospital discharge unless specified on physician order or at patient request    SN to complete comprehensive assessment including routine vital signs. Instruct on disease process and s/s of complications to report to MD. Review/verify medication list sent home with the patient at time of discharge  and instruct patient/caregiver as needed. Frequency may be adjusted depending on start of care date.     Skilled nurse to perform up to 3 visits PRN for symptoms related to diagnosis    Notify MD if SBP > 160 or < 90; DBP > 90 or < 50; HR > 120 or < 50; Temp > 101; O2 < 88%.    Ok to schedule additional visits based on staff availability and patient request on consecutive days within the home health episode.    When multiple disciplines ordered:    Start of Care occurs on Sunday -  Wednesday schedule remaining discipline evaluations as ordered on separate consecutive days following the start of care.    Thursday SOC -schedule subsequent evaluations Friday and Monday the following week.     Friday - Saturday SOC - schedule subsequent discipline evaluations on consecutive days starting Monday of the following week.    For all post-discharge communication and subsequent orders please contact patient's primary care physician.       Miscellaneous   Routine Skin for Bedridden Patients: Instruct patient/caregiver to apply moisture barrier cream to all skin folds and wet areas in perineal area daily and after baths and all bowel movements.      I certify that this patient is confined to his home and needs intermittent skilled nursing care, physical therapy and occupational therapy.

## 2022-05-26 NOTE — PROGRESS NOTES
[facility]                    Pharmacy       Discharge Medication Education      The following medications have been reviewed during this admission.        Medication List        START taking these medications      pulse oximeter device  Commonly known as: pulse oximeter  by Apply Externally route 2 (two) times a day. Use twice daily at 8 AM and 3 PM and record the value in MyChart as directed.            CONTINUE taking these medications      amlodipine-benazepril 5-10 mg 5-10 mg per capsule  Commonly known as: LOTREL     donepeziL 10 MG tablet  Commonly known as: ARICEPT  Take 1 tablet (10 mg total) by mouth every evening.     furosemide 40 MG tablet  Commonly known as: LASIX     hydrALAZINE 50 MG tablet  Commonly known as: APRESOLINE  Take 1 tablet (50 mg total) by mouth every 8 (eight) hours.     lovastatin 20 MG tablet  Commonly known as: MEVACOR     QUEtiapine 25 MG Tab  Commonly known as: SEROQUEL  Take 1 tablet (25 mg total) by mouth every evening.     tamsulosin 0.4 mg Cap  Commonly known as: FLOMAX  Take 1 capsule (0.4 mg total) by mouth once daily.               Where to Get Your Medications        These medications were sent to [facility]  [address] [place] [address]      Hours: Mon-Fri, 8a-5:30p Phone: [phone]   pulse oximeter device          Thank you  Kenyon Herrera, PharmD  [phone]

## 2022-05-26 NOTE — DISCHARGE SUMMARY
[facility] Medicine  Discharge Summary      Patient Name: Timothy Roldan  MRN: [id number]  Patient Class: IP- Inpatient  Admission Date: 5/21/2022  Hospital Length of Stay: 4 days  Discharge Date and Time:  05/26/2022 3:44 PM  Attending Physician: Marge Szymanski MD   Discharging Provider: Marge Szymanski MD  Primary Care Provider: Martine Garces MD      HPI:   Timothy Roldan is an 83 y/o M with PMH of dementia, afib not on AC. Alzheimer's disease (+/- Parkinson?), HTN, BPH presents for shortness of breath.  Patient tachycardic, hypoxic on room air.  Stable BP, afebrile. +accessory mm use. Tachypneic. Rales at right.  No lower extremity edema.  EKG AFib at 104 beats per minute, normal intervals, no STEMI. +COVID. +d-dimer. BNP mildly elevated. CXR bilateral pulm opacities with pleural effusion. Ordered lasix, decadron. D-dimer elevated, V/Q can be obtained at OSH on pt's arrival, place order (do not have CT contrast during national CT contrast shortage).  Recommended If patient decompensates would give tpa, but held pending . LLE USS with no evidence of deep venous thrombosis in the left thigh. Admitted with COVID 19 to r/o PE. Transferred from [facility] due to facility not admitting COVID pts at this time unless require specialty only available at this facility.        * No surgery found *      Hospital Course:   Mr. Roldan presented with shortness of breath.  Admitted for COVID-19 pneumonia inducing respiratory failure.  Isolation protocol in place.  Treatment initiated with remdesivir, dexamethasone, and O2 for support.  Patient completed full 5 days of remdesivir and 6 days of dexamethasone.  Oxygen was weaned to room air.  O2 assessment done with activity and he had no desaturations with sats of 93% on room air with activity.  Patient worked with PT and OT during hospitalization and home health arranged.  Patient has 24 hour care arranged at his assisted living facility.  Information communicated  to son and healthcare power .  Follow-up arranged with primary care physician.    Acute hypoxemic respiratory failure due to COVID-19  - D-dimer 2.11and if >3, will need to consider full anticoagulation if indicated. Switch heparin gtt to prophylaxis dose  - Management: per [facility] COVID Treatment Protocol    - Monitoring:          - Telemetry & Continuous Pulse Oximetry    - Nutrition:           - Multivitamin PO daily          - Add Boost supplement          - Vitamin D 1000IU daily if deficient          - Ascorbic acid 500mg PO bid    - Supportive Care:          - acetaminophen 650mg PO Q6hr PRN fever/headache          - loperamide PRN viral diarrhea          - IVF if indicated, restrictive strategy preferred, no maintenance IV if able          - VTE PPx: enoxaparin or heparin SQ unless contraindicated    - Investigational Therapies:          - If patient meets criteria  - atorvastatin 40mg po daily after TG levels if needed  - Completed dexamethasone 6 mg po Qday for 6 days and he was no longer hypoxic  - Completed full 5 day course with remdesivir during hospitalization  - Clinically improved, and weaned off O2 with no desats with activity  - COVID monitoring program arranged with home health     Debility  - PT/OT  - Plan for return back to assisted living facility with total care     Paroxysmal atrial fibrillation  - Not on any medications     Alzheimer disease  - Continued Seorquel     Primary hypertension  - Resumed Lasix, Lotrel and hydralazine          Goals of Care Treatment Preferences:  Code Status: Full Code      Consults:   Consults (From admission, onward)        Status Ordering Provider     Inpatient consult to Registered Dietitian/Nutritionist  Once        Provider:  (Not yet assigned)    Completed ANATOLIY ABDALLA     IP consult to case management  Once        Provider:  (Not yet assigned)    Acknowledged ANATOLIY ABDALLA          Service: Hospital Medicine    Final  Active Diagnoses:    Diagnosis Date Noted POA    PRINCIPAL PROBLEM:  Acute hypoxemic respiratory failure due to COVID-19 [U07.1, J96.01] 05/21/2022 Yes    Debility [R53.81] 05/24/2022 Yes    Alzheimer disease [G30.9, F02.80] 12/31/2021 Yes    Paroxysmal atrial fibrillation [I48.0] 12/31/2021 Yes    Primary hypertension [I10] 12/31/2021 Yes      Problems Resolved During this Admission:       Discharged Condition: good    Disposition: Another Health Care Inst*    Follow Up:   Follow-up Information     Martine Garces MD Follow up in 1 week(s).    Specialty: Internal Medicine  Contact information:  [facility] [place] [address]  [phone]             Martine Garces MD Follow up on 6/3/2022.    Specialty: Internal Medicine  Why: Patient has PCP follow up on  6/3/22 at 10:00 am with  Dr. Garces.  Contact information:  [address] [place] [address]  [phone]                       Patient Instructions:      Diet Cardiac     Other restrictions (specify):   Order Comments: Self quarantine until 5/28/22     COVID-19 Surveillance Program     Order Specific Question Answer Comments   Does patient have a smartphone? No    Does patient have the [facility] on their smartphone? No    While in surveillance program, will patient be using home oxygen? No      Activity as tolerated       Significant Diagnostic Studies:     Pending Diagnostic Studies:     None         Medications:  Reconciled Home Medications:      Medication List      START taking these medications    pulse oximeter device  Commonly known as: pulse oximeter  by Apply Externally route 2 (two) times a day. Use twice daily at 8 AM and 3 PM and [facility].        CONTINUE taking these medications    amlodipine-benazepril 5-10 mg 5-10 mg per capsule  Commonly known as: LOTREL  TK 1 C PO D     donepeziL 10 MG tablet  Commonly known as: ARICEPT  Take 1 tablet (10 mg total) by mouth every evening.     furosemide 40 MG  tablet  Commonly known as: LASIX  Take 40 mg by mouth once daily.     hydrALAZINE 50 MG tablet  Commonly known as: APRESOLINE  Take 1 tablet (50 mg total) by mouth every 8 (eight) hours.     lovastatin 20 MG tablet  Commonly known as: MEVACOR  Take 20 mg by mouth every evening.     QUEtiapine 25 MG Tab  Commonly known as: SEROQUEL  Take 1 tablet (25 mg total) by mouth every evening.     tamsulosin 0.4 mg Cap  Commonly known as: FLOMAX  Take 1 capsule (0.4 mg total) by mouth once daily.            Indwelling Lines/Drains at time of discharge:   Lines/Drains/Airways     None                 Time spent on the discharge of patient: 35 minutes         Marge Szymanski MD  Department of Hospital Medicine  [facility]

## 2022-05-26 NOTE — SUBJECTIVE & OBJECTIVE
Interval History: No acute events, reports he feels better today, intermittent cough less and rhinorrhea resolved.  No real shortness of breath.  Currently on 2 L via nasal cannula.    Review of Systems   Constitutional:  Negative for chills and fever.   HENT:  Negative for rhinorrhea.    Respiratory:  Positive for cough.    Cardiovascular:  Negative for chest pain.   Objective:     Vital Signs (Most Recent):  Temp: 97.5 °F (36.4 °C) (05/25/22 1535)  Pulse: 77 (05/25/22 1535)  Resp: 18 (05/25/22 1535)  BP: 131/70 (05/25/22 1535)  SpO2: 95 % (05/25/22 1535) Vital Signs (24h Range):  Temp:  [97.1 °F (36.2 °C)-97.9 °F (36.6 °C)] 97.5 °F (36.4 °C)  Pulse:  [67-82] 77  Resp:  [16-20] 18  SpO2:  [92 %-98 %] 95 %  BP: (124-156)/(66-88) 131/70     Weight: 79.6 kg (175 lb 7.8 oz)  Body mass index is 25.18 kg/m².    Intake/Output Summary (Last 24 hours) at 5/25/2022 1910  Last data filed at 5/25/2022 1500  Gross per 24 hour   Intake --   Output 400 ml   Net -400 ml        Physical Exam  Constitutional:       Appearance: He is well-developed.   HENT:      Head: Normocephalic and atraumatic.   Cardiovascular:      Rate and Rhythm: Normal rate and regular rhythm.      Heart sounds: Normal heart sounds. No murmur heard.    No friction rub. No gallop.   Pulmonary:      Effort: Pulmonary effort is normal.      Breath sounds: Normal breath sounds. No wheezing or rhonchi.   Abdominal:      General: Bowel sounds are normal.      Palpations: Abdomen is soft.      Tenderness: There is no abdominal tenderness.   Musculoskeletal:         General: Normal range of motion.      Cervical back: Neck supple.      Right lower leg: No edema.      Left lower leg: No edema.   Skin:     General: Skin is warm and dry.      Comments: LE with hyperpigment bilaterally, no edema   Neurological:      Mental Status: He is alert and oriented to person, place, and time.   Psychiatric:         Mood and Affect: Mood is not anxious or depressed.         Speech:  Speech normal.         Behavior: Behavior normal.         Thought Content: Thought content normal.       Significant Labs: All pertinent labs within the past 24 hours have been reviewed.    Significant Imaging: I have reviewed all pertinent imaging results/findings within the past 24 hours.

## 2022-05-26 NOTE — PLAN OF CARE
ANNETTE contacted pts Lakesha FLYNN to discuss dc planning.     ANNETTE expressed that pt will dc today and return to assisted living with HH services. ANNETTE expressed that ambulance transport has been scheduled for 2pm. SW expressed that transport maybe delayed and she can call nurses station for status update.     ANNETTE spoke with [facility]. [facility] provided clearance for pt to dc back to assisted living. ANNETTE faxed orders to [facility] for review. ANNETTE sent HH orders to [facility] via [employer]. ANNETTE will continue to follow pt throughout his transitions of care and assist with any dc needs.       Report# [phone]  Transport: Ambulance at 2:00 pm ETA pending  ANNETTE confirmed Assisted Living Address with [facility].     ANNETTE placed transport packet at nursing station by blue folder.     [facility] 6/3/22 @10am w/ Dr. Garces, PCP     05/26/22 4486   Final Note   Assessment Type Final Discharge Note   Anticipated Discharge Disposition [facility]   Hospital Resources/Appts/Education Provided Appointments scheduled by Navigator/Coordinator   Post-Acute Status   Discharge Delays None known at this time

## 2022-05-26 NOTE — CARE UPDATE
[facility] Power of    [phone]   Kalyan Roldan Son   [phone]     Update given and plan to discharge today to both son and HCPA. All questions and concerns were addressed.    JORI Szymanski MD

## 2022-05-26 NOTE — PROGRESS NOTES
[facility] Medicine  Progress Note    Patient Name: Timothy Roldan  MRN: [id number]  Patient Class: IP- Inpatient   Admission Date: 5/21/2022  Length of Stay: 3 days  Attending Physician: Marge Szymanski MD  Primary Care Provider: Martine Garces MD        Subjective:     Principal Problem:Acute hypoxemic respiratory failure due to COVID-19        HPI:  Timothy Roldna is an 81 y/o M with PMH of dementia, afib not on AC. Alzheimer's disease (+/- Parkinson?), HTN, BPH presents for shortness of breath.  Patient tachycardic, hypoxic on room air.  Stable BP, afebrile. +accessory mm use. Tachypneic. Rales at right.  No lower extremity edema.  EKG AFib at 104 beats per minute, normal intervals, no STEMI. +COVID. +d-dimer. BNP mildly elevated. CXR bilateral pulm opacities with pleural effusion. Ordered lasix, decadron. D-dimer elevated, V/Q can be obtained at OSH on pt's arrival, place order (do not have CT contrast during national CT contrast shortage).  Recommended If patient decompensates would give tpa, but held pending . LLE USS with no evidence of deep venous thrombosis in the left thigh. Admitted with COVID 19 to r/o PE. Transferred from [facility] due to facility not admitting COVID pts at this time unless require specialty only available at this facility.        Overview/Hospital Course:  Mr. Roldan present with shortness of breath.  Admitted for COVID-19 pneumonia inducing respiratory failure.  Isolation protocol in place.  Treatment initiated with remdesivir and dexamethasone. O2 for support.      Interval History: No acute events, reports he feels better today, intermittent cough less and rhinorrhea resolved.  No real shortness of breath.  Currently on 2 L via nasal cannula.    Review of Systems   Constitutional:  Negative for chills and fever.   HENT:  Negative for rhinorrhea.    Respiratory:  Positive for cough.    Cardiovascular:  Negative for chest pain.   Objective:     Vital Signs (Most  Recent):  Temp: 97.5 °F (36.4 °C) (05/25/22 1535)  Pulse: 77 (05/25/22 1535)  Resp: 18 (05/25/22 1535)  BP: 131/70 (05/25/22 1535)  SpO2: 95 % (05/25/22 1535) Vital Signs (24h Range):  Temp:  [97.1 °F (36.2 °C)-97.9 °F (36.6 °C)] 97.5 °F (36.4 °C)  Pulse:  [67-82] 77  Resp:  [16-20] 18  SpO2:  [92 %-98 %] 95 %  BP: (124-156)/(66-88) 131/70     Weight: 79.6 kg (175 lb 7.8 oz)  Body mass index is 25.18 kg/m².    Intake/Output Summary (Last 24 hours) at 5/25/2022 1910  Last data filed at 5/25/2022 1500  Gross per 24 hour   Intake --   Output 400 ml   Net -400 ml        Physical Exam  Constitutional:       Appearance: He is well-developed.   HENT:      Head: Normocephalic and atraumatic.   Cardiovascular:      Rate and Rhythm: Normal rate and regular rhythm.      Heart sounds: Normal heart sounds. No murmur heard.    No friction rub. No gallop.   Pulmonary:      Effort: Pulmonary effort is normal.      Breath sounds: Normal breath sounds. No wheezing or rhonchi.   Abdominal:      General: Bowel sounds are normal.      Palpations: Abdomen is soft.      Tenderness: There is no abdominal tenderness.   Musculoskeletal:         General: Normal range of motion.      Cervical back: Neck supple.      Right lower leg: No edema.      Left lower leg: No edema.   Skin:     General: Skin is warm and dry.      Comments: LE with hyperpigment bilaterally, no edema   Neurological:      Mental Status: He is alert and oriented to person, place, and time.   Psychiatric:         Mood and Affect: Mood is not anxious or depressed.         Speech: Speech normal.         Behavior: Behavior normal.         Thought Content: Thought content normal.       Significant Labs: All pertinent labs within the past 24 hours have been reviewed.    Significant Imaging: I have reviewed all pertinent imaging results/findings within the past 24 hours.      Assessment/Plan:      * Acute hypoxemic respiratory failure due to COVID-19  - D-dimer 2.11and if >3, will  need to consider full anticoagulation if indicated. Switch heparin gtt to prophylaxis dose  - Management: per [facility] COVID Treatment Protocol    - Monitoring:          - Telemetry & Continuous Pulse Oximetry    - Nutrition:           - Multivitamin PO daily          - Add Boost supplement          - Vitamin D 1000IU daily if deficient          - Ascorbic acid 500mg PO bid    - Supportive Care:          - acetaminophen 650mg PO Q6hr PRN fever/headache          - loperamide PRN viral diarrhea          - IVF if indicated, restrictive strategy preferred, no maintenance IV if able          - VTE PPx: enoxaparin or heparin SQ unless contraindicated    - Investigational Therapies:          - If patient meets criteria  - atorvastatin 40mg po daily after TG levels if needed  - Continue dexamethasone 6 mg po Qday, on day #5  - Continue remdisivir, on day #5, last dose today  - Clinically improving, O2 weaned to 2L via NC   - Wean O2 as tolerated and will check ambulatory O2 assessment    Debility  - PT/OT  - Plan for return back to assisted living facility with total care    Paroxysmal atrial fibrillation  - Not on medications    Alzheimer disease  - Continue Seorquel    Primary hypertension  - Resumed Lasix, Lotrel and hydralazine      VTE Risk Mitigation (From admission, onward)         Ordered     heparin (porcine) injection 5,000 Units  Every 8 hours         05/21/22 1518                      Marge Szymanski MD  Department of Hospital Medicine   [place] - Telemetry

## 2022-05-26 NOTE — ASSESSMENT & PLAN NOTE
- D-dimer 2.11and if >3, will need to consider full anticoagulation if indicated. Switch heparin gtt to prophylaxis dose  - Management: per [facility] COVID Treatment Protocol    - Monitoring:          - Telemetry & Continuous Pulse Oximetry    - Nutrition:           - Multivitamin PO daily          - Add Boost supplement          - Vitamin D 1000IU daily if deficient          - Ascorbic acid 500mg PO bid    - Supportive Care:          - acetaminophen 650mg PO Q6hr PRN fever/headache          - loperamide PRN viral diarrhea          - IVF if indicated, restrictive strategy preferred, no maintenance IV if able          - VTE PPx: enoxaparin or heparin SQ unless contraindicated    - Investigational Therapies:          - If patient meets criteria  - atorvastatin 40mg po daily after TG levels if needed  - Continue dexamethasone 6 mg po Qday, on day #5  - Continue remdisivir, on day #5, last dose today  - Clinically improving, O2 weaned to 2L via NC   - Wean O2 as tolerated and will check ambulatory O2 assessment

## 2022-05-26 NOTE — NURSING
Home Oxygen Evaluation    Date Performed: 2022    1) Patient's Home O2 Sat on room air, while at rest: 93%        If O2 sats on room air at rest are 88% or below, patient qualifies. No additional testing needed. Document N/A in steps 2 and 3. If 89% or above, complete steps 2.      2) Patient's O2 Sat on room air while exercisin%        If O2 sats on room air while exercising remain 89% or above patient does not qualify, no further testing needed Document N/A in step 3. If O2 sats on room air while exercising are 88% or below, continue to step 3.      3) N/A         (Must show improvement from #2 for patients to qualify)    If O2 sats improve on oxygen, patient qualifies for portable oxygen. If not, the patient does not qualify.

## 2022-05-27 ENCOUNTER — NURSE TRIAGE (OUTPATIENT)
Dept: ADMINISTRATIVE | Facility: CLINIC | Age: 82
End: 2022-05-27
Payer: MEDICARE

## 2022-05-27 NOTE — TELEPHONE ENCOUNTER
Surveillance enrollment - Pt discharged to assisted living and therefor is not a candidate for this program as the pt will already have 24 hour monitoring at this facility. All task removed and encounter routed to ordering provider.      Reason for Disposition   Caller has cancelled the call before the first contact    Protocols used: NO CONTACT OR DUPLICATE CONTACT CALL-A-[place]

## 2022-05-28 ENCOUNTER — NURSE TRIAGE (OUTPATIENT)
Dept: ADMINISTRATIVE | Facility: CLINIC | Age: 82
End: 2022-05-28
Payer: MEDICARE

## 2022-05-29 ENCOUNTER — PATIENT MESSAGE (OUTPATIENT)
Dept: ADMINISTRATIVE | Facility: OTHER | Age: 82
End: 2022-05-29
Payer: MEDICARE

## 2022-05-29 ENCOUNTER — HOSPITAL ENCOUNTER (INPATIENT)
Facility: HOSPITAL | Age: 82
LOS: 2 days | Discharge: HOME HEALTH | DRG: 291 | End: 2022-06-02
Attending: EMERGENCY MEDICINE | Admitting: INTERNAL MEDICINE
Payer: MEDICARE

## 2022-05-29 ENCOUNTER — NURSE TRIAGE (OUTPATIENT)
Dept: ADMINISTRATIVE | Facility: CLINIC | Age: 82
End: 2022-05-29
Payer: MEDICARE

## 2022-05-29 DIAGNOSIS — U07.1 PNEUMONIA DUE TO COVID-19 VIRUS: ICD-10-CM

## 2022-05-29 DIAGNOSIS — U07.1 COVID-19: ICD-10-CM

## 2022-05-29 DIAGNOSIS — N39.0 URINARY TRACT INFECTION WITHOUT HEMATURIA, SITE UNSPECIFIED: ICD-10-CM

## 2022-05-29 DIAGNOSIS — I50.31 ACUTE HEART FAILURE WITH PRESERVED EJECTION FRACTION: Primary | ICD-10-CM

## 2022-05-29 DIAGNOSIS — R07.89 CHEST WALL PAIN: ICD-10-CM

## 2022-05-29 DIAGNOSIS — J12.82 PNEUMONIA DUE TO COVID-19 VIRUS: ICD-10-CM

## 2022-05-29 DIAGNOSIS — R06.02 SHORTNESS OF BREATH: ICD-10-CM

## 2022-05-29 DIAGNOSIS — I50.9 CHF (CONGESTIVE HEART FAILURE): ICD-10-CM

## 2022-05-29 DIAGNOSIS — R07.9 CHEST PAIN: ICD-10-CM

## 2022-05-29 DIAGNOSIS — J90 PLEURAL EFFUSION: ICD-10-CM

## 2022-05-29 LAB
ALBUMIN SERPL BCP-MCNC: 2.9 G/DL (ref 3.5–5.2)
ALP SERPL-CCNC: 76 U/L (ref 55–135)
ALT SERPL W/O P-5'-P-CCNC: 18 U/L (ref 10–44)
ANION GAP SERPL CALC-SCNC: 13 MMOL/L (ref 8–16)
AST SERPL-CCNC: 14 U/L (ref 10–40)
BACTERIA #/AREA URNS AUTO: ABNORMAL /HPF
BASOPHILS # BLD AUTO: 0.02 K/UL (ref 0–0.2)
BASOPHILS NFR BLD: 0.2 % (ref 0–1.9)
BILIRUB SERPL-MCNC: 0.5 MG/DL (ref 0.1–1)
BILIRUB UR QL STRIP: NEGATIVE
BNP SERPL-MCNC: 184 PG/ML (ref 0–99)
BUN SERPL-MCNC: 25 MG/DL (ref 8–23)
CALCIUM SERPL-MCNC: 9 MG/DL (ref 8.7–10.5)
CHLORIDE SERPL-SCNC: 108 MMOL/L (ref 95–110)
CLARITY UR REFRACT.AUTO: ABNORMAL
CO2 SERPL-SCNC: 23 MMOL/L (ref 23–29)
COLOR UR AUTO: ABNORMAL
CREAT SERPL-MCNC: 1.1 MG/DL (ref 0.5–1.4)
CTP QC/QA: YES
DIFFERENTIAL METHOD: ABNORMAL
EOSINOPHIL # BLD AUTO: 0 K/UL (ref 0–0.5)
EOSINOPHIL NFR BLD: 0.4 % (ref 0–8)
ERYTHROCYTE [DISTWIDTH] IN BLOOD BY AUTOMATED COUNT: 13.9 % (ref 11.5–14.5)
EST. GFR  (AFRICAN AMERICAN): >60 ML/MIN/1.73 M^2
EST. GFR  (NON AFRICAN AMERICAN): >60 ML/MIN/1.73 M^2
GLUCOSE SERPL-MCNC: 114 MG/DL (ref 70–110)
GLUCOSE UR QL STRIP: NEGATIVE
HCT VFR BLD AUTO: 41.6 % (ref 40–54)
HGB BLD-MCNC: 13.2 G/DL (ref 14–18)
HGB UR QL STRIP: ABNORMAL
HYALINE CASTS UR QL AUTO: 0 /LPF
IMM GRANULOCYTES # BLD AUTO: 0.05 K/UL (ref 0–0.04)
IMM GRANULOCYTES NFR BLD AUTO: 0.6 % (ref 0–0.5)
KETONES UR QL STRIP: NEGATIVE
LACTATE SERPL-SCNC: 1.6 MMOL/L (ref 0.5–2.2)
LEUKOCYTE ESTERASE UR QL STRIP: ABNORMAL
LIPASE SERPL-CCNC: 48 U/L (ref 4–60)
LYMPHOCYTES # BLD AUTO: 0.8 K/UL (ref 1–4.8)
LYMPHOCYTES NFR BLD: 10.1 % (ref 18–48)
MAGNESIUM SERPL-MCNC: 2.2 MG/DL (ref 1.6–2.6)
MCH RBC QN AUTO: 29.1 PG (ref 27–31)
MCHC RBC AUTO-ENTMCNC: 31.7 G/DL (ref 32–36)
MCV RBC AUTO: 92 FL (ref 82–98)
MICROSCOPIC COMMENT: ABNORMAL
MONOCYTES # BLD AUTO: 0.5 K/UL (ref 0.3–1)
MONOCYTES NFR BLD: 6.4 % (ref 4–15)
NEUTROPHILS # BLD AUTO: 6.7 K/UL (ref 1.8–7.7)
NEUTROPHILS NFR BLD: 82.3 % (ref 38–73)
NITRITE UR QL STRIP: NEGATIVE
NRBC BLD-RTO: 0 /100 WBC
PH UR STRIP: 6 [PH] (ref 5–8)
PLATELET # BLD AUTO: 190 K/UL (ref 150–450)
PMV BLD AUTO: 10.7 FL (ref 9.2–12.9)
POTASSIUM SERPL-SCNC: 3.8 MMOL/L (ref 3.5–5.1)
PROT SERPL-MCNC: 6.7 G/DL (ref 6–8.4)
PROT UR QL STRIP: ABNORMAL
RBC # BLD AUTO: 4.54 M/UL (ref 4.6–6.2)
RBC #/AREA URNS AUTO: 85 /HPF (ref 0–4)
SARS-COV-2 RDRP RESP QL NAA+PROBE: POSITIVE
SODIUM SERPL-SCNC: 144 MMOL/L (ref 136–145)
SP GR UR STRIP: 1.01 (ref 1–1.03)
TROPONIN I SERPL DL<=0.01 NG/ML-MCNC: <0.006 NG/ML (ref 0–0.03)
TSH SERPL DL<=0.005 MIU/L-ACNC: 1.1 UIU/ML (ref 0.4–4)
URN SPEC COLLECT METH UR: ABNORMAL
WBC # BLD AUTO: 8.14 K/UL (ref 3.9–12.7)
WBC #/AREA URNS AUTO: >100 /HPF (ref 0–5)
WBC CLUMPS UR QL AUTO: ABNORMAL

## 2022-05-29 PROCEDURE — 25000003 PHARM REV CODE 250

## 2022-05-29 PROCEDURE — 99291 CRITICAL CARE FIRST HOUR: CPT | Mod: CR,CS,, | Performed by: EMERGENCY MEDICINE

## 2022-05-29 PROCEDURE — 83880 ASSAY OF NATRIURETIC PEPTIDE: CPT

## 2022-05-29 PROCEDURE — 83735 ASSAY OF MAGNESIUM: CPT

## 2022-05-29 PROCEDURE — U0002 COVID-19 LAB TEST NON-CDC: HCPCS

## 2022-05-29 PROCEDURE — 80053 COMPREHEN METABOLIC PANEL: CPT

## 2022-05-29 PROCEDURE — 93010 EKG 12-LEAD: ICD-10-PCS | Mod: ,,, | Performed by: INTERNAL MEDICINE

## 2022-05-29 PROCEDURE — 99291 CRITICAL CARE FIRST HOUR: CPT | Mod: 25

## 2022-05-29 PROCEDURE — 63600175 PHARM REV CODE 636 W HCPCS

## 2022-05-29 PROCEDURE — 83605 ASSAY OF LACTIC ACID: CPT

## 2022-05-29 PROCEDURE — 84484 ASSAY OF TROPONIN QUANT: CPT

## 2022-05-29 PROCEDURE — 96366 THER/PROPH/DIAG IV INF ADDON: CPT

## 2022-05-29 PROCEDURE — 83690 ASSAY OF LIPASE: CPT

## 2022-05-29 PROCEDURE — 93010 ELECTROCARDIOGRAM REPORT: CPT | Mod: ,,, | Performed by: INTERNAL MEDICINE

## 2022-05-29 PROCEDURE — 85025 COMPLETE CBC W/AUTO DIFF WBC: CPT

## 2022-05-29 PROCEDURE — 84443 ASSAY THYROID STIM HORMONE: CPT

## 2022-05-29 PROCEDURE — 87040 BLOOD CULTURE FOR BACTERIA: CPT

## 2022-05-29 PROCEDURE — 93005 ELECTROCARDIOGRAM TRACING: CPT

## 2022-05-29 PROCEDURE — 99291 PR CRITICAL CARE, E/M 30-74 MINUTES: ICD-10-PCS | Mod: CR,CS,, | Performed by: EMERGENCY MEDICINE

## 2022-05-29 PROCEDURE — 96365 THER/PROPH/DIAG IV INF INIT: CPT

## 2022-05-29 PROCEDURE — 87086 URINE CULTURE/COLONY COUNT: CPT

## 2022-05-29 PROCEDURE — 25000242 PHARM REV CODE 250 ALT 637 W/ HCPCS

## 2022-05-29 PROCEDURE — 96367 TX/PROPH/DG ADDL SEQ IV INF: CPT

## 2022-05-29 PROCEDURE — 96368 THER/DIAG CONCURRENT INF: CPT

## 2022-05-29 PROCEDURE — 94640 AIRWAY INHALATION TREATMENT: CPT

## 2022-05-29 PROCEDURE — 96375 TX/PRO/DX INJ NEW DRUG ADDON: CPT

## 2022-05-29 PROCEDURE — 81001 URINALYSIS AUTO W/SCOPE: CPT

## 2022-05-29 RX ORDER — CEFEPIME HYDROCHLORIDE 1 G/50ML
2 INJECTION, SOLUTION INTRAVENOUS
Status: DISCONTINUED | OUTPATIENT
Start: 2022-05-30 | End: 2022-05-30

## 2022-05-29 RX ORDER — CEFEPIME HYDROCHLORIDE 1 G/50ML
2 INJECTION, SOLUTION INTRAVENOUS
Status: DISCONTINUED | OUTPATIENT
Start: 2022-05-30 | End: 2022-05-29

## 2022-05-29 RX ORDER — HYDRALAZINE HYDROCHLORIDE 25 MG/1
50 TABLET, FILM COATED ORAL EVERY 8 HOURS
Status: DISCONTINUED | OUTPATIENT
Start: 2022-05-29 | End: 2022-05-31

## 2022-05-29 RX ORDER — CEFEPIME HYDROCHLORIDE 1 G/50ML
2 INJECTION, SOLUTION INTRAVENOUS
Status: COMPLETED | OUTPATIENT
Start: 2022-05-29 | End: 2022-05-29

## 2022-05-29 RX ORDER — FUROSEMIDE 10 MG/ML
60 INJECTION INTRAMUSCULAR; INTRAVENOUS
Status: COMPLETED | OUTPATIENT
Start: 2022-05-29 | End: 2022-05-30

## 2022-05-29 RX ORDER — QUETIAPINE FUMARATE 25 MG/1
25 TABLET, FILM COATED ORAL NIGHTLY
Status: DISCONTINUED | OUTPATIENT
Start: 2022-05-29 | End: 2022-06-02 | Stop reason: HOSPADM

## 2022-05-29 RX ORDER — CEFEPIME HYDROCHLORIDE 1 G/50ML
2 INJECTION, SOLUTION INTRAVENOUS
Status: DISCONTINUED | OUTPATIENT
Start: 2022-05-29 | End: 2022-05-29

## 2022-05-29 RX ORDER — IPRATROPIUM BROMIDE AND ALBUTEROL SULFATE 2.5; .5 MG/3ML; MG/3ML
3 SOLUTION RESPIRATORY (INHALATION)
Status: COMPLETED | OUTPATIENT
Start: 2022-05-29 | End: 2022-05-29

## 2022-05-29 RX ORDER — DONEPEZIL HYDROCHLORIDE 10 MG/1
10 TABLET, FILM COATED ORAL NIGHTLY
Status: DISCONTINUED | OUTPATIENT
Start: 2022-05-29 | End: 2022-06-02 | Stop reason: HOSPADM

## 2022-05-29 RX ADMIN — CEFEPIME HYDROCHLORIDE 2 G: 2 INJECTION, SOLUTION INTRAVENOUS at 06:05

## 2022-05-29 RX ADMIN — VANCOMYCIN HYDROCHLORIDE 2000 MG: 500 INJECTION, POWDER, LYOPHILIZED, FOR SOLUTION INTRAVENOUS at 06:05

## 2022-05-29 RX ADMIN — IPRATROPIUM BROMIDE AND ALBUTEROL SULFATE 3 ML: 2.5; .5 SOLUTION RESPIRATORY (INHALATION) at 04:05

## 2022-05-29 NOTE — TELEPHONE ENCOUNTER
Pt noted to be in the ED currently. No further outreach at this time.    Reason for Disposition   Caller has cancelled the call before the first contact    Protocols used: NO CONTACT OR DUPLICATE CONTACT CALL-A-AH

## 2022-05-29 NOTE — TELEPHONE ENCOUNTER
Return call per CG request via PES agent - CG, Lakesha Ohara reports pt was ordered Covid surveillance program but she can no longer find task in my chart oscar. On chart review NT notes all task removed as pt was discharged to assisted living facility. CG advised all task were removed per program protocol as pt is not a candidate for this program if currently admitted to inpatient facility. CG reports pt is at a assisted living private care home with 24 hour CNA service but not 24 hour RN/MD monitoring. NT able to return task to pt my chart to do list. LM on CG listed mobile notifying of task re instatement and spoke with pt son Kalyan and notified of above.

## 2022-05-29 NOTE — ED PROVIDER NOTES
Encounter Date: 5/29/2022       History     Chief Complaint   Patient presents with    Chest Pain    Shortness of Breath     Covid + on 5/21/22       81 y/o M PMHx Alzheimers, HTN, Afib (not on AC), recent COVID PNA presenting with worsening CP and SOB for the last 4 days. He describes his CP as isolated to his right chest wall and is worse with inspiration and cough. Denies any position changes or worsening with exertion. He has also developed a worsening cough although denies any sputum production. He has also had some URI sxs with congestion and runny nose. He was recently admitted on 5/21 for COVID PNA, received remdesivir and dex, improved and discharged to rehab facility. He also reports some generalized abdominal pain. Denies any fevers, chills, N/V, diarrhea, headaches, numbness and tingling of extremities.        Review of patient's allergies indicates:  No Known Allergies  Past Medical History:   Diagnosis Date    Hyperlipidemia     Hypertension      Past Surgical History:   Procedure Laterality Date    TONSILLECTOMY      TOTAL KNEE ARTHROPLASTY       No family history on file.  Social History     Tobacco Use    Smoking status: Never Smoker    Smokeless tobacco: Never Used     Review of Systems   Constitutional: Positive for activity change. Negative for chills, fatigue and fever.   HENT: Positive for congestion and rhinorrhea. Negative for sore throat.    Respiratory: Positive for cough, chest tightness and shortness of breath.    Cardiovascular: Positive for chest pain and leg swelling.   Gastrointestinal: Positive for abdominal pain and constipation. Negative for diarrhea, nausea and vomiting.   Genitourinary: Positive for dysuria. Negative for frequency and urgency.   Musculoskeletal: Negative for arthralgias and neck pain.   Neurological: Positive for weakness. Negative for dizziness, numbness and headaches.       Physical Exam     Initial Vitals   BP Pulse Resp Temp SpO2   05/29/22 1559  05/29/22 1559 05/29/22 1559 05/29/22 1602 05/29/22 1559   (!) 142/75 90 (!) 24 98.2 °F (36.8 °C) 95 %      MAP       --                Physical Exam    Constitutional:   Frail and ill appearing, bruising along arms b/l   HENT:   Head: Normocephalic and atraumatic.   Mouth/Throat: Oropharynx is clear and moist.   Eyes: Conjunctivae and EOM are normal. No scleral icterus.   Neck: Neck supple. JVD (up to lower neck) present.   Normal range of motion.  Cardiovascular: Normal rate, regular rhythm, normal heart sounds and intact distal pulses. Exam reveals no gallop and no friction rub.    No murmur heard.  Pulmonary/Chest: No respiratory distress. He has wheezes (diffuse mild expiratory). He has no rhonchi. He has no rales. He exhibits tenderness (Right chest wall).   Decreased breath sounds, poor effort   Abdominal: Abdomen is soft. Bowel sounds are normal. He exhibits no distension. There is no abdominal tenderness. There is no rebound and no guarding.   Musculoskeletal:         General: Tenderness (b/l LE) and edema (1+ pitting edema b/l) present. Normal range of motion.      Cervical back: Normal range of motion and neck supple.      Comments: B/l venous stasis dermatitis     Neurological: He is alert and oriented to person, place, and time. No cranial nerve deficit.   Skin: Skin is warm and dry. Rash noted. There is erythema.   Psychiatric: He has a normal mood and affect. Thought content normal.         ED Course   Procedures  Labs Reviewed   CBC W/ AUTO DIFFERENTIAL - Abnormal; Notable for the following components:       Result Value    RBC 4.54 (*)     Hemoglobin 13.2 (*)     MCHC 31.7 (*)     Immature Granulocytes 0.6 (*)     Immature Grans (Abs) 0.05 (*)     Lymph # 0.8 (*)     Gran % 82.3 (*)     Lymph % 10.1 (*)     All other components within normal limits   COMPREHENSIVE METABOLIC PANEL - Abnormal; Notable for the following components:    Glucose 114 (*)     BUN 25 (*)     Albumin 2.9 (*)     All other  components within normal limits   URINALYSIS, REFLEX TO URINE CULTURE - Abnormal; Notable for the following components:    Appearance, UA Cloudy (*)     Protein, UA 2+ (*)     Occult Blood UA 2+ (*)     Leukocytes, UA 3+ (*)     All other components within normal limits    Narrative:     Specimen Source->Urine   B-TYPE NATRIURETIC PEPTIDE - Abnormal; Notable for the following components:     (*)     All other components within normal limits   URINALYSIS MICROSCOPIC - Abnormal; Notable for the following components:    RBC, UA 85 (*)     WBC, UA >100 (*)     WBC Clumps, UA Few (*)     Bacteria Many (*)     All other components within normal limits    Narrative:     Specimen Source->Urine   SARS-COV-2 RDRP GENE - Abnormal; Notable for the following components:    POC Rapid COVID Positive (*)     All other components within normal limits    Narrative:     This test utilizes isothermal nucleic acid amplification   technology to detect the SARS-CoV-2 RdRp nucleic acid segment.   The analytical sensitivity (limit of detection) is 125 genome   equivalents/mL.   A POSITIVE result implies infection with the SARS-CoV-2 virus;   the patient is presumed to be contagious.     A NEGATIVE result means that SARS-CoV-2 nucleic acids are not   present above the limit of detection. A NEGATIVE result should be   treated as presumptive. It does not rule out the possibility of   COVID-19 and should not be the sole basis for treatment decisions.   If COVID-19 is strongly suspected based on clinical and exposure   history, re-testing using an alternate molecular assay should be   considered.   This test is only for use under the Food and Drug   Administration s Emergency Use Authorization (EUA).   Commercial kits are provided by [employer].   Performance characteristics of the EUA have been independently   verified by [facility] Department of   Pathology and Laboratory Medicine.    _________________________________________________________________   The authorized Fact Sheet for Healthcare Providers and the authorized Fact   Sheet for Patients of the ID NOW COVID-19 are available on the FDA   website:     https://www.fda.gov/media/038431/download  https://www.fda.gov/media/340679/download           INFLUENZA A & B BY MOLECULAR   CULTURE, BLOOD   CULTURE, BLOOD   CULTURE, URINE   LACTIC ACID, PLASMA   LIPASE   MAGNESIUM   TROPONIN I   TSH   POCT GLUCOSE MONITORING CONTINUOUS     EKG Readings: (Independently Interpreted)   Initial Reading: No STEMI. Previous EKG: Compared with most recent EKG Rhythm: Atrial Fibrillation. Ectopy: No Ectopy. Conduction: Normal. ST Segments: Normal ST Segments. T Waves: Normal. Clinical Impression: Normal Sinus Rhythm     ECG Results          EKG 12-lead (In process)  Result time 05/29/22 20:09:17    In process by Interface, Lab In [facility] (05/29/22 20:09:17)                 Narrative:    Test Reason : R07.9,    Vent. Rate : 086 BPM     Atrial Rate : 091 BPM     P-R Int : 000 ms          QRS Dur : 106 ms      QT Int : 378 ms       P-R-T Axes : 000 069 058 degrees     QTc Int : 452 ms    Atrial fibrillation  Incomplete right bundle branch block  Abnormal ECG  When compared with ECG of 21-MAY-2022 13:35,  No significant change was found    Referred By: [email or web address]   SELF           Confirmed By:                             Imaging Results          X-Ray Chest AP Portable (Final result)  Result time 05/29/22 17:33:11    Final result by Maxi De La Paz MD (05/29/22 17:33:11)                 Impression:      1. Findings suggesting pulmonary edema and bilateral pleural effusions.      Electronically signed by: Maxi De La Paz MD  Date:    05/29/2022  Time:    17:33             Narrative:    EXAMINATION:  XR CHEST AP PORTABLE    CLINICAL HISTORY:  Chest pain;    TECHNIQUE:  Single frontal view of the chest was performed.    COMPARISON:  05/21/2022    FINDINGS:  The  cardiomediastinal silhouette is prominent, similar to the previous examination noting calcification of the aorta..  There is obscuration of the bilateral costophrenic angles suggesting effusions, worsened on the right, improved on the left..  The trachea is midline.  The lungs are symmetrically expanded bilaterally with prominent central hilar interstitial attenuation suggesting congestive change and edema..  No large focal consolidation seen.  There is no pneumothorax.  The osseous structures are remarkable for degenerative changes..                              X-Rays:   Independently Interpreted Readings:   Other Readings:  B/l pleural effusions, cannot r/o RLL infiltrate. Vascular congestion. Appears worsened from previous.     Medications   vancomycin 2 g in dextrose 5 % 500 mL IVPB (2,000 mg Intravenous New Bag 5/29/22 1833)   furosemide injection 60 mg (has no administration in time range)   albuterol-ipratropium 2.5 mg-0.5 mg/3 mL nebulizer solution 3 mL (3 mLs Nebulization Given 5/29/22 1640)   cefepime in dextrose 5 % IVPB 2 g (2 g Intravenous New Bag 5/29/22 1841)     Medical Decision Making:   Initial Assessment:   83 y/o M with Alzheimer's, Afib, HTN, recent COVID PNA presenting from rehab facility with worsening right sided chest pain and SOB.  Differential Diagnosis:   MI, CHF, Pericarditis, PNA, Pneumothorax, Costochondritis, COPD, Asthma, Pleural effusion, UTI  Clinical Tests:   Lab Tests: Ordered and Reviewed  Radiological Study: Ordered and Reviewed  Medical Tests: Ordered and Reviewed  ED Management:  Patient presenting with 4 day history of right sided chest pain and SOB, no on home oxygen, satting well ORA. Appears mildly volume overloaded with JVD and b/l LE edema. Mild wheezing and poor inspiratory effort. Given duo nebs. Suspect PNA superimposed on recent COVID infection given cough and URI sxs. Also complaining of some dysuria and generalized abdominal pain although belly nontender. UA with  UTI. Do not suspect cardiac etiology given non worsening with exertion, EKG with no ischemic changes. Troponin wnl. BNP at baseline. CXR with b/l pleural effusions, cannot r/o RLL infiltrate. Also with vascular congestion. Given IV Lasix 60 x1. Given IV Vanc/Cefepime. COVID +. Transferred to another facility given COVID+ status.  Other:   I have discussed this case with another health care provider.       <> Summary of the Discussion: Case discussed with hospital medicine.                      Clinical Impression:   Final diagnoses:  [R07.9] Chest pain (Primary)  [R07.89] Chest wall pain  [J90] Pleural effusion  [R06.02] Shortness of breath  [U07.1] COVID-19  [N39.0] Urinary tract infection without hematuria, site unspecified          ED Disposition Condition    Transfer to Another Facility Stable                   Yousuf Drake MD  Resident  05/29/22 2019       Yousuf Drake MD  Resident  05/29/22 2028

## 2022-05-29 NOTE — ED NOTES
Patient identifiers for Timothy Roldan 82 y.o. male checked and correct.  Chief Complaint   Patient presents with    Chest Pain    Shortness of Breath     Covid + on 5/21/22       Past Medical History:   Diagnosis Date    Hyperlipidemia     Hypertension      Allergies reported: Review of patient's allergies indicates:  No Known Allergies      LOC: Patient is awake, alert, and  oriented x 4 and speaking appropriately.Daughter states pt has been covid positive 10 days  APPEARANCE: Patient resting comfortably and in no acute distress.  HEENT: Pt presents with surgical mask on.   SKIN: The skin is warm and dry.   MUSKULOSKELETAL: Patient is moving all extremities well   RESPIRATORY: BBS-clear. Respirations even and non-labored with normal effort and rate.  CARDIAC: cardiac monitor applied to chestwall(NSR-77) No peripheral edema noted.   ABDOMEN: Soft and non-distended.  NEUROLOGICA, PERRL.

## 2022-05-30 PROBLEM — J12.82 PNEUMONIA DUE TO COVID-19 VIRUS: Status: ACTIVE | Noted: 2022-05-21

## 2022-05-30 LAB
APTT BLDCRRT: 22.6 SEC (ref 21–32)
ASCENDING AORTA: 3.26 CM
AV INDEX (PROSTH): 0.6
AV MEAN GRADIENT: 16 MMHG
AV PEAK GRADIENT: 24 MMHG
AV VALVE AREA: 1.94 CM2
AV VELOCITY RATIO: 0.49
BACTERIA UR CULT: NORMAL
BACTERIA UR CULT: NORMAL
BSA FOR ECHO PROCEDURE: 1.98 M2
CK SERPL-CCNC: 63 U/L (ref 20–200)
CV ECHO LV RWT: 0.58 CM
D DIMER PPP IA.FEU-MCNC: 5.41 MG/L FEU
DOP CALC AO PEAK VEL: 2.44 M/S
DOP CALC AO VTI: 40.48 CM
DOP CALC LVOT AREA: 3.2 CM2
DOP CALC LVOT DIAMETER: 2.03 CM
DOP CALC LVOT PEAK VEL: 1.19 M/S
DOP CALC LVOT STROKE VOLUME: 78.61 CM3
DOP CALCLVOT PEAK VEL VTI: 24.3 CM
E WAVE DECELERATION TIME: 221.82 MSEC
E/A RATIO: 2
ECHO LV POSTERIOR WALL: 1.2 CM (ref 0.6–1.1)
EJECTION FRACTION: 70 %
ERYTHROCYTE [SEDIMENTATION RATE] IN BLOOD BY WESTERGREN METHOD: 46 MM/HR (ref 0–23)
FERRITIN SERPL-MCNC: 205 NG/ML (ref 20–300)
FRACTIONAL SHORTENING: 41 % (ref 28–44)
INR PPP: 1.1 (ref 0.8–1.2)
INTERVENTRICULAR SEPTUM: 1.2 CM (ref 0.6–1.1)
LA MAJOR: 5.5 CM
LDH SERPL L TO P-CCNC: 221 U/L (ref 110–260)
LEFT ATRIUM SIZE: 4.47 CM
LEFT INTERNAL DIMENSION IN SYSTOLE: 2.44 CM (ref 2.1–4)
LEFT VENTRICLE DIASTOLIC VOLUME INDEX: 39.1 ML/M2
LEFT VENTRICLE DIASTOLIC VOLUME: 77.03 ML
LEFT VENTRICLE MASS INDEX: 89 G/M2
LEFT VENTRICLE SYSTOLIC VOLUME INDEX: 10.7 ML/M2
LEFT VENTRICLE SYSTOLIC VOLUME: 21.08 ML
LEFT VENTRICULAR INTERNAL DIMENSION IN DIASTOLE: 4.16 CM (ref 3.5–6)
LEFT VENTRICULAR MASS: 175.58 G
LV LATERAL E/E' RATIO: 9.8 M/S
MV PEAK A VEL: 0.49 M/S
MV PEAK E VEL: 0.98 M/S
MV STENOSIS PRESSURE HALF TIME: 64.33 MS
MV VALVE AREA P 1/2 METHOD: 3.42 CM2
PISA TR MAX VEL: 2.31 M/S
PROCALCITONIN SERPL IA-MCNC: 0.04 NG/ML
PROTHROMBIN TIME: 11.3 SEC (ref 9–12.5)
PV PEAK VELOCITY: 1.01 CM/S
RA MAJOR: 6.51 CM
RA PRESSURE: 8 MMHG
RA WIDTH: 4.04 CM
RIGHT VENTRICULAR END-DIASTOLIC DIMENSION: 4.2 CM
RV TISSUE DOPPLER FREE WALL SYSTOLIC VELOCITY 1 (APICAL 4 CHAMBER VIEW): 12.96 CM/S
STJ: 3.14 CM
TDI LATERAL: 0.1 M/S
TR MAX PG: 21 MMHG
TRICUSPID ANNULAR PLANE SYSTOLIC EXCURSION: 2.57 CM
TV REST PULMONARY ARTERY PRESSURE: 29 MMHG

## 2022-05-30 PROCEDURE — 63600175 PHARM REV CODE 636 W HCPCS: Performed by: INTERNAL MEDICINE

## 2022-05-30 PROCEDURE — 85379 FIBRIN DEGRADATION QUANT: CPT | Performed by: INTERNAL MEDICINE

## 2022-05-30 PROCEDURE — G0378 HOSPITAL OBSERVATION PER HR: HCPCS

## 2022-05-30 PROCEDURE — 85610 PROTHROMBIN TIME: CPT | Performed by: INTERNAL MEDICINE

## 2022-05-30 PROCEDURE — 25000003 PHARM REV CODE 250: Performed by: INTERNAL MEDICINE

## 2022-05-30 PROCEDURE — 27000221 HC OXYGEN, UP TO 24 HOURS

## 2022-05-30 PROCEDURE — 25000242 PHARM REV CODE 250 ALT 637 W/ HCPCS: Performed by: INTERNAL MEDICINE

## 2022-05-30 PROCEDURE — 83615 LACTATE (LD) (LDH) ENZYME: CPT | Performed by: INTERNAL MEDICINE

## 2022-05-30 PROCEDURE — 85730 THROMBOPLASTIN TIME PARTIAL: CPT | Performed by: INTERNAL MEDICINE

## 2022-05-30 PROCEDURE — 63700000 PHARM REV CODE 250 ALT 637 W/O HCPCS: Performed by: INTERNAL MEDICINE

## 2022-05-30 PROCEDURE — 94761 N-INVAS EAR/PLS OXIMETRY MLT: CPT

## 2022-05-30 PROCEDURE — 82550 ASSAY OF CK (CPK): CPT | Performed by: INTERNAL MEDICINE

## 2022-05-30 PROCEDURE — 82728 ASSAY OF FERRITIN: CPT | Performed by: INTERNAL MEDICINE

## 2022-05-30 PROCEDURE — 84145 PROCALCITONIN (PCT): CPT | Performed by: INTERNAL MEDICINE

## 2022-05-30 PROCEDURE — 63600175 PHARM REV CODE 636 W HCPCS

## 2022-05-30 PROCEDURE — 94640 AIRWAY INHALATION TREATMENT: CPT | Mod: XB

## 2022-05-30 PROCEDURE — 25000003 PHARM REV CODE 250: Performed by: EMERGENCY MEDICINE

## 2022-05-30 PROCEDURE — 96366 THER/PROPH/DIAG IV INF ADDON: CPT | Performed by: EMERGENCY MEDICINE

## 2022-05-30 PROCEDURE — 96372 THER/PROPH/DIAG INJ SC/IM: CPT | Performed by: INTERNAL MEDICINE

## 2022-05-30 PROCEDURE — 85652 RBC SED RATE AUTOMATED: CPT | Performed by: INTERNAL MEDICINE

## 2022-05-30 RX ORDER — BENZONATATE 100 MG/1
100 CAPSULE ORAL 3 TIMES DAILY PRN
Status: DISCONTINUED | OUTPATIENT
Start: 2022-05-30 | End: 2022-06-02 | Stop reason: HOSPADM

## 2022-05-30 RX ORDER — ALBUTEROL SULFATE 90 UG/1
2 AEROSOL, METERED RESPIRATORY (INHALATION) EVERY 6 HOURS
Status: DISCONTINUED | OUTPATIENT
Start: 2022-05-30 | End: 2022-05-30

## 2022-05-30 RX ORDER — ATORVASTATIN CALCIUM 10 MG/1
20 TABLET, FILM COATED ORAL NIGHTLY
Status: DISCONTINUED | OUTPATIENT
Start: 2022-05-30 | End: 2022-06-02 | Stop reason: HOSPADM

## 2022-05-30 RX ORDER — GLUCAGON 1 MG
1 KIT INJECTION
Status: DISCONTINUED | OUTPATIENT
Start: 2022-05-30 | End: 2022-06-02 | Stop reason: HOSPADM

## 2022-05-30 RX ORDER — TAMSULOSIN HYDROCHLORIDE 0.4 MG/1
0.4 CAPSULE ORAL DAILY
Status: DISCONTINUED | OUTPATIENT
Start: 2022-05-30 | End: 2022-06-02 | Stop reason: HOSPADM

## 2022-05-30 RX ORDER — ASCORBIC ACID 500 MG
500 TABLET ORAL 2 TIMES DAILY
Status: DISCONTINUED | OUTPATIENT
Start: 2022-05-30 | End: 2022-06-02 | Stop reason: HOSPADM

## 2022-05-30 RX ORDER — AZITHROMYCIN 250 MG/1
500 TABLET, FILM COATED ORAL
Status: COMPLETED | OUTPATIENT
Start: 2022-05-30 | End: 2022-06-01

## 2022-05-30 RX ORDER — ACETAMINOPHEN 325 MG/1
650 TABLET ORAL EVERY 4 HOURS PRN
Status: DISCONTINUED | OUTPATIENT
Start: 2022-05-30 | End: 2022-06-02 | Stop reason: HOSPADM

## 2022-05-30 RX ORDER — LISINOPRIL 2.5 MG/1
2.5 TABLET ORAL DAILY
Status: DISCONTINUED | OUTPATIENT
Start: 2022-05-30 | End: 2022-06-02 | Stop reason: HOSPADM

## 2022-05-30 RX ORDER — IBUPROFEN 200 MG
16 TABLET ORAL
Status: DISCONTINUED | OUTPATIENT
Start: 2022-05-30 | End: 2022-06-02 | Stop reason: HOSPADM

## 2022-05-30 RX ORDER — MEMANTINE HYDROCHLORIDE 10 MG/1
2 TABLET ORAL DAILY
Status: ON HOLD | COMMUNITY
Start: 2022-05-25 | End: 2023-01-28 | Stop reason: HOSPADM

## 2022-05-30 RX ORDER — FUROSEMIDE 10 MG/ML
INJECTION INTRAMUSCULAR; INTRAVENOUS
Status: DISCONTINUED
Start: 2022-05-30 | End: 2022-05-30 | Stop reason: WASHOUT

## 2022-05-30 RX ORDER — IBUPROFEN 200 MG
24 TABLET ORAL
Status: DISCONTINUED | OUTPATIENT
Start: 2022-05-30 | End: 2022-06-02 | Stop reason: HOSPADM

## 2022-05-30 RX ORDER — ONDANSETRON 2 MG/ML
4 INJECTION INTRAMUSCULAR; INTRAVENOUS EVERY 8 HOURS PRN
Status: DISCONTINUED | OUTPATIENT
Start: 2022-05-30 | End: 2022-06-02 | Stop reason: HOSPADM

## 2022-05-30 RX ORDER — CEFEPIME HYDROCHLORIDE 1 G/50ML
1 INJECTION, SOLUTION INTRAVENOUS
Status: DISCONTINUED | OUTPATIENT
Start: 2022-05-30 | End: 2022-06-01

## 2022-05-30 RX ORDER — SODIUM CHLORIDE 0.9 % (FLUSH) 0.9 %
10 SYRINGE (ML) INJECTION
Status: DISCONTINUED | OUTPATIENT
Start: 2022-05-30 | End: 2022-06-02 | Stop reason: HOSPADM

## 2022-05-30 RX ORDER — AMLODIPINE BESYLATE 5 MG/1
5 TABLET ORAL DAILY
Status: DISCONTINUED | OUTPATIENT
Start: 2022-05-30 | End: 2022-06-01

## 2022-05-30 RX ORDER — FUROSEMIDE 40 MG/1
40 TABLET ORAL DAILY
Status: DISCONTINUED | OUTPATIENT
Start: 2022-05-30 | End: 2022-05-31

## 2022-05-30 RX ORDER — TALC
6 POWDER (GRAM) TOPICAL NIGHTLY PRN
Status: DISCONTINUED | OUTPATIENT
Start: 2022-05-30 | End: 2022-06-02 | Stop reason: HOSPADM

## 2022-05-30 RX ORDER — ENOXAPARIN SODIUM 100 MG/ML
1 INJECTION SUBCUTANEOUS 2 TIMES DAILY
Status: DISCONTINUED | OUTPATIENT
Start: 2022-05-30 | End: 2022-06-02 | Stop reason: HOSPADM

## 2022-05-30 RX ORDER — ALBUTEROL SULFATE 90 UG/1
2 AEROSOL, METERED RESPIRATORY (INHALATION) 2 TIMES DAILY
Status: DISCONTINUED | OUTPATIENT
Start: 2022-05-30 | End: 2022-06-02 | Stop reason: HOSPADM

## 2022-05-30 RX ORDER — PROCHLORPERAZINE EDISYLATE 5 MG/ML
5 INJECTION INTRAMUSCULAR; INTRAVENOUS EVERY 6 HOURS PRN
Status: DISCONTINUED | OUTPATIENT
Start: 2022-05-30 | End: 2022-06-02 | Stop reason: HOSPADM

## 2022-05-30 RX ORDER — DIPHENOXYLATE HYDROCHLORIDE AND ATROPINE SULFATE 2.5; .025 MG/1; MG/1
1 TABLET ORAL 4 TIMES DAILY PRN
Status: DISCONTINUED | OUTPATIENT
Start: 2022-05-30 | End: 2022-06-02 | Stop reason: HOSPADM

## 2022-05-30 RX ORDER — AMOXICILLIN 250 MG
1 CAPSULE ORAL 2 TIMES DAILY PRN
Status: DISCONTINUED | OUTPATIENT
Start: 2022-05-30 | End: 2022-06-02 | Stop reason: HOSPADM

## 2022-05-30 RX ADMIN — BENZONATATE 100 MG: 100 CAPSULE ORAL at 10:05

## 2022-05-30 RX ADMIN — HYDRALAZINE HYDROCHLORIDE 50 MG: 25 TABLET, FILM COATED ORAL at 12:05

## 2022-05-30 RX ADMIN — ENOXAPARIN SODIUM 80 MG: 80 INJECTION SUBCUTANEOUS at 08:05

## 2022-05-30 RX ADMIN — OXYCODONE HYDROCHLORIDE AND ACETAMINOPHEN 500 MG: 500 TABLET ORAL at 10:05

## 2022-05-30 RX ADMIN — QUETIAPINE FUMARATE 25 MG: 25 TABLET ORAL at 12:05

## 2022-05-30 RX ADMIN — THERA TABS 1 TABLET: TAB at 10:05

## 2022-05-30 RX ADMIN — CEFTRIAXONE 1 G: 1 INJECTION, SOLUTION INTRAVENOUS at 05:05

## 2022-05-30 RX ADMIN — DEXAMETHASONE 6 MG: 2 TABLET ORAL at 10:05

## 2022-05-30 RX ADMIN — ATORVASTATIN CALCIUM 20 MG: 10 TABLET, FILM COATED ORAL at 08:05

## 2022-05-30 RX ADMIN — LISINOPRIL 2.5 MG: 2.5 TABLET ORAL at 10:05

## 2022-05-30 RX ADMIN — ENOXAPARIN SODIUM 80 MG: 80 INJECTION SUBCUTANEOUS at 10:05

## 2022-05-30 RX ADMIN — DONEPEZIL HYDROCHLORIDE 10 MG: 10 TABLET ORAL at 12:05

## 2022-05-30 RX ADMIN — AMLODIPINE BESYLATE 5 MG: 5 TABLET ORAL at 10:05

## 2022-05-30 RX ADMIN — AZITHROMYCIN MONOHYDRATE 500 MG: 250 TABLET ORAL at 05:05

## 2022-05-30 RX ADMIN — ALBUTEROL SULFATE 2 PUFF: 108 INHALANT RESPIRATORY (INHALATION) at 11:05

## 2022-05-30 RX ADMIN — CEFEPIME HYDROCHLORIDE 1 G: 1 INJECTION, SOLUTION INTRAVENOUS at 02:05

## 2022-05-30 RX ADMIN — FUROSEMIDE 60 MG: 10 INJECTION, SOLUTION INTRAMUSCULAR; INTRAVENOUS at 12:05

## 2022-05-30 RX ADMIN — BENZONATATE 100 MG: 100 CAPSULE ORAL at 08:05

## 2022-05-30 RX ADMIN — TAMSULOSIN HYDROCHLORIDE 0.4 MG: 0.4 CAPSULE ORAL at 10:05

## 2022-05-30 RX ADMIN — QUETIAPINE FUMARATE 25 MG: 25 TABLET ORAL at 08:05

## 2022-05-30 RX ADMIN — OXYCODONE HYDROCHLORIDE AND ACETAMINOPHEN 500 MG: 500 TABLET ORAL at 08:05

## 2022-05-30 RX ADMIN — DONEPEZIL HYDROCHLORIDE 10 MG: 10 TABLET ORAL at 08:05

## 2022-05-30 RX ADMIN — ALBUTEROL SULFATE 2 PUFF: 108 INHALANT RESPIRATORY (INHALATION) at 07:05

## 2022-05-30 RX ADMIN — HYDRALAZINE HYDROCHLORIDE 50 MG: 25 TABLET, FILM COATED ORAL at 08:05

## 2022-05-30 RX ADMIN — VANCOMYCIN HYDROCHLORIDE 1500 MG: 1.5 INJECTION, POWDER, LYOPHILIZED, FOR SOLUTION INTRAVENOUS at 07:05

## 2022-05-30 RX ADMIN — FUROSEMIDE 40 MG: 40 TABLET ORAL at 10:05

## 2022-05-30 NOTE — PROGRESS NOTES
Pharmacokinetic Initial Assessment: IV Vancomycin    Assessment/Plan:    Initiate intravenous vancomycin with loading dose of 2000 mg once, done in ED, followed by a maintenance dose of vancomycin 1500 mg IV every 24 hours.  Desired empiric serum trough concentration is 15 to 20 mcg/mL.  Draw vancomycin trough level 60 min prior to third dose on 05/31/2022 at 1700.  Pharmacy will continue to follow and monitor vancomycin.      Please contact pharmacy at extension [phone] with any questions regarding this assessment.     Thank you for the consult,   Drew Lazar       Patient brief summary:  Timothy Roldan is a 82 y.o. male initiated on antimicrobial therapy with IV Vancomycin for treatment of suspected lower respiratory infection.    Drug Allergies:   Review of patient's allergies indicates:  No Known Allergies    Actual Body Weight:   79.4 kg    Renal Function:   Estimated Creatinine Clearance: 55.3 mL/min (based on SCr of 1.1 mg/dL).     CBC (last 72 hours):  Recent Labs   Lab Result Units 05/29/22  1712   WBC K/uL 8.14   Hemoglobin g/dL 13.2*   Hematocrit % 41.6   Platelets K/uL 190   Gran % % 82.3*   Lymph % % 10.1*   Mono % % 6.4   Eosinophil % % 0.4   Basophil % % 0.2   Differential Method  Automated       Metabolic Panel (last 72 hours):  Recent Labs   Lab Result Units 05/29/22  1710 05/29/22  1712   Sodium mmol/L  --  144   Potassium mmol/L  --  3.8   Chloride mmol/L  --  108   CO2 mmol/L  --  23   Glucose mg/dL  --  114*   Glucose, UA  Negative  --    BUN mg/dL  --  25*   Creatinine mg/dL  --  1.1   Albumin g/dL  --  2.9*   Total Bilirubin mg/dL  --  0.5   Alkaline Phosphatase U/L  --  76   AST U/L  --  14   ALT U/L  --  18   Magnesium mg/dL  --  2.2       Drug levels (last 3 results):  No results for input(s): VANCOMYCINRA, VANCORANDOM, VANCOMYCINPE, VANCOPEAK, VANCOMYCINTR, VANCOTROUGH in the last 72 hours.    Microbiologic Results:  Microbiology Results (last 7 days)     Procedure Component Value  Units Date/Time    Blood culture #1 **CANNOT BE ORDERED STAT** [[id number]] Collected: 05/29/22 1715    Order Status: Sent Specimen: Blood from Peripheral, Upper Arm, Right Updated: 05/29/22 1859    Blood culture #2 **CANNOT BE ORDERED STAT** [[id number]] Collected: 05/29/22 1715    Order Status: Sent Specimen: Blood from Peripheral, Upper Arm, Right Updated: 05/29/22 1859    Urine culture [[id number]] Collected: 05/29/22 1710    Order Status: No result Specimen: Urine Updated: 05/29/22 1857    Influenza A & B by Molecular [[id number]]     Order Status: No result Specimen: Nasopharyngeal Swab

## 2022-05-30 NOTE — PLAN OF CARE
Problem: Adult Inpatient Plan of Care  Goal: Optimal Comfort and Wellbeing  Outcome: Ongoing, Progressing  Goal: Readiness for Transition of Care  Outcome: Ongoing, Progressing     Problem: Infection  Goal: Absence of Infection Signs and Symptoms  Outcome: Ongoing, Progressing

## 2022-05-30 NOTE — ASSESSMENT & PLAN NOTE
-continue home meds Norvasc 5 mg daily, hydralazine 50 mg q.8 hours, lisinopril 2.5 mg daily, Lasix 40 mg daily

## 2022-05-30 NOTE — PROGRESS NOTES
Pharmacist Renal Dose Adjustment Note    Timothy Roldan is a 82 y.o. male being treated with cefepime for pneumonia.    Patient Data:    Vital Signs (Most Recent):  Temp: 98.2 °F (36.8 °C) (05/29/22 1602)  Pulse: 84 (05/29/22 1640)  Resp: (!) 22 (05/29/22 1640)  BP: (!) 142/75 (05/29/22 1559)  SpO2: 95 % (05/29/22 1559)   Vital Signs (72h Range):  Temp:  [98.2 °F (36.8 °C)]   Pulse:  [84-90]   Resp:  [22-24]   BP: (142)/(75)   SpO2:  [95 %]      Recent Labs   Lab 05/29/22  1712   CREATININE 1.1     Serum creatinine:  1.1 mg/dL 05/29/22 1712  Estimated creatinine clearance:  55.3 mL/min    Cefepime 2 grams IVPB every 24 hours will be changed to cefepime 2 grams IVPB every 12 hours.    Pharmacist's Name:  Drew Lazar  Pharmacist's Extension:  [phone]

## 2022-05-30 NOTE — NURSING
Bedside shift report received from Lor FUNK.  Patient resting in bed.  Rise and fall of chest noted.  All safety precautions in place.  Call light within reach. Will continue to monitor.

## 2022-05-30 NOTE — ASSESSMENT & PLAN NOTE
-patient started on IV cefepime, Zithromax, vancomycin  -blood cultures no growth so far  -keep oxygen saturation rate 92%  -with chest x-ray showing findings suggesting pulmonary edema and bilateral pleural effusions, check echo to assess LV function .  -he may be Cardiology consultation  -. Troponin negative  -elevated D-dimer 0.41.  Check a V/Q scan and bilateral venous Dopplers  -patient Lovenox 1 milligram/kilogram SC q.12 hours

## 2022-05-30 NOTE — PROGRESS NOTES
Vancomycin consult follow-up:    Patient reviewed, renal function stable, no new levels, continue current therapy; Next levels due: trough due 5/31/2022 at 1700

## 2022-05-30 NOTE — NURSING
Pt arrived to floor with  Paramedics placed telemetry monitor and 02 probe pt not in pain placed on 2L of oxygen gave report to oncoming nurse

## 2022-05-30 NOTE — PLAN OF CARE
05/30/22 0953   Discharge Planning   Assessment Type Discharge Planning Brief Assessment   Resource/Environmental Concerns none   Support Systems Other (Comment)  (Lakesha FLYNN)   Equipment Currently Used at Home none   Current Living Arrangements home/apartment/condo   Patient/Family Anticipates Transition to home   Patient/Family Anticipated Services at Transition none   DME Needed Upon Discharge  none   Discharge Plan A Home  (with instructions to followup)     [facility] [place] - [address]  [address]  [place] [address]  Phone: [phone] Fax: [phone]    Assisted Living - [facility]                                  [address] [place] [address]                               [place]  Pt had [facility] set up on last admission to hospital

## 2022-05-30 NOTE — HPI
is a pleasant 82-year-old gentleman who has a past medical history of hypertension hyperlipidemia, atrial fibrillation Alzheimer's dementia, recent hospitalization at [facility] May 21, 2022 for COVID-19 pneumonia treated remdesivir Decadron.  Patient was discharged.  He presented to the ER at [facility] with complaints of shortness of breath, lower extremity edema.  Patient had chest x-ray done that showed findings suggestive of pulmonary edema and bilateral pleural effusions.  Patient also had possible right lower lobe pneumonic infiltrate.  Urinalysis suggested UTI with 100 wbc's.  Repeat COVID-19 screen test was positive.  Patient is transferred here to [facility] for treatment of COVID-19 infection, possible right lower lobe pneumonia, and UTI.  Patient was given IV vancomycin and Cefepime at the transferring hospital.  Patient was seen this morning at bedside.  He reported no chest pain, wearing low-flow oxygen 2 L, no nausea, vomiting, or diarrhea.  Patient noted to have elevated D-dimer on laboratories 5.41, creatinine 1.1, lactic acid level 1.6, . Discussed with patient admitting him for pneumonia, COVID-19 infection, and UTI.  Discuss  checking echo, IV antibiotics, Lasix, V/Q scan, bilateral venous Dopplers due to concerns of elevated D-dimer.  Patient requests to be full code status.

## 2022-05-30 NOTE — SUBJECTIVE & OBJECTIVE
Past Medical History:   Diagnosis Date    Hyperlipidemia     Hypertension        Past Surgical History:   Procedure Laterality Date    TONSILLECTOMY      TOTAL KNEE ARTHROPLASTY         Review of patient's allergies indicates:  No Known Allergies    No current facility-administered medications on file prior to encounter.     Current Outpatient Medications on File Prior to Encounter   Medication Sig    amlodipine-benazepril 5-10 mg (LOTREL) 5-10 mg per capsule TK 1 C PO D    donepeziL (ARICEPT) 10 MG tablet Take 1 tablet (10 mg total) by mouth every evening.    furosemide (LASIX) 40 MG tablet Take 40 mg by mouth once daily.    hydrALAZINE (APRESOLINE) 50 MG tablet Take 1 tablet (50 mg total) by mouth every 8 (eight) hours.    lovastatin (MEVACOR) 20 MG tablet Take 20 mg by mouth every evening.    pulse oximeter (PULSE OXIMETER) device by Apply Externally route 2 (two) times a day. Use twice daily at 8 AM and 3 PM and record the value in MyChart as directed.    QUEtiapine (SEROQUEL) 25 MG Tab Take 1 tablet (25 mg total) by mouth every evening.    tamsulosin (FLOMAX) 0.4 mg Cap Take 1 capsule (0.4 mg total) by mouth once daily.     Family History    None       Tobacco Use    Smoking status: Never Smoker    Smokeless tobacco: Never Used   Substance and Sexual Activity    Alcohol use: Not on file    Drug use: Not on file    Sexual activity: Not on file     Review of Systems   Constitutional: Negative.    HENT: Negative.     Eyes: Negative.    Respiratory:  Positive for shortness of breath.    Cardiovascular: Negative.    Gastrointestinal: Negative.    Endocrine: Negative.    Genitourinary: Negative.    Musculoskeletal: Negative.    Allergic/Immunologic: Negative.    Neurological: Negative.    Hematological: Negative.    Psychiatric/Behavioral: Negative.     Objective:     Vital Signs (Most Recent):  Temp: 98.5 °F (36.9 °C) (05/30/22 0953)  Pulse: 104 (05/30/22 1118)  Resp: 18 (05/30/22 1118)  BP: (!) 143/79 (05/30/22  0953)  SpO2: 97 % (05/30/22 1118)   Vital Signs (24h Range):  Temp:  [98.2 °F (36.8 °C)-98.5 °F (36.9 °C)] 98.5 °F (36.9 °C)  Pulse:  [] 104  Resp:  [17-24] 18  SpO2:  [94 %-98 %] 97 %  BP: ()/() 143/79     Weight: 79.4 kg (175 lb 0.7 oz)  Body mass index is 25.12 kg/m².    Physical Exam  Constitutional:       Appearance: Normal appearance. He is normal weight.   HENT:      Head: Normocephalic and atraumatic.      Right Ear: External ear normal.      Left Ear: External ear normal.      Nose: Nose normal.      Mouth/Throat:      Mouth: Mucous membranes are dry.      Pharynx: Oropharynx is clear.   Eyes:      Extraocular Movements: Extraocular movements intact.      Conjunctiva/sclera: Conjunctivae normal.      Pupils: Pupils are equal, round, and reactive to light.   Cardiovascular:      Rate and Rhythm: Normal rate and regular rhythm.      Pulses: Normal pulses.      Heart sounds: Normal heart sounds.   Pulmonary:      Effort: Pulmonary effort is normal.      Breath sounds: Rhonchi and rales present.   Abdominal:      General: Abdomen is flat. Bowel sounds are normal.      Palpations: Abdomen is soft.   Musculoskeletal:         General: Normal range of motion.      Cervical back: Normal range of motion and neck supple.   Skin:     General: Skin is warm.      Capillary Refill: Capillary refill takes less than 2 seconds.   Neurological:      General: No focal deficit present.      Mental Status: He is alert and oriented to person, place, and time. Mental status is at baseline.      Cranial Nerves: No cranial nerve deficit.   Psychiatric:         Mood and Affect: Mood normal.         Behavior: Behavior normal.         Thought Content: Thought content normal.         Judgment: Judgment normal.         CRANIAL NERVES     CN III, IV, VI   Pupils are equal, round, and reactive to light.     Significant Labs: All pertinent labs within the past 24 hours have been reviewed.  Blood Culture:   Recent Labs    Lab 05/29/22 1715   LABBLOO No Growth to date  No Growth to date     CBC:   Recent Labs   Lab 05/29/22 1712   WBC 8.14   HGB 13.2*   HCT 41.6        CMP:   Recent Labs   Lab 05/29/22 1712      K 3.8      CO2 23   *   BUN 25*   CREATININE 1.1   CALCIUM 9.0   PROT 6.7   ALBUMIN 2.9*   BILITOT 0.5   ALKPHOS 76   AST 14   ALT 18   ANIONGAP 13   EGFRNONAA >60.0     Troponin:   Recent Labs   Lab 05/29/22 1712   TROPONINI <0.006     TSH:   Recent Labs   Lab 05/29/22 1712   TSH 1.098     Urine Culture: No results for input(s): LABURIN in the last 48 hours.    Significant Imaging:

## 2022-05-30 NOTE — PROVIDER TRANSFER
Outside Transfer Acceptance Note / [facility]    Referring facility: [facility]   Referring provider: CHONG NG  Accepting facility: [facility]  Accepting provider: YINA CHENG  Admitting provider: SHANICE BA   Reason for transfer:  COVID +  Transfer diagnosis: COVID +   Transfer specialty requested: Hospital Medicine  Transfer specialty notified: yes  Transfer level: NUMBER 1-5: 2  Bed type requested: Telemetry   Isolation status: Airborne and Contact and Droplet   Admission class or status: OP- Observation  OP- Observation      Narrative     The patient is an 83 y/o male with PMH of HTN, a-fib, HLP, and Alzheimer's who presented with SOB. Patient was found to be covid + on 5/21 but SOB has been worsening. He was admitted on 5/21 at [facility] and treated with dex, remdesivir, and O2. He did complete the 5 day course of remdesivir and 6 days of dex but has not improved. Found to have LE edema and JVD. CXR showed worsening BL pleural effusions. He was given lasix 60 mg IV and also vancomycin and cefepime due to cannot rule out RLL infiltrate. Transferring due to [facility] not admitting covid at this time.     Objective     Vitals: Temp: 98.2 °F (36.8 °C) (05/29/22 1602)  Pulse: 84 (05/29/22 1640)  Resp: (!) 22 (05/29/22 1640)  BP: (!) 142/75 (05/29/22 1559)  SpO2: 95 % (05/29/22 1559)  Recent Labs: All pertinent labs within the past 24 hours have been reviewed.  Recent imaging:  See cxr   Airway:     Vent settings:     IV access:        Peripheral IV - Single Lumen 05/29/22 1700 (Active)     Infusions:   Allergies: Review of patient's allergies indicates:  No Known Allergies   NPO: No      Anticoagulation:   Anticoagulants     None           Instructions      [facility]  Admit to Hospital Medicine  Upon patient arrival to floor, please contact Hospital Medicine on call.

## 2022-05-30 NOTE — H&P
[facility] Medicine  History & Physical    Patient Name: Timothy Roldan  MRN: [id number]  Patient Class: OP- Observation  Admission Date: 5/29/2022  Attending Physician: Xu Álvarez MD   Primary Care Provider: Martine Garces MD         Patient information was obtained from patient and ER records.     Subjective:     Principal Problem:Pneumonia due to COVID-19 virus    Chief Complaint:   Chief Complaint   Patient presents with    Chest Pain    Shortness of Breath     Covid + on 5/21/22          HPI:  is a pleasant 82-year-old gentleman who has a past medical history of hypertension hyperlipidemia, atrial fibrillation Alzheimer's dementia, recent hospitalization at [facility] May 21, 2022 for COVID-19 pneumonia treated remdesivir Decadron.  Patient was discharged.  He presented to the ER at [facility] with complaints of shortness of breath, lower extremity edema.  Patient had chest x-ray done that showed findings suggestive of pulmonary edema and bilateral pleural effusions.  Patient also had possible right lower lobe pneumonic infiltrate.  Urinalysis suggested UTI with 100 wbc's.  Repeat COVID-19 screen test was positive.  Patient is transferred here to [facility] for treatment of COVID-19 infection, possible right lower lobe pneumonia, and UTI.  Patient was given IV vancomycin and Cefepime at the transferring hospital.  Patient was seen this morning at bedside.  He reported no chest pain, wearing low-flow oxygen 2 L, no nausea, vomiting, or diarrhea.  Patient noted to have elevated D-dimer on laboratories 5.41, creatinine 1.1, lactic acid level 1.6, . Discussed with patient admitting him for pneumonia, COVID-19 infection, and UTI.  Discuss  checking echo, IV antibiotics, Lasix, V/Q scan, bilateral venous Dopplers due to concerns of elevated D-dimer.  Patient requests to be full code status.      Past Medical History:    Diagnosis Date    Hyperlipidemia     Hypertension        Past Surgical History:   Procedure Laterality Date    TONSILLECTOMY      TOTAL KNEE ARTHROPLASTY         Review of patient's allergies indicates:  No Known Allergies    No current facility-administered medications on file prior to encounter.     Current Outpatient Medications on File Prior to Encounter   Medication Sig    amlodipine-benazepril 5-10 mg (LOTREL) 5-10 mg per capsule TK 1 C PO D    donepeziL (ARICEPT) 10 MG tablet Take 1 tablet (10 mg total) by mouth every evening.    furosemide (LASIX) 40 MG tablet Take 40 mg by mouth once daily.    hydrALAZINE (APRESOLINE) 50 MG tablet Take 1 tablet (50 mg total) by mouth every 8 (eight) hours.    lovastatin (MEVACOR) 20 MG tablet Take 20 mg by mouth every evening.    pulse oximeter (PULSE OXIMETER) device by Apply Externally route 2 (two) times a day. Use twice daily at 8 AM and 3 PM and record the value in MyChart as directed.    QUEtiapine (SEROQUEL) 25 MG Tab Take 1 tablet (25 mg total) by mouth every evening.    tamsulosin (FLOMAX) 0.4 mg Cap Take 1 capsule (0.4 mg total) by mouth once daily.     Family History    None       Tobacco Use    Smoking status: Never Smoker    Smokeless tobacco: Never Used   Substance and Sexual Activity    Alcohol use: Not on file    Drug use: Not on file    Sexual activity: Not on file     Review of Systems   Constitutional: Negative.    HENT: Negative.     Eyes: Negative.    Respiratory:  Positive for shortness of breath.    Cardiovascular: Negative.    Gastrointestinal: Negative.    Endocrine: Negative.    Genitourinary: Negative.    Musculoskeletal: Negative.    Allergic/Immunologic: Negative.    Neurological: Negative.    Hematological: Negative.    Psychiatric/Behavioral: Negative.     Objective:     Vital Signs (Most Recent):  Temp: 98.5 °F (36.9 °C) (05/30/22 0953)  Pulse: 104 (05/30/22 1118)  Resp: 18 (05/30/22 1118)  BP: (!) 143/79 (05/30/22  0953)  SpO2: 97 % (05/30/22 1118)   Vital Signs (24h Range):  Temp:  [98.2 °F (36.8 °C)-98.5 °F (36.9 °C)] 98.5 °F (36.9 °C)  Pulse:  [] 104  Resp:  [17-24] 18  SpO2:  [94 %-98 %] 97 %  BP: ()/() 143/79     Weight: 79.4 kg (175 lb 0.7 oz)  Body mass index is 25.12 kg/m².    Physical Exam  Constitutional:       Appearance: Normal appearance. He is normal weight.   HENT:      Head: Normocephalic and atraumatic.      Right Ear: External ear normal.      Left Ear: External ear normal.      Nose: Nose normal.      Mouth/Throat:      Mouth: Mucous membranes are dry.      Pharynx: Oropharynx is clear.   Eyes:      Extraocular Movements: Extraocular movements intact.      Conjunctiva/sclera: Conjunctivae normal.      Pupils: Pupils are equal, round, and reactive to light.   Cardiovascular:      Rate and Rhythm: Normal rate and regular rhythm.      Pulses: Normal pulses.      Heart sounds: Normal heart sounds.   Pulmonary:      Effort: Pulmonary effort is normal.      Breath sounds: Rhonchi and rales present.   Abdominal:      General: Abdomen is flat. Bowel sounds are normal.      Palpations: Abdomen is soft.   Musculoskeletal:         General: Normal range of motion.      Cervical back: Normal range of motion and neck supple.   Skin:     General: Skin is warm.      Capillary Refill: Capillary refill takes less than 2 seconds.   Neurological:      General: No focal deficit present.      Mental Status: He is alert and oriented to person, place, and time. Mental status is at baseline.      Cranial Nerves: No cranial nerve deficit.   Psychiatric:         Mood and Affect: Mood normal.         Behavior: Behavior normal.         Thought Content: Thought content normal.         Judgment: Judgment normal.         CRANIAL NERVES     CN III, IV, VI   Pupils are equal, round, and reactive to light.     Significant Labs: All pertinent labs within the past 24 hours have been reviewed.  Blood Culture:   Recent Labs    Lab 05/29/22  1715   LABBLOO No Growth to date  No Growth to date     CBC:   Recent Labs   Lab 05/29/22  1712   WBC 8.14   HGB 13.2*   HCT 41.6        CMP:   Recent Labs   Lab 05/29/22  1712      K 3.8      CO2 23   *   BUN 25*   CREATININE 1.1   CALCIUM 9.0   PROT 6.7   ALBUMIN 2.9*   BILITOT 0.5   ALKPHOS 76   AST 14   ALT 18   ANIONGAP 13   EGFRNONAA >60.0     Troponin:   Recent Labs   Lab 05/29/22  1712   TROPONINI <0.006     TSH:   Recent Labs   Lab 05/29/22  1712   TSH 1.098     Urine Culture: No results for input(s): LABURIN in the last 48 hours.    Significant Imaging:     Assessment/Plan:     * Pneumonia due to COVID-19 virus  -patient started on IV cefepime, Zithromax, vancomycin  -blood cultures no growth so far  -keep oxygen saturation rate 92%  -with chest x-ray showing findings suggesting pulmonary edema and bilateral pleural effusions, check echo to assess LV function .  -he may be Cardiology consultation  -. Troponin negative  -elevated D-dimer 0.41.  Check a V/Q scan and bilateral venous Dopplers  -patient Lovenox 1 milligram/kilogram SC q.12 hours      Paroxysmal atrial fibrillation  Patient with Long standing persistent (>12 months) atrial fibrillation which is controlled currently with Beta Blocker. Patient is currently in atrial fibrillation.[id number] Score: 2. Anticoagulation indicated. Anticoagulation done with Lovenox for now.        Alzheimer disease  -assist with  -ADLs Q shift p.r.n. continue Aricept 10 mg daily      Essential hypertension  -continue home meds Norvasc 5 mg daily, hydralazine 50 mg q.8 hours, lisinopril 2.5 mg daily, Lasix 40 mg daily        VTE Risk Mitigation (From admission, onward)         Ordered     enoxaparin injection 80 mg  2 times daily         05/30/22 7970                   Xu Álvarez MD  Department of Hospital Medicine   [facility] - [place], [place] - Telemetry

## 2022-05-30 NOTE — ASSESSMENT & PLAN NOTE
Patient with Long standing persistent (>12 months) atrial fibrillation which is controlled currently with Beta Blocker. Patient is currently in atrial fibrillation.[id number] Score: 2. Anticoagulation indicated. Anticoagulation done with Lovenox for now.

## 2022-05-31 PROBLEM — R79.89 ELEVATED D-DIMER: Status: ACTIVE | Noted: 2022-05-31

## 2022-05-31 PROBLEM — I48.19 PERSISTENT ATRIAL FIBRILLATION: Status: ACTIVE | Noted: 2021-12-31

## 2022-05-31 PROBLEM — J90 PLEURAL EFFUSION: Status: ACTIVE | Noted: 2022-05-31

## 2022-05-31 PROBLEM — J96.01 ACUTE HYPOXEMIC RESPIRATORY FAILURE: Status: ACTIVE | Noted: 2022-05-31

## 2022-05-31 PROBLEM — E87.6 HYPOKALEMIA: Status: ACTIVE | Noted: 2022-05-31

## 2022-05-31 PROBLEM — M62.82 RHABDOMYOLYSIS: Status: RESOLVED | Noted: 2022-01-03 | Resolved: 2022-05-31

## 2022-05-31 PROBLEM — I50.31 ACUTE DIASTOLIC CONGESTIVE HEART FAILURE: Status: ACTIVE | Noted: 2022-05-31

## 2022-05-31 LAB
ANION GAP SERPL CALC-SCNC: 9 MMOL/L (ref 8–16)
BASOPHILS # BLD AUTO: 0.01 K/UL (ref 0–0.2)
BASOPHILS NFR BLD: 0.1 % (ref 0–1.9)
BUN SERPL-MCNC: 23 MG/DL (ref 8–23)
CALCIUM SERPL-MCNC: 8.7 MG/DL (ref 8.7–10.5)
CHLORIDE SERPL-SCNC: 107 MMOL/L (ref 95–110)
CO2 SERPL-SCNC: 26 MMOL/L (ref 23–29)
CREAT SERPL-MCNC: 1 MG/DL (ref 0.5–1.4)
DIFFERENTIAL METHOD: ABNORMAL
EOSINOPHIL # BLD AUTO: 0 K/UL (ref 0–0.5)
EOSINOPHIL NFR BLD: 0.1 % (ref 0–8)
ERYTHROCYTE [DISTWIDTH] IN BLOOD BY AUTOMATED COUNT: 14.2 % (ref 11.5–14.5)
EST. GFR  (AFRICAN AMERICAN): >60 ML/MIN/1.73 M^2
EST. GFR  (NON AFRICAN AMERICAN): >60 ML/MIN/1.73 M^2
GLUCOSE SERPL-MCNC: 106 MG/DL (ref 70–110)
HCT VFR BLD AUTO: 37 % (ref 40–54)
HGB BLD-MCNC: 11.8 G/DL (ref 14–18)
IMM GRANULOCYTES # BLD AUTO: 0.03 K/UL (ref 0–0.04)
IMM GRANULOCYTES NFR BLD AUTO: 0.4 % (ref 0–0.5)
LYMPHOCYTES # BLD AUTO: 1.1 K/UL (ref 1–4.8)
LYMPHOCYTES NFR BLD: 13.3 % (ref 18–48)
MCH RBC QN AUTO: 30.1 PG (ref 27–31)
MCHC RBC AUTO-ENTMCNC: 31.9 G/DL (ref 32–36)
MCV RBC AUTO: 94 FL (ref 82–98)
MONOCYTES # BLD AUTO: 0.7 K/UL (ref 0.3–1)
MONOCYTES NFR BLD: 7.8 % (ref 4–15)
NEUTROPHILS # BLD AUTO: 6.7 K/UL (ref 1.8–7.7)
NEUTROPHILS NFR BLD: 78.3 % (ref 38–73)
NRBC BLD-RTO: 0 /100 WBC
PLATELET # BLD AUTO: 161 K/UL (ref 150–450)
PMV BLD AUTO: 10.8 FL (ref 9.2–12.9)
POTASSIUM SERPL-SCNC: 3.4 MMOL/L (ref 3.5–5.1)
RBC # BLD AUTO: 3.92 M/UL (ref 4.6–6.2)
SODIUM SERPL-SCNC: 142 MMOL/L (ref 136–145)
WBC # BLD AUTO: 8.5 K/UL (ref 3.9–12.7)

## 2022-05-31 PROCEDURE — 97165 OT EVAL LOW COMPLEX 30 MIN: CPT

## 2022-05-31 PROCEDURE — 97535 SELF CARE MNGMENT TRAINING: CPT

## 2022-05-31 PROCEDURE — 97161 PT EVAL LOW COMPLEX 20 MIN: CPT

## 2022-05-31 PROCEDURE — 63600175 PHARM REV CODE 636 W HCPCS: Performed by: HOSPITALIST

## 2022-05-31 PROCEDURE — 27000221 HC OXYGEN, UP TO 24 HOURS

## 2022-05-31 PROCEDURE — 99223 1ST HOSP IP/OBS HIGH 75: CPT | Mod: ,,, | Performed by: INTERNAL MEDICINE

## 2022-05-31 PROCEDURE — 96376 TX/PRO/DX INJ SAME DRUG ADON: CPT | Performed by: EMERGENCY MEDICINE

## 2022-05-31 PROCEDURE — 96372 THER/PROPH/DIAG INJ SC/IM: CPT | Performed by: INTERNAL MEDICINE

## 2022-05-31 PROCEDURE — 85025 COMPLETE CBC W/AUTO DIFF WBC: CPT | Performed by: INTERNAL MEDICINE

## 2022-05-31 PROCEDURE — 94640 AIRWAY INHALATION TREATMENT: CPT

## 2022-05-31 PROCEDURE — 25000242 PHARM REV CODE 250 ALT 637 W/ HCPCS: Performed by: INTERNAL MEDICINE

## 2022-05-31 PROCEDURE — 21400001 HC TELEMETRY ROOM

## 2022-05-31 PROCEDURE — 36415 COLL VENOUS BLD VENIPUNCTURE: CPT | Performed by: INTERNAL MEDICINE

## 2022-05-31 PROCEDURE — 25000003 PHARM REV CODE 250: Performed by: HOSPITALIST

## 2022-05-31 PROCEDURE — 63700000 PHARM REV CODE 250 ALT 637 W/O HCPCS: Performed by: INTERNAL MEDICINE

## 2022-05-31 PROCEDURE — 97530 THERAPEUTIC ACTIVITIES: CPT

## 2022-05-31 PROCEDURE — 27000207 HC ISOLATION

## 2022-05-31 PROCEDURE — 63600175 PHARM REV CODE 636 W HCPCS: Performed by: INTERNAL MEDICINE

## 2022-05-31 PROCEDURE — 96366 THER/PROPH/DIAG IV INF ADDON: CPT | Performed by: EMERGENCY MEDICINE

## 2022-05-31 PROCEDURE — 25000003 PHARM REV CODE 250: Performed by: INTERNAL MEDICINE

## 2022-05-31 PROCEDURE — 80048 BASIC METABOLIC PNL TOTAL CA: CPT | Performed by: HOSPITALIST

## 2022-05-31 PROCEDURE — 99223 PR INITIAL HOSPITAL CARE,LEVL III: ICD-10-PCS | Mod: ,,, | Performed by: INTERNAL MEDICINE

## 2022-05-31 RX ORDER — LORATADINE 10 MG/1
TABLET ORAL
COMMUNITY
Start: 2021-09-22 | End: 2022-06-02

## 2022-05-31 RX ORDER — FUROSEMIDE 10 MG/ML
40 INJECTION INTRAMUSCULAR; INTRAVENOUS
Status: DISCONTINUED | OUTPATIENT
Start: 2022-05-31 | End: 2022-06-02 | Stop reason: HOSPADM

## 2022-05-31 RX ORDER — TAMSULOSIN HYDROCHLORIDE 0.4 MG/1
0.4 CAPSULE ORAL
Status: ON HOLD | COMMUNITY
Start: 2022-03-29 | End: 2022-06-02 | Stop reason: HOSPADM

## 2022-05-31 RX ORDER — AMLODIPINE AND BENAZEPRIL HYDROCHLORIDE 5; 10 MG/1; MG/1
1 CAPSULE ORAL EVERY MORNING
COMMUNITY
Start: 2022-05-25 | End: 2022-06-02

## 2022-05-31 RX ORDER — POTASSIUM CHLORIDE 20 MEQ/1
40 TABLET, EXTENDED RELEASE ORAL ONCE
Status: COMPLETED | OUTPATIENT
Start: 2022-05-31 | End: 2022-05-31

## 2022-05-31 RX ORDER — HYDRALAZINE HYDROCHLORIDE 25 MG/1
50 TABLET, FILM COATED ORAL EVERY 8 HOURS PRN
Status: DISCONTINUED | OUTPATIENT
Start: 2022-05-31 | End: 2022-06-02 | Stop reason: HOSPADM

## 2022-05-31 RX ADMIN — LISINOPRIL 2.5 MG: 2.5 TABLET ORAL at 08:05

## 2022-05-31 RX ADMIN — DONEPEZIL HYDROCHLORIDE 10 MG: 10 TABLET ORAL at 09:05

## 2022-05-31 RX ADMIN — ENOXAPARIN SODIUM 80 MG: 80 INJECTION SUBCUTANEOUS at 09:05

## 2022-05-31 RX ADMIN — QUETIAPINE FUMARATE 25 MG: 25 TABLET ORAL at 09:05

## 2022-05-31 RX ADMIN — FUROSEMIDE 40 MG: 10 INJECTION, SOLUTION INTRAMUSCULAR; INTRAVENOUS at 09:05

## 2022-05-31 RX ADMIN — CEFEPIME HYDROCHLORIDE 1 G: 1 INJECTION, SOLUTION INTRAVENOUS at 02:05

## 2022-05-31 RX ADMIN — AMLODIPINE BESYLATE 5 MG: 5 TABLET ORAL at 08:05

## 2022-05-31 RX ADMIN — AZITHROMYCIN MONOHYDRATE 500 MG: 250 TABLET ORAL at 04:05

## 2022-05-31 RX ADMIN — ENOXAPARIN SODIUM 80 MG: 80 INJECTION SUBCUTANEOUS at 08:05

## 2022-05-31 RX ADMIN — CEFEPIME HYDROCHLORIDE 1 G: 1 INJECTION, SOLUTION INTRAVENOUS at 01:05

## 2022-05-31 RX ADMIN — FUROSEMIDE 40 MG: 10 INJECTION, SOLUTION INTRAMUSCULAR; INTRAVENOUS at 08:05

## 2022-05-31 RX ADMIN — HYDRALAZINE HYDROCHLORIDE 50 MG: 25 TABLET, FILM COATED ORAL at 05:05

## 2022-05-31 RX ADMIN — POTASSIUM CHLORIDE 40 MEQ: 1500 TABLET, EXTENDED RELEASE ORAL at 08:05

## 2022-05-31 RX ADMIN — TAMSULOSIN HYDROCHLORIDE 0.4 MG: 0.4 CAPSULE ORAL at 08:05

## 2022-05-31 RX ADMIN — THERA TABS 1 TABLET: TAB at 08:05

## 2022-05-31 RX ADMIN — ALBUTEROL SULFATE 2 PUFF: 108 INHALANT RESPIRATORY (INHALATION) at 07:05

## 2022-05-31 RX ADMIN — DEXAMETHASONE 6 MG: 2 TABLET ORAL at 08:05

## 2022-05-31 RX ADMIN — OXYCODONE HYDROCHLORIDE AND ACETAMINOPHEN 500 MG: 500 TABLET ORAL at 09:05

## 2022-05-31 RX ADMIN — ALBUTEROL SULFATE 2 PUFF: 108 INHALANT RESPIRATORY (INHALATION) at 08:05

## 2022-05-31 RX ADMIN — OXYCODONE HYDROCHLORIDE AND ACETAMINOPHEN 500 MG: 500 TABLET ORAL at 08:05

## 2022-05-31 RX ADMIN — ATORVASTATIN CALCIUM 20 MG: 10 TABLET, FILM COATED ORAL at 09:05

## 2022-05-31 NOTE — ASSESSMENT & PLAN NOTE
Patient admitted with shortness of breath, edema consistent with acute diastolic CHF. BNP  Recent Labs   Lab 05/29/22  1712   *     CXR: bilateral pleural effusions, pulmonary edema  Recent Labs   Lab 05/29/22  1712   TROPONINI <0.006     EKG personally reviewed and shows AFib  Start on IV lasix diuresis. Monitor ins and outs  TTE EF 70%, mild AS, normal diastolic function   Continue ACEi  Not on BB at home  Cardiology follow up on discharge

## 2022-05-31 NOTE — ASSESSMENT & PLAN NOTE
COVID positive. Recently admitted, now readmit. Suspect respiratory failure is more due to CHF than COVID  - continue dexamethasone  - already received remdesivir  - weaned to room air on 5/31

## 2022-05-31 NOTE — PROGRESS NOTES
[facility] Medicine  Progress Note    Patient Name: Timothy Roldan  MRN: [id number]  Patient Class: OP- Observation   Admission Date: 5/29/2022  Length of Stay: 0 days  Attending Physician: Hilda Rosa MD  Primary Care Provider: Martine Garces MD        Subjective:     Principal Problem:Acute hypoxemic respiratory failure        HPI:   is a pleasant 82-year-old gentleman who has a past medical history of hypertension hyperlipidemia, atrial fibrillation Alzheimer's dementia, recent hospitalization at [facility] May 21, 2022 for COVID-19 pneumonia treated remdesivir Decadron.  Patient was discharged.  He presented to the ER at [facility] with complaints of shortness of breath, lower extremity edema.  Patient had chest x-ray done that showed findings suggestive of pulmonary edema and bilateral pleural effusions.  Patient also had possible right lower lobe pneumonic infiltrate.  Urinalysis suggested UTI with 100 wbc's.  Repeat COVID-19 screen test was positive.  Patient is transferred here to [facility] for treatment of COVID-19 infection, possible right lower lobe pneumonia, and UTI.  Patient was given IV vancomycin and Cefepime at the transferring hospital.  Patient was seen this morning at bedside.  He reported no chest pain, wearing low-flow oxygen 2 L, no nausea, vomiting, or diarrhea.  Patient noted to have elevated D-dimer on laboratories 5.41, creatinine 1.1, lactic acid level 1.6, . Discussed with patient admitting him for pneumonia, COVID-19 infection, and UTI.  Discuss  checking echo, IV antibiotics, Lasix, V/Q scan, bilateral venous Dopplers due to concerns of elevated D-dimer.  Patient requests to be full code status.      Overview/Hospital Course:  Mr Timothy Roldan is a 82 y.o. man admitted with acute diastolic CHF with bilateral pleural effusions, pulmonary edema, and lower extremity edema after recent  hospitalization with COVID. Started IV lasix. TTE EF 70%, mild AS, normal diastolic function. Also started dexamethasone for COVID. Also started antibiotics given worsening CXR and recent hospitalization, though suspect main cause of shortness of breath is CHF, not COVID or pneumonia.       Interval History:  Sitting in bed.  Per nursing he ate all of his breakfast and work with therapy today.  At this time he seems lethargic.  His lunch is sitting in front of him and has not been touched.  He says that his breathing is a little bit better.  He still has a wet cough.  He has lower extremity swelling.    Review of Systems   Constitutional:  Negative for chills and fever.   Respiratory:  Positive for cough and shortness of breath. Negative for chest tightness.    Cardiovascular:  Positive for leg swelling. Negative for chest pain.   Gastrointestinal:  Negative for abdominal pain, constipation, diarrhea, nausea and vomiting.   Genitourinary:  Negative for difficulty urinating.   Neurological:  Negative for weakness and numbness.   Objective:     Vital Signs (Most Recent):  Temp: 98.7 °F (37.1 °C) (05/31/22 1101)  Pulse: 85 (05/31/22 1101)  Resp: 18 (05/31/22 1101)  BP: 111/60 (05/31/22 1101)  SpO2: 99 % (05/31/22 1101) Vital Signs (24h Range):  Temp:  [98.1 °F (36.7 °C)-98.7 °F (37.1 °C)] 98.7 °F (37.1 °C)  Pulse:  [75-91] 85  Resp:  [14-20] 18  SpO2:  [91 %-99 %] 99 %  BP: ()/(57-78) 111/60     Weight: 79.4 kg (175 lb 0.7 oz)  Body mass index is 25.12 kg/m².    Intake/Output Summary (Last 24 hours) at 5/31/2022 1324  Last data filed at 5/31/2022 0715  Gross per 24 hour   Intake 360 ml   Output --   Net 360 ml      Physical Exam  Vitals and nursing note reviewed.   Constitutional:       General: He is not in acute distress.     Appearance: He is ill-appearing. He is not toxic-appearing or diaphoretic.   HENT:      Head: Normocephalic and atraumatic.      Nose: Nose normal.      Mouth/Throat:      Mouth: Mucous  membranes are moist.   Cardiovascular:      Rate and Rhythm: Normal rate. Rhythm irregular.      Pulses: Normal pulses.      Heart sounds: Normal heart sounds. No murmur heard.    No gallop.      Comments: AFib on monitor  Pulmonary:      Effort: Pulmonary effort is normal. No respiratory distress.      Breath sounds: Rales present. No wheezing or rhonchi.      Comments: Turned off oxygen.  SpO2 97% on room air  Abdominal:      General: Bowel sounds are normal. There is no distension.      Palpations: Abdomen is soft.      Tenderness: There is no abdominal tenderness. There is no guarding.   Musculoskeletal:      Right lower leg: Edema present.      Left lower leg: Edema present.   Skin:     General: Skin is warm and dry.      Findings: Bruising (Bilateral arms) present.   Neurological:      Comments: Oriented to person       Significant Labs: All pertinent labs within the past 24 hours have been reviewed.    Significant Imaging: I have reviewed all pertinent imaging results/findings within the past 24 hours.      Assessment/Plan:      * Acute hypoxemic respiratory failure  Patient admitted with Hypoxic which is Acute.  he is not on home oxygen-- need to confirm this. Signs/symptoms of respiratory failure include- increased work of breathing present on admission.   - Labs and images were reviewed. Patient Has not has a recent ABG.   - Supplemental oxygen was provided and noted- Nasal Cannula 0 LPM 97% on room air  - Respiratory failure is due to- CHF and COVID   - continue IV lasix  - continue COVID treatment      Hypokalemia  Replace and monitor        Elevated d-dimer  LE US negative for DVT  V/Q scan was only a perfusion scan and thus is not appropriate for diagnosis of PE  Continue full dose lovenox while in hospital       Pleural effusion  Bilateral pleural effusions with elevated BNP, JVD, bilateral lower extremity edema  Suspect this is from CHF  - diuresis      Acute diastolic congestive heart  failure  Patient admitted with shortness of breath, edema consistent with acute diastolic CHF. BNP  Recent Labs   Lab 05/29/22  1712   *     CXR: bilateral pleural effusions, pulmonary edema  Recent Labs   Lab 05/29/22  1712   TROPONINI <0.006     EKG personally reviewed and shows AFib  Start on IV lasix diuresis. Monitor ins and outs  TTE EF 70%, mild AS, normal diastolic function   Continue ACEi  Not on BB at home  Cardiology follow up on discharge        Debility  PT, OT consult         Pneumonia due to COVID-19 virus  COVID positive. Recently admitted, now readmit. Suspect respiratory failure is more due to CHF than COVID  - continue dexamethasone  - already received remdesivir  - weaned to room air on 5/31      Persistent atrial fibrillation  Patient with Long standing persistent (>12 months) atrial fibrillation which is rate controlled currently without Rx. Patient is currently in atrial fibrillation.[id number] Score: 2. Anticoagulation indicated. Anticoagulation done with Lovenox for now.        Alzheimer disease  - continue Aricept 10 mg daily  - delirium precautions       Essential hypertension  -continue home meds Norvasc 5 mg daily, lisinopril 2.5 mg daily with hold parameters  - change hydralazine to q8 prn SBP>180        VTE Risk Mitigation (From admission, onward)         Ordered     enoxaparin injection 80 mg  2 times daily         05/30/22 0420                Discharge Planning   JACQUELINE:      Code Status: Full Code   Is the patient medically ready for discharge?:     Reason for patient still in hospital (select all that apply): Patient trending condition  Discharge Plan A: Home (with instructions to followup)        1:37 PM  Called NISHANT Gonzáles. He is not on oxygen at home. He also had chest pain on admission. He has no history of heart failure but does have Afib. She is requesting Cardiology consult- consult placed.       Hilda Rosa MD  Department of Hospital Medicine   [facility]  - Telemetry

## 2022-05-31 NOTE — ASSESSMENT & PLAN NOTE
Patient admitted with Hypoxic which is Acute.  he is not on home oxygen-- need to confirm this. Signs/symptoms of respiratory failure include- increased work of breathing present on admission.   - Labs and images were reviewed. Patient Has not has a recent ABG.   - Supplemental oxygen was provided and noted- Nasal Cannula 0 LPM 97% on room air  - Respiratory failure is due to- CHF and COVID   - continue IV lasix  - continue COVID treatment

## 2022-05-31 NOTE — PT/OT/SLP EVAL
"Occupational Therapy   Evaluation    Name: Timothy Roldan  MRN: [id number]  Admitting Diagnosis:  Acute hypoxemic respiratory failure  Recent Surgery: * No surgery found *      Recommendations:     Discharge Recommendations:  (OT at next level of care w/ 24 hr supervision)  Discharge Equipment Recommendations:  none  Barriers to discharge:   (Pt is at risk for falls, readmission and morbidity if d/c home at this time.)    Assessment:     Timothy Roldan is a 82 y.o. male with a medical diagnosis of Acute hypoxemic respiratory failure. Performance deficits affecting function: weakness, impaired endurance, impaired self care skills, impaired functional mobilty, gait instability, impaired balance, decreased upper extremity function, decreased lower extremity function, decreased coordination, decreased safety awareness, pain, decreased ROM, impaired skin, edema, impaired cardiopulmonary response to activity.      Pt pleasant and willing to participate in tx session this date; pt was able to perform bed mobility w/ mod-max A to and [place] [facility] [place] 2 persons and HHA. Pt w/ SPO2 >95% on 3L O2 NC. Pt w/ decreases strength and endurance but tolerated OOB activities fairly well. Pt will continue to benefit from skilled acute OT services to maximize functional capacity for safe performance w/ ADLs and functional mobility.     Rehab Prognosis: Good; patient would benefit from acute skilled OT services to address these deficits and reach maximum level of function.       Plan:     Patient to be seen 5 x/week to address the above listed problems via self-care/home management, therapeutic activities, therapeutic exercises  · Plan of Care Expires: 06/14/22  · Plan of Care Reviewed with: patient    Subjective     Chief Complaint: "I did not know I was getting into this program."  Patient/Family Comments/goals: Needing to have BM    Occupational Profile:  Living Environment: Pt is a resident of [facility].   Previous level " of function: Per chart review, pt required assist for ADLs (bathing, toileting and LB dressing) and functional transfers.   Roles and Routines: None stated   Equipment Used at Home:  walker, rolling, wheelchair, bedside commode  Assistance upon Discharge: Pt will have assistance from staff.     Pain/Comfort:  · Pain Rating 1: 0/10  · Pain Rating Post-Intervention 1: 0/10    Patients cultural, spiritual, Anglican conflicts given the current situation: no    Objective:     Communicated with: Nurse prior to session.  Patient found HOB elevated with peripheral IV, telemetry, oxygen, pulse ox (continuous), bed alarm upon OT entry to room.    General Precautions: Standard, fall, airborne, contact, droplet   Orthopedic Precautions:N/A   Braces: N/A  Respiratory Status: Nasal cannula, flow 2.5 L/min    Occupational Performance:    Bed Mobility:    · Patient completed Rolling/Turning to Left with  moderate assistance  · Patient completed Scooting/Bridging with minimum assistance  · Patient completed Supine to Sit with moderate assistance and maximal assistance and HOB elevated   · Patient completed Sit to Supine with moderate assistance    Functional Mobility/Transfers:  · Patient completed Sit <> Stand Transfer x 1 trial from EOB and x 1 trial from BSC with moderate assistance  with  hand-held assist   · Patient completed Bed <> BSC Transfer using Step Transfer technique with moderate assistance and of 2 persons with hand-held assist  · Patient completed BSC <> Bed Step Transfer technique with moderate assistance and of 2 persons with hand-held assist    Activities of Daily Living:  · Upper Body Dressing: minimum assistance for donning gown over back   · Lower Body Dressing: total assistance for donning sock for BLEs   · Toileting: minimum assistance for using urinal while sitting on BSC; pt unable to have BM (no comfort-care needs)    Cognitive/Visual Perceptual:  Cognitive/Psychosocial Skills:     -       Oriented to:  Person, Place and Time   -       Follows Commands/attention:Follows two-step commands  -       Communication: clear/fluent  -       Memory: Impaired STM and Poor immediate recall  -       Safety awareness/insight to disability: impaired     Physical Exam:  Balance:    -       fair + sitting; fair standing   Postural examination/scapula alignment:    -       Rounded shoulders  -       Forward head  -       Pt very stiff  Skin integrity: Redness to BLEs; black-like scab noted to L lateral aspect of calf  Edema:  Moderate BLEs  Sensation:    -       Intact  Upper Extremity Range of Motion:     -       Right Upper Extremity: WFL  -       Left Upper Extremity: WFL  Upper Extremity Strength:    -       Right Upper Extremity: WFL  -       Left Upper Extremity: WFL   Strength:    -       Right Upper Extremity: WFL  -       Left Upper Extremity: WFL    AMPAC 6 Click ADL:  AMPAC Total Score: 18    Treatment & Education:  -Initial eval complete.   -Pt educated on role of OT and POC.   -pt educated on safe functional mobility and ADL performance.   -Chair t/f deferred at this time due to safety concerns; therapy to attempt during f/u.   -Questions and concerns addressed within OT scope.   Education:    Patient left HOB elevated with all lines intact, call button in reach, bed alarm on and BLE pressure relief boots on.     GOALS:   Multidisciplinary Problems     Occupational Therapy Goals        Problem: Occupational Therapy    Goal Priority Disciplines Outcome Interventions   Occupational Therapy Goal     OT, PT/OT Ongoing, Progressing    Description: Goals to be met by: 06/14/222     Patient will increase functional independence with ADLs by performing:    LE Dressing with Minimal Assistance.  Grooming while EOB with Supervision.  Toileting from bedside commode with Minimal Assistance for hygiene and clothing management.   Supine to sit with Stand-by Assistance.  Step transfer with Contact Guard Assistance  Toilet transfer  to bedside commode with Contact Guard Assistance.  Upper extremity exercise program x15 reps per handout, with independence.  Implementation of PLB and energy conservation strategies into daily routines w/ (I).                      History:     Past Medical History:   Diagnosis Date    Hyperlipidemia     Hypertension        Past Surgical History:   Procedure Laterality Date    TONSILLECTOMY      TOTAL KNEE ARTHROPLASTY         Time Tracking:     OT Date of Treatment: 05/31/22  OT Start Time: 1055  OT Stop Time: 1120  OT Total Time (min): 25 min    Billable Minutes:Evaluation 15  Self Care/Home Management 10  Total Time 25 (co-eval w/ PT)     5/31/2022

## 2022-05-31 NOTE — CONSULTS
"  [facility] - Telemetry  Adult Nutrition  Consult Note    SUMMARY     Recommendations    1) Continue Cardiac Diet as tolerated  2) Addition of Boost (+) TID to assist in meeting pt needs   3) Monitor PO intake / Nutrition related labs    Goals:   1) Patient to meet > 75% EEN/EPN via PO/ONS intake    Nutrition Goal Status: new  Communication of RD Recs:  (POC)    Assessment and Plan  Nutrition Problem  Increased Energy Needs    Related to (etiology):   Physiological State    Signs and Symptoms (as evidenced by):   COVID 19 (+)    Interventions/Recommendations (treatment strategy):  Fat and Mineral Modified Diet  Commercial Beverage    Nutrition Diagnosis Status:   New        Malnutrition Assessment  Pt not seen due to COVID isolation precautions.  Per weight hx pt with no noted recent weight loss.                                       Reason for Assessment    Reason For Assessment: consult (Nutritional Optimization)  Diagnosis:  (Pneumonia d/t COVID19)  Relevant Medical History: Alzheimer's, HTN, Afib, COVID19  General Information Comments: Pt presented with SOB & CP - COVID(+). Per MD pt very frail. Will monitor intake and weights.  Nutrition Discharge Planning: Discharge on cardiac diet, Boost (+) or similar BID    Nutrition Risk Screen    Nutrition Risk Screen: no indicators present    Nutrition/Diet History    Spiritual, Cultural Beliefs, Episcopalian Practices, Values that Affect Care: no  Food Allergies: NKFA  Factors Affecting Nutritional Intake: impaired cognitive status/motor control    Anthropometrics    Temp: 98.2 °F (36.8 °C)  Height: 5' 10" (177.8 cm)  Height (inches): 70 in  Weight Method: Bed Scale  Weight: 79.4 kg (175 lb 0.7 oz)  Weight (lb): 175.05 lb  Ideal Body Weight (IBW), Male: 166 lb  % Ideal Body Weight, Male (lb): 105.45 %  BMI (Calculated): 25.1  BMI Grade: 25 - 29.9 - overweight       Lab/Procedures/Meds    Pertinent Labs Reviewed: reviewed  CBC:  No results for input(s): WBC, HGB, HCT, PLT " in the last 24 hours.  CMP:  No results for input(s): GLUCOSE, CALCIUM, ALBUMIN, PROT, NA, K, CO2, CL, BUN, CREATININE, ALKPHOS, ALT, AST, BILITOT in the last 24 hours.    Pertinent Medications Reviewed: reviewed  Scheduled Meds:   albuterol  2 puff Inhalation BID    amLODIPine  5 mg Oral Daily    ascorbic acid (vitamin C)  500 mg Oral BID    atorvastatin  20 mg Oral QHS    azithromycin  500 mg Oral Q24H    ceFEPime (MAXIPIME) IVPB  1 g Intravenous Q12H    dexAMETHasone  6 mg Oral Daily    donepeziL  10 mg Oral QHS    enoxaparin  1 mg/kg Subcutaneous BID    furosemide  40 mg Oral Daily    hydrALAZINE  50 mg Oral Q8H    lisinopriL  2.5 mg Oral Daily    multivitamin  1 tablet Oral Daily    QUEtiapine  25 mg Oral QHS    tamsulosin  0.4 mg Oral Daily    vancomycin (VANCOCIN) IVPB  1,500 mg Intravenous Q24H     Continuous Infusions:  PRN Meds:.acetaminophen, benzonatate, diphenoxylate-atropine 2.5-0.025 mg, glucagon (human recombinant), glucose, glucose, melatonin, ondansetron, prochlorperazine, senna-docusate 8.6-50 mg, sodium chloride 0.9%, Pharmacy to dose Vancomycin consult **AND** vancomycin - pharmacy to dose      Estimated/Assessed Needs    Weight Used For Calorie Calculations: 79.4 kg (175 lb 0.7 oz)  Energy Calorie Requirements (kcal): 1985  Energy Need Method: Kcal/kg (25)  Protein Requirements: 80 - 95g (1.0 - 1.2g/kg)  Weight Used For Protein Calculations: 79.4 kg (175 lb 0.7 oz)     Estimated Fluid Requirement Method: RDA Method (or per MD)  RDA Method (mL): 1985  CHO Requirement: 248g      Nutrition Prescription Ordered    Current Diet Order: Cardiac Diet    Evaluation of Received Nutrient/Fluid Intake    Energy Calories Required: not meeting needs  Protein Required: not meeting needs  Fluid Required: not meeting needs  Comments: LBM 5/29  % Intake of Estimated Energy Needs: 75 - 100 %  % Meal Intake: 75 - 100 %  Only one PO intake noted at this time, will continue to  monitor.    Intake/Output - Last 3 Shifts       05/29 0700 05/30 0659 05/30 0700 05/31 0659    P.O.  240    IV Piggyback 550     Total Intake(mL/kg) 550 (6.9) 240 (3)    Urine (mL/kg/hr) 850 175 (0.2)    Stool  0    Total Output 850 175    Net -300 +65          Urine Occurrence  3 x    Stool Occurrence  0 x          Nutrition Risk    Level of Risk/Frequency of Follow-up:  (1-2x/week)       Monitor and Evaluation    Food and Nutrient Intake: energy intake, food and beverage intake  Food and Nutrient Adminstration: diet order  Physical Activity and Function: nutrition-related ADLs and IADLs  Anthropometric Measurements: weight, weight change  Biochemical Data, Medical Tests and Procedures: electrolyte and renal panel, gastrointestinal profile, glucose/endocrine profile, inflammatory profile, lipid profile  Nutrition-Focused Physical Findings: overall appearance       Nutrition Follow-Up    RD Follow-up?: Yes

## 2022-05-31 NOTE — HOSPITAL COURSE
Mr Timothy Roldan is a 82 y.o. man admitted with acute HFpEF with bilateral pleural effusions, pulmonary edema, and lower extremity edema after recent hospitalization with COVID. Started 40mg IV lasix BID (takes 40mg PO QD at home). TTE EF 70%, mild AS, normal diastolic function. Also started dexamethasone for COVID. Also started antibiotics on admit given worsening CXR and recent hospitalization, though suspect main cause of shortness of breath is HFpEF, not COVID or pneumonia. No growth on cultures; antibiotics discontinued. Remains stable on room air. He does have confusion most likely due to delirium in hospitalized patient with dementia. Confusion has improved. He worked with PT, OT. Discussed plan of care and medication changes with HCPOA. Plan return to assisted living with PT, OT services. Change HTN meds to metoprolol + benazepril for Afib rate control and HFpEF. Increase lasix to 40mg PO BID. Add eliquis. Discussed risk of bleeding and higher risk associated with falls. Stable for discharge.

## 2022-05-31 NOTE — SUBJECTIVE & OBJECTIVE
Interval History:  Sitting in bed.  Per nursing he ate all of his breakfast and work with therapy today.  At this time he seems lethargic.  His lunch is sitting in front of him and has not been touched.  He says that his breathing is a little bit better.  He still has a wet cough.  He has lower extremity swelling.    Review of Systems   Constitutional:  Negative for chills and fever.   Respiratory:  Positive for cough and shortness of breath. Negative for chest tightness.    Cardiovascular:  Positive for leg swelling. Negative for chest pain.   Gastrointestinal:  Negative for abdominal pain, constipation, diarrhea, nausea and vomiting.   Genitourinary:  Negative for difficulty urinating.   Neurological:  Negative for weakness and numbness.   Objective:     Vital Signs (Most Recent):  Temp: 98.7 °F (37.1 °C) (05/31/22 1101)  Pulse: 85 (05/31/22 1101)  Resp: 18 (05/31/22 1101)  BP: 111/60 (05/31/22 1101)  SpO2: 99 % (05/31/22 1101) Vital Signs (24h Range):  Temp:  [98.1 °F (36.7 °C)-98.7 °F (37.1 °C)] 98.7 °F (37.1 °C)  Pulse:  [75-91] 85  Resp:  [14-20] 18  SpO2:  [91 %-99 %] 99 %  BP: ()/(57-78) 111/60     Weight: 79.4 kg (175 lb 0.7 oz)  Body mass index is 25.12 kg/m².    Intake/Output Summary (Last 24 hours) at 5/31/2022 1324  Last data filed at 5/31/2022 0715  Gross per 24 hour   Intake 360 ml   Output --   Net 360 ml      Physical Exam  Vitals and nursing note reviewed.   Constitutional:       General: He is not in acute distress.     Appearance: He is ill-appearing. He is not toxic-appearing or diaphoretic.   HENT:      Head: Normocephalic and atraumatic.      Nose: Nose normal.      Mouth/Throat:      Mouth: Mucous membranes are moist.   Cardiovascular:      Rate and Rhythm: Normal rate. Rhythm irregular.      Pulses: Normal pulses.      Heart sounds: Normal heart sounds. No murmur heard.    No gallop.      Comments: AFib on monitor  Pulmonary:      Effort: Pulmonary effort is normal. No respiratory  distress.      Breath sounds: Rales present. No wheezing or rhonchi.      Comments: Turned off oxygen.  SpO2 97% on room air  Abdominal:      General: Bowel sounds are normal. There is no distension.      Palpations: Abdomen is soft.      Tenderness: There is no abdominal tenderness. There is no guarding.   Musculoskeletal:      Right lower leg: Edema present.      Left lower leg: Edema present.   Skin:     General: Skin is warm and dry.      Findings: Bruising (Bilateral arms) present.   Neurological:      Comments: Oriented to person       Significant Labs: All pertinent labs within the past 24 hours have been reviewed.    Significant Imaging: I have reviewed all pertinent imaging results/findings within the past 24 hours.

## 2022-05-31 NOTE — ASSESSMENT & PLAN NOTE
Patient with Long standing persistent (>12 months) atrial fibrillation which is rate controlled currently without Rx. Patient is currently in atrial fibrillation.[id number] Score: 2. Anticoagulation indicated. Anticoagulation done with Lovenox for now.

## 2022-05-31 NOTE — PLAN OF CARE
Recommendations    1) Continue Cardiac Diet as tolerated  2) Addition of Boost (+) TID to assist in meeting pt needs   3) Monitor PO intake / Nutrition related labs    Goals:   1) Patient to meet > 75% EEN/EPN via PO/ONS intake    Nutrition Goal Status: new  Communication of RD Recs:  (POC)

## 2022-05-31 NOTE — CONSULTS
[facility] - Telemetry  Wound Care    Patient Name:  Timtohy Roldan   MRN:  [id number]  Date: 5/31/2022  Diagnosis: Acute hypoxemic respiratory failure    History:     Past Medical History:   Diagnosis Date    Hyperlipidemia     Hypertension        Social History     Socioeconomic History    Marital status: Single   Tobacco Use    Smoking status: Never Smoker    Smokeless tobacco: Never Used     Social Determinants of Health     Financial Resource Strain: Unknown    Difficulty of Paying Living Expenses: Patient refused   Food Insecurity: Unknown    Worried About Running Out of Food in the Last Year: Patient refused    Ran Out of Food in the Last Year: Patient refused   Transportation Needs: No Transportation Needs    Lack of Transportation (Medical): No    Lack of Transportation (Non-Medical): No   Physical Activity: Insufficiently Active    Days of Exercise per Week: 1 day    Minutes of Exercise per Session: 10 min   Stress: Stress Concern Present    Feeling of Stress : To some extent   Social Connections: Unknown    Frequency of Communication with Friends and Family: More than three times a week    Frequency of Social Gatherings with Friends and Family: More than three times a week    Active Member of Clubs or Organizations: No    Attends Club or Organization Meetings: Patient refused    Marital Status:    Housing Stability: Low Risk     Unable to Pay for Housing in the Last Year: No    Number of Places Lived in the Last Year: 2    Unstable Housing in the Last Year: No       Precautions:     Allergies as of 05/29/2022    (No Known Allergies)       WOC Assessment Details/Treatment   Consulted for altered skin integrity sacral wound  An 82 year old male admitted 5/29/22 from home via [facility] with pneumonia due to COVID 19; Afib; Alzheimer's disease; essential hypertension  5/31 WBC 8.5 Hgb 11.8 Hct 37.0   5/29 Alb 2.9 Weight 175 lb  On Isoflex mattress; Lg score 19  Recently hospitalized at  [facility] 5/21-5/26 with COVID  To manage pressure injury as per WOGs and Pressure Injury Prevention Interventions.  Orders placed.     05/31/2022

## 2022-05-31 NOTE — PT/OT/SLP EVAL
Physical Therapy Evaluation    Patient Name:  Timothy Roldan   MRN:  [id number]    Recommendations:     Discharge Recommendations: PT at next level of care and 24 hour supervision  Discharge Equipment Recommendations: none      Assessment:     Timothy Roldan is a 82 y.o. male admitted with a medical diagnosis of Acute hypoxemic respiratory failure.  He presents with the following impairments/functional limitations:  weakness, impaired joint extensibility, decreased ROM, impaired cognition, impaired endurance, impaired muscle length, impaired coordination, impaired self care skills, impaired fine motor, decreased upper extremity function, decreased lower extremity function, impaired functional mobilty, gait instability, impaired balance, impaired cardiopulmonary response to activity .    Able to transfer with assistance to bedside commode for unsuccessful attempt at BM. VSS on 2.5L O2. Hopeful he can get OOB to chair tomorrow. Visibly weak. Participated well.     Rehab Prognosis: Good; patient would benefit from acute skilled PT services to address these deficits and reach maximum level of function.        Plan:     During this hospitalization, patient to be seen 6 x/week to address the identified rehab impairments via gait training, therapeutic activities, therapeutic exercises, neuromuscular re-education and progress toward the following goals:    · Plan of Care Expires:  06/14/22    Subjective     Chief Complaint: constipation  Patient/Family Comments/goals: have BM  Pain/Comfort:  · Pain Rating 1: 0/10    Patients cultural, spiritual, Confucianism conflicts given the current situation: no    Living Environment:  Per chart review: [facility] where he receives assist for all ADLs if needed and mobility. Unknown last time patient was ambulatory.  Prior to admission, patients level of function was assist for most ADLs and mobility.  Equipment used at home:  .  DME owned (not currently used): unknown.   "Upon discharge, patient will have assistance from Assisted Living Staff. Pt has niece who is POA    Objective:     Communicated with nurse Damari prior to session.  Patient found supine with peripheral IV, telemetry, oxygen, pulse ox (continuous), bed alarm  upon PT entry to room.    General Precautions: Standard, fall, airborne, contact, droplet   Orthopedic Precautions:N/A   Braces: N/A  Respiratory Status: Nasal cannula, flow 2.5 L/min    VSS throughout  "I didn't know I was getting into this deal and program" (at PT and OT arrival)    Exams:  · Cognitive Exam:  Patient is oriented to person, , "hospital", not situation.   · Gross Motor Coordination:  WFL  · Postural Exam:  Patient presented with the following abnormalities:    · -       WFL  · Sensation:    · -       Intact  · Skin Integrity/Edema:      · -       Skin integrity: BUEs ecchymotic; B lower leg hemoseriderin staining  · RLE ROM: Deficits: joint stiffness at endrange  · RLE Strength: Deficits: grossly 3/5  · LLE ROM: Deficits: see RLE  · LLE Strength: Deficits: see RLE    Functional Mobility:  · Bed Mobility:     · Supine to Sit: maximal assistance  · Sit to Supine: moderate assistance  · Transfers:     · Sit to Stand:  minimum assistance with hand-held assist  · Bed to and from BSC transfer with moderate assist via HHA  · Sat on BSC for attempted BM with no success  · Balance: fair + with transfer    AM-PAC 6 CLICK MOBILITY  Total Score:11     Patient left HOB elevated >45 degrees, with call button in reach, bed alarm on and B pressure relief boots applied, pillow under knees    GOALS:   Multidisciplinary Problems     Physical Therapy Goals        Problem: Physical Therapy    Goal Priority Disciplines Outcome Goal Variances Interventions   Physical Therapy Goal     PT, PT/OT Ongoing, Progressing     Description: Goals to be met by:      Patient will increase functional independence with mobility by performin. Supine to sit with Modified " [place]  2. Sit to supine with Modified [place]  3. Sit to stand transfer with Modified [place]  4. Bed to chair transfer with Supervision   5. Gait  x 100 feet with Stand-by Assistance using Rolling Walker.   6. Lower extremity exercise program x20 reps per handout, with supervision                     History:     Past Medical History:   Diagnosis Date    Hyperlipidemia     Hypertension        Past Surgical History:   Procedure Laterality Date    TONSILLECTOMY      TOTAL KNEE ARTHROPLASTY         Time Tracking:     PT Received On: 05/31/22  PT Start Time: 1055     PT Stop Time: 1120  PT Total Time (min): 25 min     Billable Minutes: Evaluation 15 and Therapeutic Activity 10      05/31/2022

## 2022-05-31 NOTE — PLAN OF CARE
Problem: Occupational Therapy  Goal: Occupational Therapy Goal  Description: Goals to be met by: 06/14/222     Patient will increase functional independence with ADLs by performing:    LE Dressing with Minimal Assistance.  Grooming while EOB with Supervision.  Toileting from bedside commode with Minimal Assistance for hygiene and clothing management.   Supine to sit with Stand-by Assistance.  Step transfer with Contact Guard Assistance  Toilet transfer to bedside commode with Contact Guard Assistance.  Upper extremity exercise program x15 reps per handout, with independence.  Implementation of PLB and energy conservation strategies into daily routines w/ (I).     Outcome: Ongoing, Progressing    Pt pleasant and willing to participate in tx session this date; pt was able to perform bed mobility w/ mod-max A to and transfer to BSC w/ mod A x 2 persons and HHA. Pt w/ SPO2 >95% on 3L O2 NC. Pt w/ decreases strength and endurance but tolerated OOB activities fairly well. Pt will continue to benefit from skilled acute OT services to maximize functional capacity for safe performance w/ ADLs and functional mobility.

## 2022-05-31 NOTE — ASSESSMENT & PLAN NOTE
LE US negative for DVT  V/Q scan was only a perfusion scan and thus is not appropriate for diagnosis of PE  Continue full dose lovenox while in hospital

## 2022-05-31 NOTE — PLAN OF CARE
Problem: Adult Inpatient Plan of Care  Goal: Plan of Care Review  Outcome: Ongoing, Not Progressing  Goal: Patient-Specific Goal (Individualized)  Outcome: Ongoing, Not Progressing  Goal: Absence of Hospital-Acquired Illness or Injury  Outcome: Ongoing, Not Progressing  Goal: Optimal Comfort and Wellbeing  Outcome: Ongoing, Not Progressing  Goal: Readiness for Transition of Care  Outcome: Ongoing, Not Progressing     Problem: Infection  Goal: Absence of Infection Signs and Symptoms  Outcome: Ongoing, Not Progressing     Problem: Impaired Wound Healing  Goal: Optimal Wound Healing  Outcome: Ongoing, Not Progressing   Remain  Afib  on telemetry monitor.  Explained plan of care, verbalized understanding. Educated pt .on new medicine . No injury during shift, Side rails up x 2, call light by bedside.   GEE Crowder  called for patient updated with  status. Increased to 3L nasal cannula while sleeping

## 2022-05-31 NOTE — ASSESSMENT & PLAN NOTE
Bilateral pleural effusions with elevated BNP, JVD, bilateral lower extremity edema  Suspect this is from CHF  - diuresis

## 2022-05-31 NOTE — PLAN OF CARE
Problem: Physical Therapy  Goal: Physical Therapy Goal  Description: Goals to be met by:      Patient will increase functional independence with mobility by performin. Supine to sit with Modified [place]  2. Sit to supine with Modified [place]  3. Sit to stand transfer with Modified [place]  4. Bed to chair transfer with Supervision   5. Gait  x 100 feet with Stand-by Assistance using Rolling Walker.   6. Lower extremity exercise program x20 reps per handout, with supervision    Outcome: Ongoing, Progressing

## 2022-05-31 NOTE — ASSESSMENT & PLAN NOTE
-continue home meds Norvasc 5 mg daily, lisinopril 2.5 mg daily with hold parameters  - change hydralazine to q8 prn SBP>180

## 2022-06-01 PROBLEM — Z71.89 GOALS OF CARE, COUNSELING/DISCUSSION: Status: ACTIVE | Noted: 2022-06-01

## 2022-06-01 LAB
ANION GAP SERPL CALC-SCNC: 9 MMOL/L (ref 8–16)
BUN SERPL-MCNC: 29 MG/DL (ref 8–23)
CALCIUM SERPL-MCNC: 8.8 MG/DL (ref 8.7–10.5)
CHLORIDE SERPL-SCNC: 108 MMOL/L (ref 95–110)
CO2 SERPL-SCNC: 26 MMOL/L (ref 23–29)
CREAT SERPL-MCNC: 1 MG/DL (ref 0.5–1.4)
EST. GFR  (AFRICAN AMERICAN): >60 ML/MIN/1.73 M^2
EST. GFR  (NON AFRICAN AMERICAN): >60 ML/MIN/1.73 M^2
GLUCOSE SERPL-MCNC: 97 MG/DL (ref 70–110)
POTASSIUM SERPL-SCNC: 3.4 MMOL/L (ref 3.5–5.1)
SODIUM SERPL-SCNC: 143 MMOL/L (ref 136–145)

## 2022-06-01 PROCEDURE — 25000003 PHARM REV CODE 250: Performed by: INTERNAL MEDICINE

## 2022-06-01 PROCEDURE — 25000003 PHARM REV CODE 250: Performed by: HOSPITALIST

## 2022-06-01 PROCEDURE — 94640 AIRWAY INHALATION TREATMENT: CPT

## 2022-06-01 PROCEDURE — 63700000 PHARM REV CODE 250 ALT 637 W/O HCPCS: Performed by: INTERNAL MEDICINE

## 2022-06-01 PROCEDURE — 97535 SELF CARE MNGMENT TRAINING: CPT | Mod: CO

## 2022-06-01 PROCEDURE — 36415 COLL VENOUS BLD VENIPUNCTURE: CPT | Performed by: HOSPITALIST

## 2022-06-01 PROCEDURE — 99233 PR SUBSEQUENT HOSPITAL CARE,LEVL III: ICD-10-PCS | Mod: ,,, | Performed by: INTERNAL MEDICINE

## 2022-06-01 PROCEDURE — 97530 THERAPEUTIC ACTIVITIES: CPT

## 2022-06-01 PROCEDURE — 25000242 PHARM REV CODE 250 ALT 637 W/ HCPCS: Performed by: INTERNAL MEDICINE

## 2022-06-01 PROCEDURE — 63600175 PHARM REV CODE 636 W HCPCS: Performed by: INTERNAL MEDICINE

## 2022-06-01 PROCEDURE — 63600175 PHARM REV CODE 636 W HCPCS: Performed by: HOSPITALIST

## 2022-06-01 PROCEDURE — 99233 SBSQ HOSP IP/OBS HIGH 50: CPT | Mod: ,,, | Performed by: INTERNAL MEDICINE

## 2022-06-01 PROCEDURE — 80048 BASIC METABOLIC PNL TOTAL CA: CPT | Performed by: HOSPITALIST

## 2022-06-01 PROCEDURE — 21400001 HC TELEMETRY ROOM

## 2022-06-01 PROCEDURE — 27000207 HC ISOLATION

## 2022-06-01 RX ORDER — POTASSIUM CHLORIDE 20 MEQ/1
40 TABLET, EXTENDED RELEASE ORAL ONCE
Status: COMPLETED | OUTPATIENT
Start: 2022-06-01 | End: 2022-06-01

## 2022-06-01 RX ORDER — METOPROLOL TARTRATE 25 MG/1
12.5 TABLET ORAL 2 TIMES DAILY
Status: DISCONTINUED | OUTPATIENT
Start: 2022-06-01 | End: 2022-06-02 | Stop reason: HOSPADM

## 2022-06-01 RX ADMIN — OXYCODONE HYDROCHLORIDE AND ACETAMINOPHEN 500 MG: 500 TABLET ORAL at 09:06

## 2022-06-01 RX ADMIN — DONEPEZIL HYDROCHLORIDE 10 MG: 10 TABLET ORAL at 09:06

## 2022-06-01 RX ADMIN — QUETIAPINE FUMARATE 25 MG: 25 TABLET ORAL at 09:06

## 2022-06-01 RX ADMIN — POTASSIUM CHLORIDE 40 MEQ: 1500 TABLET, EXTENDED RELEASE ORAL at 09:06

## 2022-06-01 RX ADMIN — ATORVASTATIN CALCIUM 20 MG: 10 TABLET, FILM COATED ORAL at 09:06

## 2022-06-01 RX ADMIN — ALBUTEROL SULFATE 2 PUFF: 108 INHALANT RESPIRATORY (INHALATION) at 07:06

## 2022-06-01 RX ADMIN — LISINOPRIL 2.5 MG: 2.5 TABLET ORAL at 09:06

## 2022-06-01 RX ADMIN — DEXAMETHASONE 6 MG: 2 TABLET ORAL at 09:06

## 2022-06-01 RX ADMIN — TAMSULOSIN HYDROCHLORIDE 0.4 MG: 0.4 CAPSULE ORAL at 09:06

## 2022-06-01 RX ADMIN — THERA TABS 1 TABLET: TAB at 09:06

## 2022-06-01 RX ADMIN — ENOXAPARIN SODIUM 80 MG: 80 INJECTION SUBCUTANEOUS at 09:06

## 2022-06-01 RX ADMIN — AZITHROMYCIN MONOHYDRATE 500 MG: 250 TABLET ORAL at 04:06

## 2022-06-01 RX ADMIN — METOPROLOL TARTRATE 12.5 MG: 25 TABLET, FILM COATED ORAL at 09:06

## 2022-06-01 RX ADMIN — AMLODIPINE BESYLATE 5 MG: 5 TABLET ORAL at 09:06

## 2022-06-01 RX ADMIN — FUROSEMIDE 40 MG: 10 INJECTION, SOLUTION INTRAMUSCULAR; INTRAVENOUS at 09:06

## 2022-06-01 RX ADMIN — CEFEPIME HYDROCHLORIDE 1 G: 1 INJECTION, SOLUTION INTRAVENOUS at 01:06

## 2022-06-01 NOTE — ASSESSMENT & PLAN NOTE
-continue home meds lisinopril 2.5 mg daily with hold parameters  - DC amlodipine  - start metoprolol for AFib rate control  - change hydralazine to q8 prn SBP>180- likely wont need this Rx on discharge

## 2022-06-01 NOTE — PLAN OF CARE
Recommendations    1) Continue Cardiac Diet as tolerated  2) Continue of Boost (+) TID to assist in meeting pt needs   3) Addition of Alex BID to promote wound healing   4) Monitor PO intake / Nutrition related labs   - Hypokalemia - replete PRN    Goals:   1) Patient to meet > 75% EEN/EPN via PO/ONS intake  2) Monitored labs to trend toward target ranges    Nutrition Goal Status: new  Communication of RD Recs:  (POC)

## 2022-06-01 NOTE — HPI
is a pleasant 82-year-old gentleman who has a past medical history of hypertension hyperlipidemia, atrial fibrillation Alzheimer's dementia, recent hospitalization at [facility] May 21, 2022 for COVID-19 pneumonia treated remdesivir Decadron.  Patient was discharged.  He presented to the ER at [facility] with complaints of shortness of breath, lower extremity edema.  Patient had chest x-ray done that showed findings suggestive of pulmonary edema and bilateral pleural effusions.  Patient also had possible right lower lobe pneumonic infiltrate.  Urinalysis suggested UTI with 100 wbc's.  Repeat COVID-19 screen test was positive.  Patient is transferred here to [facility] for treatment of COVID-19 infection, possible right lower lobe pneumonia, and UTI.  Patient was given IV vancomycin and Cefepime at the transferring hospital.  Patient was seen this morning at bedside.  He reported no chest pain, wearing low-flow oxygen 2 L, no nausea, vomiting, or diarrhea.  Patient noted to have elevated D-dimer on laboratories 5.41, creatinine 1.1, lactic acid level 1.6, . Discussed with patient admitting him for pneumonia, COVID-19 infection, and UTI.  Discuss  checking echo, IV antibiotics, Lasix, V/Q scan, bilateral venous Dopplers due to concerns of elevated D-dimer.     The left ventricle is normal in size with mild concentric hypertrophy and normal systolic function.  The estimated ejection fraction is 70%.  Normal left ventricular diastolic function.  Mild right ventricular enlargement with normal right ventricular systolic function.  Mild left atrial enlargement.  There is mild aortic valve stenosis.  Aortic valve area is 1.94 cm2; peak velocity is 2.44 m/s; mean gradient is 16 mmHg.  Intermediate central venous pressure (8 mmHg).  The estimated PA systolic pressure is 29 mmHg.

## 2022-06-01 NOTE — ASSESSMENT & PLAN NOTE
Patient admitted with Hypoxic which is Acute.  he is not on home oxygen. Signs/symptoms of respiratory failure include- increased work of breathing present on admission.   - Labs and images were reviewed. Patient Has not has a recent ABG.   - Supplemental oxygen was provided and noted- Nasal Cannula 0 LPM 97% on room air  - Respiratory failure is due to- CHF and COVID   - continue IV lasix  - continue COVID treatment  - stable on room air. Respiratory failure resolved.

## 2022-06-01 NOTE — PLAN OF CARE
Problem: Occupational Therapy  Goal: Occupational Therapy Goal  Description: Goals to be met by: 06/14/222     Patient will increase functional independence with ADLs by performing:    LE Dressing with Minimal Assistance.  Grooming while EOB with Supervision.  Toileting from bedside commode with Minimal Assistance for hygiene and clothing management.   Supine to sit with Stand-by Assistance.  Step transfer with Contact Guard Assistance  Toilet transfer to bedside commode with Contact Guard Assistance.  Upper extremity exercise program x15 reps per handout, with independence.  Implementation of PLB and energy conservation strategies into daily routines w/ (I).     Outcome: Ongoing, Progressing

## 2022-06-01 NOTE — PLAN OF CARE
Problem: Adult Inpatient Plan of Care  Goal: Absence of Hospital-Acquired Illness or Injury  Outcome: Ongoing, Progressing  Intervention: Identify and Manage Fall Risk  Flowsheets (Taken 6/1/2022 2337)  Safety Promotion/Fall Prevention:   assistive device/personal item within reach   bed alarm set   instructed to call staff for mobility

## 2022-06-01 NOTE — ASSESSMENT & PLAN NOTE
Patient with Long standing persistent (>12 months) atrial fibrillation which is rate controlled currently without Rx. Patient is currently in atrial fibrillation.[id number] Score: 2. Anticoagulation indicated. Anticoagulation done with Lovenox for now.  - start metoprolol  - eliquis on discharge

## 2022-06-01 NOTE — PT/OT/SLP PROGRESS
Physical Therapy Treatment    Patient Name:  Timothy Roldan   MRN:  [id number]    Recommendations:     Discharge Recommendations:  PT upon return to Assisted Living  Discharge Equipment Recommendations: none       Assessment:     Timothy Roldan is a 82 y.o. male admitted with a medical diagnosis of Acute hypoxemic respiratory failure.  He presents with the following impairments/functional limitations:  weakness, impaired joint extensibility, decreased ROM, impaired cognition, impaired muscle length, impaired endurance, decreased coordination, impaired fine motor, decreased upper extremity function, impaired self care skills, decreased lower extremity function, impaired functional mobilty, impaired skin, decreased safety awareness, gait instability, impaired balance, impaired cardiopulmonary response to activity .    Too weak to ambulate today but able to transfer to chair. Good participation.     Rehab Prognosis: Good; patient would benefit from acute skilled PT services to address these deficits and reach maximum level of function.        Plan:     During this hospitalization, patient to be seen 6 x/week to address the identified rehab impairments via gait training, therapeutic activities, therapeutic exercises, neuromuscular re-education and progress toward the following goals:    · Plan of Care Expires:  06/15/22    Subjective     Chief Complaint: none  Patient/Family Comments/goals: try to eat lunch  Pain/Comfort:  · Pain Rating 1: 0/10      Objective:     Communicated with nurse Geiger prior to session.  Patient found HOB elevated with bed alarm, peripheral IV, pulse ox (continuous), telemetry upon PT entry to room.     General Precautions: Standard, airborne, contact, droplet, fall   Orthopedic Precautions:N/A   Braces:    Respiratory Status: Room air     Alert. Min verbal. Confused to situation. Agreed to get OOB without encouragement needed.     Functional Mobility:  · Bed Mobility:     · Supine to Sit:  maximal assistance  · Transfers:     · Sit to Stand:  moderate assistance with rolling walker   · Hips and knees flexed in standing; unable to extend posture despite verbal and tactile cues  · Static standing with RW as PT provided balance assist (min assist) and OT provided dependent pericare and applied new diaper  · Bed to Chair: moderate assistance and of 2 persons with  hand-held assist  using  Stand Pivot  · Balance: good seated      AM-PAC 6 CLICK MOBILITY  Turning over in bed (including adjusting bedclothes, sheets and blankets)?: 2  Sitting down on and standing up from a chair with arms (e.g., wheelchair, bedside commode, etc.): 2  Moving from lying on back to sitting on the side of the bed?: 2  Moving to and from a bed to a chair (including a wheelchair)?: 2  Need to walk in hospital room?: 1  Climbing 3-5 steps with a railing?: 1  Basic Mobility Total Score: 10       Patient left up in chair with call button in reach, chair alarm on and nurse Juanjo notified..    GOALS:   Multidisciplinary Problems     Physical Therapy Goals        Problem: Physical Therapy    Goal Priority Disciplines Outcome Goal Variances Interventions   Physical Therapy Goal     PT, PT/OT Ongoing, Progressing     Description: Goals to be met by:      Patient will increase functional independence with mobility by performin. Supine to sit with Modified [place]  2. Sit to supine with Modified [place]  3. Sit to stand transfer with Modified [place]  4. Bed to chair transfer with Supervision   5. Gait  x 100 feet with Stand-by Assistance using Rolling Walker.   6. Lower extremity exercise program x20 reps per handout, with supervision                     Time Tracking:     PT Received On: 22  PT Start Time: 1350     PT Stop Time: 1409  PT Total Time (min): 19 min     Billable Minutes: Therapeutic Activity 15    Treatment Type: Treatment  PT/PTA: PT     PTA Visit Number: 0     2022

## 2022-06-01 NOTE — PLAN OF CARE
Problem: Physical Therapy  Goal: Physical Therapy Goal  Description: Goals to be met by:      Patient will increase functional independence with mobility by performin. Supine to sit with Modified [place]  2. Sit to supine with Modified [place]  3. Sit to stand transfer with Modified [place]  4. Bed to chair transfer with Supervision   5. Gait  x 100 feet with Stand-by Assistance using Rolling Walker.   6. Lower extremity exercise program x20 reps per handout, with supervision    Outcome: Ongoing, Progressing

## 2022-06-01 NOTE — PROGRESS NOTES
[facility] Medicine  Progress Note    Patient Name: Timothy Roldan  MRN: [id number]  Patient Class: IP- Inpatient   Admission Date: 5/29/2022  Length of Stay: 1 days  Attending Physician: Hilda Rosa MD  Primary Care Provider: Martine Garces MD        Subjective:     Principal Problem:Acute hypoxemic respiratory failure        HPI:   is a pleasant 82-year-old gentleman who has a past medical history of hypertension hyperlipidemia, atrial fibrillation Alzheimer's dementia, recent hospitalization at [facility] May 21, 2022 for COVID-19 pneumonia treated remdesivir Decadron.  Patient was discharged.  He presented to the ER at [facility] with complaints of shortness of breath, lower extremity edema.  Patient had chest x-ray done that showed findings suggestive of pulmonary edema and bilateral pleural effusions.  Patient also had possible right lower lobe pneumonic infiltrate.  Urinalysis suggested UTI with 100 wbc's.  Repeat COVID-19 screen test was positive.  Patient is transferred here to [facility] for treatment of COVID-19 infection, possible right lower lobe pneumonia, and UTI.  Patient was given IV vancomycin and Cefepime at the transferring hospital.  Patient was seen this morning at bedside.  He reported no chest pain, wearing low-flow oxygen 2 L, no nausea, vomiting, or diarrhea.  Patient noted to have elevated D-dimer on laboratories 5.41, creatinine 1.1, lactic acid level 1.6, . Discussed with patient admitting him for pneumonia, COVID-19 infection, and UTI.  Discuss  checking echo, IV antibiotics, Lasix, V/Q scan, bilateral venous Dopplers due to concerns of elevated D-dimer.  Patient requests to be full code status.      Overview/Hospital Course:  Mr Timothy Roldan is a 82 y.o. man admitted with acute diastolic CHF with bilateral pleural effusions, pulmonary edema, and lower extremity edema after recent  hospitalization with COVID. Started IV lasix. TTE EF 70%, mild AS, normal diastolic function. Also started dexamethasone for COVID. Also started antibiotics given worsening CXR and recent hospitalization, though suspect main cause of shortness of breath is HFpEF, not COVID or pneumonia. No growth on cultures; antibiotics discontinued. Remains stable on room air. He does have confusion most likely due to delirium in hospitalized patient with dementia.       Interval History:  Lying in bed. Says that he wants to get out of this situation. He knows he is in the hospital but has difficulty describing why he is here. No shortness of breath today.     Review of Systems   Constitutional:  Negative for chills and fever.   Respiratory:  Negative for cough, chest tightness and shortness of breath.    Cardiovascular:  Negative for chest pain and leg swelling.   Gastrointestinal:  Negative for abdominal pain, constipation, diarrhea, nausea and vomiting.   Genitourinary:  Negative for difficulty urinating.   Neurological:  Negative for weakness and numbness.   Psychiatric/Behavioral:  Positive for confusion.    Objective:     Vital Signs (Most Recent):  Temp: 97.9 °F (36.6 °C) (06/01/22 1139)  Pulse: 86 (06/01/22 1139)  Resp: 18 (06/01/22 1139)  BP: 108/63 (06/01/22 1139)  SpO2: (!) 92 % (06/01/22 1139) Vital Signs (24h Range):  Temp:  [97.5 °F (36.4 °C)-98 °F (36.7 °C)] 97.9 °F (36.6 °C)  Pulse:  [76-91] 86  Resp:  [17-20] 18  SpO2:  [90 %-96 %] 92 %  BP: (108-132)/(63-77) 108/63     Weight: 79.4 kg (175 lb 0.7 oz)  Body mass index is 25.12 kg/m².    Intake/Output Summary (Last 24 hours) at 6/1/2022 1314  Last data filed at 6/1/2022 0700  Gross per 24 hour   Intake 360 ml   Output --   Net 360 ml        Physical Exam  Vitals and nursing note reviewed.   Constitutional:       General: He is not in acute distress.     Appearance: He is ill-appearing. He is not toxic-appearing or diaphoretic.   HENT:      Head: Normocephalic and  atraumatic.      Nose: Nose normal.      Mouth/Throat:      Mouth: Mucous membranes are moist.   Cardiovascular:      Rate and Rhythm: Normal rate. Rhythm irregular.      Pulses: Normal pulses.      Heart sounds: Normal heart sounds. No murmur heard.    No gallop.      Comments: AFib on monitor  Pulmonary:      Effort: Pulmonary effort is normal. No respiratory distress.      Breath sounds: No wheezing, rhonchi or rales.      Comments: Room air  Abdominal:      General: Bowel sounds are normal. There is no distension.      Palpations: Abdomen is soft.      Tenderness: There is no abdominal tenderness. There is no guarding.   Musculoskeletal:      Right lower leg: Edema present.      Left lower leg: Edema present.   Skin:     General: Skin is warm and dry.      Findings: Bruising (Bilateral arms) present.   Neurological:      Comments: Oriented to person, birthday       Significant Labs: All pertinent labs within the past 24 hours have been reviewed.    Significant Imaging: I have reviewed all pertinent imaging results/findings within the past 24 hours.      Assessment/Plan:      * Acute hypoxemic respiratory failure  Patient admitted with Hypoxic which is Acute.  he is not on home oxygen. Signs/symptoms of respiratory failure include- increased work of breathing present on admission.   - Labs and images were reviewed. Patient Has not has a recent ABG.   - Supplemental oxygen was provided and noted- Nasal Cannula 0 LPM 97% on room air  - Respiratory failure is due to- CHF and COVID   - continue IV lasix  - continue COVID treatment  - stable on room air. Respiratory failure resolved.       Hypokalemia  Replace and monitor        Elevated d-dimer  LE US negative for DVT  V/Q scan was only a perfusion scan and thus is not appropriate for diagnosis of PE  Continue full dose lovenox while in hospital      Pleural effusion  Bilateral pleural effusions with elevated BNP, JVD, bilateral lower extremity edema  Suspect this is  from CHF  - diuresis      Acute heart failure with preserved ejection fraction  Patient admitted with shortness of breath, edema consistent with acute HFpEF. BNP  Recent Labs   Lab 05/29/22  1712   *     CXR: bilateral pleural effusions, pulmonary edema  Recent Labs   Lab 05/29/22  1712   TROPONINI <0.006     EKG personally reviewed and shows AFib  Start on IV lasix diuresis. Monitor ins and outs. Improving- continue IV lasix today.   TTE EF 70%, mild AS, normal diastolic function   Continue ACEi  Not on BB at home- will DC amlodipine and start this for rate control   Cardiology follow up on discharge        Debility  PT, OT consult   Discussed with HCPOA- plan back to assisted living with PT, OT on discharge         Pneumonia due to COVID-19 virus  COVID positive. Recently admitted, now readmit. Suspect respiratory failure is more due to CHF than COVID  - continue dexamethasone  - already received remdesivir  - weaned to room air on 5/31      Persistent atrial fibrillation  Patient with Long standing persistent (>12 months) atrial fibrillation which is rate controlled currently without Rx. Patient is currently in atrial fibrillation.[id number] Score: 2. Anticoagulation indicated. Anticoagulation done with Lovenox for now.  - start metoprolol  - eliquis on discharge         Alzheimer disease  - continue Aricept 10 mg daily  - delirium precautions       Essential hypertension  -continue home meds lisinopril 2.5 mg daily with hold parameters  - DC amlodipine  - start metoprolol for AFib rate control  - change hydralazine to q8 prn SBP>180- likely wont need this Rx on discharge         VTE Risk Mitigation (From admission, onward)         Ordered     enoxaparin injection 80 mg  2 times daily         05/30/22 0420                Discharge Planning   JACQUELINE: 6/2/2022     Code Status: Full Code   Is the patient medically ready for discharge?:     Reason for patient still in hospital (select all that apply): Patient  trending condition  Discharge Plan A: Home (with instructions to followup)          2:05 PM  Called NISHANT Gonzáles. No answer. Left discrete message.    2:24 PM Updated NISHANT.           Hilda Rosa MD  Department of [facility] Medicine   [facility]

## 2022-06-01 NOTE — ASSESSMENT & PLAN NOTE
Patient admitted with shortness of breath, edema consistent with acute HFpEF. BNP  Recent Labs   Lab 05/29/22  1712   *     CXR: bilateral pleural effusions, pulmonary edema  Recent Labs   Lab 05/29/22 1712   TROPONINI <0.006     EKG personally reviewed and shows AFib  Start on IV lasix diuresis. Monitor ins and outs. Improving- continue IV lasix today.   TTE EF 70%, mild AS, normal diastolic function   Continue ACEi  Not on BB at home- will DC amlodipine and start this for rate control   Cardiology follow up on discharge

## 2022-06-01 NOTE — SUBJECTIVE & OBJECTIVE
Interval History:  Lying in bed. Says that he wants to get out of this situation. He knows he is in the hospital but has difficulty describing why he is here. No shortness of breath today.     Review of Systems   Constitutional:  Negative for chills and fever.   Respiratory:  Negative for cough, chest tightness and shortness of breath.    Cardiovascular:  Negative for chest pain and leg swelling.   Gastrointestinal:  Negative for abdominal pain, constipation, diarrhea, nausea and vomiting.   Genitourinary:  Negative for difficulty urinating.   Neurological:  Negative for weakness and numbness.   Psychiatric/Behavioral:  Positive for confusion.    Objective:     Vital Signs (Most Recent):  Temp: 97.9 °F (36.6 °C) (06/01/22 1139)  Pulse: 86 (06/01/22 1139)  Resp: 18 (06/01/22 1139)  BP: 108/63 (06/01/22 1139)  SpO2: (!) 92 % (06/01/22 1139) Vital Signs (24h Range):  Temp:  [97.5 °F (36.4 °C)-98 °F (36.7 °C)] 97.9 °F (36.6 °C)  Pulse:  [76-91] 86  Resp:  [17-20] 18  SpO2:  [90 %-96 %] 92 %  BP: (108-132)/(63-77) 108/63     Weight: 79.4 kg (175 lb 0.7 oz)  Body mass index is 25.12 kg/m².    Intake/Output Summary (Last 24 hours) at 6/1/2022 1314  Last data filed at 6/1/2022 0700  Gross per 24 hour   Intake 360 ml   Output --   Net 360 ml        Physical Exam  Vitals and nursing note reviewed.   Constitutional:       General: He is not in acute distress.     Appearance: He is ill-appearing. He is not toxic-appearing or diaphoretic.   HENT:      Head: Normocephalic and atraumatic.      Nose: Nose normal.      Mouth/Throat:      Mouth: Mucous membranes are moist.   Cardiovascular:      Rate and Rhythm: Normal rate. Rhythm irregular.      Pulses: Normal pulses.      Heart sounds: Normal heart sounds. No murmur heard.    No gallop.      Comments: AFib on monitor  Pulmonary:      Effort: Pulmonary effort is normal. No respiratory distress.      Breath sounds: No wheezing, rhonchi or rales.      Comments: Room air  Abdominal:       General: Bowel sounds are normal. There is no distension.      Palpations: Abdomen is soft.      Tenderness: There is no abdominal tenderness. There is no guarding.   Musculoskeletal:      Right lower leg: Edema present.      Left lower leg: Edema present.   Skin:     General: Skin is warm and dry.      Findings: Bruising (Bilateral arms) present.   Neurological:      Comments: Oriented to person, birthday       Significant Labs: All pertinent labs within the past 24 hours have been reviewed.    Significant Imaging: I have reviewed all pertinent imaging results/findings within the past 24 hours.

## 2022-06-01 NOTE — CONSULTS
[facility] - Telemetry  Cardiology  Consult Note    Patient Name: Timothy Roldan  MRN: [id number]  Admission Date: 5/29/2022  Hospital Length of Stay: 0 days  Code Status: Full Code   Attending Provider: Hilda Rosa MD   Consulting Provider: Rony Tariq MD  Primary Care Physician: Martine Garces MD  Principal Problem:Acute hypoxemic respiratory failure    Patient information was obtained from patient and ER records.     Inpatient consult to Cardiology  Consult performed by: Rony Tariq MD  Consult ordered by: Hilda Rosa MD        Subjective:     Chief Complaint:  sob     HPI:    is a pleasant 82-year-old gentleman who has a past medical history of hypertension hyperlipidemia, atrial fibrillation Alzheimer's dementia, recent hospitalization at [facility] May 21, 2022 for COVID-19 pneumonia treated remdesivir Decadron.  Patient was discharged.  He presented to the ER at [facility] with complaints of shortness of breath, lower extremity edema.  Patient had chest x-ray done that showed findings suggestive of pulmonary edema and bilateral pleural effusions.  Patient also had possible right lower lobe pneumonic infiltrate.  Urinalysis suggested UTI with 100 wbc's.  Repeat COVID-19 screen test was positive.  Patient is transferred here to [facility] for treatment of COVID-19 infection, possible right lower lobe pneumonia, and UTI.  Patient was given IV vancomycin and Cefepime at the transferring hospital.  Patient was seen this morning at bedside.  He reported no chest pain, wearing low-flow oxygen 2 L, no nausea, vomiting, or diarrhea.  Patient noted to have elevated D-dimer on laboratories 5.41, creatinine 1.1, lactic acid level 1.6, . Discussed with patient admitting him for pneumonia, COVID-19 infection, and UTI.  Discuss  checking echo, IV antibiotics, Lasix, V/Q scan, bilateral venous Dopplers due to concerns of elevated D-dimer.    ·   The left ventricle is normal in size with mild concentric hypertrophy and normal systolic function.  · The estimated ejection fraction is 70%.  · Normal left ventricular diastolic function.  · Mild right ventricular enlargement with normal right ventricular systolic function.  · Mild left atrial enlargement.  · There is mild aortic valve stenosis.  · Aortic valve area is 1.94 cm2; peak velocity is 2.44 m/s; mean gradient is 16 mmHg.  · Intermediate central venous pressure (8 mmHg).  · The estimated PA systolic pressure is 29 mmHg.         Past Medical History:   Diagnosis Date    Hyperlipidemia     Hypertension        Past Surgical History:   Procedure Laterality Date    TONSILLECTOMY      TOTAL KNEE ARTHROPLASTY         Review of patient's allergies indicates:  No Known Allergies    No current facility-administered medications on file prior to encounter.     Current Outpatient Medications on File Prior to Encounter   Medication Sig    amlodipine-benazepril 5-10 mg (LOTREL) 5-10 mg per capsule TK 1 C PO D    amlodipine-benazepril 5-10 mg (LOTREL) 5-10 mg per capsule Take 1 capsule by mouth every morning.    donepeziL (ARICEPT) 10 MG tablet Take 1 tablet (10 mg total) by mouth every evening.    furosemide (LASIX) 40 MG tablet Take 40 mg by mouth once daily.    hydrALAZINE (APRESOLINE) 50 MG tablet Take 1 tablet (50 mg total) by mouth every 8 (eight) hours.    loratadine (CLARITIN) 10 mg tablet loratadine 10 mg tablet   TAKE 1 TABLET BY MOUTH DAILY    lovastatin (MEVACOR) 20 MG tablet Take 20 mg by mouth every evening.    memantine (NAMENDA) 10 MG Tab Take 2 tablets by mouth once daily.    QUEtiapine (SEROQUEL) 25 MG Tab Take 1 tablet (25 mg total) by mouth every evening.    tamsulosin (FLOMAX) 0.4 mg Cap Take 0.4 mg by mouth.    pulse oximeter (PULSE OXIMETER) device by Apply Externally route 2 (two) times a day. Use twice daily at 8 AM and 3 PM and record the value in [employer] as directed.     tamsulosin (FLOMAX) 0.4 mg Cap Take 1 capsule (0.4 mg total) by mouth once daily.     Family History    None       Tobacco Use    Smoking status: Never Smoker    Smokeless tobacco: Never Used   Substance and Sexual Activity    Alcohol use: Not on file    Drug use: Not on file    Sexual activity: Not on file     Review of Systems   Constitutional: Positive for malaise/fatigue.   HENT: Negative.     Eyes: Negative.    Cardiovascular:  Positive for dyspnea on exertion.   Endocrine: Negative.    Hematologic/Lymphatic: Negative.    Skin: Negative.    Musculoskeletal: Negative.    Gastrointestinal: Negative.    Genitourinary: Negative.    Neurological: Negative.    Psychiatric/Behavioral: Negative.     Allergic/Immunologic: Negative.    Objective:     Vital Signs (Most Recent):  Temp: 97.5 °F (36.4 °C) (05/31/22 2042)  Pulse: 85 (05/31/22 2042)  Resp: 17 (05/31/22 2042)  BP: 116/73 (05/31/22 2042)  SpO2: (!) 93 % (05/31/22 2042)   Vital Signs (24h Range):  Temp:  [97.5 °F (36.4 °C)-98.7 °F (37.1 °C)] 97.5 °F (36.4 °C)  Pulse:  [79-91] 85  Resp:  [14-20] 17  SpO2:  [90 %-99 %] 93 %  BP: ()/(57-77) 116/73     Weight: 79.4 kg (175 lb 0.7 oz)  Body mass index is 25.12 kg/m².    SpO2: (!) 93 %  O2 Device (Oxygen Therapy): nasal cannula      Intake/Output Summary (Last 24 hours) at 5/31/2022 2250  Last data filed at 5/31/2022 1700  Gross per 24 hour   Intake 560 ml   Output --   Net 560 ml       Lines/Drains/Airways       Peripheral Intravenous Line  Duration                  Peripheral IV - Single Lumen 05/29/22 1700 22 G Hand 2 days                    Physical Exam  Vitals reviewed.   Constitutional:       Appearance: He is well-developed.   HENT:      Head: Normocephalic.   Eyes:      Conjunctiva/sclera: Conjunctivae normal.      Pupils: Pupils are equal, round, and reactive to light.   Cardiovascular:      Rate and Rhythm: Normal rate. Rhythm irregular.      Heart sounds: Normal heart sounds.   Pulmonary:       Effort: Pulmonary effort is normal.      Breath sounds: Normal breath sounds.   Abdominal:      General: Bowel sounds are normal.      Palpations: Abdomen is soft.   Musculoskeletal:      Cervical back: Normal range of motion and neck supple.   Skin:     General: Skin is warm.   Neurological:      Mental Status: He is alert and oriented to person, place, and time.       Significant Labs: BMP:   Recent Labs   Lab 05/31/22  0658         K 3.4*      CO2 26   BUN 23   CREATININE 1.0   CALCIUM 8.7   , CMP   Recent Labs   Lab 05/31/22  0658      K 3.4*      CO2 26      BUN 23   CREATININE 1.0   CALCIUM 8.7   ANIONGAP 9   ESTGFRAFRICA >60   EGFRNONAA >60   , CBC   Recent Labs   Lab 05/31/22  0530   WBC 8.50   HGB 11.8*   HCT 37.0*      , INR   Recent Labs   Lab 05/30/22  0454   INR 1.1   , Lipid Panel No results for input(s): CHOL, HDL, LDLCALC, TRIG, CHOLHDL in the last 48 hours., Troponin No results for input(s): TROPONINI in the last 48 hours., and All pertinent lab results from the last 24 hours have been reviewed.    Significant Imaging: Echocardiogram: Transthoracic echo (TTE) complete (Cupid Only):   Results for orders placed or performed during the hospital encounter of 05/29/22   Echo Saline Bubble? No   Result Value Ref Range    BSA 1.98 m2    LV LATERAL E/E' RATIO 9.80 m/s    TDI LATERAL 0.10 m/s    PV PEAK VELOCITY 1.01 cm/s    LVIDd 4.16 3.5 - 6.0 cm    IVS 1.20 (A) 0.6 - 1.1 cm    Posterior Wall 1.20 (A) 0.6 - 1.1 cm    LVIDs 2.44 2.1 - 4.0 cm    FS 41 28 - 44 %    STJ 3.14 cm    Ascending aorta 3.26 cm    LV mass 175.58 g    LA size 4.47 cm    RVDD 4.20 cm    TAPSE 2.57 cm    RV S' 12.96 cm/s    Left Ventricle Relative Wall Thickness 0.58 cm    AV mean gradient 16 mmHg    AV valve area 1.94 cm2    AV Velocity Ratio 0.49     AV index (prosthetic) 0.60     MV valve area p 1/2 method 3.42 cm2    E/A ratio 2.00     E wave deceleration time 221.82 msec    LVOT diameter  2.03 cm    LVOT area 3.2 cm2    LVOT peak edgar 1.19 m/s    LVOT peak VTI 24.30 cm    Ao peak edgar 2.44 m/s    Ao VTI 40.48 cm    LVOT stroke volume 78.61 cm3    AV peak gradient 24 mmHg    MV Peak E Edgar 0.98 m/s    TR Max Edgar 2.31 m/s    MV stenosis pressure 1/2 time 64.33 ms    MV Peak A Edgar 0.49 m/s    LV Systolic Volume 21.08 mL    LV Systolic Volume Index 10.7 mL/m2    LV Diastolic Volume 77.03 mL    LV Diastolic Volume Index 39.10 mL/m2    LV Mass Index 89 g/m2    RA Major Axis 6.51 cm    Left Atrium Major Axis 5.50 cm    Triscuspid Valve Regurgitation Peak Gradient 21 mmHg    RA Width 4.04 cm    Right Atrial Pressure (from IVC) 8 mmHg    EF 70 %    TV rest pulmonary artery pressure 29 mmHg    Narrative    · The left ventricle is normal in size with mild concentric hypertrophy   and normal systolic function.  · The estimated ejection fraction is 70%.  · Normal left ventricular diastolic function.  · Mild right ventricular enlargement with normal right ventricular   systolic function.  · Mild left atrial enlargement.  · There is mild aortic valve stenosis.  · Aortic valve area is 1.94 cm2; peak velocity is 2.44 m/s; mean gradient   is 16 mmHg.  · Intermediate central venous pressure (8 mmHg).  · The estimated PA systolic pressure is 29 mmHg.        Assessment and Plan:     * Acute hypoxemic respiratory failure        Pleural effusion        Debility        Pneumonia due to COVID-19 virus        Persistent atrial fibrillation  Rate control. Anti coagulate    Alzheimer disease         Essential hypertension               VTE Risk Mitigation (From admission, onward)         Ordered     enoxaparin injection 80 mg  2 times daily         05/30/22 4259                Thank you for your consult. I will follow-up with patient. Please contact us if you have any additional questions.    Rony Tariq MD  Cardiology   [facility]

## 2022-06-01 NOTE — ACP (ADVANCE CARE PLANNING)
Advance Care Planning     Date: 06/01/2022  Discussed advanced directives with NISHANT Gonzáles. She states that he previously completed advanced directives with a company called [employer]. He was of sound mind and completed them himself. She thinks he has DNR status. She will try to find this paperwork so that it can be uploaded into [facility] system.     Time spent discussing ACP= 3 minutes    Hilda Rosa MD  06/01/2022  2:43 PM

## 2022-06-01 NOTE — SUBJECTIVE & OBJECTIVE
Past Medical History:   Diagnosis Date    Hyperlipidemia     Hypertension        Past Surgical History:   Procedure Laterality Date    TONSILLECTOMY      TOTAL KNEE ARTHROPLASTY         Review of patient's allergies indicates:  No Known Allergies    No current facility-administered medications on file prior to encounter.     Current Outpatient Medications on File Prior to Encounter   Medication Sig    amlodipine-benazepril 5-10 mg (LOTREL) 5-10 mg per capsule TK 1 C PO D    amlodipine-benazepril 5-10 mg (LOTREL) 5-10 mg per capsule Take 1 capsule by mouth every morning.    donepeziL (ARICEPT) 10 MG tablet Take 1 tablet (10 mg total) by mouth every evening.    furosemide (LASIX) 40 MG tablet Take 40 mg by mouth once daily.    hydrALAZINE (APRESOLINE) 50 MG tablet Take 1 tablet (50 mg total) by mouth every 8 (eight) hours.    loratadine (CLARITIN) 10 mg tablet loratadine 10 mg tablet   TAKE 1 TABLET BY MOUTH DAILY    lovastatin (MEVACOR) 20 MG tablet Take 20 mg by mouth every evening.    memantine (NAMENDA) 10 MG Tab Take 2 tablets by mouth once daily.    QUEtiapine (SEROQUEL) 25 MG Tab Take 1 tablet (25 mg total) by mouth every evening.    tamsulosin (FLOMAX) 0.4 mg Cap Take 0.4 mg by mouth.    pulse oximeter (PULSE OXIMETER) device by Apply Externally route 2 (two) times a day. Use twice daily at 8 AM and 3 PM and record the value in MyChart as directed.    tamsulosin (FLOMAX) 0.4 mg Cap Take 1 capsule (0.4 mg total) by mouth once daily.     Family History    None       Tobacco Use    Smoking status: Never Smoker    Smokeless tobacco: Never Used   Substance and Sexual Activity    Alcohol use: Not on file    Drug use: Not on file    Sexual activity: Not on file     Review of Systems   Constitutional: Positive for malaise/fatigue.   HENT: Negative.     Eyes: Negative.    Cardiovascular:  Positive for dyspnea on exertion.   Endocrine: Negative.    Hematologic/Lymphatic: Negative.    Skin: Negative.    Musculoskeletal:  Negative.    Gastrointestinal: Negative.    Genitourinary: Negative.    Neurological: Negative.    Psychiatric/Behavioral: Negative.     Allergic/Immunologic: Negative.    Objective:     Vital Signs (Most Recent):  Temp: 97.5 °F (36.4 °C) (05/31/22 2042)  Pulse: 85 (05/31/22 2042)  Resp: 17 (05/31/22 2042)  BP: 116/73 (05/31/22 2042)  SpO2: (!) 93 % (05/31/22 2042)   Vital Signs (24h Range):  Temp:  [97.5 °F (36.4 °C)-98.7 °F (37.1 °C)] 97.5 °F (36.4 °C)  Pulse:  [79-91] 85  Resp:  [14-20] 17  SpO2:  [90 %-99 %] 93 %  BP: ()/(57-77) 116/73     Weight: 79.4 kg (175 lb 0.7 oz)  Body mass index is 25.12 kg/m².    SpO2: (!) 93 %  O2 Device (Oxygen Therapy): nasal cannula      Intake/Output Summary (Last 24 hours) at 5/31/2022 2250  Last data filed at 5/31/2022 1700  Gross per 24 hour   Intake 560 ml   Output --   Net 560 ml       Lines/Drains/Airways       Peripheral Intravenous Line  Duration                  Peripheral IV - Single Lumen 05/29/22 1700 22 G Hand 2 days                    Physical Exam  Vitals reviewed.   Constitutional:       Appearance: He is well-developed.   HENT:      Head: Normocephalic.   Eyes:      Conjunctiva/sclera: Conjunctivae normal.      Pupils: Pupils are equal, round, and reactive to light.   Cardiovascular:      Rate and Rhythm: Normal rate. Rhythm irregular.      Heart sounds: Normal heart sounds.   Pulmonary:      Effort: Pulmonary effort is normal.      Breath sounds: Normal breath sounds.   Abdominal:      General: Bowel sounds are normal.      Palpations: Abdomen is soft.   Musculoskeletal:      Cervical back: Normal range of motion and neck supple.   Skin:     General: Skin is warm.   Neurological:      Mental Status: He is alert and oriented to person, place, and time.       Significant Labs: BMP:   Recent Labs   Lab 05/31/22  0658         K 3.4*      CO2 26   BUN 23   CREATININE 1.0   CALCIUM 8.7   , CMP   Recent Labs   Lab 05/31/22  0658      K  3.4*      CO2 26      BUN 23   CREATININE 1.0   CALCIUM 8.7   ANIONGAP 9   ESTGFRAFRICA >60   EGFRNONAA >60   , CBC   Recent Labs   Lab 05/31/22  0530   WBC 8.50   HGB 11.8*   HCT 37.0*      , INR   Recent Labs   Lab 05/30/22  0454   INR 1.1   , Lipid Panel No results for input(s): CHOL, HDL, LDLCALC, TRIG, CHOLHDL in the last 48 hours., Troponin No results for input(s): TROPONINI in the last 48 hours., and All pertinent lab results from the last 24 hours have been reviewed.    Significant Imaging: Echocardiogram: Transthoracic echo (TTE) complete (Cupid Only):   Results for orders placed or performed during the hospital encounter of 05/29/22   Echo Saline Bubble? No   Result Value Ref Range    BSA 1.98 m2    LV LATERAL E/E' RATIO 9.80 m/s    TDI LATERAL 0.10 m/s    PV PEAK VELOCITY 1.01 cm/s    LVIDd 4.16 3.5 - 6.0 cm    IVS 1.20 (A) 0.6 - 1.1 cm    Posterior Wall 1.20 (A) 0.6 - 1.1 cm    LVIDs 2.44 2.1 - 4.0 cm    FS 41 28 - 44 %    STJ 3.14 cm    Ascending aorta 3.26 cm    LV mass 175.58 g    LA size 4.47 cm    RVDD 4.20 cm    TAPSE 2.57 cm    RV S' 12.96 cm/s    Left Ventricle Relative Wall Thickness 0.58 cm    AV mean gradient 16 mmHg    AV valve area 1.94 cm2    AV Velocity Ratio 0.49     AV index (prosthetic) 0.60     MV valve area p 1/2 method 3.42 cm2    E/A ratio 2.00     E wave deceleration time 221.82 msec    LVOT diameter 2.03 cm    LVOT area 3.2 cm2    LVOT peak edgar 1.19 m/s    LVOT peak VTI 24.30 cm    Ao peak edgar 2.44 m/s    Ao VTI 40.48 cm    LVOT stroke volume 78.61 cm3    AV peak gradient 24 mmHg    MV Peak E Edgar 0.98 m/s    TR Max Edgar 2.31 m/s    MV stenosis pressure 1/2 time 64.33 ms    MV Peak A Edgar 0.49 m/s    LV Systolic Volume 21.08 mL    LV Systolic Volume Index 10.7 mL/m2    LV Diastolic Volume 77.03 mL    LV Diastolic Volume Index 39.10 mL/m2    LV Mass Index 89 g/m2    RA Major Axis 6.51 cm    Left Atrium Major Axis 5.50 cm    Triscuspid Valve Regurgitation Peak  Gradient 21 mmHg    RA Width 4.04 cm    Right Atrial Pressure (from IVC) 8 mmHg    EF 70 %    TV rest pulmonary artery pressure 29 mmHg    Narrative    · The left ventricle is normal in size with mild concentric hypertrophy   and normal systolic function.  · The estimated ejection fraction is 70%.  · Normal left ventricular diastolic function.  · Mild right ventricular enlargement with normal right ventricular   systolic function.  · Mild left atrial enlargement.  · There is mild aortic valve stenosis.  · Aortic valve area is 1.94 cm2; peak velocity is 2.44 m/s; mean gradient   is 16 mmHg.  · Intermediate central venous pressure (8 mmHg).  · The estimated PA systolic pressure is 29 mmHg.

## 2022-06-01 NOTE — PT/OT/SLP PROGRESS
Occupational Therapy   Treatment    Name: Timothy Roldan  MRN: [id number]  Admitting Diagnosis:  Acute hypoxemic respiratory failure       Recommendations:     Discharge Recommendations:  (OT at next level of care w/ 24 hr supervision)  Discharge Equipment Recommendations:  none  Barriers to discharge: none       Assessment:     Timothy Roldan is a 82 y.o. male with a medical diagnosis of Acute hypoxemic respiratory failure.  He presents with the following performance deficits affecting function: weakness, impaired endurance, impaired self care skills, impaired functional mobilty, decreased safety awareness, impaired coordination, impaired cardiopulmonary response to activity, decreased coordination, gait instability, impaired balance, decreased upper extremity function, decreased lower extremity function, decreased ROM.     Pt tolerated OOB>chair today with mod A x 2 persons and static standing during pericare with min A for balance; dependent A for pericare.. Pt with globalized weakness and will benefit from continued OT services to address functional deficits and maximize level of functional independence.    Rehab Prognosis:  Good; patient would benefit from acute skilled OT services to address these deficits and reach maximum level of function.       Plan:     Patient to be seen 5 x/week to address the above listed problems via self-care/home management, therapeutic activities, therapeutic exercises  · Plan of Care Expires: 06/14/22  · Plan of Care Reviewed with: patient    Subjective   Pt agreeable to therapy.    Pain/Comfort:  · Pain Rating 1: 0/10    Objective:   Communicated with: Nurse prior to session.  Patient found HOB elevated with bed alarm, peripheral IV, pulse ox (continuous), telemetry upon OT entry to room.    General Precautions: Standard, fall, airborne, contact, droplet  (COVID 19+)  Orthopedic Precautions:N/A   Braces: N/A  Respiratory Status: Room air     Occupational Performance:     Bed  Mobility:    · Patient completed Supine to Sit with maximal assistance     Functional Mobility/Transfers:  · Patient completed Sit <> Stand Transfer with moderate assistance x 2 persons  with  rolling walker x1 trial from EOB, and 1 trial from EOB with no AD; increased time and verbal/tactile cues for full upright posture. Pt with severe weakness.  · Patient completed Bed > Chair Transfer using Stand Pivot technique with moderate assistance  And 2 persons with HHA  · Functional Mobility: Attempted to mobilize within room, however, pt still too weak to ambulate at this time.    Activities of Daily Living:  · Feeding: pt with untouched lunch tray; assisted with cutting meats and setting up lunch once pt in chair. Able to take a few bites. Strongly encouraged and educated pt on importance of nutritional intake  · Upper Body Dressing: maximal assistance to don back gown  · Toileting: MIN A for static standing balance with assist from PT for dependent pericare and brief change in standing. Attempted to have pt perform anterior pericare seated EOB; but pt unable      AMPAC 6 Click ADL: 17    Treatment & Education:   Bed mobility, functional transfer/mobility , and ADL completed as noted above.    Pt educated on safety awareness with all OOB mobility and ADL .    Pt minimally confused , but able to follow simple commands to mobilize today.   Static standing balance during dependent pericare with MIN A      Patient left up in chair on pressure relief cushion with all lines intact, call button in reach, chair alarm on and nurse aware and notifiedEducation:      GOALS:   Multidisciplinary Problems     Occupational Therapy Goals        Problem: Occupational Therapy    Goal Priority Disciplines Outcome Interventions   Occupational Therapy Goal     OT, PT/OT Ongoing, Progressing    Description: Goals to be met by: 06/14/222     Patient will increase functional independence with ADLs by performing:    LE Dressing with Minimal  Assistance.  Grooming while EOB with Supervision.  Toileting from bedside commode with Minimal Assistance for hygiene and clothing management.   Supine to sit with Stand-by Assistance.  Step transfer with Contact Guard Assistance  Toilet transfer to bedside commode with Contact Guard Assistance.  Upper extremity exercise program x15 reps per handout, with independence.  Implementation of PLB and energy conservation strategies into daily routines w/ (I).                      Time Tracking:     OT Date of Treatment: 06/01/22  OT Start Time: 1350  OT Stop Time: 1409  OT Total Time (min): 19 min    Billable Minutes:Self Care/Home Management 19   A client care conference was completed by the Occupational Therapist and the Occupational Therapy Assistant prior to treatment by the Occupational Therapy Assistant to discuss the patient's plan of care and current status.    OT/STEWART: STEWART     STEWART Visit Number: 1    6/1/2022

## 2022-06-01 NOTE — CONSULTS
[facility] - Telemetry  Adult Nutrition  Consult Note    SUMMARY     Recommendations    1) Continue Cardiac Diet as tolerated  2) Continue of Boost (+) TID to assist in meeting pt needs   3) Addition of Alex BID to promote wound healing   4) Monitor PO intake / Nutrition related labs   - Hypokalemia - replete PRN    Goals:   1) Patient to meet > 75% EEN/EPN via PO/ONS intake  2) Monitored labs to trend toward target ranges    Nutrition Goal Status: new  Communication of RD Recs:  (POC)    Assessment and Plan  Nutrition Problem  Increased Energy Needs    Related to (etiology):   Physiological State    Signs and Symptoms (as evidenced by):   COVID 19 (+)    Interventions/Recommendations (treatment strategy):  Fat and Mineral Modified Diet  Commercial Beverage    Nutrition Diagnosis Status:   Continues        Malnutrition Assessment  Pt not seen due to COVID isolation precautions.  Per weight hx pt with no noted recent weight loss.            Reason for Assessment    Reason For Assessment: consult (Altered Skin Integrity)  Diagnosis:  (Pneumonia d/t COVID19)  Relevant Medical History: Alzheimer's, HTN, Afib, COVID19  General Information Comments:     5/31- 2' consult submitted for altered skin integrity for sacral wound. Per nurse pt ate full breakfast yesterday but was more lethargic by lunch time and had yet to touch lunch by time of MD visit. Wound care consulted - Isoflex mattress:venecia score 19.  PO intake noted 50 - 75%, Addition of Alex, continue Boost.     5/30 - Pt presented with SOB & CP - COVID(+). Per MD pt very frail. Will monitor intake and weights.Nutrition Discharge Planning: Discharge on cardiac diet, Boost (+) or similar BID    Nutrition Risk Screen    Nutrition Risk Screen: no indicators present    Nutrition/Diet History    Spiritual, Cultural Beliefs, Taoist Practices, Values that Affect Care: no  Food Allergies: NKFA  Factors Affecting Nutritional Intake: impaired cognitive status/motor  "control    Anthropometrics    Temp: 97.7 °F (36.5 °C)  Height: 5' 10" (177.8 cm)  Height (inches): 70 in  Weight Method: Bed Scale  Weight: 79.4 kg (175 lb 0.7 oz)  Weight (lb): 175.05 lb  Ideal Body Weight (IBW), Male: 166 lb  % Ideal Body Weight, Male (lb): 105.45 %  BMI (Calculated): 25.1  BMI Grade: 25 - 29.9 - overweight       Lab/Procedures/Meds    Pertinent Labs Reviewed: reviewed  CBC:  No results for input(s): WBC, HGB, HCT, PLT in the last 24 hours.  CMP:  Recent Labs   Lab 06/01/22  0510   CALCIUM 8.8      K 3.4*   CO2 26      BUN 29*   CREATININE 1.0       Pertinent Medications Reviewed: reviewed  Scheduled Meds:   albuterol  2 puff Inhalation BID    amLODIPine  5 mg Oral Daily    ascorbic acid (vitamin C)  500 mg Oral BID    atorvastatin  20 mg Oral QHS    dexAMETHasone  6 mg Oral Daily    donepeziL  10 mg Oral QHS    enoxaparin  1 mg/kg Subcutaneous BID    furosemide (LASIX) injection  40 mg Intravenous Q12H    lisinopriL  2.5 mg Oral Daily    multivitamin  1 tablet Oral Daily    QUEtiapine  25 mg Oral QHS    tamsulosin  0.4 mg Oral Daily     Continuous Infusions:  PRN Meds:.acetaminophen, benzonatate, diphenoxylate-atropine 2.5-0.025 mg, glucagon (human recombinant), glucose, glucose, hydrALAZINE, melatonin, ondansetron, prochlorperazine, senna-docusate 8.6-50 mg, sodium chloride 0.9%      Estimated/Assessed Needs    Weight Used For Calorie Calculations: 79.4 kg (175 lb 0.7 oz)  Energy Calorie Requirements (kcal): 1985  Energy Need Method: Kcal/kg (25)  Protein Requirements: 80 - 95g (1.0 - 1.2g/kg)  Weight Used For Protein Calculations: 79.4 kg (175 lb 0.7 oz)     Estimated Fluid Requirement Method: RDA Method (or per MD)  RDA Method (mL): 1985  CHO Requirement: 248g      Nutrition Prescription Ordered    Current Diet Order: Cardiac Diet  Oral Nutrition Supplement: Boost (+)    Evaluation of Received Nutrient/Fluid Intake    Energy Calories Required: not meeting needs  Protein " Required: not meeting needs  Fluid Required: not meeting needs  Comments: LBM 5/29  % Intake of Estimated Energy Needs: 75 - 100 %  % Meal Intake: 50 - 75 %    Intake/Output - Last 3 Shifts       05/30 0700 05/31 0659 05/31 0700 06/01 0659 06/01 0700 06/02 0659    P.O. 240 560     IV Piggyback       Total Intake(mL/kg) 240 (3) 560 (7.1)     Urine (mL/kg/hr) 175 (0.1)      Stool 0      Total Output 175      Net +65 +560            Urine Occurrence 7 x 1 x     Stool Occurrence 0 x 1 x           Nutrition Risk    Level of Risk/Frequency of Follow-up:  (1-2x/week)       Monitor and Evaluation    Food and Nutrient Intake: energy intake, food and beverage intake  Food and Nutrient Adminstration: diet order  Physical Activity and Function: nutrition-related ADLs and IADLs  Anthropometric Measurements: weight, weight change  Biochemical Data, Medical Tests and Procedures: electrolyte and renal panel, gastrointestinal profile, glucose/endocrine profile, inflammatory profile, lipid profile  Nutrition-Focused Physical Findings: overall appearance       Nutrition Follow-Up    RD Follow-up?: Yes

## 2022-06-02 VITALS
HEART RATE: 71 BPM | SYSTOLIC BLOOD PRESSURE: 119 MMHG | OXYGEN SATURATION: 95 % | TEMPERATURE: 98 F | DIASTOLIC BLOOD PRESSURE: 80 MMHG | WEIGHT: 175.06 LBS | HEIGHT: 70 IN | RESPIRATION RATE: 18 BRPM | BODY MASS INDEX: 25.06 KG/M2

## 2022-06-02 LAB
ANION GAP SERPL CALC-SCNC: 9 MMOL/L (ref 8–16)
BUN SERPL-MCNC: 32 MG/DL (ref 8–23)
CALCIUM SERPL-MCNC: 9.1 MG/DL (ref 8.7–10.5)
CHLORIDE SERPL-SCNC: 108 MMOL/L (ref 95–110)
CO2 SERPL-SCNC: 27 MMOL/L (ref 23–29)
CREAT SERPL-MCNC: 1 MG/DL (ref 0.5–1.4)
EST. GFR  (AFRICAN AMERICAN): >60 ML/MIN/1.73 M^2
EST. GFR  (NON AFRICAN AMERICAN): >60 ML/MIN/1.73 M^2
GLUCOSE SERPL-MCNC: 107 MG/DL (ref 70–110)
POTASSIUM SERPL-SCNC: 3.8 MMOL/L (ref 3.5–5.1)
SODIUM SERPL-SCNC: 144 MMOL/L (ref 136–145)

## 2022-06-02 PROCEDURE — 63600175 PHARM REV CODE 636 W HCPCS: Performed by: INTERNAL MEDICINE

## 2022-06-02 PROCEDURE — 99233 PR SUBSEQUENT HOSPITAL CARE,LEVL III: ICD-10-PCS | Mod: ,,, | Performed by: INTERNAL MEDICINE

## 2022-06-02 PROCEDURE — 25000242 PHARM REV CODE 250 ALT 637 W/ HCPCS: Performed by: INTERNAL MEDICINE

## 2022-06-02 PROCEDURE — 80048 BASIC METABOLIC PNL TOTAL CA: CPT | Performed by: HOSPITALIST

## 2022-06-02 PROCEDURE — 63600175 PHARM REV CODE 636 W HCPCS: Performed by: HOSPITALIST

## 2022-06-02 PROCEDURE — 97530 THERAPEUTIC ACTIVITIES: CPT

## 2022-06-02 PROCEDURE — 99233 SBSQ HOSP IP/OBS HIGH 50: CPT | Mod: ,,, | Performed by: INTERNAL MEDICINE

## 2022-06-02 PROCEDURE — 94640 AIRWAY INHALATION TREATMENT: CPT

## 2022-06-02 PROCEDURE — 25000003 PHARM REV CODE 250: Performed by: HOSPITALIST

## 2022-06-02 PROCEDURE — 97535 SELF CARE MNGMENT TRAINING: CPT

## 2022-06-02 PROCEDURE — 25000003 PHARM REV CODE 250: Performed by: INTERNAL MEDICINE

## 2022-06-02 PROCEDURE — 36415 COLL VENOUS BLD VENIPUNCTURE: CPT | Performed by: HOSPITALIST

## 2022-06-02 RX ORDER — BENAZEPRIL HYDROCHLORIDE 10 MG/1
10 TABLET ORAL DAILY
Qty: 90 TABLET | Refills: 3 | Status: ON HOLD | OUTPATIENT
Start: 2022-06-02 | End: 2022-12-28 | Stop reason: HOSPADM

## 2022-06-02 RX ORDER — FUROSEMIDE 40 MG/1
40 TABLET ORAL 2 TIMES DAILY
Qty: 60 TABLET | Refills: 2 | Status: ON HOLD | OUTPATIENT
Start: 2022-06-02 | End: 2022-12-28 | Stop reason: HOSPADM

## 2022-06-02 RX ORDER — METOPROLOL TARTRATE 25 MG/1
12.5 TABLET, FILM COATED ORAL 2 TIMES DAILY
Qty: 30 TABLET | Refills: 11 | Status: ON HOLD | OUTPATIENT
Start: 2022-06-02 | End: 2023-01-28 | Stop reason: HOSPADM

## 2022-06-02 RX ADMIN — ENOXAPARIN SODIUM 80 MG: 80 INJECTION SUBCUTANEOUS at 10:06

## 2022-06-02 RX ADMIN — TAMSULOSIN HYDROCHLORIDE 0.4 MG: 0.4 CAPSULE ORAL at 10:06

## 2022-06-02 RX ADMIN — FUROSEMIDE 40 MG: 10 INJECTION, SOLUTION INTRAMUSCULAR; INTRAVENOUS at 10:06

## 2022-06-02 RX ADMIN — THERA TABS 1 TABLET: TAB at 10:06

## 2022-06-02 RX ADMIN — METOPROLOL TARTRATE 12.5 MG: 25 TABLET, FILM COATED ORAL at 10:06

## 2022-06-02 RX ADMIN — OXYCODONE HYDROCHLORIDE AND ACETAMINOPHEN 500 MG: 500 TABLET ORAL at 10:06

## 2022-06-02 RX ADMIN — ALBUTEROL SULFATE 2 PUFF: 108 INHALANT RESPIRATORY (INHALATION) at 07:06

## 2022-06-02 RX ADMIN — DEXAMETHASONE 6 MG: 2 TABLET ORAL at 10:06

## 2022-06-02 RX ADMIN — LISINOPRIL 2.5 MG: 2.5 TABLET ORAL at 10:06

## 2022-06-02 NOTE — SUBJECTIVE & OBJECTIVE
Interval History:  Breathing is better.  Laying flat in bed without any apparent respiratory distress.      Review of Systems   Constitutional: Positive for malaise/fatigue.   HENT: Negative.     Eyes: Negative.    Endocrine: Negative.    Hematologic/Lymphatic: Negative.    Skin: Negative.    Musculoskeletal: Negative.    Gastrointestinal: Negative.    Genitourinary: Negative.    Neurological: Negative.    Psychiatric/Behavioral: Negative.     Allergic/Immunologic: Negative.    Objective:     Vital Signs (Most Recent):  Temp: 97.9 °F (36.6 °C) (06/02/22 1144)  Pulse: 71 (06/02/22 1144)  Resp: 18 (06/02/22 1144)  BP: 115/72 (06/02/22 1144)  SpO2: 96 % (06/02/22 1144) Vital Signs (24h Range):  Temp:  [97.5 °F (36.4 °C)-98.6 °F (37 °C)] 97.9 °F (36.6 °C)  Pulse:  [70-88] 71  Resp:  [17-20] 18  SpO2:  [93 %-99 %] 96 %  BP: ()/(56-82) 115/72     Weight: 79.4 kg (175 lb 0.7 oz)  Body mass index is 25.12 kg/m².     SpO2: 96 %  O2 Device (Oxygen Therapy): room air      Intake/Output Summary (Last 24 hours) at 6/2/2022 1346  Last data filed at 6/2/2022 0700  Gross per 24 hour   Intake 100 ml   Output --   Net 100 ml       Lines/Drains/Airways       Peripheral Intravenous Line  Duration                  Peripheral IV - Single Lumen 05/29/22 1700 22 G Hand 3 days                    Physical Exam  Vitals reviewed.   Constitutional:       Appearance: He is well-developed.   HENT:      Head: Normocephalic.   Eyes:      Conjunctiva/sclera: Conjunctivae normal.      Pupils: Pupils are equal, round, and reactive to light.   Cardiovascular:      Rate and Rhythm: Normal rate. Rhythm irregular.      Heart sounds: Normal heart sounds.   Pulmonary:      Effort: Pulmonary effort is normal.      Breath sounds: Normal breath sounds.   Abdominal:      General: Bowel sounds are normal.      Palpations: Abdomen is soft.   Musculoskeletal:      Cervical back: Normal range of motion and neck supple.   Skin:     General: Skin is warm.    Neurological:      Mental Status: He is alert and oriented to person, place, and time.       Significant Labs: BMP:   Recent Labs   Lab 06/01/22  0510 06/02/22  0441   GLU 97 107    144   K 3.4* 3.8    108   CO2 26 27   BUN 29* 32*   CREATININE 1.0 1.0   CALCIUM 8.8 9.1   , CMP   Recent Labs   Lab 06/01/22  0510 06/02/22  0441    144   K 3.4* 3.8    108   CO2 26 27   GLU 97 107   BUN 29* 32*   CREATININE 1.0 1.0   CALCIUM 8.8 9.1   ANIONGAP 9 9   ESTGFRAFRICA >60 >60   EGFRNONAA >60 >60   , CBC No results for input(s): WBC, HGB, HCT, PLT in the last 48 hours., INR No results for input(s): INR, PROTIME in the last 48 hours., Lipid Panel No results for input(s): CHOL, HDL, LDLCALC, TRIG, CHOLHDL in the last 48 hours., Troponin No results for input(s): TROPONINI in the last 48 hours., and All pertinent lab results from the last 24 hours have been reviewed.    Significant Imaging: Echocardiogram: Transthoracic echo (TTE) complete (Cupid Only):   Results for orders placed or performed during the hospital encounter of 05/29/22   Echo Saline Bubble? No   Result Value Ref Range    BSA 1.98 m2    LV LATERAL E/E' RATIO 9.80 m/s    TDI LATERAL 0.10 m/s    PV PEAK VELOCITY 1.01 cm/s    LVIDd 4.16 3.5 - 6.0 cm    IVS 1.20 (A) 0.6 - 1.1 cm    Posterior Wall 1.20 (A) 0.6 - 1.1 cm    LVIDs 2.44 2.1 - 4.0 cm    FS 41 28 - 44 %    STJ 3.14 cm    Ascending aorta 3.26 cm    LV mass 175.58 g    LA size 4.47 cm    RVDD 4.20 cm    TAPSE 2.57 cm    RV S' 12.96 cm/s    Left Ventricle Relative Wall Thickness 0.58 cm    AV mean gradient 16 mmHg    AV valve area 1.94 cm2    AV Velocity Ratio 0.49     AV index (prosthetic) 0.60     MV valve area p 1/2 method 3.42 cm2    E/A ratio 2.00     E wave deceleration time 221.82 msec    LVOT diameter 2.03 cm    LVOT area 3.2 cm2    LVOT peak ainsley 1.19 m/s    LVOT peak VTI 24.30 cm    Ao peak ainsley 2.44 m/s    Ao VTI 40.48 cm    LVOT stroke volume 78.61 cm3    AV peak gradient 24  mmHg    MV Peak E Edgar 0.98 m/s    TR Max Edgar 2.31 m/s    MV stenosis pressure 1/2 time 64.33 ms    MV Peak A Edgar 0.49 m/s    LV Systolic Volume 21.08 mL    LV Systolic Volume Index 10.7 mL/m2    LV Diastolic Volume 77.03 mL    LV Diastolic Volume Index 39.10 mL/m2    LV Mass Index 89 g/m2    RA Major Axis 6.51 cm    Left Atrium Major Axis 5.50 cm    Triscuspid Valve Regurgitation Peak Gradient 21 mmHg    RA Width 4.04 cm    Right Atrial Pressure (from IVC) 8 mmHg    EF 70 %    TV rest pulmonary artery pressure 29 mmHg    Narrative    · The left ventricle is normal in size with mild concentric hypertrophy   and normal systolic function.  · The estimated ejection fraction is 70%.  · Normal left ventricular diastolic function.  · Mild right ventricular enlargement with normal right ventricular   systolic function.  · Mild left atrial enlargement.  · There is mild aortic valve stenosis.  · Aortic valve area is 1.94 cm2; peak velocity is 2.44 m/s; mean gradient   is 16 mmHg.  · Intermediate central venous pressure (8 mmHg).  · The estimated PA systolic pressure is 29 mmHg.

## 2022-06-02 NOTE — DISCHARGE SUMMARY
[facility] Medicine  Discharge Summary      Patient Name: Timothy Roldan  MRN: [id number]  Patient Class: IP- Inpatient  Admission Date: 5/29/2022  Hospital Length of Stay: 2 days  Discharge Date and Time:  06/02/2022 3:07 PM  Attending Physician: Hilda Rosa MD   Discharging Provider: Hilda Rosa MD  Primary Care Provider: Martine Garces MD      HPI:    is a pleasant 82-year-old gentleman who has a past medical history of hypertension hyperlipidemia, atrial fibrillation Alzheimer's dementia, recent hospitalization at [facility] May 21, 2022 for COVID-19 pneumonia treated remdesivir Decadron.  Patient was discharged.  He presented to the ER at [facility] with complaints of shortness of breath, lower extremity edema.  Patient had chest x-ray done that showed findings suggestive of pulmonary edema and bilateral pleural effusions.  Patient also had possible right lower lobe pneumonic infiltrate.  Urinalysis suggested UTI with 100 wbc's.  Repeat COVID-19 screen test was positive.  Patient is transferred here to [facility] for treatment of COVID-19 infection, possible right lower lobe pneumonia, and UTI.  Patient was given IV vancomycin and Cefepime at the transferring hospital.  Patient was seen this morning at bedside.  He reported no chest pain, wearing low-flow oxygen 2 L, no nausea, vomiting, or diarrhea.  Patient noted to have elevated D-dimer on laboratories 5.41, creatinine 1.1, lactic acid level 1.6, . Discussed with patient admitting him for pneumonia, COVID-19 infection, and UTI.  Discuss  checking echo, IV antibiotics, Lasix, V/Q scan, bilateral venous Dopplers due to concerns of elevated D-dimer.  Patient requests to be full code status.      * No surgery found *      Hospital Course:   Mr Timothy Roldan is a 82 y.o. man admitted with acute HFpEF with bilateral pleural effusions, pulmonary edema, and lower  extremity edema after recent hospitalization with COVID. Started 40mg IV lasix BID (takes 40mg PO QD at home). TTE EF 70%, mild AS, normal diastolic function. Also started dexamethasone for COVID. Also started antibiotics on admit given worsening CXR and recent hospitalization, though suspect main cause of shortness of breath is HFpEF, not COVID or pneumonia. No growth on cultures; antibiotics discontinued. Remains stable on room air. He does have confusion most likely due to delirium in hospitalized patient with dementia. Confusion has improved. He worked with PT, OT. Discussed plan of care and medication changes with HCPOA. Plan return to assisted living with PT, OT services. Change HTN meds to metoprolol + benazepril for Afib rate control and HFpEF. Increase lasix to 40mg PO BID. Add eliquis. Discussed risk of bleeding and higher risk associated with falls. Stable for discharge.        Goals of Care Treatment Preferences:  Code Status: Full Code      Consults:   Consults (From admission, onward)        Status Ordering Provider     Inpatient consult to Social Work  Once        Provider:  (Not yet assigned)    Ordered ALFRED VALDOVINOS     Inpatient consult to Cardiology  Once        Provider:  Alfred Valdovinos MD    Completed ETIENNE GAFFNEY     Inpatient consult to Registered Dietitian/Nutritionist  Once        Provider:  (Not yet assigned)    Completed ETIENNE GAFFNEY     Inpatient consult to Registered Dietitian/Nutritionist  Once        Provider:  (Not yet assigned)    Completed NELSON BA          No new Assessment & Plan notes have been filed under this hospital service since the last note was generated.  Service: Hospital Medicine    Final Active Diagnoses:    Diagnosis Date Noted POA    PRINCIPAL PROBLEM:  Acute heart failure with preserved ejection fraction [I50.31] 05/31/2022 Yes    Goals of care, counseling/discussion [Z71.89] 06/01/2022 Not Applicable    Acute hypoxemic respiratory failure  [J96.01] 05/31/2022 Yes    Pleural effusion [J90] 05/31/2022 Yes    Elevated d-dimer [R79.89] 05/31/2022 Yes    Hypokalemia [E87.6] 05/31/2022 No    Debility [R53.81] 05/24/2022 Yes    Pneumonia due to COVID-19 virus [U07.1, J12.82] 05/21/2022 Yes    Persistent atrial fibrillation [I48.19] 12/31/2021 Yes    Essential hypertension [I10] 12/31/2021 Yes    Alzheimer disease [G30.9, F02.80] 12/31/2021 Yes      Problems Resolved During this Admission:       Discharged Condition: good    Disposition: Home or Self Care    Follow Up:   Follow-up Information     Rony Tariq MD Follow up.    Specialties: Cardiology, Interventional Cardiology  Contact information:  [address]  [address]  [facility] [address]  [phone]                       Patient Instructions:      Diet Cardiac     Notify your health care provider if you experience any of the following:  temperature >100.4     Notify your health care provider if you experience any of the following:  persistent nausea and vomiting or diarrhea     Notify your health care provider if you experience any of the following:  severe uncontrolled pain     Notify your health care provider if you experience any of the following:  redness, tenderness, or signs of infection (pain, swelling, redness, odor or green/yellow discharge around incision site)     Notify your health care provider if you experience any of the following:  difficulty breathing or increased cough     Notify your health care provider if you experience any of the following:  severe persistent headache     Notify your health care provider if you experience any of the following:  worsening rash     Notify your health care provider if you experience any of the following:  persistent dizziness, light-headedness, or visual disturbances     Notify your health care provider if you experience any of the following:  increased confusion or weakness     Activity as tolerated       Significant Diagnostic Studies:  Labs: All labs within the past 24 hours have been reviewed    Pending Diagnostic Studies:     None         Medications:  Reconciled Home Medications:      Medication List      START taking these medications    apixaban 5 mg Tab  Commonly known as: ELIQUIS  Take 1 tablet (5 mg total) by mouth 2 (two) times daily.     benazepriL 10 MG tablet  Commonly known as: LOTENSIN  Take 1 tablet (10 mg total) by mouth once daily.     metoprolol tartrate 25 MG tablet  Commonly known as: LOPRESSOR  Take 0.5 tablets (12.5 mg total) by mouth 2 (two) times daily.        CHANGE how you take these medications    furosemide 40 MG tablet  Commonly known as: LASIX  Take 1 tablet (40 mg total) by mouth 2 (two) times a day.  What changed: when to take this     tamsulosin 0.4 mg Cap  Commonly known as: FLOMAX  Take 1 capsule (0.4 mg total) by mouth once daily.  What changed: Another medication with the same name was removed. Continue taking this medication, and follow the directions you see here.        CONTINUE taking these medications    donepeziL 10 MG tablet  Commonly known as: ARICEPT  Take 1 tablet (10 mg total) by mouth every evening.     lovastatin 20 MG tablet  Commonly known as: MEVACOR  Take 20 mg by mouth every evening.     memantine 10 MG Tab  Commonly known as: NAMENDA  Take 2 tablets by mouth once daily.     pulse oximeter device  Commonly known as: pulse oximeter  by Apply Externally route 2 (two) times a day. Use twice daily at 8 AM and 3 PM and record the value in [facility] as directed.     QUEtiapine 25 MG Tab  Commonly known as: SEROQUEL  Take 1 tablet (25 mg total) by mouth every evening.        STOP taking these medications    amlodipine-benazepril 5-10 mg 5-10 mg per capsule  Commonly known as: LOTREL     hydrALAZINE 50 MG tablet  Commonly known as: APRESOLINE     loratadine 10 mg tablet  Commonly known as: CLARITIN            Indwelling Lines/Drains at time of discharge:   Lines/Drains/Airways     None                  Time spent on the discharge of patient: 35 minutes         Hilda Rosa MD  Department of Hospital Medicine  [facility] - Telemetry

## 2022-06-02 NOTE — PLAN OF CARE
[facility]      HOME HEALTH ORDERS  FACE TO FACE ENCOUNTER    Patient Name: Timothy Roldan  YOB: 1940    PCP: Martine Garces MD   PCP Address: [address] [place] [address] / [place] [address]  PCP Phone Number: [phone]  PCP Fax: [phone]    Encounter Date: 5/29/22    Admit to Home Health    Diagnoses:  Active Hospital Problems    Diagnosis  POA    *Acute hypoxemic respiratory failure [J96.01]  Yes    Goals of care, counseling/discussion [Z71.89]  Not Applicable    Acute heart failure with preserved ejection fraction [I50.31]  Yes    Pleural effusion [J90]  Yes    Elevated d-dimer [R79.89]  Yes    Hypokalemia [E87.6]  No    Debility [R53.81]  Yes    Pneumonia due to COVID-19 virus [U07.1, J12.82]  Yes    Persistent atrial fibrillation [I48.19]  Yes    Essential hypertension [I10]  Yes    Alzheimer disease [G30.9, F02.80]  Yes      Resolved Hospital Problems   No resolved problems to display.       Follow Up Appointments:  No future appointments.    Allergies:Review of patient's allergies indicates:  No Known Allergies    Medications: Review discharge medications with patient and family and provide education.    Current Facility-Administered Medications   Medication Dose Route Frequency Provider Last Rate Last Admin    acetaminophen tablet 650 mg  650 mg Oral Q4H PRN NELSON Mcconnell MD        albuterol inhaler 2 puff  2 puff Inhalation BID Xu Álvarez MD   2 puff at 06/02/22 0719    ascorbic acid (vitamin C) tablet 500 mg  500 mg Oral BID NELSON Mcconnell MD   500 mg at 06/02/22 1027    atorvastatin tablet 20 mg  20 mg Oral QHS NELSON Mcconnell MD   20 mg at 06/01/22 2147    benzonatate capsule 100 mg  100 mg Oral TID PRN NELSON Mcconnell MD   100 mg at 05/30/22 2024    diphenoxylate-atropine 2.5-0.025 mg per tablet 1 tablet  1 tablet Oral QID PRN NELSON Mcconnell MD        donepeziL tablet 10 mg  10 mg Oral QHS NELSON Mcconnell MD   10 mg at 06/01/22 2147    enoxaparin  injection 80 mg  1 mg/kg Subcutaneous BID NELSON Mcconnell MD   80 mg at 06/02/22 1026    furosemide injection 40 mg  40 mg Intravenous Q12H Hilda Rosa MD   40 mg at 06/02/22 1000    glucagon (human recombinant) injection 1 mg  1 mg Intramuscular PRN NELSON Mcconnell MD        glucose chewable tablet 16 g  16 g Oral PRN NELSON Mcconnell MD        glucose chewable tablet 24 g  24 g Oral PRN NELSON Mcconnell MD        hydrALAZINE tablet 50 mg  50 mg Oral Q8H PRN Hilda Rosa MD        lisinopriL tablet 2.5 mg  2.5 mg Oral Daily Hilda Rosa MD   2.5 mg at 06/02/22 1027    melatonin tablet 6 mg  6 mg Oral Nightly PRN NELSON Mcconnell MD        metoprolol tartrate (LOPRESSOR) split tablet 12.5 mg  12.5 mg Oral BID Hilda Rosa MD   12.5 mg at 06/02/22 1027    multivitamin tablet  1 tablet Oral Daily NELSON Mcconnell MD   1 tablet at 06/02/22 1027    ondansetron injection 4 mg  4 mg Intravenous Q8H PRN NELSON Mcconnell MD        prochlorperazine injection Soln 5 mg  5 mg Intravenous Q6H PRN NELSON Mcconnell MD        QUEtiapine tablet 25 mg  25 mg Oral QHS NELSON Mcconnell MD   25 mg at 06/01/22 2147    senna-docusate 8.6-50 mg per tablet 1 tablet  1 tablet Oral BID PRN NELSON Mcconnell MD        sodium chloride 0.9% flush 10 mL  10 mL Intravenous PRN NELSON Mcconnell MD        tamsulosin 24 hr capsule 0.4 mg  0.4 mg Oral Daily NELSON Mcconnell MD   0.4 mg at 06/02/22 1027     Current Discharge Medication List      START taking these medications    Details   apixaban (ELIQUIS) 5 mg Tab Take 1 tablet (5 mg total) by mouth 2 (two) times daily.  Qty: 60 tablet, Refills: 11      benazepriL (LOTENSIN) 10 MG tablet Take 1 tablet (10 mg total) by mouth once daily.  Qty: 90 tablet, Refills: 3    Comments: .      metoprolol tartrate (LOPRESSOR) 25 MG tablet Take 0.5 tablets (12.5 mg total) by mouth 2 (two) times daily.  Qty: 30 tablet, Refills: 11    Comments: .         CONTINUE these  medications which have CHANGED    Details   furosemide (LASIX) 40 MG tablet Take 1 tablet (40 mg total) by mouth 2 (two) times a day.  Qty: 60 tablet, Refills: 2         CONTINUE these medications which have NOT CHANGED    Details   donepeziL (ARICEPT) 10 MG tablet Take 1 tablet (10 mg total) by mouth every evening.  Qty: 30 tablet, Refills: 5      lovastatin (MEVACOR) 20 MG tablet Take 20 mg by mouth every evening.      memantine (NAMENDA) 10 MG Tab Take 2 tablets by mouth once daily.      QUEtiapine (SEROQUEL) 25 MG Tab Take 1 tablet (25 mg total) by mouth every evening.  Qty: 30 tablet, Refills: 5      pulse oximeter (PULSE OXIMETER) device by Apply Externally route 2 (two) times a day. Use twice daily at 8 AM and 3 PM and record the value in [employer] as directed.  Qty: 1 each, Refills: 0    Comments: This is a NO CHARGE item.  Please override price to zero.  DO NOT PRINT.  NORMAL MODE e-PRESCRIBE ONLY.      tamsulosin (FLOMAX) 0.4 mg Cap Take 1 capsule (0.4 mg total) by mouth once daily.  Qty: 30 capsule, Refills: 5         STOP taking these medications       amlodipine-benazepril 5-10 mg (LOTREL) 5-10 mg per capsule Comments:   Reason for Stopping:         amlodipine-benazepril 5-10 mg (LOTREL) 5-10 mg per capsule Comments:   Reason for Stopping:         hydrALAZINE (APRESOLINE) 50 MG tablet Comments:   Reason for Stopping:         loratadine (CLARITIN) 10 mg tablet Comments:   Reason for Stopping:                 I have seen and examined this patient within the last 30 days. My clinical findings that support the need for the home health skilled services and home bound status are the following:no   Weakness/numbness causing balance and gait disturbance due to Heart Failure and Weakness/Debility making it taxing to leave home.     Diet:   cardiac diet and 2 gram sodium diet      Referrals/ Consults  Physical Therapy to evaluate and treat. Evaluate for home safety and equipment needs; Establish/upgrade home  exercise program. Perform / instruct on therapeutic exercises, gait training, transfer training, and Range of Motion.  Occupational Therapy to evaluate and treat. Evaluate home environment for safety and equipment needs. Perform/Instruct on transfers, ADL training, ROM, and therapeutic exercises.    Activities:   activity as tolerated    Nursing:   Agency to admit patient within 24 hours of hospital discharge unless specified on physician order or at patient request    SN to complete comprehensive assessment including routine vital signs. Instruct on disease process and s/s of complications to report to MD. Review/verify medication list sent home with the patient at time of discharge  and instruct patient/caregiver as needed. Frequency may be adjusted depending on start of care date.     Skilled nurse to perform up to 3 visits PRN for symptoms related to diagnosis    Notify MD if SBP > 160 or < 90; DBP > 90 or < 50; HR > 120 or < 50; Temp > 101; O2 < 88%    Ok to schedule additional visits based on staff availability and patient request on consecutive days within the home health episode.    When multiple disciplines ordered:    Start of Care occurs on Sunday - Wednesday schedule remaining discipline evaluations as ordered on separate consecutive days following the start of care.    Thursday SOC -schedule subsequent evaluations Friday and Monday the following week.     Friday - Saturday SOC - schedule subsequent discipline evaluations on consecutive days starting Monday of the following week.      Miscellaneous   Heart Failure:      SN to instruct on the following:    Instruct on the definition of CHF.   Instruct on the signs/sympoms of CHF to be reported.   Instruct on and monitor daily weights.   Instruct on factors that cause exacerbation.   Instruct on action, dose, schedule, and side effects of medications.   Instruct on diet as prescribed.   Instruct on activity allowed.   Instruct on life-style modifications  for life long management of CHF   SN to assess compliance with daily weights, diet, medications, fluid retention,    safety precautions, activities permitted and life-style modifications.   Additional 1-2 SN visits per week as needed for signs and symptoms     of CHF exacerbation.      For Weight Gain > 2-3 lbs in 1 day or 4-6 lbs over 1 week notify PCP:     Increase lasix to 80mg BID   Check BMP at time of increase and again in 3 days   Schedule PCP appointment       I certify that this patient is confined to his home and needs intermittent skilled nursing care, physical therapy and occupational therapy.    Hilda Rosa MD  06/02/2022  3:01 PM

## 2022-06-02 NOTE — PROGRESS NOTES
[facility] - Telemetry  Cardiology  Progress Note    Patient Name: Timothy Roldan  MRN: [id number]  Admission Date: 5/29/2022  Hospital Length of Stay: 1 days  Code Status: Full Code   Attending Physician: Hilda Rosa MD   Primary Care Physician: Martine Garces MD  Expected Discharge Date: 6/2/2022  Principal Problem:Acute hypoxemic respiratory failure    Subjective:       Interval History: doing better.     Review of Systems   Constitutional: Positive for malaise/fatigue.   HENT: Negative.     Eyes: Negative.    Cardiovascular:  Positive for dyspnea on exertion.   Endocrine: Negative.    Hematologic/Lymphatic: Negative.    Skin: Negative.    Musculoskeletal: Negative.    Gastrointestinal: Negative.    Genitourinary: Negative.    Neurological: Negative.    Psychiatric/Behavioral: Negative.     Allergic/Immunologic: Negative.    Objective:     Vital Signs (Most Recent):  Temp: 97.5 °F (36.4 °C) (06/01/22 1605)  Pulse: 85 (06/01/22 1605)  Resp: 18 (06/01/22 1605)  BP: (!) 98/56 (06/01/22 1605)  SpO2: 96 % (06/01/22 1605)   Vital Signs (24h Range):  Temp:  [97.5 °F (36.4 °C)-98 °F (36.7 °C)] 97.5 °F (36.4 °C)  Pulse:  [76-89] 85  Resp:  [17-20] 18  SpO2:  [90 %-96 %] 96 %  BP: ()/(56-73) 98/56     Weight: 79.4 kg (175 lb 0.7 oz)  Body mass index is 25.12 kg/m².     SpO2: 96 %  O2 Device (Oxygen Therapy): room air      Intake/Output Summary (Last 24 hours) at 6/1/2022 1910  Last data filed at 6/1/2022 1142  Gross per 24 hour   Intake 320 ml   Output --   Net 320 ml       Lines/Drains/Airways       Peripheral Intravenous Line  Duration                  Peripheral IV - Single Lumen 05/29/22 1700 22 G Hand 3 days                    Physical Exam  Vitals reviewed.   Constitutional:       Appearance: He is well-developed.   HENT:      Head: Normocephalic.   Eyes:      Conjunctiva/sclera: Conjunctivae normal.      Pupils: Pupils are equal, round, and reactive to light.   Cardiovascular:      Rate and Rhythm: Normal  rate. Rhythm irregular.      Heart sounds: Normal heart sounds.   Pulmonary:      Effort: Pulmonary effort is normal.      Breath sounds: Normal breath sounds.   Abdominal:      General: Bowel sounds are normal.      Palpations: Abdomen is soft.   Musculoskeletal:      Cervical back: Normal range of motion and neck supple.   Skin:     General: Skin is warm.   Neurological:      Mental Status: He is alert and oriented to person, place, and time.       Significant Labs: BMP:   Recent Labs   Lab 05/31/22  0658 06/01/22  0510    97    143   K 3.4* 3.4*    108   CO2 26 26   BUN 23 29*   CREATININE 1.0 1.0   CALCIUM 8.7 8.8   , CMP   Recent Labs   Lab 05/31/22  0658 06/01/22  0510    143   K 3.4* 3.4*    108   CO2 26 26    97   BUN 23 29*   CREATININE 1.0 1.0   CALCIUM 8.7 8.8   ANIONGAP 9 9   ESTGFRAFRICA >60 >60   EGFRNONAA >60 >60   , CBC   Recent Labs   Lab 05/31/22  0530   WBC 8.50   HGB 11.8*   HCT 37.0*      , INR No results for input(s): INR, PROTIME in the last 48 hours., Lipid Panel No results for input(s): CHOL, HDL, LDLCALC, TRIG, CHOLHDL in the last 48 hours., Troponin No results for input(s): TROPONINI in the last 48 hours., and All pertinent lab results from the last 24 hours have been reviewed.    Significant Imaging: Echocardiogram: Transthoracic echo (TTE) complete (Cupid Only):   Results for orders placed or performed during the hospital encounter of 05/29/22   Echo Saline Bubble? No   Result Value Ref Range    BSA 1.98 m2    LV LATERAL E/E' RATIO 9.80 m/s    TDI LATERAL 0.10 m/s    PV PEAK VELOCITY 1.01 cm/s    LVIDd 4.16 3.5 - 6.0 cm    IVS 1.20 (A) 0.6 - 1.1 cm    Posterior Wall 1.20 (A) 0.6 - 1.1 cm    LVIDs 2.44 2.1 - 4.0 cm    FS 41 28 - 44 %    STJ 3.14 cm    Ascending aorta 3.26 cm    LV mass 175.58 g    LA size 4.47 cm    RVDD 4.20 cm    TAPSE 2.57 cm    RV S' 12.96 cm/s    Left Ventricle Relative Wall Thickness 0.58 cm    AV mean gradient 16 mmHg     AV valve area 1.94 cm2    AV Velocity Ratio 0.49     AV index (prosthetic) 0.60     MV valve area p 1/2 method 3.42 cm2    E/A ratio 2.00     E wave deceleration time 221.82 msec    LVOT diameter 2.03 cm    LVOT area 3.2 cm2    LVOT peak edgar 1.19 m/s    LVOT peak VTI 24.30 cm    Ao peak edgar 2.44 m/s    Ao VTI 40.48 cm    LVOT stroke volume 78.61 cm3    AV peak gradient 24 mmHg    MV Peak E Edgar 0.98 m/s    TR Max Edgar 2.31 m/s    MV stenosis pressure 1/2 time 64.33 ms    MV Peak A Edgar 0.49 m/s    LV Systolic Volume 21.08 mL    LV Systolic Volume Index 10.7 mL/m2    LV Diastolic Volume 77.03 mL    LV Diastolic Volume Index 39.10 mL/m2    LV Mass Index 89 g/m2    RA Major Axis 6.51 cm    Left Atrium Major Axis 5.50 cm    Triscuspid Valve Regurgitation Peak Gradient 21 mmHg    RA Width 4.04 cm    Right Atrial Pressure (from IVC) 8 mmHg    EF 70 %    TV rest pulmonary artery pressure 29 mmHg    Narrative    · The left ventricle is normal in size with mild concentric hypertrophy   and normal systolic function.  · The estimated ejection fraction is 70%.  · Normal left ventricular diastolic function.  · Mild right ventricular enlargement with normal right ventricular   systolic function.  · Mild left atrial enlargement.  · There is mild aortic valve stenosis.  · Aortic valve area is 1.94 cm2; peak velocity is 2.44 m/s; mean gradient   is 16 mmHg.  · Intermediate central venous pressure (8 mmHg).  · The estimated PA systolic pressure is 29 mmHg.        Assessment and Plan:     Brief HPI:     * Acute hypoxemic respiratory failure     Iv diuresis    rx covid    Supportive care    Pleural effusion        Debility        Pneumonia due to COVID-19 virus        Persistent atrial fibrillation  Rate control. Anti coagulate    Alzheimer disease         Essential hypertension         Afib:    Rate control.  Switch to eliquis at time of dc      VTE Risk Mitigation (From admission, onward)         Ordered     enoxaparin injection 80 mg   2 times daily         05/30/22 9942                Rony Tariq MD  Cardiology  [facility] [place] [place] - Telemetry

## 2022-06-02 NOTE — SUBJECTIVE & OBJECTIVE
Interval History: doing better.     Review of Systems   Constitutional: Positive for malaise/fatigue.   HENT: Negative.     Eyes: Negative.    Cardiovascular:  Positive for dyspnea on exertion.   Endocrine: Negative.    Hematologic/Lymphatic: Negative.    Skin: Negative.    Musculoskeletal: Negative.    Gastrointestinal: Negative.    Genitourinary: Negative.    Neurological: Negative.    Psychiatric/Behavioral: Negative.     Allergic/Immunologic: Negative.    Objective:     Vital Signs (Most Recent):  Temp: 97.5 °F (36.4 °C) (06/01/22 1605)  Pulse: 85 (06/01/22 1605)  Resp: 18 (06/01/22 1605)  BP: (!) 98/56 (06/01/22 1605)  SpO2: 96 % (06/01/22 1605)   Vital Signs (24h Range):  Temp:  [97.5 °F (36.4 °C)-98 °F (36.7 °C)] 97.5 °F (36.4 °C)  Pulse:  [76-89] 85  Resp:  [17-20] 18  SpO2:  [90 %-96 %] 96 %  BP: ()/(56-73) 98/56     Weight: 79.4 kg (175 lb 0.7 oz)  Body mass index is 25.12 kg/m².     SpO2: 96 %  O2 Device (Oxygen Therapy): room air      Intake/Output Summary (Last 24 hours) at 6/1/2022 1910  Last data filed at 6/1/2022 1142  Gross per 24 hour   Intake 320 ml   Output --   Net 320 ml       Lines/Drains/Airways       Peripheral Intravenous Line  Duration                  Peripheral IV - Single Lumen 05/29/22 1700 22 G Hand 3 days                    Physical Exam  Vitals reviewed.   Constitutional:       Appearance: He is well-developed.   HENT:      Head: Normocephalic.   Eyes:      Conjunctiva/sclera: Conjunctivae normal.      Pupils: Pupils are equal, round, and reactive to light.   Cardiovascular:      Rate and Rhythm: Normal rate. Rhythm irregular.      Heart sounds: Normal heart sounds.   Pulmonary:      Effort: Pulmonary effort is normal.      Breath sounds: Normal breath sounds.   Abdominal:      General: Bowel sounds are normal.      Palpations: Abdomen is soft.   Musculoskeletal:      Cervical back: Normal range of motion and neck supple.   Skin:     General: Skin is warm.   Neurological:       Mental Status: He is alert and oriented to person, place, and time.       Significant Labs: BMP:   Recent Labs   Lab 05/31/22  0658 06/01/22  0510    97    143   K 3.4* 3.4*    108   CO2 26 26   BUN 23 29*   CREATININE 1.0 1.0   CALCIUM 8.7 8.8   , CMP   Recent Labs   Lab 05/31/22  0658 06/01/22  0510    143   K 3.4* 3.4*    108   CO2 26 26    97   BUN 23 29*   CREATININE 1.0 1.0   CALCIUM 8.7 8.8   ANIONGAP 9 9   ESTGFRAFRICA >60 >60   EGFRNONAA >60 >60   , CBC   Recent Labs   Lab 05/31/22  0530   WBC 8.50   HGB 11.8*   HCT 37.0*      , INR No results for input(s): INR, PROTIME in the last 48 hours., Lipid Panel No results for input(s): CHOL, HDL, LDLCALC, TRIG, CHOLHDL in the last 48 hours., Troponin No results for input(s): TROPONINI in the last 48 hours., and All pertinent lab results from the last 24 hours have been reviewed.    Significant Imaging: Echocardiogram: Transthoracic echo (TTE) complete (Cupid Only):   Results for orders placed or performed during the hospital encounter of 05/29/22   Echo Saline Bubble? No   Result Value Ref Range    BSA 1.98 m2    LV LATERAL E/E' RATIO 9.80 m/s    TDI LATERAL 0.10 m/s    PV PEAK VELOCITY 1.01 cm/s    LVIDd 4.16 3.5 - 6.0 cm    IVS 1.20 (A) 0.6 - 1.1 cm    Posterior Wall 1.20 (A) 0.6 - 1.1 cm    LVIDs 2.44 2.1 - 4.0 cm    FS 41 28 - 44 %    STJ 3.14 cm    Ascending aorta 3.26 cm    LV mass 175.58 g    LA size 4.47 cm    RVDD 4.20 cm    TAPSE 2.57 cm    RV S' 12.96 cm/s    Left Ventricle Relative Wall Thickness 0.58 cm    AV mean gradient 16 mmHg    AV valve area 1.94 cm2    AV Velocity Ratio 0.49     AV index (prosthetic) 0.60     MV valve area p 1/2 method 3.42 cm2    E/A ratio 2.00     E wave deceleration time 221.82 msec    LVOT diameter 2.03 cm    LVOT area 3.2 cm2    LVOT peak ainsley 1.19 m/s    LVOT peak VTI 24.30 cm    Ao peak ainsley 2.44 m/s    Ao VTI 40.48 cm    LVOT stroke volume 78.61 cm3    AV peak gradient 24 mmHg     MV Peak E Edgar 0.98 m/s    TR Max Edgar 2.31 m/s    MV stenosis pressure 1/2 time 64.33 ms    MV Peak A Edgar 0.49 m/s    LV Systolic Volume 21.08 mL    LV Systolic Volume Index 10.7 mL/m2    LV Diastolic Volume 77.03 mL    LV Diastolic Volume Index 39.10 mL/m2    LV Mass Index 89 g/m2    RA Major Axis 6.51 cm    Left Atrium Major Axis 5.50 cm    Triscuspid Valve Regurgitation Peak Gradient 21 mmHg    RA Width 4.04 cm    Right Atrial Pressure (from IVC) 8 mmHg    EF 70 %    TV rest pulmonary artery pressure 29 mmHg    Narrative    · The left ventricle is normal in size with mild concentric hypertrophy   and normal systolic function.  · The estimated ejection fraction is 70%.  · Normal left ventricular diastolic function.  · Mild right ventricular enlargement with normal right ventricular   systolic function.  · Mild left atrial enlargement.  · There is mild aortic valve stenosis.  · Aortic valve area is 1.94 cm2; peak velocity is 2.44 m/s; mean gradient   is 16 mmHg.  · Intermediate central venous pressure (8 mmHg).  · The estimated PA systolic pressure is 29 mmHg.

## 2022-06-02 NOTE — PT/OT/SLP PROGRESS
Occupational Therapy   Treatment    Name: Timothy Roldan  MRN: [id number]  Admitting Diagnosis:  Acute heart failure with preserved ejection fraction       Recommendations:     Discharge Recommendations:  (OT at next level of care w/ 24 hr supervision)  Discharge Equipment Recommendations:  none  Barriers to discharge:   (Pt is at risk for falls, readmission and morbidity if d/c home at this time.)    Assessment:     Timothy Roldan is a 82 y.o. male with a medical diagnosis of Acute heart failure with preserved ejection fraction. Performance deficits affecting function are weakness, impaired endurance, impaired self care skills, impaired functional mobilty, gait instability, impaired balance, decreased upper extremity function, decreased lower extremity function, decreased coordination, decreased safety awareness, impaired cardiopulmonary response to activity.     Pt w/ progress this date as he was able to perform bed mobility w/ close SBA to stand from EOB w/ mod A to transfer to BSC w/ mod A and use of RW. Pt required increased time for performing OOB activities due to weakness and decreased endurance but tolerated well. Pt will continue to benefit from skilled acute OT services to maximize functional capacity for safe performance w/ ADLs and functional mobility.     Rehab Prognosis:  Good; patient would benefit from acute skilled OT services to address these deficits and reach maximum level of function.       Plan:     Patient to be seen 3 x/week, 5 x/week to address the above listed problems via self-care/home management, therapeutic activities, therapeutic exercises  · Plan of Care Expires: 06/14/22  · Plan of Care Reviewed with: patient    Subjective     Pain/Comfort:  · Pain Rating 1: 0/10  · Pain Rating Post-Intervention 1: 0/10    Objective:     Communicated with: Nurse prior to session.  Patient found HOB elevated with bed alarm, peripheral IV, pulse ox (continuous), telemetry upon OT entry to  room.    General Precautions: Standard, airborne, contact, droplet, fall   Orthopedic Precautions:N/A   Braces: N/A  Respiratory Status: Room air     Occupational Performance:     Bed Mobility:    · Patient completed Scooting/Bridging with stand by assistance and contact guard assistance  · Patient completed Supine to Sit with stand by assistance and increased time  · Patient completed Sit to Supine with moderate assistance for managing BLEs    Functional Mobility/Transfers:  · Patient completed Sit <> Stand Transfer with moderate assistance  with  rolling walker   · Patient completed Bed > BSC Step Transfer technique with moderate assistance with rolling walker  · Patient completed Toilet Transfer BSC>Bed with Mod A and use of rolling walker    Activities of Daily Living:  · Toileting: stand by assistance for performing front comfort-care; pt required increased time on BSC to attempt having BM    AMPAC 6 Click ADL: 17    Treatment & Education:  -Pt re-educated on role of OT and POC.   -Pt re-ducated on safe functional mobility and ADL performance.   -Chair t/f deferred at this time due to safety concerns; therapy to attempt during f/u.   -Questions and concerns addressed within OT scope    Patient left HOB elevated with all lines intact, call button in reach and bed alarm onEducation:      GOALS:   Multidisciplinary Problems     Occupational Therapy Goals        Problem: Occupational Therapy    Goal Priority Disciplines Outcome Interventions   Occupational Therapy Goal     OT, PT/OT Ongoing, Progressing    Description: Goals to be met by: 06/14/222     Patient will increase functional independence with ADLs by performing:    LE Dressing with Minimal Assistance.  Grooming while EOB with Supervision.  Toileting from bedside commode with Minimal Assistance for hygiene and clothing management.   Supine to sit with Stand-by Assistance.  Step transfer with Contact Guard Assistance  Toilet transfer to bedside commode with  Contact Guard Assistance.  Upper extremity exercise program x15 reps per handout, with independence.  Implementation of PLB and energy conservation strategies into daily routines w/ (I).                      Time Tracking:     OT Date of Treatment: 06/02/22  OT Start Time: 1555  OT Stop Time: 1619  OT Total Time (min): 24 min    Billable Minutes:Self Care/Home Management 10  Therapeutic Activity 14  Total Time 24    OT/STEWART: OT     STEWART Visit Number: 1    6/2/2022

## 2022-06-02 NOTE — PROGRESS NOTES
[facility] - Telemetry  Cardiology  Progress Note    Patient Name: Timothy Roldan  MRN: [id number]  Admission Date: 5/29/2022  Hospital Length of Stay: 2 days  Code Status: Full Code   Attending Physician: Hilda Rosa MD   Primary Care Physician: Martine Garces MD  Expected Discharge Date: 6/2/2022  Principal Problem:Acute hypoxemic respiratory failure    Subjective:       Interval History:  Breathing is better.  Laying flat in bed without any apparent respiratory distress.      Review of Systems   Constitutional: Positive for malaise/fatigue.   HENT: Negative.     Eyes: Negative.    Endocrine: Negative.    Hematologic/Lymphatic: Negative.    Skin: Negative.    Musculoskeletal: Negative.    Gastrointestinal: Negative.    Genitourinary: Negative.    Neurological: Negative.    Psychiatric/Behavioral: Negative.     Allergic/Immunologic: Negative.    Objective:     Vital Signs (Most Recent):  Temp: 97.9 °F (36.6 °C) (06/02/22 1144)  Pulse: 71 (06/02/22 1144)  Resp: 18 (06/02/22 1144)  BP: 115/72 (06/02/22 1144)  SpO2: 96 % (06/02/22 1144) Vital Signs (24h Range):  Temp:  [97.5 °F (36.4 °C)-98.6 °F (37 °C)] 97.9 °F (36.6 °C)  Pulse:  [70-88] 71  Resp:  [17-20] 18  SpO2:  [93 %-99 %] 96 %  BP: ()/(56-82) 115/72     Weight: 79.4 kg (175 lb 0.7 oz)  Body mass index is 25.12 kg/m².     SpO2: 96 %  O2 Device (Oxygen Therapy): room air      Intake/Output Summary (Last 24 hours) at 6/2/2022 1346  Last data filed at 6/2/2022 0700  Gross per 24 hour   Intake 100 ml   Output --   Net 100 ml       Lines/Drains/Airways       Peripheral Intravenous Line  Duration                  Peripheral IV - Single Lumen 05/29/22 1700 22 G Hand 3 days                    Physical Exam  Vitals reviewed.   Constitutional:       Appearance: He is well-developed.   HENT:      Head: Normocephalic.   Eyes:      Conjunctiva/sclera: Conjunctivae normal.      Pupils: Pupils are equal, round, and reactive to light.   Cardiovascular:      Rate and  Rhythm: Normal rate. Rhythm irregular.      Heart sounds: Normal heart sounds.   Pulmonary:      Effort: Pulmonary effort is normal.      Breath sounds: Normal breath sounds.   Abdominal:      General: Bowel sounds are normal.      Palpations: Abdomen is soft.   Musculoskeletal:      Cervical back: Normal range of motion and neck supple.   Skin:     General: Skin is warm.   Neurological:      Mental Status: He is alert and oriented to person, place, and time.       Significant Labs: BMP:   Recent Labs   Lab 06/01/22  0510 06/02/22  0441   GLU 97 107    144   K 3.4* 3.8    108   CO2 26 27   BUN 29* 32*   CREATININE 1.0 1.0   CALCIUM 8.8 9.1   , CMP   Recent Labs   Lab 06/01/22  0510 06/02/22  0441    144   K 3.4* 3.8    108   CO2 26 27   GLU 97 107   BUN 29* 32*   CREATININE 1.0 1.0   CALCIUM 8.8 9.1   ANIONGAP 9 9   ESTGFRAFRICA >60 >60   EGFRNONAA >60 >60   , CBC No results for input(s): WBC, HGB, HCT, PLT in the last 48 hours., INR No results for input(s): INR, PROTIME in the last 48 hours., Lipid Panel No results for input(s): CHOL, HDL, LDLCALC, TRIG, CHOLHDL in the last 48 hours., Troponin No results for input(s): TROPONINI in the last 48 hours., and All pertinent lab results from the last 24 hours have been reviewed.    Significant Imaging: Echocardiogram: Transthoracic echo (TTE) complete (Cupid Only):   Results for orders placed or performed during the hospital encounter of 05/29/22   Echo Saline Bubble? No   Result Value Ref Range    BSA 1.98 m2    LV LATERAL E/E' RATIO 9.80 m/s    TDI LATERAL 0.10 m/s    PV PEAK VELOCITY 1.01 cm/s    LVIDd 4.16 3.5 - 6.0 cm    IVS 1.20 (A) 0.6 - 1.1 cm    Posterior Wall 1.20 (A) 0.6 - 1.1 cm    LVIDs 2.44 2.1 - 4.0 cm    FS 41 28 - 44 %    STJ 3.14 cm    Ascending aorta 3.26 cm    LV mass 175.58 g    LA size 4.47 cm    RVDD 4.20 cm    TAPSE 2.57 cm    RV S' 12.96 cm/s    Left Ventricle Relative Wall Thickness 0.58 cm    AV mean gradient 16 mmHg     AV valve area 1.94 cm2    AV Velocity Ratio 0.49     AV index (prosthetic) 0.60     MV valve area p 1/2 method 3.42 cm2    E/A ratio 2.00     E wave deceleration time 221.82 msec    LVOT diameter 2.03 cm    LVOT area 3.2 cm2    LVOT peak edgar 1.19 m/s    LVOT peak VTI 24.30 cm    Ao peak edgar 2.44 m/s    Ao VTI 40.48 cm    LVOT stroke volume 78.61 cm3    AV peak gradient 24 mmHg    MV Peak E Edgar 0.98 m/s    TR Max Edgar 2.31 m/s    MV stenosis pressure 1/2 time 64.33 ms    MV Peak A Edgar 0.49 m/s    LV Systolic Volume 21.08 mL    LV Systolic Volume Index 10.7 mL/m2    LV Diastolic Volume 77.03 mL    LV Diastolic Volume Index 39.10 mL/m2    LV Mass Index 89 g/m2    RA Major Axis 6.51 cm    Left Atrium Major Axis 5.50 cm    Triscuspid Valve Regurgitation Peak Gradient 21 mmHg    RA Width 4.04 cm    Right Atrial Pressure (from IVC) 8 mmHg    EF 70 %    TV rest pulmonary artery pressure 29 mmHg    Narrative    · The left ventricle is normal in size with mild concentric hypertrophy   and normal systolic function.  · The estimated ejection fraction is 70%.  · Normal left ventricular diastolic function.  · Mild right ventricular enlargement with normal right ventricular   systolic function.  · Mild left atrial enlargement.  · There is mild aortic valve stenosis.  · Aortic valve area is 1.94 cm2; peak velocity is 2.44 m/s; mean gradient   is 16 mmHg.  · Intermediate central venous pressure (8 mmHg).  · The estimated PA systolic pressure is 29 mmHg.        Assessment and Plan:     Brief HPI:     * Acute hypoxemic respiratory failure        Pleural effusion        Debility        Pneumonia due to COVID-19 virus        Persistent atrial fibrillation  Rate control. Anti coagulate    Alzheimer disease           Essential hypertension                 VTE Risk Mitigation (From admission, onward)         Ordered     enoxaparin injection 80 mg  2 times daily         05/30/22 4220                Rony Tariq MD  Cardiology  [facility]  Telemetry

## 2022-06-02 NOTE — PLAN OF CARE
[facility] - Telemetry  Discharge Final Note    Primary Care Provider: Martine Garces MD    Expected Discharge Date: 6/2/2022    Final Discharge Note (most recent)       Final Note - 06/02/22 1654          Final Note    Assessment Type Final Discharge Note     Anticipated Discharge Disposition Home-Health Care Sv     What phone number can be called within the next 1-3 days to see how you are doing after discharge? [phone]     Hospital Resources/Appts/Education Provided Provided patient/caregiver with written discharge plan information;Appointments scheduled and added to AVS;Appointments scheduled by Navigator/Coordinator        Post-Acute Status    Post-Acute Authorization Home Health     Home Health Status Set-up Complete/Auth obtained     Discharge Delays None known at this time                     Important Message from Medicare  Important Message from Medicare regarding Discharge Appeal Rights: Given to patient/caregiver, Explained to patient/caregiver     Date IMM was signed: 06/02/22  Time IMM was signed: 1045    Contact Info       Rony Tariq MD   Specialty: Cardiology, Interventional Cardiology    [address]  [address]  [facility] [address]   Phone: [phone]       Next Steps: Follow up    [facility] [address] [address]   Phone: [phone]       Next Steps: Follow up    Instructions: Home Health          ANNETTE spoke with Luda ([facility]) re: Alto belt being covered by insurance. Luda explained that Alto belt is not covered by insurances.       ANNETTE spoke with patient's niece (Lakesha/GEE) informing her of patient discharging today and providing her with update re: Alto belt/transfer belt. ANNETTE also received address [address] [place] [address]. [place] [address].     ANNETTE secure chat patient's nurse Adeola that patient patient is cleared from case management standpoint.     ANNETTE spoke with Eli ([facility] answering service) to have [facility] nurse contact ANNETTE.    ANNETTE  received call from Rosa ([facility]) re: patient being arranged with HH services. Rosa stated Yumiko ([facility]) will contact  with resume of HH services for patient.

## 2022-06-02 NOTE — PLAN OF CARE
06/02/22 1056   Medicare Message   Important Message from Medicare regarding Discharge Appeal Rights Given to patient/caregiver;Explained to patient/caregiver   Date IMM was signed 06/02/22   Time IMM was signed 1045     SW given to Lakesha TOBAR) via phone. ANNETTE received email from Lakesha: [email or web address].   ANNETTE emailed IMM to Lakesha.

## 2022-06-02 NOTE — PLAN OF CARE
[facility] - Telemetry  Discharge Reassessment    Primary Care Provider: Martine Garces MD    Expected Discharge Date: 6/2/2022    Reassessment (most recent)       Discharge Reassessment - 06/02/22 1103          Discharge Reassessment    Assessment Type Discharge Planning Reassessment     Discharge Plan discussed with: POA     Name(s) and Number(s) beryl gerardo (Healthcare Power of )   [phone] (Mobile     Communicated JACQUELINE with patient/caregiver Yes     Discharge Plan A Home Health;Independent living facility     Discharge Plan B Home Health;Independent living facility     DME Needed Upon Discharge  none     Discharge Barriers Identified None     Why the patient remains in the hospital Requires continued medical care        Post-Acute Status    Post-Acute Authorization Home Health     Home Health Status Pending medical clearance/testing     Discharge Delays None known at this time                   ANNETTE received call from Beryl (POA) re: patient's disposition. ANNETTE explained MD is planning to discharge patient today or tomorrow. Beryl stated that patient is receiving HH services with [facility] and would like for patient to resume HH services. ANNETTE voiced understanding. Patient's POA stated that patient will need transportation at discharge.

## 2022-06-02 NOTE — PROGRESS NOTES
ADT 30 order placed for Stretcher Transportation.  Requested  time: 5:30PM stretcher with oxygen to [address] [place] [address] [place] [phone].    If transportation does not arrive at ETA time nurse will be instructed to follow protocol for transportation below:   How can I get in touch directly with dispatch, if needed?                 · Non-emergent (stretcher): [phone]      ++NURSING:  If Stretcher does not arrive at requested time please call the above Non Emergent Dispatcher.  If issue not resolved please escalate to your charge nurse for further instructions.

## 2022-06-03 ENCOUNTER — PATIENT MESSAGE (OUTPATIENT)
Dept: ADMINISTRATIVE | Facility: OTHER | Age: 82
End: 2022-06-03
Payer: MEDICARE

## 2022-06-03 ENCOUNTER — NURSE TRIAGE (OUTPATIENT)
Dept: ADMINISTRATIVE | Facility: CLINIC | Age: 82
End: 2022-06-03
Payer: MEDICARE

## 2022-06-03 ENCOUNTER — PATIENT MESSAGE (OUTPATIENT)
Dept: ADMINISTRATIVE | Facility: CLINIC | Age: 82
End: 2022-06-03
Payer: MEDICARE

## 2022-06-03 ENCOUNTER — PATIENT OUTREACH (OUTPATIENT)
Dept: ADMINISTRATIVE | Facility: CLINIC | Age: 82
End: 2022-06-03
Payer: MEDICARE

## 2022-06-03 LAB
BACTERIA BLD CULT: NORMAL
BACTERIA BLD CULT: NORMAL

## 2022-06-03 NOTE — TELEPHONE ENCOUNTER
Pt escalated for no response. Attempted to contact pt with no success. Left voicemail and sent [facility] message. Will continue to monitor.    Reason for Disposition   Message left on unidentified voice mail. Phone number verified.    Protocols used: NO CONTACT OR DUPLICATE CONTACT CALL-A-OH

## 2022-06-03 NOTE — PROGRESS NOTES
C3 nurse attempted to contact Timothy Jamar for a TCC post hospital discharge follow up call. No answer. The patient does not have a scheduled HOSFU appointment, and the pt [facility].  Message sent via [facility] portal for Post Discharge Attempt with callback information.

## 2022-06-03 NOTE — TELEPHONE ENCOUNTER
Pt escalated for surveillance no response. Last submission 145 today. No call made as in window of afternoon submission time.     Reason for Disposition   Caller has cancelled the call before the first contact    Additional Information   Negative: Caller has already spoken with the PCP (or office), and has no further questions   Negative: Caller has already spoken with another triager and has no further questions   Negative: Caller has already spoken with another triager or PCP (or office), and has further questions and triager able to answer questions.    Protocols used: NO CONTACT OR DUPLICATE CONTACT CALL-A-[place]

## 2022-06-03 NOTE — PROGRESS NOTES
C3 nurse contacted patient's POA/caregiver for a TCC post hospital discharge follow-up call. The patient was deemed not applicable for TCC post discharge call.  Patient is a resident of a [facility] and [facility].

## 2022-06-04 ENCOUNTER — PATIENT MESSAGE (OUTPATIENT)
Dept: ADMINISTRATIVE | Facility: OTHER | Age: 82
End: 2022-06-04
Payer: MEDICARE

## 2022-06-05 ENCOUNTER — PATIENT MESSAGE (OUTPATIENT)
Dept: ADMINISTRATIVE | Facility: CLINIC | Age: 82
End: 2022-06-05
Payer: MEDICARE

## 2022-06-05 ENCOUNTER — PATIENT MESSAGE (OUTPATIENT)
Dept: ADMINISTRATIVE | Facility: OTHER | Age: 82
End: 2022-06-05
Payer: MEDICARE

## 2022-06-05 ENCOUNTER — NURSE TRIAGE (OUTPATIENT)
Dept: ADMINISTRATIVE | Facility: CLINIC | Age: 82
End: 2022-06-05
Payer: MEDICARE

## 2022-06-05 NOTE — TELEPHONE ENCOUNTER
"Pt called for not response to cc push.    Family member will call the assisted living facility that he lives in and get them to put the VS in. Instructed her to call o[facility] at [phone] if he has any questions or concerns or symptoms that need triaging.    Educated family member on the need for vs to be placed into computer oscar 2x day at 0800 and 1500 anf if not entered by then to expect a phone call from cc program.    No [facility] pcp availiable to route message to.    [place] text message sent:  This inbox is not monitored 24/7, so please do not reply to this message unless you want to opt out of the surveillance program.    We are contacting you from [facility]'s Covid-19 Surveillance Program, as we did not receive your symptoms and vital signs earlier today. We are calling to make sure that you are okay. If you need anything, please call [facility] ([phone]) to speak with one of our nurses. If you are having a medical emergency, call 911 or go to your nearest emergency room. Thank you.    Participation in this program is voluntary. If you no longer want to be monitored by the Surveillance Program, there are two possible options for opting out:  1. If you are able to reply to this message, you can opt out of the program by replying "Opt out, or  2. Call [phone] from 8:00 am to 8:00 pm and ask to speak with a surveillance nurse.    This inbox is not monitored 24/7, so please do not reply to this message unless you want to opt out of the surveillance program. If you have questions or concerns, please call [phone]. If you are having a medical emergency, please proceed to your nearest emergency department or dial 911.      "

## 2022-06-05 NOTE — TELEPHONE ENCOUNTER
Reason for Disposition   Message left with person in household.    Protocols used: NO CONTACT OR DUPLICATE CONTACT CALL-A-AH

## 2022-06-05 NOTE — TELEPHONE ENCOUNTER
Surveillance pt escalated for no response. Unable to contact pt or EC listed. Left VM message including OOC number. MyChart message sent.    Reason for Disposition   Message left on unidentified voice mail.  Phone number verified.    Additional Information   Negative: Caller is angry or rude (e.g., hangs up, verbally abusive, yelling)   Negative: Caller hangs up   Negative: Caller has already spoken with the PCP and has no further questions.   Negative: Caller has already spoken with another triager and has no further questions.   Negative: Caller has already spoken with another triager or PCP AND has further questions AND triager able to answer questions.   Negative: Busy signal.  First attempt to contact caller.  Follow-up call scheduled within 15 minutes.   Negative: No answer.  First attempt to contact caller.  Follow-up call scheduled within 15 minutes.   Negative: Message left on identified voice mail    Protocols used: NO CONTACT OR DUPLICATE CONTACT CALL-A-

## 2022-06-06 ENCOUNTER — PATIENT MESSAGE (OUTPATIENT)
Dept: ADMINISTRATIVE | Facility: OTHER | Age: 82
End: 2022-06-06
Payer: MEDICARE

## 2022-06-06 ENCOUNTER — PATIENT MESSAGE (OUTPATIENT)
Dept: ADMINISTRATIVE | Facility: CLINIC | Age: 82
End: 2022-06-06
Payer: MEDICARE

## 2022-06-06 ENCOUNTER — NURSE TRIAGE (OUTPATIENT)
Dept: ADMINISTRATIVE | Facility: CLINIC | Age: 82
End: 2022-06-06
Payer: MEDICARE

## 2022-06-06 NOTE — TELEPHONE ENCOUNTER
Pt escalated for no response. Spoke to caregiver who states she forgot to enter vitals. CG asked if entry time could be changed. Advised times are set times for program. She states she will get vitals entered. Will continue to monitor.    Reason for Disposition   Health Information question, no triage required and triager able to answer question    Protocols used: INFORMATION ONLY CALL - NO TRIAGE-A-AH

## 2022-06-07 ENCOUNTER — PATIENT MESSAGE (OUTPATIENT)
Dept: ADMINISTRATIVE | Facility: CLINIC | Age: 82
End: 2022-06-07
Payer: MEDICARE

## 2022-06-07 ENCOUNTER — PATIENT MESSAGE (OUTPATIENT)
Dept: ADMINISTRATIVE | Facility: OTHER | Age: 82
End: 2022-06-07
Payer: MEDICARE

## 2022-06-08 ENCOUNTER — PATIENT MESSAGE (OUTPATIENT)
Dept: ADMINISTRATIVE | Facility: OTHER | Age: 82
End: 2022-06-08
Payer: MEDICARE

## 2022-06-09 ENCOUNTER — PATIENT MESSAGE (OUTPATIENT)
Dept: ADMINISTRATIVE | Facility: OTHER | Age: 82
End: 2022-06-09
Payer: MEDICARE

## 2022-06-10 ENCOUNTER — PATIENT MESSAGE (OUTPATIENT)
Dept: ADMINISTRATIVE | Facility: CLINIC | Age: 82
End: 2022-06-10
Payer: MEDICARE

## 2022-06-10 ENCOUNTER — NURSE TRIAGE (OUTPATIENT)
Dept: ADMINISTRATIVE | Facility: CLINIC | Age: 82
End: 2022-06-10
Payer: MEDICARE

## 2022-06-10 ENCOUNTER — PATIENT MESSAGE (OUTPATIENT)
Dept: ADMINISTRATIVE | Facility: OTHER | Age: 82
End: 2022-06-10
Payer: MEDICARE

## 2022-06-10 NOTE — TELEPHONE ENCOUNTER
Pt escalated for no response, Spoke with Lakesha pt CG who reports she will enter VS now and denies any worsening of symptoms. CG will call OOC for any health concerns.     Reason for Disposition   [1] Follow-up call to recent contact AND [2] information only call, no triage required    Protocols used: INFORMATION ONLY CALL - NO TRIAGE-A-

## 2022-07-19 ENCOUNTER — PATIENT MESSAGE (OUTPATIENT)
Dept: RESEARCH | Facility: CLINIC | Age: 82
End: 2022-07-19
Payer: MEDICARE

## 2022-08-02 ENCOUNTER — DOCUMENT SCAN (OUTPATIENT)
Dept: HOME HEALTH SERVICES | Facility: HOSPITAL | Age: 82
End: 2022-08-02

## 2022-08-02 ENCOUNTER — DOCUMENT SCAN (OUTPATIENT)
Dept: HOME HEALTH SERVICES | Facility: HOSPITAL | Age: 82
End: 2022-08-02
Payer: MEDICARE

## 2022-12-21 ENCOUNTER — HOSPITAL ENCOUNTER (INPATIENT)
Facility: HOSPITAL | Age: 82
LOS: 7 days | Discharge: HOME HEALTH | DRG: 854 | End: 2022-12-28
Attending: EMERGENCY MEDICINE | Admitting: HOSPITALIST
Payer: MEDICARE

## 2022-12-21 DIAGNOSIS — N13.30 BILATERAL HYDRONEPHROSIS: ICD-10-CM

## 2022-12-21 DIAGNOSIS — R07.9 CHEST PAIN: ICD-10-CM

## 2022-12-21 DIAGNOSIS — R41.82 AMS (ALTERED MENTAL STATUS): ICD-10-CM

## 2022-12-21 DIAGNOSIS — N17.9 AKI (ACUTE KIDNEY INJURY): Primary | ICD-10-CM

## 2022-12-21 DIAGNOSIS — R41.82 ALTERED MENTAL STATE: ICD-10-CM

## 2022-12-21 DIAGNOSIS — I50.9 CHF (CONGESTIVE HEART FAILURE): ICD-10-CM

## 2022-12-21 DIAGNOSIS — R68.83 CHILLS: ICD-10-CM

## 2022-12-21 PROBLEM — A41.9 SEPSIS: Status: ACTIVE | Noted: 2022-12-21

## 2022-12-21 PROBLEM — I50.32 CHRONIC HEART FAILURE WITH PRESERVED EJECTION FRACTION: Status: ACTIVE | Noted: 2022-05-31

## 2022-12-21 LAB
ALBUMIN SERPL BCP-MCNC: 2.2 G/DL (ref 3.5–5.2)
ALP SERPL-CCNC: 66 U/L (ref 55–135)
ALT SERPL W/O P-5'-P-CCNC: 7 U/L (ref 10–44)
ANION GAP SERPL CALC-SCNC: 10 MMOL/L (ref 8–16)
AST SERPL-CCNC: 11 U/L (ref 10–40)
BACTERIA #/AREA URNS AUTO: ABNORMAL /HPF
BASOPHILS # BLD AUTO: 0.01 K/UL (ref 0–0.2)
BASOPHILS NFR BLD: 0.1 % (ref 0–1.9)
BILIRUB SERPL-MCNC: 0.7 MG/DL (ref 0.1–1)
BILIRUB UR QL STRIP: NEGATIVE
BNP SERPL-MCNC: 407 PG/ML (ref 0–99)
BUN SERPL-MCNC: 52 MG/DL (ref 8–23)
CALCIUM SERPL-MCNC: 8.8 MG/DL (ref 8.7–10.5)
CHLORIDE SERPL-SCNC: 105 MMOL/L (ref 95–110)
CK SERPL-CCNC: 22 U/L (ref 20–200)
CLARITY UR REFRACT.AUTO: ABNORMAL
CO2 SERPL-SCNC: 27 MMOL/L (ref 23–29)
COLOR UR AUTO: YELLOW
CREAT SERPL-MCNC: 4.2 MG/DL (ref 0.5–1.4)
DIFFERENTIAL METHOD: ABNORMAL
EOSINOPHIL # BLD AUTO: 0 K/UL (ref 0–0.5)
EOSINOPHIL NFR BLD: 0 % (ref 0–8)
ERYTHROCYTE [DISTWIDTH] IN BLOOD BY AUTOMATED COUNT: 14.4 % (ref 11.5–14.5)
EST. GFR  (NO RACE VARIABLE): 13.6 ML/MIN/1.73 M^2
GLUCOSE SERPL-MCNC: 117 MG/DL (ref 70–110)
GLUCOSE UR QL STRIP: NEGATIVE
HCT VFR BLD AUTO: 31.3 % (ref 40–54)
HCV AB SERPL QL IA: NORMAL
HGB BLD-MCNC: 9.9 G/DL (ref 14–18)
HGB UR QL STRIP: ABNORMAL
HIV 1+2 AB+HIV1 P24 AG SERPL QL IA: NORMAL
HYALINE CASTS UR QL AUTO: 6 /LPF
IMM GRANULOCYTES # BLD AUTO: 0.04 K/UL (ref 0–0.04)
IMM GRANULOCYTES NFR BLD AUTO: 0.5 % (ref 0–0.5)
INFLUENZA A, MOLECULAR: NOT DETECTED
INFLUENZA B, MOLECULAR: NOT DETECTED
KETONES UR QL STRIP: NEGATIVE
LACTATE SERPL-SCNC: 0.6 MMOL/L (ref 0.5–2.2)
LEUKOCYTE ESTERASE UR QL STRIP: ABNORMAL
LYMPHOCYTES # BLD AUTO: 0.5 K/UL (ref 1–4.8)
LYMPHOCYTES NFR BLD: 5.7 % (ref 18–48)
MCH RBC QN AUTO: 29.4 PG (ref 27–31)
MCHC RBC AUTO-ENTMCNC: 31.6 G/DL (ref 32–36)
MCV RBC AUTO: 93 FL (ref 82–98)
MICROSCOPIC COMMENT: ABNORMAL
MONOCYTES # BLD AUTO: 0.4 K/UL (ref 0.3–1)
MONOCYTES NFR BLD: 5.2 % (ref 4–15)
NEUTROPHILS # BLD AUTO: 7.5 K/UL (ref 1.8–7.7)
NEUTROPHILS NFR BLD: 88.5 % (ref 38–73)
NITRITE UR QL STRIP: NEGATIVE
NRBC BLD-RTO: 0 /100 WBC
PH UR STRIP: 6 [PH] (ref 5–8)
PLATELET # BLD AUTO: 164 K/UL (ref 150–450)
PMV BLD AUTO: 11.1 FL (ref 9.2–12.9)
POTASSIUM SERPL-SCNC: 3.9 MMOL/L (ref 3.5–5.1)
PROT SERPL-MCNC: 6.9 G/DL (ref 6–8.4)
PROT UR QL STRIP: ABNORMAL
RBC # BLD AUTO: 3.37 M/UL (ref 4.6–6.2)
RBC #/AREA URNS AUTO: 25 /HPF (ref 0–4)
RSV AG BY MOLECULAR METHOD: NOT DETECTED
SARS-COV-2 RNA RESP QL NAA+PROBE: NOT DETECTED
SODIUM SERPL-SCNC: 142 MMOL/L (ref 136–145)
SP GR UR STRIP: 1.01 (ref 1–1.03)
SQUAMOUS #/AREA URNS AUTO: 0 /HPF
TROPONIN I SERPL DL<=0.01 NG/ML-MCNC: 0.01 NG/ML (ref 0–0.03)
URN SPEC COLLECT METH UR: ABNORMAL
WBC # BLD AUTO: 8.45 K/UL (ref 3.9–12.7)
WBC #/AREA URNS AUTO: >100 /HPF (ref 0–5)

## 2022-12-21 PROCEDURE — 83880 ASSAY OF NATRIURETIC PEPTIDE: CPT

## 2022-12-21 PROCEDURE — 93010 ELECTROCARDIOGRAM REPORT: CPT | Mod: ,,, | Performed by: INTERNAL MEDICINE

## 2022-12-21 PROCEDURE — 99285 EMERGENCY DEPT VISIT HI MDM: CPT | Mod: 25

## 2022-12-21 PROCEDURE — 83935 ASSAY OF URINE OSMOLALITY: CPT

## 2022-12-21 PROCEDURE — 87389 HIV-1 AG W/HIV-1&-2 AB AG IA: CPT | Performed by: PHYSICIAN ASSISTANT

## 2022-12-21 PROCEDURE — 80053 COMPREHEN METABOLIC PANEL: CPT

## 2022-12-21 PROCEDURE — 0241U SARS-COV2 (COVID) WITH FLU/RSV BY PCR: CPT | Performed by: FAMILY MEDICINE

## 2022-12-21 PROCEDURE — 99223 1ST HOSP IP/OBS HIGH 75: CPT | Mod: ,,, | Performed by: FAMILY MEDICINE

## 2022-12-21 PROCEDURE — 84484 ASSAY OF TROPONIN QUANT: CPT

## 2022-12-21 PROCEDURE — 99223 PR INITIAL HOSPITAL CARE,LEVL III: ICD-10-PCS | Mod: ,,, | Performed by: FAMILY MEDICINE

## 2022-12-21 PROCEDURE — 84300 ASSAY OF URINE SODIUM: CPT

## 2022-12-21 PROCEDURE — 12000002 HC ACUTE/MED SURGE SEMI-PRIVATE ROOM

## 2022-12-21 PROCEDURE — 63600175 PHARM REV CODE 636 W HCPCS

## 2022-12-21 PROCEDURE — 25000003 PHARM REV CODE 250: Performed by: FAMILY MEDICINE

## 2022-12-21 PROCEDURE — 85025 COMPLETE CBC W/AUTO DIFF WBC: CPT

## 2022-12-21 PROCEDURE — 93005 ELECTROCARDIOGRAM TRACING: CPT

## 2022-12-21 PROCEDURE — 63600175 PHARM REV CODE 636 W HCPCS: Performed by: FAMILY MEDICINE

## 2022-12-21 PROCEDURE — 84540 ASSAY OF URINE/UREA-N: CPT

## 2022-12-21 PROCEDURE — 93010 EKG 12-LEAD: ICD-10-PCS | Mod: ,,, | Performed by: INTERNAL MEDICINE

## 2022-12-21 PROCEDURE — 99291 PR CRITICAL CARE, E/M 30-74 MINUTES: ICD-10-PCS | Mod: ,,, | Performed by: EMERGENCY MEDICINE

## 2022-12-21 PROCEDURE — 82550 ASSAY OF CK (CPK): CPT

## 2022-12-21 PROCEDURE — 99291 CRITICAL CARE FIRST HOUR: CPT | Mod: ,,, | Performed by: EMERGENCY MEDICINE

## 2022-12-21 PROCEDURE — 81001 URINALYSIS AUTO W/SCOPE: CPT

## 2022-12-21 PROCEDURE — 87086 URINE CULTURE/COLONY COUNT: CPT

## 2022-12-21 PROCEDURE — 86803 HEPATITIS C AB TEST: CPT | Performed by: PHYSICIAN ASSISTANT

## 2022-12-21 PROCEDURE — 96374 THER/PROPH/DIAG INJ IV PUSH: CPT

## 2022-12-21 PROCEDURE — 83605 ASSAY OF LACTIC ACID: CPT | Performed by: FAMILY MEDICINE

## 2022-12-21 PROCEDURE — 25000003 PHARM REV CODE 250

## 2022-12-21 PROCEDURE — 87040 BLOOD CULTURE FOR BACTERIA: CPT | Performed by: FAMILY MEDICINE

## 2022-12-21 PROCEDURE — 84156 ASSAY OF PROTEIN URINE: CPT

## 2022-12-21 RX ORDER — QUETIAPINE FUMARATE 25 MG/1
25 TABLET, FILM COATED ORAL NIGHTLY
Status: DISCONTINUED | OUTPATIENT
Start: 2022-12-21 | End: 2022-12-28 | Stop reason: HOSPADM

## 2022-12-21 RX ORDER — MEMANTINE HYDROCHLORIDE 10 MG/1
20 TABLET ORAL DAILY
Status: DISCONTINUED | OUTPATIENT
Start: 2022-12-22 | End: 2022-12-28 | Stop reason: HOSPADM

## 2022-12-21 RX ORDER — OXYCODONE HYDROCHLORIDE 5 MG/1
5 TABLET ORAL EVERY 6 HOURS PRN
Status: DISCONTINUED | OUTPATIENT
Start: 2022-12-21 | End: 2022-12-28 | Stop reason: HOSPADM

## 2022-12-21 RX ORDER — ACETAMINOPHEN 500 MG
1000 TABLET ORAL EVERY 8 HOURS PRN
Status: DISCONTINUED | OUTPATIENT
Start: 2022-12-21 | End: 2022-12-28 | Stop reason: HOSPADM

## 2022-12-21 RX ORDER — ONDANSETRON 2 MG/ML
4 INJECTION INTRAMUSCULAR; INTRAVENOUS EVERY 8 HOURS PRN
Status: DISCONTINUED | OUTPATIENT
Start: 2022-12-21 | End: 2022-12-28 | Stop reason: HOSPADM

## 2022-12-21 RX ORDER — NALOXONE HCL 0.4 MG/ML
0.02 VIAL (ML) INJECTION
Status: DISCONTINUED | OUTPATIENT
Start: 2022-12-21 | End: 2022-12-28 | Stop reason: HOSPADM

## 2022-12-21 RX ORDER — MAG HYDROX/ALUMINUM HYD/SIMETH 200-200-20
30 SUSPENSION, ORAL (FINAL DOSE FORM) ORAL 4 TIMES DAILY PRN
Status: DISCONTINUED | OUTPATIENT
Start: 2022-12-21 | End: 2022-12-28 | Stop reason: HOSPADM

## 2022-12-21 RX ORDER — TAMSULOSIN HYDROCHLORIDE 0.4 MG/1
0.4 CAPSULE ORAL DAILY
Status: DISCONTINUED | OUTPATIENT
Start: 2022-12-22 | End: 2022-12-26

## 2022-12-21 RX ORDER — DONEPEZIL HYDROCHLORIDE 5 MG/1
10 TABLET, FILM COATED ORAL NIGHTLY
Status: DISCONTINUED | OUTPATIENT
Start: 2022-12-21 | End: 2022-12-28 | Stop reason: HOSPADM

## 2022-12-21 RX ORDER — ATORVASTATIN CALCIUM 10 MG/1
10 TABLET, FILM COATED ORAL DAILY
Status: DISCONTINUED | OUTPATIENT
Start: 2022-12-22 | End: 2022-12-28 | Stop reason: HOSPADM

## 2022-12-21 RX ORDER — GLUCAGON 1 MG
1 KIT INJECTION
Status: DISCONTINUED | OUTPATIENT
Start: 2022-12-21 | End: 2022-12-28 | Stop reason: HOSPADM

## 2022-12-21 RX ORDER — SODIUM CHLORIDE 0.9 % (FLUSH) 0.9 %
10 SYRINGE (ML) INJECTION EVERY 12 HOURS PRN
Status: DISCONTINUED | OUTPATIENT
Start: 2022-12-21 | End: 2022-12-28 | Stop reason: HOSPADM

## 2022-12-21 RX ORDER — IBUPROFEN 200 MG
24 TABLET ORAL
Status: DISCONTINUED | OUTPATIENT
Start: 2022-12-21 | End: 2022-12-28 | Stop reason: HOSPADM

## 2022-12-21 RX ORDER — ACETAMINOPHEN 500 MG
1000 TABLET ORAL
Status: COMPLETED | OUTPATIENT
Start: 2022-12-21 | End: 2022-12-21

## 2022-12-21 RX ORDER — FUROSEMIDE 10 MG/ML
40 INJECTION INTRAMUSCULAR; INTRAVENOUS
Status: COMPLETED | OUTPATIENT
Start: 2022-12-21 | End: 2022-12-21

## 2022-12-21 RX ORDER — TALC
6 POWDER (GRAM) TOPICAL NIGHTLY PRN
Status: DISCONTINUED | OUTPATIENT
Start: 2022-12-21 | End: 2022-12-28 | Stop reason: HOSPADM

## 2022-12-21 RX ORDER — IBUPROFEN 200 MG
16 TABLET ORAL
Status: DISCONTINUED | OUTPATIENT
Start: 2022-12-21 | End: 2022-12-28 | Stop reason: HOSPADM

## 2022-12-21 RX ORDER — IPRATROPIUM BROMIDE AND ALBUTEROL SULFATE 2.5; .5 MG/3ML; MG/3ML
3 SOLUTION RESPIRATORY (INHALATION) EVERY 6 HOURS PRN
Status: DISCONTINUED | OUTPATIENT
Start: 2022-12-21 | End: 2022-12-28 | Stop reason: HOSPADM

## 2022-12-21 RX ORDER — AMOXICILLIN 250 MG
1 CAPSULE ORAL 2 TIMES DAILY PRN
Status: DISCONTINUED | OUTPATIENT
Start: 2022-12-21 | End: 2022-12-28 | Stop reason: HOSPADM

## 2022-12-21 RX ADMIN — QUETIAPINE FUMARATE 25 MG: 25 TABLET ORAL at 11:12

## 2022-12-21 RX ADMIN — APIXABAN 5 MG: 5 TABLET, FILM COATED ORAL at 11:12

## 2022-12-21 RX ADMIN — DONEPEZIL HYDROCHLORIDE 10 MG: 10 TABLET ORAL at 11:12

## 2022-12-21 RX ADMIN — CEFEPIME 2 G: 2 INJECTION, POWDER, FOR SOLUTION INTRAVENOUS at 11:12

## 2022-12-21 RX ADMIN — FUROSEMIDE 40 MG: 10 INJECTION, SOLUTION INTRAMUSCULAR; INTRAVENOUS at 08:12

## 2022-12-21 RX ADMIN — ACETAMINOPHEN 1000 MG: 500 TABLET ORAL at 07:12

## 2022-12-21 RX ADMIN — SODIUM CHLORIDE 1000 ML: 9 INJECTION, SOLUTION INTRAVENOUS at 11:12

## 2022-12-22 PROBLEM — N20.0 KIDNEY STONES: Status: ACTIVE | Noted: 2022-12-22

## 2022-12-22 PROBLEM — N13.30 BILATERAL HYDRONEPHROSIS: Status: ACTIVE | Noted: 2022-12-22

## 2022-12-22 LAB
ALBUMIN SERPL BCP-MCNC: 1.8 G/DL (ref 3.5–5.2)
ALP SERPL-CCNC: 55 U/L (ref 55–135)
ALT SERPL W/O P-5'-P-CCNC: 5 U/L (ref 10–44)
ANION GAP SERPL CALC-SCNC: 10 MMOL/L (ref 8–16)
AST SERPL-CCNC: 10 U/L (ref 10–40)
BASOPHILS # BLD AUTO: 0.01 K/UL (ref 0–0.2)
BASOPHILS NFR BLD: 0.2 % (ref 0–1.9)
BILIRUB SERPL-MCNC: 0.7 MG/DL (ref 0.1–1)
BUN SERPL-MCNC: 48 MG/DL (ref 8–23)
CALCIUM SERPL-MCNC: 7.9 MG/DL (ref 8.7–10.5)
CHLORIDE SERPL-SCNC: 104 MMOL/L (ref 95–110)
CO2 SERPL-SCNC: 25 MMOL/L (ref 23–29)
CREAT SERPL-MCNC: 4.1 MG/DL (ref 0.5–1.4)
CREAT UR-MCNC: 64 MG/DL (ref 23–375)
DIFFERENTIAL METHOD: ABNORMAL
EOSINOPHIL # BLD AUTO: 0 K/UL (ref 0–0.5)
EOSINOPHIL NFR BLD: 0.2 % (ref 0–8)
ERYTHROCYTE [DISTWIDTH] IN BLOOD BY AUTOMATED COUNT: 14.5 % (ref 11.5–14.5)
EST. GFR  (NO RACE VARIABLE): 14 ML/MIN/1.73 M^2
GLUCOSE SERPL-MCNC: 105 MG/DL (ref 70–110)
HCT VFR BLD AUTO: 27.9 % (ref 40–54)
HGB BLD-MCNC: 8.7 G/DL (ref 14–18)
IMM GRANULOCYTES # BLD AUTO: 0.03 K/UL (ref 0–0.04)
IMM GRANULOCYTES NFR BLD AUTO: 0.5 % (ref 0–0.5)
LYMPHOCYTES # BLD AUTO: 0.5 K/UL (ref 1–4.8)
LYMPHOCYTES NFR BLD: 8.7 % (ref 18–48)
MAGNESIUM SERPL-MCNC: 1.9 MG/DL (ref 1.6–2.6)
MCH RBC QN AUTO: 29.2 PG (ref 27–31)
MCHC RBC AUTO-ENTMCNC: 31.2 G/DL (ref 32–36)
MCV RBC AUTO: 94 FL (ref 82–98)
MONOCYTES # BLD AUTO: 0.2 K/UL (ref 0.3–1)
MONOCYTES NFR BLD: 3.5 % (ref 4–15)
NEUTROPHILS # BLD AUTO: 5.4 K/UL (ref 1.8–7.7)
NEUTROPHILS NFR BLD: 86.9 % (ref 38–73)
NRBC BLD-RTO: 0 /100 WBC
OSMOLALITY UR: 343 MOSM/KG (ref 50–1200)
PHOSPHATE SERPL-MCNC: 3.9 MG/DL (ref 2.7–4.5)
PLATELET # BLD AUTO: 133 K/UL (ref 150–450)
PMV BLD AUTO: 11 FL (ref 9.2–12.9)
POTASSIUM SERPL-SCNC: 3.6 MMOL/L (ref 3.5–5.1)
PROCALCITONIN SERPL IA-MCNC: 0.69 NG/ML
PROT SERPL-MCNC: 5.7 G/DL (ref 6–8.4)
PROT UR-MCNC: 38 MG/DL (ref 0–15)
PROT/CREAT UR: 0.59 MG/G{CREAT} (ref 0–0.2)
RBC # BLD AUTO: 2.98 M/UL (ref 4.6–6.2)
SODIUM SERPL-SCNC: 139 MMOL/L (ref 136–145)
SODIUM UR-SCNC: 67 MMOL/L (ref 20–250)
UUN UR-MCNC: 359 MG/DL (ref 140–1050)
VANCOMYCIN SERPL-MCNC: 12.9 UG/ML
WBC # BLD AUTO: 6.24 K/UL (ref 3.9–12.7)

## 2022-12-22 PROCEDURE — 94761 N-INVAS EAR/PLS OXIMETRY MLT: CPT

## 2022-12-22 PROCEDURE — 25000003 PHARM REV CODE 250: Performed by: FAMILY MEDICINE

## 2022-12-22 PROCEDURE — 63600175 PHARM REV CODE 636 W HCPCS: Performed by: HOSPITALIST

## 2022-12-22 PROCEDURE — 36415 COLL VENOUS BLD VENIPUNCTURE: CPT | Performed by: FAMILY MEDICINE

## 2022-12-22 PROCEDURE — 25000003 PHARM REV CODE 250: Performed by: HOSPITALIST

## 2022-12-22 PROCEDURE — 84100 ASSAY OF PHOSPHORUS: CPT | Performed by: FAMILY MEDICINE

## 2022-12-22 PROCEDURE — 99233 SBSQ HOSP IP/OBS HIGH 50: CPT | Mod: ,,, | Performed by: HOSPITALIST

## 2022-12-22 PROCEDURE — 83735 ASSAY OF MAGNESIUM: CPT | Performed by: FAMILY MEDICINE

## 2022-12-22 PROCEDURE — 12000002 HC ACUTE/MED SURGE SEMI-PRIVATE ROOM

## 2022-12-22 PROCEDURE — 51798 US URINE CAPACITY MEASURE: CPT

## 2022-12-22 PROCEDURE — 85025 COMPLETE CBC W/AUTO DIFF WBC: CPT | Performed by: FAMILY MEDICINE

## 2022-12-22 PROCEDURE — 84145 PROCALCITONIN (PCT): CPT | Performed by: FAMILY MEDICINE

## 2022-12-22 PROCEDURE — 80053 COMPREHEN METABOLIC PANEL: CPT | Performed by: FAMILY MEDICINE

## 2022-12-22 PROCEDURE — 63600175 PHARM REV CODE 636 W HCPCS: Performed by: FAMILY MEDICINE

## 2022-12-22 PROCEDURE — 80202 ASSAY OF VANCOMYCIN: CPT | Performed by: FAMILY MEDICINE

## 2022-12-22 PROCEDURE — 99233 PR SUBSEQUENT HOSPITAL CARE,LEVL III: ICD-10-PCS | Mod: ,,, | Performed by: HOSPITALIST

## 2022-12-22 RX ORDER — SODIUM CHLORIDE, SODIUM LACTATE, POTASSIUM CHLORIDE, CALCIUM CHLORIDE 600; 310; 30; 20 MG/100ML; MG/100ML; MG/100ML; MG/100ML
INJECTION, SOLUTION INTRAVENOUS CONTINUOUS
Status: ACTIVE | OUTPATIENT
Start: 2022-12-22 | End: 2022-12-22

## 2022-12-22 RX ADMIN — ACETAMINOPHEN 1000 MG: 500 TABLET ORAL at 06:12

## 2022-12-22 RX ADMIN — APIXABAN 2.5 MG: 2.5 TABLET, FILM COATED ORAL at 08:12

## 2022-12-22 RX ADMIN — QUETIAPINE FUMARATE 25 MG: 25 TABLET ORAL at 09:12

## 2022-12-22 RX ADMIN — DONEPEZIL HYDROCHLORIDE 10 MG: 10 TABLET ORAL at 09:12

## 2022-12-22 RX ADMIN — SODIUM CHLORIDE, POTASSIUM CHLORIDE, SODIUM LACTATE AND CALCIUM CHLORIDE: 600; 310; 30; 20 INJECTION, SOLUTION INTRAVENOUS at 11:12

## 2022-12-22 RX ADMIN — ATORVASTATIN CALCIUM 10 MG: 10 TABLET, FILM COATED ORAL at 08:12

## 2022-12-22 RX ADMIN — TAMSULOSIN HYDROCHLORIDE 0.4 MG: 0.4 CAPSULE ORAL at 08:12

## 2022-12-22 RX ADMIN — MEMANTINE 20 MG: 10 TABLET ORAL at 08:12

## 2022-12-22 RX ADMIN — OXYCODONE 5 MG: 5 TABLET ORAL at 12:12

## 2022-12-22 RX ADMIN — SODIUM CHLORIDE 500 ML: 0.9 INJECTION, SOLUTION INTRAVENOUS at 01:12

## 2022-12-22 RX ADMIN — VANCOMYCIN HYDROCHLORIDE 1250 MG: 1.25 INJECTION, POWDER, LYOPHILIZED, FOR SOLUTION INTRAVENOUS at 12:12

## 2022-12-22 NOTE — H&P
Inpatient Radiology Pre-procedure Note    History of Present Illness:  Timothy Roldan is a 82 y.o. male with pmhx of HTN, Afib on Eliquis, alzheimer dementia, HFpEF and BPH who presents from nursing home to the ED with chief complaint of fever, malaise and altered mental status.    In the ED patient Temp 100.8, tachycardic, hemodynamically stable saturating well on room air. WBC 8.45 with Lt shift. UA consistent with UTI. Creatinine 4.2 on presentation (baseline 1.0). . CXR with with evidence of bilateral effusions similar to prior studies as well as question of right lower lobe consolidation. Patient started on vancomycin and cefepime and admitted to .  Pt became hypotensive with 90s/50s on IVF.  CT abdomen and pelvis showing large bilateral obstructing renal pelvis/ureter stones with mod to sever hydronephrosis.  Urology consulted no surgical intervention. IR consulted for nephrotomy tube placement.    Admission H&P reviewed.    Past Medical History:   Diagnosis Date    Hyperlipidemia     Hypertension      Past Surgical History:   Procedure Laterality Date    TONSILLECTOMY      TOTAL KNEE ARTHROPLASTY         Review of Systems:   As documented in primary team H&P    Home Meds:   Prior to Admission medications    Medication Sig Start Date End Date Taking? Authorizing Provider   apixaban (ELIQUIS) 5 mg Tab Take 1 tablet (5 mg total) by mouth 2 (two) times daily. 6/2/22 6/2/23  Hilda Rosa MD   benazepriL (LOTENSIN) 10 MG tablet Take 1 tablet (10 mg total) by mouth once daily. 6/2/22 6/2/23  Hilda Rosa MD   donepeziL (ARICEPT) 10 MG tablet Take 1 tablet (10 mg total) by mouth every evening. 1/7/22 1/7/23  Luis Manuel Hammonds MD   furosemide (LASIX) 40 MG tablet Take 1 tablet (40 mg total) by mouth 2 (two) times a day. 6/2/22 8/31/22  Hilda Rosa MD   lovastatin (MEVACOR) 20 MG tablet Take 20 mg by mouth every evening.    Historical Provider   memantine (NAMENDA) 10 MG Tab Take 2 tablets  by mouth once daily. 5/25/22   Historical Provider   metoprolol tartrate (LOPRESSOR) 25 MG tablet Take 0.5 tablets (12.5 mg total) by mouth 2 (two) times daily. 6/2/22 6/2/23  Hilda Rosa MD   pulse oximeter (PULSE OXIMETER) device by Apply Externally route 2 (two) times a day. Use twice daily at 8 AM and 3 PM and record the value in MyChart as directed. 5/26/22   Marge Szymanski MD   QUEtiapine (SEROQUEL) 25 MG Tab Take 1 tablet (25 mg total) by mouth every evening. 1/7/22 1/7/23  Luis Manuel Hammonds MD   tamsulosin (FLOMAX) 0.4 mg Cap Take 1 capsule (0.4 mg total) by mouth once daily. 1/8/22 1/8/23  Luis Manuel Hammonds MD   amlodipine-benazepril 5-10 mg (LOTREL) 5-10 mg per capsule Take 1 capsule by mouth every morning. 5/25/22 6/2/22  Historical Provider   hydrALAZINE (APRESOLINE) 50 MG tablet Take 1 tablet (50 mg total) by mouth every 8 (eight) hours. 1/7/22 6/2/22  Luis Manuel Hammonds MD   loratadine (CLARITIN) 10 mg tablet loratadine 10 mg tablet   TAKE 1 TABLET BY MOUTH DAILY 9/22/21 6/2/22  Historical Provider     Scheduled Meds:    atorvastatin  10 mg Oral Daily    ceFEPime (MAXIPIME) IVPB  2 g Intravenous Q24H    donepeziL  10 mg Oral QHS    memantine  20 mg Oral Daily    QUEtiapine  25 mg Oral QHS    tamsulosin  0.4 mg Oral Daily     Continuous Infusions:    lactated ringers 100 mL/hr at 12/22/22 1126     PRN Meds:acetaminophen, albuterol-ipratropium, aluminum-magnesium hydroxide-simethicone, dextrose 10%, dextrose 10%, glucagon (human recombinant), glucose, glucose, melatonin, naloxone, ondansetron, oxyCODONE, senna-docusate 8.6-50 mg, sodium chloride 0.9%, Pharmacy to dose Vancomycin consult **AND** vancomycin - pharmacy to dose  Anticoagulants/Antiplatelets:  Eliquis    Allergies: Review of patient's allergies indicates:  No Known Allergies  Sedation Hx: have not been any systemic reactions    Labs:  No results for input(s): INR in the last 168 hours.    Invalid input(s):  PT,  PTT    Recent  Labs   Lab 12/22/22 0423   WBC 6.24   HGB 8.7*   HCT 27.9*   MCV 94   *      Recent Labs   Lab 12/22/22 0423         K 3.6      CO2 25   BUN 48*   CREATININE 4.1*   CALCIUM 7.9*   MG 1.9   ALT 5*   AST 10   ALBUMIN 1.8*   BILITOT 0.7         Vitals:  Temp: 98 °F (36.7 °C) (12/22/22 1511)  Pulse: 89 (12/22/22 1511)  Resp: 16 (12/22/22 1511)  BP: (!) 104/56 (12/22/22 1513)  SpO2: 95 % (12/22/22 1511)     Physical Exam:  ASA: III  Mallampati:     General: no acute distress  Mental Status: alert and oriented to person, place and time  HEENT: normocephalic, atraumatic  Chest: unlabored breathing  Heart: regular heart rate  Abdomen: nondistended  Extremity: moves all extremities      Plan:  Case discussed with hospital medicine staff Dr. Gipson and since the pt is hemodynamically stable,not emergent and the pt received eliquis his am we will hold off on proceeding with nephrostomy tubes placement for tonight unless pt become unstable. Will schedule him for bilateral nephrostomy tube placement on 12/23. Please contact STACY bui if there is any significant change in pt's clinical status.           Bj Mccollum MD  Radiology Resident PGY- 3  [facility]

## 2022-12-22 NOTE — ASSESSMENT & PLAN NOTE
- Creatinine 4.2 on presentation  ;  Baseline 1.0  - suspect due to dehydration and sepsis and UTI  - follow up Urine studies  - IVFs as above  - strict I/Os  - Renal US pending  - avoid nephrotoxic agents as appropriate  - consider Nephrology consult pending clinical course

## 2022-12-22 NOTE — SUBJECTIVE & OBJECTIVE
Past Medical History:   Diagnosis Date    Hyperlipidemia     Hypertension        Past Surgical History:   Procedure Laterality Date    TONSILLECTOMY      TOTAL KNEE ARTHROPLASTY         Review of patient's allergies indicates:  No Known Allergies    Family History    None         Tobacco Use    Smoking status: Never    Smokeless tobacco: Never   Substance and Sexual Activity    Alcohol use: Not on file    Drug use: Not on file    Sexual activity: Not on file       Review of Systems   Unable to perform ROS    Objective:     Temp:  [97.6 °F (36.4 °C)-100.8 °F (38.2 °C)] 98 °F (36.7 °C)  Pulse:  [] 89  Resp:  [13-19] 16  SpO2:  [92 %-100 %] 95 %  BP: ()/(56-86) 104/56     Body mass index is 25.53 kg/m².    Date 12/22/22 0700 - 12/23/22 0659   Shift 2114-2081 7010-5042 1639-9586 24 Hour Total   INTAKE   P.O. 60   60   Shift Total(mL/kg) 60(0.7)   60(0.7)   OUTPUT   Shift Total(mL/kg)       Weight (kg) 80.7 80.7 80.7 80.7     Bladder Scan Volume (mL): 313 mL (12/22/22 0946)    Drains       Drain  Duration             Male External Urinary Catheter 12/21/22 2010 Small <1 day                    Physical Exam  Constitutional:       General: He is not in acute distress.  HENT:      Head: Normocephalic.   Cardiovascular:      Rate and Rhythm: Normal rate.   Pulmonary:      Effort: Pulmonary effort is normal.   Abdominal:      General: There is no distension.      Palpations: Abdomen is soft.      Tenderness: There is no abdominal tenderness. There is no right CVA tenderness, left CVA tenderness or guarding.   Genitourinary:     Comments: Condom cath draining light pink urine  Skin:     General: Skin is warm and dry.   Neurological:      Coordination: Coordination normal.      Comments: Oriented x1       Significant Labs:    BMP:  Recent Labs   Lab 12/21/22  1920 12/22/22  0423    139   K 3.9 3.6    104   CO2 27 25   BUN 52* 48*   CREATININE 4.2* 4.1*   CALCIUM 8.8 7.9*       CBC:  Recent Labs   Lab  12/21/22  1920 12/22/22  0423   WBC 8.45 6.24   HGB 9.9* 8.7*   HCT 31.3* 27.9*    133*       All pertinent labs results from the past 24 hours have been reviewed.    Significant Imaging:  All pertinent imaging results/findings from the past 24 hours have been reviewed.

## 2022-12-22 NOTE — SUBJECTIVE & OBJECTIVE
Past Medical History:   Diagnosis Date    Hyperlipidemia     Hypertension        Past Surgical History:   Procedure Laterality Date    TONSILLECTOMY      TOTAL KNEE ARTHROPLASTY         Review of patient's allergies indicates:  No Known Allergies    No current facility-administered medications on file prior to encounter.     Current Outpatient Medications on File Prior to Encounter   Medication Sig    apixaban (ELIQUIS) 5 mg Tab Take 1 tablet (5 mg total) by mouth 2 (two) times daily.    benazepriL (LOTENSIN) 10 MG tablet Take 1 tablet (10 mg total) by mouth once daily.    donepeziL (ARICEPT) 10 MG tablet Take 1 tablet (10 mg total) by mouth every evening.    furosemide (LASIX) 40 MG tablet Take 1 tablet (40 mg total) by mouth 2 (two) times a day.    lovastatin (MEVACOR) 20 MG tablet Take 20 mg by mouth every evening.    memantine (NAMENDA) 10 MG Tab Take 2 tablets by mouth once daily.    metoprolol tartrate (LOPRESSOR) 25 MG tablet Take 0.5 tablets (12.5 mg total) by mouth 2 (two) times daily.    pulse oximeter (PULSE OXIMETER) device by Apply Externally route 2 (two) times a day. Use twice daily at 8 AM and 3 PM and record the value in [employer] as directed.    QUEtiapine (SEROQUEL) 25 MG Tab Take 1 tablet (25 mg total) by mouth every evening.    tamsulosin (FLOMAX) 0.4 mg Cap Take 1 capsule (0.4 mg total) by mouth once daily.    [DISCONTINUED] amlodipine-benazepril 5-10 mg (LOTREL) 5-10 mg per capsule Take 1 capsule by mouth every morning.    [DISCONTINUED] hydrALAZINE (APRESOLINE) 50 MG tablet Take 1 tablet (50 mg total) by mouth every 8 (eight) hours.    [DISCONTINUED] loratadine (CLARITIN) 10 mg tablet loratadine 10 mg tablet   TAKE 1 TABLET BY MOUTH DAILY     Family History    None       Tobacco Use    Smoking status: Never    Smokeless tobacco: Never   Substance and Sexual Activity    Alcohol use: Not on file    Drug use: Not on file    Sexual activity: Not on file     Review of Systems   Unable to perform  ROS: Mental status change   Objective:     Vital Signs (Most Recent):  Temp: 97.9 °F (36.6 °C) (12/21/22 2202)  Pulse: 89 (12/21/22 2202)  Resp: 16 (12/21/22 2202)  BP: (!) 99/59 (12/21/22 2202)  SpO2: 96 % (12/21/22 2202)   Vital Signs (24h Range):  Temp:  [97.9 °F (36.6 °C)-100.8 °F (38.2 °C)] 97.9 °F (36.6 °C)  Pulse:  [] 89  Resp:  [13-19] 16  SpO2:  [96 %-99 %] 96 %  BP: ()/(56-86) 99/59     Weight: 80.7 kg (177 lb 14.6 oz)  Body mass index is 25.53 kg/m².    Physical Exam  Constitutional:       General: He is not in acute distress.     Appearance: He is ill-appearing. He is not toxic-appearing or diaphoretic.      Comments: Frail.   HENT:      Head: Normocephalic and atraumatic.      Nose: Nose normal.   Eyes:      General: No scleral icterus.     Extraocular Movements: Extraocular movements intact.      Pupils: Pupils are equal, round, and reactive to light.   Cardiovascular:      Rate and Rhythm: Regular rhythm. Tachycardia present.   Pulmonary:      Effort: Pulmonary effort is normal. No respiratory distress.      Breath sounds: No wheezing or rales.   Abdominal:      General: Abdomen is flat. There is no distension.      Palpations: Abdomen is soft.      Tenderness: There is no abdominal tenderness. There is no guarding.   Musculoskeletal:         General: Normal range of motion.      Cervical back: Normal range of motion and neck supple. No rigidity.      Right lower leg: Edema (trace) present.      Left lower leg: No edema.   Skin:     General: Skin is warm and dry.      Coloration: Skin is not jaundiced.      Comments: Bilateral lower extremity venous insufficiency skin changes   Neurological:      Mental Status: He is alert.      Cranial Nerves: No cranial nerve deficit.      Comments: Alert to person. GCS 15   Psychiatric:         Mood and Affect: Mood normal.         Behavior: Behavior normal.         CRANIAL NERVES     CN III, IV, VI   Pupils are equal, round, and reactive to light.      Significant Labs: All pertinent labs within the past 24 hours have been reviewed.  CBC:   Recent Labs   Lab 12/21/22 1920   WBC 8.45   HGB 9.9*   HCT 31.3*        CMP:   Recent Labs   Lab 12/21/22 1920      K 3.9      CO2 27   *   BUN 52*   CREATININE 4.2*   CALCIUM 8.8   PROT 6.9   ALBUMIN 2.2*   BILITOT 0.7   ALKPHOS 66   AST 11   ALT 7*   ANIONGAP 10     Cardiac Markers:   Recent Labs   Lab 12/21/22 1920   *     Lactic Acid: No results for input(s): LACTATE in the last 48 hours.  Urine Studies:   Recent Labs   Lab 12/21/22 2211   COLORU Yellow   APPEARANCEUA Hazy*   PHUR 6.0   SPECGRAV 1.010   PROTEINUA 1+*   GLUCUA Negative   KETONESU Negative   BILIRUBINUA Negative   OCCULTUA 2+*   NITRITE Negative   LEUKOCYTESUR 3+*   RBCUA 25*   WBCUA >100*   BACTERIA Many*   SQUAMEPITHEL 0   HYALINECASTS 6*       Significant Imaging: I have reviewed all pertinent imaging results/findings within the past 24 hours.

## 2022-12-22 NOTE — ASSESSMENT & PLAN NOTE
Dennies Glaeser is an 83yo male with bilateral hydronephrosis, bilateral large renal pelvis stones, UTI and acute renal failure.     -UA concerning for infection. On vanc and cefepime. F/u culture and tailor antibiotics to sensitivity  -CTRSS with 5.7 cm right renal pelvis stone and additional 1.8 cm proximal ureteral stone with severe right hydronephrosis. Large left renal pelvis stone 5cm with moderate left hydronephrosis. Bilateral renal cysts.   -Cr 4.1 from 4.2 yesterday (1.0 baseline). Trend Cr  -Bladder scan with 404cc. Recommend brower placement  - Consult to IR for bilateral nephrostomy tubes. Discussed case with on call resident   -NPO  -Dose all medications to patient's current GFR. Avoid nephrotoxic medications   - Rest of care per primary

## 2022-12-22 NOTE — CONSULTS
[facility] ([place] [address])  Urology  Consult Note    Patient Name: Timothy Roldan  MRN: [id number]  Admission Date: 12/21/2022  Hospital Length of Stay: 1   Code Status: Full Code   Attending Provider: Radha Gipson MD   Consulting Provider: JEFFREY RAMIREZ MD  Primary Care Physician: Martine Garces MD  Principal Problem:Sepsis    Inpatient consult to Urology  Consult performed by: Jeffrey Ramirez MD  Consult ordered by: Radha Gipson MD          Subjective:     HPI:  Leonardo Roldan is a 83yo male with a past medical history of HTN, Afib on Eliquis, alzheimer dementia, HFpEF, COVID pna, and BPH who presented to the ED with chief complaint of fever and altered mental status. Patient currently in nursing home facility and over last two days has reportedly had complaints of chills/cold as well as increased tiredness and some worsened confusion. Patient noted to be febrile at living facility today 101.4 and sent to the ED for further evaluation and management. In the ED patient Temp 100.8, tachycardic, hemodynamically stable saturating well on room air. WBC 8.45 with Lt shift. UA consistent with UTI. Creatinine 4.2 on presentation (baseline 1.0). . CXR with with evidence of bilateral effusions similar to prior studies as well as question of right lower lobe consolidation. Patient started on vancomycin and cefepime and admitted to the care of medicine for further evaluation and management.      Febrile last night to 100.8. Afebrile today. Mildly hypotensive. WBC 6.2. Cr 4.1 from 4.2 yesterday (1.0 baseline). UA nitrite negative. Microscopy with with many bacteria. 25RBCs, >100WBCs, 0 SECs. Urine culture in process. Blood culture NGTD. RAJAT with moderate right hydronephrosis. Mild left hydronephrosis. CTRSS with 5.7 cm right renal pelvis stone and additional 1.8 cm proximal ureteral stone with severe right hydronephrosis. Large left renal pelvis stone 5cm with moderate left hydronephrosis. Bilateral  renal cysts.     Eliquis last dose was the morning of 12/22. Last ate 12pm 12/22.       Past Medical History:   Diagnosis Date    Hyperlipidemia     Hypertension        Past Surgical History:   Procedure Laterality Date    TONSILLECTOMY      TOTAL KNEE ARTHROPLASTY         Review of patient's allergies indicates:  No Known Allergies    Family History    None         Tobacco Use    Smoking status: Never    Smokeless tobacco: Never   Substance and Sexual Activity    Alcohol use: Not on file    Drug use: Not on file    Sexual activity: Not on file       Review of Systems   Unable to perform ROS    Objective:     Temp:  [97.6 °F (36.4 °C)-100.8 °F (38.2 °C)] 98 °F (36.7 °C)  Pulse:  [] 89  Resp:  [13-19] 16  SpO2:  [92 %-100 %] 95 %  BP: ()/(56-86) 104/56     Body mass index is 25.53 kg/m².    Date 12/22/22 0700 - 12/23/22 0659   Shift 6039-5414 4375-4733 9917-1512 24 Hour Total   INTAKE   P.O. 60   60   Shift Total(mL/kg) 60(0.7)   60(0.7)   OUTPUT   Shift Total(mL/kg)       Weight (kg) 80.7 80.7 80.7 80.7     Bladder Scan Volume (mL): 313 mL (12/22/22 0946)    Drains       Drain  Duration             Male External Urinary Catheter 12/21/22 2010 Small <1 day                    Physical Exam  Constitutional:       General: He is not in acute distress.  HENT:      Head: Normocephalic.   Cardiovascular:      Rate and Rhythm: Normal rate.   Pulmonary:      Effort: Pulmonary effort is normal.   Abdominal:      General: There is no distension.      Palpations: Abdomen is soft.      Tenderness: There is no abdominal tenderness. There is no right CVA tenderness, left CVA tenderness or guarding.   Genitourinary:     Comments: Condom cath draining light pink urine  Skin:     General: Skin is warm and dry.   Neurological:      Coordination: Coordination normal.      Comments: Oriented x1       Significant Labs:    BMP:  Recent Labs   Lab 12/21/22  1920 12/22/22  0423    139   K 3.9 3.6    104    CO2 27 25   BUN 52* 48*   CREATININE 4.2* 4.1*   CALCIUM 8.8 7.9*       CBC:  Recent Labs   Lab 12/21/22  1920 12/22/22  0423   WBC 8.45 6.24   HGB 9.9* 8.7*   HCT 31.3* 27.9*    133*       All pertinent labs results from the past 24 hours have been reviewed.    Significant Imaging:  All pertinent imaging results/findings from the past 24 hours have been reviewed.                      Assessment and Plan:     Kidney stones  Dennies Glaeser is an 81yo male with bilateral hydronephrosis, bilateral large renal pelvis stones, UTI and acute renal failure.     -UA concerning for infection. On vanc and cefepime. F/u culture and tailor antibiotics to sensitivity  -CTRSS with 5.7 cm right renal pelvis stone and additional 1.8 cm proximal ureteral stone with severe right hydronephrosis. Large left renal pelvis stone 5cm with moderate left hydronephrosis. Bilateral renal cysts.   -Cr 4.1 from 4.2 yesterday (1.0 baseline). Trend Cr  -Bladder scan with 404cc. Recommend brower placement  - Consult to IR for bilateral nephrostomy tubes. Discussed case with on call resident   -NPO  -Dose all medications to patient's current GFR. Avoid nephrotoxic medications   - Rest of care per primary           VTE Risk Mitigation (From admission, onward)    None          Thank you for your consult. I will follow-up with patient. Please contact us if you have any additional questions.    JEFFREY ALANIS MD  Urology  [facility] ([place])

## 2022-12-22 NOTE — HPI
This is an 81yo male with a past medical history of HTN, Afib on Eliquis, alzheimer dementia, HFpEF, COVID pna, and BPH who presents to the ED with chief complaint of fever and altered mental status. Patient currently in nursing home facility and over last two days has reportedly had complaints of chills/cold as well as increased tiredness and some worsened confusion. Patient noted to be febrile at living facility today 101.4 and sent to the ED for further evaluation and management. His niece and power of  is at bedside and states that at baseline he has waxing and waning levels of responsiveness during conversations, but says that he is responding much less than normal today.    In the ED patient Temp 100.8, tachycardic, hemodynamically stable saturating well on room air. WBC 8.45 with Lt shift. UA consistent with UTI. Creatinine 4.2 on presentation (baseline 1.0). . CXR with with evidence of bilateral effusions similar to prior studies as well as question of right lower lobe consolidation. Patient started on vancomycin and cefepime and admitted to the care of medicine for further evaluation and management.

## 2022-12-22 NOTE — HOSPITAL COURSE
Mr. Roldan was admitted to Hospital Medicine for management of severe sepsis 2/2 UTI and PNA.  He was started on Vanc and Cefepime.  Renal US was ordered to evaluate for CLEM kidney stones.  Urology was consulted.  CT renal stone was ordered which showed a 5.7 cm right renal pelvis stone and additional 1.8 cm proximal ureteral stone with severe right hydronephrosis, large left renal pelvis stone 5cm with moderate left hydronephrosis, and bilateral renal cysts. Urology suggested bilateral nephrostomy tubes, so IR was consulted.  He had bilateral nephrostomy tubes placed 12/23.

## 2022-12-22 NOTE — PROGRESS NOTES
Pharmacokinetic Initial Assessment & Plan: IV Vancomycin    Initiate IV Vancomycin 1250 mg x once , then subsequent doses based on random vancomycin levels. Obtain a vancomycin random tomorrow on 12/22 @ 1800  Desired empiric serum trough concentration is 15 to 20 mcg/mL for sepis    Pharmacy will continue to follow and monitor vancomycin.x [id number] with any questions regarding this assessment.     Thank you for the consult,   Ksenia Aguilar       Patient brief summary:  Timothy Roldan is a 82 y.o. male initiated on antimicrobial therapy with IV Vancomycin for treatment of suspected  bacteremia    Drug Allergies:   Review of patient's allergies indicates:  No Known Allergies    Actual Body Weight:   80.7 kg    Renal Function:   Estimated Creatinine Clearance: 14.5 mL/min (A) (based on SCr of 4.2 mg/dL (H)).,     Dialysis Method (if applicable):  N/A    CBC (last 72 hours):  Recent Labs   Lab Result Units 12/21/22  1920   WBC K/uL 8.45   Hemoglobin g/dL 9.9*   Hematocrit % 31.3*   Platelets K/uL 164   Gran % % 88.5*   Lymph % % 5.7*   Mono % % 5.2   Eosinophil % % 0.0   Basophil % % 0.1   Differential Method  Automated       Metabolic Panel (last 72 hours):  Recent Labs   Lab Result Units 12/21/22  1920   Sodium mmol/L 142   Potassium mmol/L 3.9   Chloride mmol/L 105   CO2 mmol/L 27   Glucose mg/dL 117*   BUN mg/dL 52*   Creatinine mg/dL 4.2*   Albumin g/dL 2.2*   Total Bilirubin mg/dL 0.7   Alkaline Phosphatase U/L 66   AST U/L 11   ALT U/L 7*       Drug levels (last 3 results):  No results for input(s): VANCOMYCINRA, VANCORANDOM, VANCOMYCINPE, VANCOPEAK, VANCOMYCINTR, VANCOTROUGH in the last 72 hours.    Microbiologic Results:  Microbiology Results (last 7 days)       Procedure Component Value Units Date/Time    Blood culture [[id number]]     Order Status: Sent Specimen: Blood     Blood culture [[id number]]     Order Status: Sent Specimen: Blood

## 2022-12-22 NOTE — H&P
[facility] - Emergency Dept  [facility] Medicine  History & Physical    Patient Name: Timothy Roldan  MRN: [id number]  Patient Class: IP- Inpatient  Admission Date: 12/21/2022  Attending Physician: Radha Gipson MD   Primary Care Provider: Martine Garces MD         Patient information was obtained from patient, past medical records and ER records.     Subjective:     Principal Problem:Sepsis    Chief Complaint:   Chief Complaint   Patient presents with    Shaking     Pt coming from [facility], staff say he is at baseline. Pt shivering and complaining of being cold. Staff states this is not normal for him         HPI: This is an 83yo male with a past medical history of HTN, Afib on Eliquis, alzheimer dementia, HFpEF, COVID pna, and BPH who presents to the ED with chief complaint of fever and altered mental status. Patient currently in nursing home facility and over last two days has reportedly had complaints of chills/cold as well as increased tiredness and some worsened confusion. Patient noted to be febrile at living facility today 101.4 and sent to the ED for further evaluation and management. His niece and power of  is at bedside and states that at baseline he has waxing and waning levels of responsiveness during conversations, but says that he is responding much less than normal today.    In the ED patient Temp 100.8, tachycardic, hemodynamically stable saturating well on room air. WBC 8.45 with Lt shift. UA consistent with UTI. Creatinine 4.2 on presentation (baseline 1.0). . CXR with with evidence of bilateral effusions similar to prior studies as well as question of right lower lobe consolidation. Patient started on vancomycin and cefepime and admitted to the care of medicine for further evaluation and management.       Past Medical History:   Diagnosis Date    Hyperlipidemia     Hypertension        Past Surgical History:   Procedure Laterality Date    TONSILLECTOMY      TOTAL KNEE  ARTHROPLASTY         Review of patient's allergies indicates:  No Known Allergies    No current facility-administered medications on file prior to encounter.     Current Outpatient Medications on File Prior to Encounter   Medication Sig    apixaban (ELIQUIS) 5 mg Tab Take 1 tablet (5 mg total) by mouth 2 (two) times daily.    benazepriL (LOTENSIN) 10 MG tablet Take 1 tablet (10 mg total) by mouth once daily.    donepeziL (ARICEPT) 10 MG tablet Take 1 tablet (10 mg total) by mouth every evening.    furosemide (LASIX) 40 MG tablet Take 1 tablet (40 mg total) by mouth 2 (two) times a day.    lovastatin (MEVACOR) 20 MG tablet Take 20 mg by mouth every evening.    memantine (NAMENDA) 10 MG Tab Take 2 tablets by mouth once daily.    metoprolol tartrate (LOPRESSOR) 25 MG tablet Take 0.5 tablets (12.5 mg total) by mouth 2 (two) times daily.    pulse oximeter (PULSE OXIMETER) device by Apply Externally route 2 (two) times a day. Use twice daily at 8 AM and 3 PM and [facility] the value [facility] as directed.    QUEtiapine (SEROQUEL) 25 MG Tab Take 1 tablet (25 mg total) by mouth every evening.    tamsulosin (FLOMAX) 0.4 mg Cap Take 1 capsule (0.4 mg total) by mouth once daily.    [DISCONTINUED] amlodipine-benazepril 5-10 mg (LOTREL) 5-10 mg per capsule Take 1 capsule by mouth every morning.    [DISCONTINUED] hydrALAZINE (APRESOLINE) 50 MG tablet Take 1 tablet (50 mg total) by mouth every 8 (eight) hours.    [DISCONTINUED] loratadine (CLARITIN) 10 mg tablet loratadine 10 mg tablet   TAKE 1 TABLET BY MOUTH DAILY     Family History    None       Tobacco Use    Smoking status: Never    Smokeless tobacco: Never   Substance and Sexual Activity    Alcohol use: Not on file    Drug use: Not on file    Sexual activity: Not on file     Review of Systems   Unable to perform ROS: Mental status change   Objective:     Vital Signs (Most Recent):  Temp: 97.9 °F (36.6 °C) (12/21/22 2202)  Pulse: 89 (12/21/22 2202)  Resp: 16  (12/21/22 2202)  BP: (!) 99/59 (12/21/22 2202)  SpO2: 96 % (12/21/22 2202)   Vital Signs (24h Range):  Temp:  [97.9 °F (36.6 °C)-100.8 °F (38.2 °C)] 97.9 °F (36.6 °C)  Pulse:  [] 89  Resp:  [13-19] 16  SpO2:  [96 %-99 %] 96 %  BP: ()/(56-86) 99/59     Weight: 80.7 kg (177 lb 14.6 oz)  Body mass index is 25.53 kg/m².    Physical Exam  Constitutional:       General: He is not in acute distress.     Appearance: He is ill-appearing. He is not toxic-appearing or diaphoretic.      Comments: Frail.   HENT:      Head: Normocephalic and atraumatic.      Nose: Nose normal.   Eyes:      General: No scleral icterus.     Extraocular Movements: Extraocular movements intact.      Pupils: Pupils are equal, round, and reactive to light.   Cardiovascular:      Rate and Rhythm: Regular rhythm. Tachycardia present.   Pulmonary:      Effort: Pulmonary effort is normal. No respiratory distress.      Breath sounds: No wheezing or rales.   Abdominal:      General: Abdomen is flat. There is no distension.      Palpations: Abdomen is soft.      Tenderness: There is no abdominal tenderness. There is no guarding.   Musculoskeletal:         General: Normal range of motion.      Cervical back: Normal range of motion and neck supple. No rigidity.      Right lower leg: Edema (trace) present.      Left lower leg: No edema.   Skin:     General: Skin is warm and dry.      Coloration: Skin is not jaundiced.      Comments: Bilateral lower extremity venous insufficiency skin changes   Neurological:      Mental Status: He is alert.      Cranial Nerves: No cranial nerve deficit.      Comments: Alert to person. GCS 15   Psychiatric:         Mood and Affect: Mood normal.         Behavior: Behavior normal.         CRANIAL NERVES     CN III, IV, VI   Pupils are equal, round, and reactive to light.     Significant Labs: All pertinent labs within the past 24 hours have been reviewed.  CBC:   Recent Labs   Lab 12/21/22 1920   WBC 8.45   HGB 9.9*    HCT 31.3*        CMP:   Recent Labs   Lab 12/21/22 1920      K 3.9      CO2 27   *   BUN 52*   CREATININE 4.2*   CALCIUM 8.8   PROT 6.9   ALBUMIN 2.2*   BILITOT 0.7   ALKPHOS 66   AST 11   ALT 7*   ANIONGAP 10     Cardiac Markers:   Recent Labs   Lab 12/21/22 1920   *     Lactic Acid: No results for input(s): LACTATE in the last 48 hours.  Urine Studies:   Recent Labs   Lab 12/21/22 2211   COLORU Yellow   APPEARANCEUA Hazy*   PHUR 6.0   SPECGRAV 1.010   PROTEINUA 1+*   GLUCUA Negative   KETONESU Negative   BILIRUBINUA Negative   OCCULTUA 2+*   NITRITE Negative   LEUKOCYTESUR 3+*   RBCUA 25*   WBCUA >100*   BACTERIA Many*   SQUAMEPITHEL 0   HYALINECASTS 6*       Significant Imaging: I have reviewed all pertinent imaging results/findings within the past 24 hours.    Assessment/Plan:     * Sepsis  - SIRS 3/4 ; UTI source (possibly pna also) ; LA pending  - 1.5L NS bolus pending   ;   Deferring full 30ml/kg bolus given history of HFpEF and pleural effusions  - continue vancomycin and cefepime  - LA pending  - procalcitonin pending  - follow up blood and urine cultures  - follow up CT Chest w/o  - further management pending clinical course and future study review    Chronic heart failure with preserved ejection fraction  - ECHO 05/22   EF 70%  - BNP elevated and CXR with stable pleural effusions. Clinically patient appears hypovolemic. Suspect BNP elevation due to CLEM.  - CT chest pending to further evaluate pleural effusions and potential consolidation  - sp lasix 40mg iv once in ED  - holding further diuresis at this time  - daily weights and strict I/Os  - further management as above      Persistent atrial fibrillation  - continue home Eliquis  - holding home metoprolol while monitoring hemodynamics     Alzheimer disease  - continue home namenda and aricept  - continue home seroquel    CLEM (acute kidney injury)  - Creatinine 4.2 on presentation  ;  Baseline 1.0  - suspect due to  dehydration and sepsis and UTI  - follow up Urine studies  - IVFs as above  - strict I/Os  - Renal US pending  - avoid nephrotoxic agents as appropriate  - consider Nephrology consult pending clinical course      Essential hypertension  - holding home benazepril, and metoprolol while monitoring hemodynamics        VTE Risk Mitigation (From admission, onward)         Ordered     apixaban tablet 5 mg  2 times daily         12/21/22 3853                   Mickey Young MD  Department of Hospital Medicine   [facility] - Emergency Dept

## 2022-12-22 NOTE — CONSULTS
See H&P     Bj Mccollum MD  Radiology Resident PGY- 3  [facility]   Pager: [phone]

## 2022-12-22 NOTE — ED TRIAGE NOTES
Timothy Roldan, a 82 y.o. male presents to the ED w/ complaint of shakes    Triage note:  Chief Complaint   Patient presents with    Shaking     Pt coming from [facility], staff say he is at baseline. Pt shivering and complaining of being cold. Staff states this is not normal for him      Review of patient's allergies indicates:  No Known Allergies  Past Medical History:   Diagnosis Date    Hyperlipidemia     Hypertension

## 2022-12-22 NOTE — PROGRESS NOTES
[facility] - Intensive Care ([place] [address])  [facility] Medicine  Progress Note    Patient Name: Timothy Roldan  MRN: [id number]  Patient Class: IP- Inpatient   Admission Date: 12/21/2022  Length of Stay: 1 days  Attending Physician: Radha Gipson MD  Primary Care Provider: Martine Garces MD        Subjective:     Principal Problem:Sepsis        HPI:  This is an 83yo male with a past medical history of HTN, Afib on Eliquis, alzheimer dementia, HFpEF, COVID pna, and BPH who presents to the ED with chief complaint of fever and altered mental status. Patient currently in nursing home facility and over last two days has reportedly had complaints of chills/cold as well as increased tiredness and some worsened confusion. Patient noted to be febrile at living facility today 101.4 and sent to the ED for further evaluation and management. His niece and power of  is at bedside and states that at baseline he has waxing and waning levels of responsiveness during conversations, but says that he is responding much less than normal today.    In the ED patient Temp 100.8, tachycardic, hemodynamically stable saturating well on room air. WBC 8.45 with Lt shift. UA consistent with UTI. Creatinine 4.2 on presentation (baseline 1.0). . CXR with with evidence of bilateral effusions similar to prior studies as well as question of right lower lobe consolidation. Patient started on vancomycin and cefepime and admitted to the care of medicine for further evaluation and management.       Overview/Hospital Course:  Mr. Roldan was admitted to Hospital Medicine for management of severe sepsis 2/2 UTI and PNA.  He was started on Vanc and Cefepime.  Renal US was ordered to evaluate for CLEM.      Interval History: Admitted overnight.  No fevers.  Creatinine improved to 4.1.  Renal US pending, bladder scan was 300mL so will give additional IVF.  Remains on Vanc and Cefepime.  Cx NGTD.    Review of Systems   Unable to perform ROS:  Pt sleeping. Respirations equal and unlabored. Sitter 1:1   Dementia   Objective:     Vital Signs (Most Recent):  Temp: 98.6 °F (37 °C) (12/22/22 0800)  Pulse: 91 (12/22/22 0800)  Resp: 16 (12/22/22 0800)  BP: 121/72 (12/22/22 0800)  SpO2: (!) 92 % (12/22/22 0800)   Vital Signs (24h Range):  Temp:  [97.6 °F (36.4 °C)-100.8 °F (38.2 °C)] 98.6 °F (37 °C)  Pulse:  [] 91  Resp:  [13-19] 16  SpO2:  [92 %-100 %] 92 %  BP: ()/(56-86) 121/72     Weight: 80.7 kg (177 lb 14.6 oz)  Body mass index is 25.53 kg/m².    Intake/Output Summary (Last 24 hours) at 12/22/2022 1103  Last data filed at 12/22/2022 0215  Gross per 24 hour   Intake 1050 ml   Output 100 ml   Net 950 ml      Physical Exam  Constitutional:       Appearance: Normal appearance. He is well-developed.   HENT:      Head: Normocephalic and atraumatic.   Cardiovascular:      Rate and Rhythm: Normal rate and regular rhythm.      Heart sounds: No murmur heard.  Pulmonary:      Effort: Pulmonary effort is normal. No respiratory distress.      Breath sounds: Normal breath sounds. No wheezing or rales.   Abdominal:      General: There is no distension.      Palpations: Abdomen is soft.      Tenderness: There is no abdominal tenderness.   Musculoskeletal:         General: No deformity.   Skin:     General: Skin is warm.   Neurological:      Mental Status: He is alert. Mental status is at baseline.       Significant Labs: All pertinent labs within the past 24 hours have been reviewed.  CBC:   Recent Labs   Lab 12/21/22 1920 12/22/22 0423   WBC 8.45 6.24   HGB 9.9* 8.7*   HCT 31.3* 27.9*    133*     CMP:   Recent Labs   Lab 12/21/22 1920 12/22/22 0423    139   K 3.9 3.6    104   CO2 27 25   * 105   BUN 52* 48*   CREATININE 4.2* 4.1*   CALCIUM 8.8 7.9*   PROT 6.9 5.7*   ALBUMIN 2.2* 1.8*   BILITOT 0.7 0.7   ALKPHOS 66 55   AST 11 10   ALT 7* 5*   ANIONGAP 10 10       Significant Imaging: I have reviewed all pertinent imaging results/findings within the past 24 hours.      Assessment/Plan:       * Sepsis  · Afebrile and without leukocytosis  · Source likely UTI and possible pneuomia  · Urine cx NGTD  · Blood cx NGTD  · Continue Vanc and Cefepime (start date 12/21)      CLEM (acute kidney injury)  · Creatinine 4.2 on admit, baseline around 1 - Currently 4.1  · Estimated Creatinine Clearance: 14.8 mL/min (A) (based on SCr of 4.1 mg/dL (H)).  · Check urine lytes and renal ultrasound  · Strict I&Os  · Gentle IVF  · Avoid Nephrotoxic agents    Chronic heart failure with preserved ejection fraction  · 2D echo with EF 70%  ·  on admit, given Lasix 40mg IV in the ER then IVF  · Hold on Lasix for now with CLEM      Persistent atrial fibrillation  · Chronic and stable  · Continue Eliquis, renal dose adjustment  · Holding home metoprolol while monitoring hemodynamics     Alzheimer disease  · Chronic issue  · Resides at a NH  · Continue home namenda and aricept  · Continue home seroquel    Essential hypertension  · Chronic issue  · Holding home benazepril, and metoprolol while monitoring hemodynamics      VTE Risk Mitigation (From admission, onward)         Ordered     apixaban tablet 2.5 mg  2 times daily         12/22/22 0834                Discharge Planning   JACQUELINE: 12/27/2022     Code Status: Full Code   Is the patient medically ready for discharge?: No    Reason for patient still in hospital (select all that apply): Patient trending condition  Discharge Plan A:  (RETURNT TO [facility])                  Radha Gipson MD  Department of Hospital Medicine   [facility] - Intensive Care ([place])

## 2022-12-22 NOTE — ASSESSMENT & PLAN NOTE
· Creatinine 4.2 on admit, baseline around 1 - Currently 4.1  · Estimated Creatinine Clearance: 14.8 mL/min (A) (based on SCr of 4.1 mg/dL (H)).  · Check urine lytes and renal ultrasound  · Strict I&Os  · Gentle IVF  · Avoid Nephrotoxic agents

## 2022-12-22 NOTE — ASSESSMENT & PLAN NOTE
- ECHO 05/22   EF 70%  - BNP elevated and CXR with stable pleural effusions. Clinically patient appears hypovolemic. Suspect BNP elevation due to CLEM.  - CT chest pending to further evaluate pleural effusions and potential consolidation  - sp lasix 40mg iv once in ED  - holding further diuresis at this time  - daily weights and strict I/Os  - further management as above

## 2022-12-22 NOTE — ASSESSMENT & PLAN NOTE
· Chronic and stable  · Continue Eliquis, renal dose adjustment  · Holding home metoprolol while monitoring hemodynamics

## 2022-12-22 NOTE — SUBJECTIVE & OBJECTIVE
Interval History: Admitted overnight.  No fevers.  Creatinine improved to 4.1.  Renal US pending, bladder scan was 300mL so will give additional IVF.  Remains on Vanc and Cefepime.  Cx NGTD.    Review of Systems   Unable to perform ROS: Dementia   Objective:     Vital Signs (Most Recent):  Temp: 98.6 °F (37 °C) (12/22/22 0800)  Pulse: 91 (12/22/22 0800)  Resp: 16 (12/22/22 0800)  BP: 121/72 (12/22/22 0800)  SpO2: (!) 92 % (12/22/22 0800)   Vital Signs (24h Range):  Temp:  [97.6 °F (36.4 °C)-100.8 °F (38.2 °C)] 98.6 °F (37 °C)  Pulse:  [] 91  Resp:  [13-19] 16  SpO2:  [92 %-100 %] 92 %  BP: ()/(56-86) 121/72     Weight: 80.7 kg (177 lb 14.6 oz)  Body mass index is 25.53 kg/m².    Intake/Output Summary (Last 24 hours) at 12/22/2022 1103  Last data filed at 12/22/2022 0215  Gross per 24 hour   Intake 1050 ml   Output 100 ml   Net 950 ml      Physical Exam  Constitutional:       Appearance: Normal appearance. He is well-developed.   HENT:      Head: Normocephalic and atraumatic.   Cardiovascular:      Rate and Rhythm: Normal rate and regular rhythm.      Heart sounds: No murmur heard.  Pulmonary:      Effort: Pulmonary effort is normal. No respiratory distress.      Breath sounds: Normal breath sounds. No wheezing or rales.   Abdominal:      General: There is no distension.      Palpations: Abdomen is soft.      Tenderness: There is no abdominal tenderness.   Musculoskeletal:         General: No deformity.   Skin:     General: Skin is warm.   Neurological:      Mental Status: He is alert. Mental status is at baseline.       Significant Labs: All pertinent labs within the past 24 hours have been reviewed.  CBC:   Recent Labs   Lab 12/21/22 1920 12/22/22  0423   WBC 8.45 6.24   HGB 9.9* 8.7*   HCT 31.3* 27.9*    133*     CMP:   Recent Labs   Lab 12/21/22 1920 12/22/22  0423    139   K 3.9 3.6    104   CO2 27 25   * 105   BUN 52* 48*   CREATININE 4.2* 4.1*   CALCIUM 8.8 7.9*   PROT  6.9 5.7*   ALBUMIN 2.2* 1.8*   BILITOT 0.7 0.7   ALKPHOS 66 55   AST 11 10   ALT 7* 5*   ANIONGAP 10 10       Significant Imaging: I have reviewed all pertinent imaging results/findings within the past 24 hours.

## 2022-12-22 NOTE — PLAN OF CARE
[facility] - Int[facility] Care ([place] [address])  Initial Discharge Assessment       Primary Care Provider: Martine Garces MD    Admission Diagnosis: Chills [R68.83]  Altered mental state [R41.82]  CLEM (acute kidney injury) [N17.9]  Chest pain [R07.9]  AMS (altered mental status) [R41.82]    Admission Date: 12/21/2022  Expected Discharge Date:     Discharge Barriers Identified: None    Payor: HU[employer] MANAGED MEDICARE / Plan: HU[employer] MEDICARE HMO / Product Type: Capitation /     Extended Emergency Contact Information  Primary Emergency Contact: cyril gerardo   [place]  Mobile Phone: [phone]  Relation: Healthcare Power of   Preferred language: English   needed? No  Secondary Emergency Contact: Kalyan Roldan   [place]  Mobile Phone: [phone]  Relation: Son    Discharge Plan A:  (RETURNT TO [facility])  Discharge Plan B:  (RETURN TO [facility])      [facility] [place] - [address]  [address]  [place] [address]  Phone: [phone] Fax: [phone]      Initial Assessment (most recent)       Adult Discharge Assessment - 12/22/22 0928          Discharge Assessment    Assessment Type Discharge Planning Assessment     Confirmed/corrected address, phone number and insurance Yes     Confirmed Demographics Correct on Facesheet     Source of Information other (see comments)   GEE HALLI     When was your last doctors appointment? --   VIRTUAL    Communicated JACQUELINE with patient/caregiver Date not available/Unable to determine     Facility Arrived From: [facility] [place] [phone]     Do you expect to return to your current living situation? Yes     Do you have help at home or someone to help you manage your care at home? Yes     Prior to hospitilization cognitive status: Unable to Assess     Current cognitive status: Unable to Assess     Walking or Climbing Stairs ambulation difficulty,  requires equipment     Mobility Management WALKER AND WHEELCHAIR     Dressing/Bathing bathing difficulty, assistance 1 person     Home Accessibility wheelchair accessible     Home Layout Able to live on 1st floor     Equipment Currently Used at Home wheelchair;walker, rolling     Readmission within 30 days? No     Patient currently being followed by outpatient case management? No     Do you currently have service(s) that help you manage your care at home? No     Do you take prescription medications? Yes     Do you have prescription coverage? Yes     Do you have any problems affording any of your prescribed medications? No     Who is going to help you get home at discharge? [facility]     How do you get to doctors appointments? agency     Are you on dialysis? No     Do you take coumadin? No     Discharge Plan A --   RETURNT TO [facility]    Discharge Plan B --   RETURN TO [facility]    DME Needed Upon Discharge  none     Discharge Plan discussed with: POA     Discharge Barriers Identified None                 Patient lives in  group/assisted living he is not on dialysis and does not take coumadin he will require transportation  GEE/Lakesha Gonzáles

## 2022-12-22 NOTE — ASSESSMENT & PLAN NOTE
· Afebrile and without leukocytosis  · Source likely UTI and possible pneuomia  · Urine cx NGTD  · Blood cx NGTD  · Continue Vanc and Cefepime (start date 12/21)

## 2022-12-22 NOTE — CARE UPDATE
Renal US with large kidney stones with hydronephrosis with a dirty UA, concern for infected kidney stones with obstructive renal failure.  Urology consult placed.  Request CT renal stone study.  Healthcare POA Lakesha updated.  Eliquis stopped, last dose the morning of 12/22.    Radha Gipson MD, [facility]  Senior Physician  [facility] Medicine

## 2022-12-22 NOTE — ASSESSMENT & PLAN NOTE
· 2D echo with EF 70%  ·  on admit, given Lasix 40mg IV in the ER then IVF  · Hold on Lasix for now with CLEM

## 2022-12-22 NOTE — PROGRESS NOTES
Pharmacist Renal Dose Adjustment Note    Timothy Roldan is a 82 y.o. male being treated with the medication apixaban (Eliquis)    Patient Data:    Vital Signs (Most Recent):  Temp: 98.6 °F (37 °C) (12/22/22 0800)  Pulse: 91 (12/22/22 0800)  Resp: 16 (12/22/22 0800)  BP: 121/72 (12/22/22 0800)  SpO2: (!) 92 % (12/22/22 0800)   Vital Signs (72h Range):  Temp:  [97.6 °F (36.4 °C)-100.8 °F (38.2 °C)]   Pulse:  []   Resp:  [13-19]   BP: ()/(56-86)   SpO2:  [92 %-100 %]      Recent Labs   Lab 12/21/22  1920 12/22/22  0423   CREATININE 4.2* 4.1*     Serum creatinine: 4.1 mg/dL (H) 12/22/22 0423  Estimated creatinine clearance: 14.8 mL/min (A)    Medication:Eliquis 5 mg bid will be changed to medication: Eliquis 2.5 mg bid for Afib (81 y/o and SCr > 1.5 mg/dL)    Pharmacist's Name: Ricardo Higginbotham  Pharmacist's Extension: [phone]

## 2022-12-22 NOTE — HPI
Leonardo Roldan is a 83yo male with a past medical history of HTN, Afib on Eliquis, alzheimer dementia, HFpEF, COVID pna, and BPH who presented to the ED with chief complaint of fever and altered mental status. Patient currently in nursing home facility and over last two days has reportedly had complaints of chills/cold as well as increased tiredness and some worsened confusion. Patient noted to be febrile at living facility today 101.4 and sent to the ED for further evaluation and management. In the ED patient Temp 100.8, tachycardic, hemodynamically stable saturating well on room air. WBC 8.45 with Lt shift. UA consistent with UTI. Creatinine 4.2 on presentation (baseline 1.0). . CXR with with evidence of bilateral effusions similar to prior studies as well as question of right lower lobe consolidation. Patient started on vancomycin and cefepime and admitted to the care of medicine for further evaluation and management.      Febrile last night to 100.8. Afebrile today. Mildly hypotensive. WBC 6.2. Cr 4.1 from 4.2 yesterday (1.0 baseline). UA nitrite negative. Microscopy with with many bacteria. 25RBCs, >100WBCs, 0 SECs. Urine culture in process. Blood culture NGTD. RAJAT with moderate right hydronephrosis. Mild left hydronephrosis. CTRSS with 5.7 cm right renal pelvis stone and additional 1.8 cm proximal ureteral stone with severe right hydronephrosis. Large left renal pelvis stone 5cm with moderate left hydronephrosis. Bilateral renal cysts.     Eliquis last dose was the morning of 12/22. Last ate 12pm 12/22.

## 2022-12-22 NOTE — ASSESSMENT & PLAN NOTE
- SIRS 3/4 ; UTI source (possibly pna also) ; LA pending  - 1.5L NS bolus pending   ;   Deferring full 30ml/kg bolus given history of HFpEF and pleural effusions  - continue vancomycin and cefepime  - LA pending  - procalcitonin pending  - follow up blood and urine cultures  - follow up CT Chest w/o  - further management pending clinical course and future study review

## 2022-12-22 NOTE — ED PROVIDER NOTES
Encounter Date: 12/21/2022       History     Chief Complaint   Patient presents with    Shaking     Pt coming from [facility], staff say he is at baseline. Pt shivering and complaining of being cold. Staff states this is not normal for him      Timothy Roldan is a 82 y.o. male with a PMH of hypertension, hyperlipidemia, atrial fibrillation on Eliquis, Alzheimer dementia, CHF, and COVID pneumonia presenting to [facility] for evaluation chills and altered mental status that was first noticed by the staff at his living facility today.  They say he has been shivering complaining of being cold.  His niece and power of  is at bedside and states that at baseline he has waxing and waning levels of responsiveness during conversations, but says that he is responding much less than normal today.  She also says his legs look a little more swollen than normal to her today.  Niece states that he has a history of UTIs.  He was also noted to be febrile by staff at the living facility with a temperature of 101.4° F.  No report of blood in the patient's stools or urine.  Unable to obtain complete HPI secondary to altered mental status.    History provided by: The patient's niece and power of . The history is limited by the condition of the patient. No  was used.   Review of patient's allergies indicates:  No Known Allergies  Past Medical History:   Diagnosis Date    Hyperlipidemia     Hypertension      Past Surgical History:   Procedure Laterality Date    TONSILLECTOMY      TOTAL KNEE ARTHROPLASTY       No family history on file.  Social History     Tobacco Use    Smoking status: Never    Smokeless tobacco: Never     Review of Systems   Unable to perform ROS: Mental status change   Constitutional:  Positive for fever.        Chills, shaking, decreased level of responsiveness   Cardiovascular:  Positive for leg swelling.   Genitourinary:         History of UTIs     Physical  Exam     Initial Vitals [12/21/22 1641]   BP Pulse Resp Temp SpO2   133/86 68 18 98.8 °F (37.1 °C) 99 %      MAP       --         Physical Exam    Nursing note and vitals reviewed.  Constitutional: He is not diaphoretic.   Appears uncomfortable but in no acute distress   HENT:   Head: Normocephalic.   Right Ear: External ear normal.   Left Ear: External ear normal.   Nose: Nose normal.   Mouth/Throat: Oropharynx is clear and moist. No oropharyngeal exudate.   Eyes: Conjunctivae and EOM are normal. No scleral icterus.   Neck: Neck supple. No JVD present.   Cardiovascular:            Tachycardic, irregularly irregular rhythm   Pulmonary/Chest: No stridor.   Mild bibasilar rales, no wheezing or rhonchi.  No acute respiratory distress.  Oxygen saturation is 100 on room air.   Abdominal: Abdomen is soft. He exhibits no distension. There is no abdominal tenderness. There is no rebound and no guarding.   Musculoskeletal:      Cervical back: Neck supple.      Comments: 3+ pitting edema with slight weeping in bilateral lower extremities with overlying skin changes consistent with chronic venous stasis changes.  No open wounds noted on legs or feet.     Neurological: No cranial nerve deficit.   Alert, with resting tremor bilateral upper extremities, follows some commands, does not respond to most questions but can state his name and location (hospital).  No facial droop noted.    Skin: Skin is warm. Capillary refill takes less than 2 seconds. No rash and no abscess noted.   Two clean bandages on left upper extremity removed with small abrasions and ecchymosis underneath       ED Course   Procedures  Labs Reviewed   COMPREHENSIVE METABOLIC PANEL - Abnormal; Notable for the following components:       Result Value    Glucose 117 (*)     BUN 52 (*)     Creatinine 4.2 (*)     Albumin 2.2 (*)     ALT 7 (*)     eGFR 13.6 (*)     All other components within normal limits   CBC W/ AUTO DIFFERENTIAL - Abnormal; Notable for the  following components:    RBC 3.37 (*)     Hemoglobin 9.9 (*)     Hematocrit 31.3 (*)     MCHC 31.6 (*)     Lymph # 0.5 (*)     Gran % 88.5 (*)     Lymph % 5.7 (*)     All other components within normal limits   B-TYPE NATRIURETIC PEPTIDE - Abnormal; Notable for the following components:     (*)     All other components within normal limits   URINALYSIS, REFLEX TO URINE CULTURE - Abnormal; Notable for the following components:    Appearance, UA Hazy (*)     Protein, UA 1+ (*)     Occult Blood UA 2+ (*)     Leukocytes, UA 3+ (*)     All other components within normal limits    Narrative:     Specimen Source->Urine   URINALYSIS MICROSCOPIC - Abnormal; Notable for the following components:    RBC, UA 25 (*)     WBC, UA >100 (*)     Bacteria Many (*)     Hyaline Casts, UA 6 (*)     All other components within normal limits    Narrative:     Specimen Source->Urine   PROTEIN / CREATININE RATIO, URINE - Abnormal; Notable for the following components:    Protein, Urine Random 38 (*)     Prot/Creat Ratio, Urine 0.59 (*)     All other components within normal limits    Narrative:     ADD-ON UUNR UNAR UROSM UPRCR TO ORDER #[id number]  [facility]  PER ROXY JANE MD  [id number]   12/21/2022  23:33  Specimen Source->Urine   HIV 1 / 2 ANTIBODY    Narrative:     Release to patient->Immediate   HEPATITIS C ANTIBODY    Narrative:     Release to patient->Immediate   TROPONIN I   CK   SARS-COV2 (COVID) WITH FLU/RSV BY PCR   CK    Narrative:     Add on CK per Dr. Gipson @ 22:12 PM to order # [id number]   LACTIC ACID, PLASMA   PROTEIN / CREATININE RATIO, URINE   OSMOLALITY, URINE RANDOM   SODIUM, URINE, RANDOM   UREA NITROGEN, URINE, RANDOM   CREATININE, URINE, RANDOM   UREA NITROGEN, URINE, RANDOM    Narrative:     ADD-ON UUNR UNAR UROSM UPRCR TO ORDER #[id number]  [facility]  PER ROXY JANE MD  [id number]   12/21/2022  23:33  Specimen Source->Urine   SODIUM, URINE, RANDOM    Narrative:     ADD-ON UUNR UNAR  URO UPRCR TO ORDER #[id number]  [facility]  PER ROXY JANE MD  [id number]   12/21/2022  23:33  Specimen Source->Urine   OSMOLALITY, URINE RANDOM    Narrative:     ADD-ON UUNR UNAR URO UPRCR TO ORDER #[id number]  [facility]  PER ROXY JANE MD  [id number]   12/21/2022  23:33  Specimen Source->Urine        ECG Results              EKG 12-lead (Final result)  Result time 12/22/22 08:35:52      Final result by Interface, Lab In [facility] (12/22/22 08:35:52)                   Narrative:    Test Reason : R68.83,    Vent. Rate : 101 BPM     Atrial Rate : 078 BPM     P-R Int : 000 ms          QRS Dur : 104 ms      QT Int : 362 ms       P-R-T Axes : 000 069 020 degrees     QTc Int : 469 ms    Atrial fibrillation with rapid ventricular response  Low septal forces -cannot exclude Septal infarct (cited on or before   21-DEC-2022)  Abnormal ECG  When compared with ECG of 21-DEC-2022 18:30,  No significant change was found  Confirmed by Doug HEART MD ([id number]) on 12/22/2022 8:35:41 AM    Referred By: [email or web address]   SELF           Confirmed By:Doug HEART MD                                     EKG 12-lead (Final result)  Result time 12/22/22 11:44:40      Final result by Interface, Lab In [facility] (12/22/22 11:44:40)                   Narrative:    Test Reason : R41.82,    Vent. Rate : 097 BPM     Atrial Rate : 098 BPM     P-R Int : 000 ms          QRS Dur : 094 ms      QT Int : 352 ms       P-R-T Axes : 000 060 021 degrees     QTc Int : 447 ms    Atrial fibrillation  Low anterior forces  Abnormal ECG  When compared with ECG of 29-MAY-2022 16:54,  No significant change was found  Confirmed by CAYETANO HELTON MD ([id number]) on 12/22/2022 11:44:29 AM    Referred By: [email or web address]   SELF           Confirmed By:CAYETANO HELTON MD                                  Imaging Results              CT Chest Without Contrast (Final result)  Result time 12/22/22 11:24:14      Final result by Maribel Coronel MD (12/22/22 11:24:14)                    Impression:      1. Large left and moderate right pleural effusions.  There is associated bilateral lower lobe atelectasis.  2. Dilation of the ascending thoracic aorta up to 4.7 cm.      Electronically signed by: Maribel Coronel  Date:    12/22/2022  Time:    11:24               Narrative:    EXAMINATION:  CT CHEST WITHOUT CONTRAST    CLINICAL HISTORY:  further evaluate pleural effusions and potential RLL consolidation.;    TECHNIQUE:  Low dose axial images, sagittal and coronal reformations were obtained from the thoracic inlet to the lung bases. Contrast was not administered.    COMPARISON:  12/21/2022    FINDINGS:  Support tubes and lines: None.    Aorta: The ascending aorta measures up to 4.7 cm    Heart: Normal size.    Coronary arteries: Moderate left main and anterior descending calcifications    Pericardium: Small pericardial effusion    Central pulmonary arteries: Normal caliber.    Base of neck/thyroid: Unremarkable.    Lymph nodes: No supraclavicular, axillary, internal mammary, mediastinal, or hilar adenopathy.    Esophagus: A small hiatal hernia is present    Pleura: Large left and moderate right effusions are present.    Upper abdomen: Unremarkable.    Body wall: Unremarkable.    Airways: Unremarkable.    Lungs: Bilateral lower lobe atelectasis is present, left greater than right.    Bones: Unremarkable.                                        US Retroperitoneal Complete (Final result)  Result time 12/22/22 11:10:07      Final result by Bill Taylor MD (12/22/22 11:10:07)                   Impression:      Bilateral right greater than left renal calculi without least moderate hydronephrosis on the right and mild hydronephrosis on the left.  Recommend further evaluation with CT.    Bladder diverticuli.    This report was flagged in Epic as abnormal.    Electronically signed by resident: Cyrus Murillo  Date:    12/22/2022  Time:    11:00    Electronically signed by: Bill  MD Brandon  Date:    12/22/2022  Time:    11:10               Narrative:    EXAMINATION:  US RETROPERITONEAL COMPLETE    CLINICAL HISTORY:  CLEM;    TECHNIQUE:  Ultrasound of the kidneys and urinary bladder was performed including color flow and Doppler evaluation of the kidneys.    COMPARISON:  None.    FINDINGS:  Right kidney: The right kidney is enlarged measuring 15.5 cm.  There are mildly complex cyst measuring 4.2 x 3.3 x 4.1 cm and 2.6 x 1.3 x 2.8 cm.  There are multiple large renal calculi.  The largest is located centrally and measures up to 6 cm.  There is debris within the collecting system.  There is at least moderate hydronephrosis.    Left kidney: The left kidney measures 11.8 cm. There is a cyst measuring 1.7 x 2.6 x 1.2 cm.  There are multiple renal calculi.  The largest is located centrally in measures 2.4 cm.  There is mild hydronephrosis.    The bladder is partially distended at the time of scanning and has multiple diverticuli and indwelling debris.                                       CT Head Without Contrast (Final result)  Result time 12/21/22 21:19:54      Final result by Kalyan Woodward MD (12/21/22 21:19:54)                   Impression:      No acute abnormality.      Electronically signed by: Kalyan Woodward  Date:    12/21/2022  Time:    21:19               Narrative:    EXAMINATION:  CT HEAD WITHOUT CONTRAST    CLINICAL HISTORY:  Mental status change, unknown cause;    TECHNIQUE:  Low dose axial CT images obtained throughout the head without intravenous contrast. Sagittal and coronal reconstructions were performed.    COMPARISON:  12/31/2021 CT of the brain    FINDINGS:  Intracranial compartment:    Ventricles and sulci are stable in size for age continued prominence of the cortical sulcal markings and central ventricular system with prominent cisterna magna..  No extra-axial blood or fluid collections.    The brain parenchyma appears stable.  No parenchymal mass, hemorrhage, edema  or major vascular distribution infarct.    Skull/extracranial contents (limited evaluation): No fracture. Persistent opacity in the right maxillary sinus with minimal ethmoid and sphenoid sinus opacity noted.                                       X-Ray Chest AP Portable (Final result)  Result time 12/21/22 19:34:18      Final result by Maxi De La Paz MD (12/21/22 19:34:18)                   Impression:      1. Pulmonary findings suggest underlying emphysematous change with scattered regions of bandlike atelectasis or scarring.  Bilateral pleural effusions appears similar to the previous exam noting likely underlying edema.  Developing right lower lung zone consolidation not excluded, differential would include rounded atelectasis.  Correlation and follow-up advised.      Electronically signed by: Maxi De La Paz MD  Date:    12/21/2022  Time:    19:34               Narrative:    EXAMINATION:  XR CHEST AP PORTABLE    CLINICAL HISTORY:  Altered mental status, unspecified    TECHNIQUE:  Single frontal view of the chest was performed.    COMPARISON:  05/29/2022    FINDINGS:  The cardiomediastinal silhouette is prominent, similar to the previous exam noting calcification of the aorta..  There is obscuration of the costophrenic angles suggesting effusions, similar to the prior examination.  The trachea is midline.  The lungs are symmetrically expanded bilaterally with scattered bandlike atelectasis or scarring primarily involving the bilateral lower lung zones.  There may be developing consolidation projected over the right lower lung zone versus atelectasis.  Superimposed coarse interstitial attenuation suggests edema..  There is no pneumothorax.  The osseous structures are remarkable for degenerative changes noting remote right rib injuries..                                       Medications   QUEtiapine tablet 25 mg (25 mg Oral Given 12/23/22 2112)   tamsulosin 24 hr capsule 0.4 mg (0.4 mg Oral Given 12/24/22 0928)    memantine tablet 20 mg (20 mg Oral Given 12/24/22 0928)   atorvastatin tablet 10 mg (10 mg Oral Given 12/24/22 0928)   donepeziL tablet 10 mg (10 mg Oral Given 12/23/22 2112)   vancomycin - pharmacy to dose ( Intravenous Random Level Due 12/22/22 1800)   sodium chloride 0.9% flush 10 mL (has no administration in time range)   naloxone 0.4 mg/mL injection 0.02 mg (has no administration in time range)   glucose chewable tablet 16 g (has no administration in time range)   glucose chewable tablet 24 g (has no administration in time range)   dextrose 10% bolus 125 mL 125 mL (has no administration in time range)   dextrose 10% bolus 250 mL 250 mL (has no administration in time range)   glucagon (human recombinant) injection 1 mg (has no administration in time range)   acetaminophen tablet 1,000 mg (1,000 mg Oral Given 12/23/22 1833)   oxyCODONE immediate release tablet 5 mg (5 mg Oral Given 12/24/22 1002)   ondansetron injection 4 mg (has no administration in time range)   senna-docusate 8.6-50 mg per tablet 1 tablet (has no administration in time range)   albuterol-ipratropium 2.5 mg-0.5 mg/3 mL nebulizer solution 3 mL (has no administration in time range)   melatonin tablet 6 mg (has no administration in time range)   aluminum-magnesium hydroxide-simethicone 200-200-20 mg/5 mL suspension 30 mL (has no administration in time range)   lactated ringers infusion ( Intravenous New Bag 12/22/22 1126)   ceFEPIme (MAXIPIME) 1 g in dextrose 5 % in water (D5W) 5 % 50 mL IVPB (MB+) (has no administration in time range)   apixaban tablet 2.5 mg (has no administration in time range)   furosemide injection 40 mg (40 mg Intravenous Given 12/21/22 2012)   acetaminophen tablet 1,000 mg (1,000 mg Oral Given 12/21/22 1946)   vancomycin 1,250 mg in dextrose 5 % 250 mL IVPB (0 mg Intravenous Stopped 12/22/22 0153)   sodium chloride 0.9% bolus 1,000 mL 1,000 mL (0 mLs Intravenous Stopped 12/22/22 0017)   sodium chloride 0.9% bolus 500 mL  500 mL (0 mLs Intravenous Stopped 12/22/22 0247)   vancomycin (VANCOCIN) 1,000 mg in dextrose 5 % 250 mL IVPB (0 mg Intravenous Stopped 12/23/22 0453)   midazolam (VERSED) 1 mg/mL injection (1 mg Intravenous Given 12/23/22 1015)   fentaNYL 50 mcg/mL injection (25 mcg Intravenous Given 12/23/22 1041)   cefTRIAXone 1 gram/50 mL IVPB (1 g Intravenous New Bag 12/23/22 1011)   iohexoL (OMNIPAQUE 300) injection 100 mL (40 mLs Intra-Catheter Given by Other 12/23/22 1051)   vancomycin (VANCOCIN) 250 mg in dextrose 5 % 100 mL IVPB (0 mg Intravenous Stopped 12/24/22 1107)     Medical Decision Making:   History:   I obtained history from: someone other than patient.  Old Medical Records: I decided to obtain old medical records.  Old Records Summarized: records from clinic visits, records from previous admission(s) and records from another hospital.  Initial Assessment:   Patient is febrile with reported decreased level of responsiveness a new onset tremor.  Unable to get a complete history as patient is altered and his niece and POA at bedside does not live with him and has only been information she got from the nursing facility  Differential Diagnosis:   Differential diagnoses considered include, but not limited to:  CHF, pneumonia, cystitis, pyelonephritis, urinary retention, CVA, SDH  Clinical Tests:   Lab Tests: Ordered and Reviewed  Radiological Study: Ordered and Reviewed  Medical Tests: Ordered and Reviewed  ED Management:  History of CHF, BNP elevated at 407, crackles and bilateral lower extremity edema on exam.  40 mg IV Lasix given.  Home dose is 40 mg.    Head CT without any acute abnormality.  Chest x-ray with bilateral pleural effusions and right lower lobe consolidation consistent with pneumonia.  UA pending.  Rocephin and azithromycin started to cover both pneumonia and UA.  1 g of Tylenol given for fever.    Patient with CLEM, creatinine of 4.1 baseline creatinine is 1.0.  Patient admitted to Hospital Medicine  for further evaluation and management of his CLEM, pneumonia, and possible urinary tract infection.          Attending Attestation:   Physician Attestation Statement for Resident:  As the supervising MD   Physician Attestation Statement: I have personally seen and examined this patient.   I agree with the above history.  -:   As the supervising MD I agree with the above PE.     As the supervising MD I agree with the above treatment, course, plan, and disposition.            Attending Critical Care:   Critical Care Times:   Direct Patient Care (initial evaluation, reassessments, and time considering the case)................................................................25 minutes.   Ordering, Reviewing, and Interpreting Diagnostic Studies...............................................................................................................5 minutes.   Documentation..................................................................................................................................................................................5 minutes.   ==============================================================  Total Critical Care Time - exclusive of procedural time: 35 minutes.  ==============================================================  Critical care was necessary to treat or prevent imminent or life-threatening deterioration of the following conditions: congestive heart failure.   Critical care was time spent personally by me on the following activities: obtaining history from patient or relative, examination of patient, review of x-rays / CT sent with the patient, ordering lab, x-rays, and/or EKG, re-evaluation of patient's conition, interpretation of cardiac measurements, discussion with consultants, evaluation of patient's response to treatment and ordering and performing treatments and interventions.   Critical Care Condition: potentially life-threatening       Attending ED Notes:   STAFF  ATTENDING PHYSICIAN NOTE:  I have individually/jointly evaluated Timothy Roldan and discussed their ED management with the resident physician. I have also reviewed their notes, assessments, and procedures documented.  I was present during all critical portions of any procedure(s) performed on Timothy Roldan.   ____________________  Jeronimo King MD, [facility]  Emergency Medicine Staff                   Clinical Impression:   Final diagnoses:  [R68.83] Chills  [R41.82] AMS (altered mental status)  [R41.82] Altered mental state  [N17.9] CLEM (acute kidney injury)        ED Disposition Condition    Admit Stable                Lillie Virk MD  Resident  12/21/22 1107       Kennedy King MD  12/24/22 [id number]

## 2022-12-23 LAB
ALBUMIN SERPL BCP-MCNC: 1.7 G/DL (ref 3.5–5.2)
ALP SERPL-CCNC: 58 U/L (ref 55–135)
ALT SERPL W/O P-5'-P-CCNC: 5 U/L (ref 10–44)
ANION GAP SERPL CALC-SCNC: 9 MMOL/L (ref 8–16)
APPEARANCE FLD: NORMAL
AST SERPL-CCNC: 8 U/L (ref 10–40)
AV INDEX (PROSTH): 0.36
AV MEAN GRADIENT: 24 MMHG
AV PEAK GRADIENT: 43 MMHG
AV VALVE AREA: 1.19 CM2
AV VELOCITY RATIO: 0.33
BACTERIA UR CULT: NORMAL
BACTERIA UR CULT: NORMAL
BASOPHILS # BLD AUTO: 0.01 K/UL (ref 0–0.2)
BASOPHILS NFR BLD: 0.2 % (ref 0–1.9)
BILIRUB SERPL-MCNC: 0.5 MG/DL (ref 0.1–1)
BODY FLD TYPE: NORMAL
BSA FOR ECHO PROCEDURE: 1.99 M2
BUN SERPL-MCNC: 56 MG/DL (ref 8–23)
CALCIUM SERPL-MCNC: 8.1 MG/DL (ref 8.7–10.5)
CHLORIDE SERPL-SCNC: 106 MMOL/L (ref 95–110)
CO2 SERPL-SCNC: 24 MMOL/L (ref 23–29)
COLOR FLD: NORMAL
CREAT SERPL-MCNC: 4.3 MG/DL (ref 0.5–1.4)
CV ECHO LV RWT: 0.42 CM
DIFFERENTIAL METHOD: ABNORMAL
DOP CALC AO PEAK VEL: 3.27 M/S
DOP CALC AO VTI: 72.78 CM
DOP CALC LVOT AREA: 3.3 CM2
DOP CALC LVOT DIAMETER: 2.06 CM
DOP CALC LVOT PEAK VEL: 1.07 M/S
DOP CALC LVOT STROKE VOLUME: 86.61 CM3
DOP CALCLVOT PEAK VEL VTI: 26 CM
E/E' RATIO: 9.33 M/S
ECHO LV POSTERIOR WALL: 0.95 CM (ref 0.6–1.1)
EJECTION FRACTION: 70 %
EOSINOPHIL # BLD AUTO: 0 K/UL (ref 0–0.5)
EOSINOPHIL NFR BLD: 0.3 % (ref 0–8)
ERYTHROCYTE [DISTWIDTH] IN BLOOD BY AUTOMATED COUNT: 14.5 % (ref 11.5–14.5)
EST. GFR  (NO RACE VARIABLE): 13.2 ML/MIN/1.73 M^2
FRACTIONAL SHORTENING: 38 % (ref 28–44)
GLUCOSE SERPL-MCNC: 101 MG/DL (ref 70–110)
GRAM STN SPEC: NORMAL
GRAM STN SPEC: NORMAL
HCT VFR BLD AUTO: 26.9 % (ref 40–54)
HGB BLD-MCNC: 8.3 G/DL (ref 14–18)
IMM GRANULOCYTES # BLD AUTO: 0.02 K/UL (ref 0–0.04)
IMM GRANULOCYTES NFR BLD AUTO: 0.3 % (ref 0–0.5)
INTERVENTRICULAR SEPTUM: 0.94 CM (ref 0.6–1.1)
LA MAJOR: 5.05 CM
LA MINOR: 5.9 CM
LA WIDTH: 4.35 CM
LEFT ATRIUM SIZE: 2.8 CM
LEFT ATRIUM VOLUME INDEX MOD: 26.4 ML/M2
LEFT ATRIUM VOLUME INDEX: 28.5 ML/M2
LEFT ATRIUM VOLUME MOD: 52.18 CM3
LEFT ATRIUM VOLUME: 56.34 CM3
LEFT INTERNAL DIMENSION IN SYSTOLE: 2.8 CM (ref 2.1–4)
LEFT VENTRICLE DIASTOLIC VOLUME INDEX: 46.73 ML/M2
LEFT VENTRICLE DIASTOLIC VOLUME: 92.53 ML
LEFT VENTRICLE MASS INDEX: 72 G/M2
LEFT VENTRICLE SYSTOLIC VOLUME INDEX: 14.9 ML/M2
LEFT VENTRICLE SYSTOLIC VOLUME: 29.55 ML
LEFT VENTRICULAR INTERNAL DIMENSION IN DIASTOLE: 4.5 CM (ref 3.5–6)
LEFT VENTRICULAR MASS: 141.87 G
LV LATERAL E/E' RATIO: 9.33 M/S
LV SEPTAL E/E' RATIO: 9.33 M/S
LYMPHOCYTES # BLD AUTO: 0.6 K/UL (ref 1–4.8)
LYMPHOCYTES NFR BLD: 9.3 % (ref 18–48)
LYMPHOCYTES NFR FLD MANUAL: 18 %
MAGNESIUM SERPL-MCNC: 1.9 MG/DL (ref 1.6–2.6)
MCH RBC QN AUTO: 29.2 PG (ref 27–31)
MCHC RBC AUTO-ENTMCNC: 30.9 G/DL (ref 32–36)
MCV RBC AUTO: 95 FL (ref 82–98)
MONOCYTES # BLD AUTO: 0.4 K/UL (ref 0.3–1)
MONOCYTES NFR BLD: 6.1 % (ref 4–15)
MONOS+MACROS NFR FLD MANUAL: 8 %
MV PEAK E VEL: 0.84 M/S
NEUTROPHILS # BLD AUTO: 5.5 K/UL (ref 1.8–7.7)
NEUTROPHILS NFR BLD: 83.8 % (ref 38–73)
NEUTROPHILS NFR FLD MANUAL: 74 %
NRBC BLD-RTO: 0 /100 WBC
PHOSPHATE SERPL-MCNC: 4 MG/DL (ref 2.7–4.5)
PISA TR MAX VEL: 2.47 M/S
PLATELET # BLD AUTO: 137 K/UL (ref 150–450)
PMV BLD AUTO: 11.2 FL (ref 9.2–12.9)
POTASSIUM SERPL-SCNC: 3.8 MMOL/L (ref 3.5–5.1)
PROT SERPL-MCNC: 5.5 G/DL (ref 6–8.4)
RA MAJOR: 4.95 CM
RA PRESSURE: 3 MMHG
RA WIDTH: 5.55 CM
RBC # BLD AUTO: 2.84 M/UL (ref 4.6–6.2)
RIGHT VENTRICULAR END-DIASTOLIC DIMENSION: 3.23 CM
RV TISSUE DOPPLER FREE WALL SYSTOLIC VELOCITY 1 (APICAL 4 CHAMBER VIEW): 10.65 CM/S
SINUS: 3.52 CM
SODIUM SERPL-SCNC: 139 MMOL/L (ref 136–145)
TDI LATERAL: 0.09 M/S
TDI SEPTAL: 0.09 M/S
TDI: 0.09 M/S
TR MAX PG: 24 MMHG
TRICUSPID ANNULAR PLANE SYSTOLIC EXCURSION: 1.69 CM
TV REST PULMONARY ARTERY PRESSURE: 27 MMHG
WBC # BLD AUTO: 6.54 K/UL (ref 3.9–12.7)
WBC # FLD: NORMAL /CU MM

## 2022-12-23 PROCEDURE — 87102 FUNGUS ISOLATION CULTURE: CPT | Performed by: HOSPITALIST

## 2022-12-23 PROCEDURE — 63600175 PHARM REV CODE 636 W HCPCS: Performed by: FAMILY MEDICINE

## 2022-12-23 PROCEDURE — 87205 SMEAR GRAM STAIN: CPT | Performed by: HOSPITALIST

## 2022-12-23 PROCEDURE — 63600175 PHARM REV CODE 636 W HCPCS: Performed by: HOSPITALIST

## 2022-12-23 PROCEDURE — 83735 ASSAY OF MAGNESIUM: CPT | Performed by: FAMILY MEDICINE

## 2022-12-23 PROCEDURE — 25000003 PHARM REV CODE 250: Performed by: HOSPITALIST

## 2022-12-23 PROCEDURE — 80053 COMPREHEN METABOLIC PANEL: CPT | Performed by: FAMILY MEDICINE

## 2022-12-23 PROCEDURE — 51702 INSERT TEMP BLADDER CATH: CPT

## 2022-12-23 PROCEDURE — 63600175 PHARM REV CODE 636 W HCPCS: Performed by: RADIOLOGY

## 2022-12-23 PROCEDURE — 89051 BODY FLUID CELL COUNT: CPT | Performed by: HOSPITALIST

## 2022-12-23 PROCEDURE — 85025 COMPLETE CBC W/AUTO DIFF WBC: CPT | Performed by: FAMILY MEDICINE

## 2022-12-23 PROCEDURE — 99232 PR SUBSEQUENT HOSPITAL CARE,LEVL II: ICD-10-PCS | Mod: ,,, | Performed by: HOSPITALIST

## 2022-12-23 PROCEDURE — 36415 COLL VENOUS BLD VENIPUNCTURE: CPT | Performed by: FAMILY MEDICINE

## 2022-12-23 PROCEDURE — 25000003 PHARM REV CODE 250: Performed by: FAMILY MEDICINE

## 2022-12-23 PROCEDURE — 12000002 HC ACUTE/MED SURGE SEMI-PRIVATE ROOM

## 2022-12-23 PROCEDURE — 25500020 PHARM REV CODE 255: Performed by: HOSPITALIST

## 2022-12-23 PROCEDURE — 84100 ASSAY OF PHOSPHORUS: CPT | Performed by: FAMILY MEDICINE

## 2022-12-23 PROCEDURE — 87086 URINE CULTURE/COLONY COUNT: CPT | Performed by: HOSPITALIST

## 2022-12-23 PROCEDURE — 99232 SBSQ HOSP IP/OBS MODERATE 35: CPT | Mod: ,,, | Performed by: HOSPITALIST

## 2022-12-23 RX ORDER — FENTANYL CITRATE 50 UG/ML
INJECTION, SOLUTION INTRAMUSCULAR; INTRAVENOUS
Status: COMPLETED | OUTPATIENT
Start: 2022-12-23 | End: 2022-12-23

## 2022-12-23 RX ORDER — MIDAZOLAM HYDROCHLORIDE 1 MG/ML
INJECTION INTRAMUSCULAR; INTRAVENOUS
Status: COMPLETED | OUTPATIENT
Start: 2022-12-23 | End: 2022-12-23

## 2022-12-23 RX ORDER — CEFTRIAXONE 1 G/50ML
INJECTION, SOLUTION INTRAVENOUS
Status: COMPLETED | OUTPATIENT
Start: 2022-12-23 | End: 2022-12-23

## 2022-12-23 RX ADMIN — VANCOMYCIN HYDROCHLORIDE 1000 MG: 1 INJECTION, POWDER, LYOPHILIZED, FOR SOLUTION INTRAVENOUS at 03:12

## 2022-12-23 RX ADMIN — IOHEXOL 40 ML: 300 INJECTION, SOLUTION INTRAVENOUS at 10:12

## 2022-12-23 RX ADMIN — CEFTRIAXONE 1 G: 1 INJECTION, SOLUTION INTRAVENOUS at 10:12

## 2022-12-23 RX ADMIN — MEMANTINE 20 MG: 10 TABLET ORAL at 08:12

## 2022-12-23 RX ADMIN — DONEPEZIL HYDROCHLORIDE 10 MG: 10 TABLET ORAL at 09:12

## 2022-12-23 RX ADMIN — CEFEPIME 2 G: 2 INJECTION, POWDER, FOR SOLUTION INTRAVENOUS at 01:12

## 2022-12-23 RX ADMIN — OXYCODONE 5 MG: 5 TABLET ORAL at 06:12

## 2022-12-23 RX ADMIN — FENTANYL CITRATE 25 MCG: 0.05 INJECTION, SOLUTION INTRAMUSCULAR; INTRAVENOUS at 10:12

## 2022-12-23 RX ADMIN — QUETIAPINE FUMARATE 25 MG: 25 TABLET ORAL at 09:12

## 2022-12-23 RX ADMIN — ATORVASTATIN CALCIUM 10 MG: 10 TABLET, FILM COATED ORAL at 08:12

## 2022-12-23 RX ADMIN — CEFEPIME 2 G: 2 INJECTION, POWDER, FOR SOLUTION INTRAVENOUS at 10:12

## 2022-12-23 RX ADMIN — TAMSULOSIN HYDROCHLORIDE 0.4 MG: 0.4 CAPSULE ORAL at 08:12

## 2022-12-23 RX ADMIN — ACETAMINOPHEN 1000 MG: 500 TABLET ORAL at 06:12

## 2022-12-23 RX ADMIN — MIDAZOLAM HYDROCHLORIDE 1 MG: 1 INJECTION, SOLUTION INTRAMUSCULAR; INTRAVENOUS at 10:12

## 2022-12-23 NOTE — CARE UPDATE
Called and updated GEE Crowder.  All questions answered.    Radha Gipson MD, [facility]

## 2022-12-23 NOTE — PROGRESS NOTES
Pharmacokinetic Assessment Follow Up: IV Vancomycin    Vancomycin serum concentration assessment(s):    The random level was drawn correctly and can be used to guide therapy at this time. The measurement is below the desired definitive target range of 15 to 20 mcg/mL.  - The random level was drawn ~18 hours after previous dose and resulted at 12.9.    Vancomycin Regimen Plan:    Will re-dose with vanc 1000 mg once now.  - Re-dose when the random level is less than 20 mcg/mL, next level to be drawn at 0500 with AM labs on 12/24.  - In CLEM with baseline Scr ~1.0.  - Will continue pulse dosing in the setting of CLEM.    Drug levels (last 3 results):  Recent Labs   Lab Result Units 12/22/22  1758   Vancomycin, Random ug/mL 12.9       Pharmacy will continue to follow and monitor vancomycin.    Please contact pharmacy at extension [phone] for questions regarding this assessment.    Thank you for the consult,   Lillie Dueñas       Patient brief summary:  Timothy Roldan is a 82 y.o. male initiated on antimicrobial therapy with IV Vancomycin for treatment of bacteremia    The patient's current regimen is pulse dosing in the setting of CLME    Actual Body Weight:   80.7 kg    Renal Function:   Estimated Creatinine Clearance: 14.8 mL/min (A) (based on SCr of 4.1 mg/dL (H)).     Dialysis Method (if applicable):  N/A

## 2022-12-23 NOTE — PLAN OF CARE
Pt arrived to [facility]PU room 4 for 1hr recovery, no acute distress noted. VSS. Dressings CDI. R neph tube draining purulent fluid.

## 2022-12-23 NOTE — ASSESSMENT & PLAN NOTE
· 2D echo with EF 70%, repeat 2D echo ordered  ·  on admit, given Lasix 40mg IV in the ER then IVF  · Hold on Lasix for now with CLEM

## 2022-12-23 NOTE — PLAN OF CARE
Post procedure bedside report given to MPU RNEsha.  Post procedure phone report called to primary, Lizbeth SIEGEL.

## 2022-12-23 NOTE — ASSESSMENT & PLAN NOTE
· Seen on CT scan  · 2D echo pending  · Consider Pulm consult for thora  · Continue to hold Eliquis for now in case of drainage

## 2022-12-23 NOTE — PLAN OF CARE
Problem: Adult Inpatient Plan of Care  Goal: Plan of Care Review  Outcome: Ongoing, Not Progressing  Goal: Patient-Specific Goal (Individualized)  Outcome: Ongoing, Not Progressing  Goal: Absence of Hospital-Acquired Illness or Injury  Outcome: Ongoing, Not Progressing  Goal: Optimal Comfort and Wellbeing  Outcome: Ongoing, Not Progressing  Goal: Readiness for Transition of Care  Outcome: Ongoing, Not Progressing     Problem: Adjustment to Illness (Sepsis/Septic Shock)  Goal: Optimal Coping  Outcome: Ongoing, Not Progressing     Problem: Bleeding (Sepsis/Septic Shock)  Goal: Absence of Bleeding  Outcome: Ongoing, Not Progressing     Problem: Glycemic Control Impaired (Sepsis/Septic Shock)  Goal: Blood Glucose Level Within Desired Range  Outcome: Ongoing, Not Progressing     Problem: Infection Progression (Sepsis/Septic Shock)  Goal: Absence of Infection Signs and Symptoms  Outcome: Ongoing, Not Progressing     Problem: Nutrition Impaired (Sepsis/Septic Shock)  Goal: Optimal Nutrition Intake  Outcome: Ongoing, Not Progressing     Problem: Fluid and Electrolyte Imbalance (Acute Kidney Injury/Impairment)  Goal: Fluid and Electrolyte Balance  Outcome: Ongoing, Not Progressing     Problem: Oral Intake Inadequate (Acute Kidney Injury/Impairment)  Goal: Optimal Nutrition Intake  Outcome: Ongoing, Not Progressing     Problem: Renal Function Impairment (Acute Kidney Injury/Impairment)  Goal: Effective Renal Function  Outcome: Ongoing, Not Progressing     Problem: Fall Injury Risk  Goal: Absence of Fall and Fall-Related Injury  Outcome: Ongoing, Not Progressing     Problem: Skin Injury Risk Increased  Goal: Skin Health and Integrity  Outcome: Ongoing, Not Progressing     Problem: Impaired Wound Healing  Goal: Optimal Wound Healing  Outcome: Ongoing, Not Progressing     Problem: Infection  Goal: Absence of Infection Signs and Symptoms  Outcome: Ongoing, Not Progressing

## 2022-12-23 NOTE — PLAN OF CARE
1hr IR recovery complete. VSS. Dressing CDI. Pt. Ready for transport back to floor. Report called to ROBBIN Nieves

## 2022-12-23 NOTE — ASSESSMENT & PLAN NOTE
· CT renal stone was ordered which showed a 5.7 cm right renal pelvis stone and additional 1.8 cm proximal ureteral stone with severe right hydronephrosis, large left renal pelvis stone 5cm with moderate left hydronephrosis, and bilateral renal cysts.  · Urology consulted - suggest IR for bilateral nephrostomy tubes  · IR placed bilateral nephrostomy tubes 12/23

## 2022-12-23 NOTE — SUBJECTIVE & OBJECTIVE
Interval History: Went to IR for bilateral nephrostomy tube placement for large kidney stones.  Remains on Vanc, Cefepime - cultures negative.  Might need ID if cultures remain negative.  2D echo pending.    Review of Systems   Unable to perform ROS: Dementia   Objective:     Vital Signs (Most Recent):  Temp: 97.7 °F (36.5 °C) (12/23/22 1110)  Pulse: 84 (12/23/22 1140)  Resp: 11 (12/23/22 1140)  BP: (!) 142/67 (12/23/22 1140)  SpO2: 97 % (12/23/22 1140)   Vital Signs (24h Range):  Temp:  [97.4 °F (36.3 °C)-98.3 °F (36.8 °C)] 97.7 °F (36.5 °C)  Pulse:  [] 84  Resp:  [9-21] 11  SpO2:  [69 %-100 %] 97 %  BP: ()/(56-91) 142/67     Weight: 80.7 kg (177 lb 14.6 oz)  Body mass index is 25.53 kg/m².    Intake/Output Summary (Last 24 hours) at 12/23/2022 1203  Last data filed at 12/23/2022 0647  Gross per 24 hour   Intake --   Output 900 ml   Net -900 ml        Physical Exam  Constitutional:       Appearance: Normal appearance. He is well-developed.   HENT:      Head: Normocephalic and atraumatic.   Cardiovascular:      Rate and Rhythm: Normal rate and regular rhythm.      Heart sounds: No murmur heard.  Pulmonary:      Effort: Pulmonary effort is normal. No respiratory distress.      Breath sounds: Normal breath sounds. No wheezing or rales.   Abdominal:      General: There is no distension.      Palpations: Abdomen is soft.      Tenderness: There is no abdominal tenderness.   Musculoskeletal:         General: No deformity.   Skin:     General: Skin is warm.   Neurological:      Mental Status: He is alert. Mental status is at baseline.       Significant Labs: All pertinent labs within the past 24 hours have been reviewed.  CBC:   Recent Labs   Lab 12/21/22  1920 12/22/22  0423 12/23/22  0239   WBC 8.45 6.24 6.54   HGB 9.9* 8.7* 8.3*   HCT 31.3* 27.9* 26.9*    133* 137*       CMP:   Recent Labs   Lab 12/21/22  1920 12/22/22  0423 12/23/22  0239    139 139   K 3.9 3.6 3.8    104 106   CO2 27 25  24   * 105 101   BUN 52* 48* 56*   CREATININE 4.2* 4.1* 4.3*   CALCIUM 8.8 7.9* 8.1*   PROT 6.9 5.7* 5.5*   ALBUMIN 2.2* 1.8* 1.7*   BILITOT 0.7 0.7 0.5   ALKPHOS 66 55 58   AST 11 10 8*   ALT 7* 5* 5*   ANIONGAP 10 10 9         Significant Imaging: I have reviewed all pertinent imaging results/findings within the past 24 hours.

## 2022-12-23 NOTE — PROGRESS NOTES
[facility] ([place] [address])  [facility] Medicine  Progress Note    Patient Name: Timothy Roldan  MRN: [id number]  Patient Class: IP- Inpatient   Admission Date: 12/21/2022  Length of Stay: 2 days  Attending Physician: Radha Gipson MD  Primary Care Provider: Martine Garces MD        Subjective:     Principal Problem:Sepsis        HPI:  This is an 81yo male with a past medical history of HTN, Afib on Eliquis, alzheimer dementia, HFpEF, COVID pna, and BPH who presents to the ED with chief complaint of fever and altered mental status. Patient currently in nursing home facility and over last two days has reportedly had complaints of chills/cold as well as increased tiredness and some worsened confusion. Patient noted to be febrile at living facility today 101.4 and sent to the ED for further evaluation and management. His niece and power of  is at bedside and states that at baseline he has waxing and waning levels of responsiveness during conversations, but says that he is responding much less than normal today.    In the ED patient Temp 100.8, tachycardic, hemodynamically stable saturating well on room air. WBC 8.45 with Lt shift. UA consistent with UTI. Creatinine 4.2 on presentation (baseline 1.0). . CXR with with evidence of bilateral effusions similar to prior studies as well as question of right lower lobe consolidation. Patient started on vancomycin and cefepime and admitted to the care of medicine for further evaluation and management.       Overview/Hospital Course:  Mr. Roldan was admitted to Hospital Medicine for management of severe sepsis 2/2 UTI and PNA.  He was started on Vanc and Cefepime.  Renal US was ordered to evaluate for CLEM kidney stones.  Urology was consulted.  CT renal stone was ordered which showed a 5.7 cm right renal pelvis stone and additional 1.8 cm proximal ureteral stone with severe right hydronephrosis, large left renal pelvis stone 5cm with moderate left  hydronephrosis, and bilateral renal cysts. Urology suggested bilateral nephrostomy tubes, so IR was consulted.  He had bilateral nephrostomy tubes placed 12/23.      Interval History: Went to IR for bilateral nephrostomy tube placement for large kidney stones.  Remains on Vanc, Cefepime - cultures negative.  Might need ID if cultures remain negative.  2D echo pending.    Review of Systems   Unable to perform ROS: Dementia   Objective:     Vital Signs (Most Recent):  Temp: 97.7 °F (36.5 °C) (12/23/22 1110)  Pulse: 84 (12/23/22 1140)  Resp: 11 (12/23/22 1140)  BP: (!) 142/67 (12/23/22 1140)  SpO2: 97 % (12/23/22 1140)   Vital Signs (24h Range):  Temp:  [97.4 °F (36.3 °C)-98.3 °F (36.8 °C)] 97.7 °F (36.5 °C)  Pulse:  [] 84  Resp:  [9-21] 11  SpO2:  [69 %-100 %] 97 %  BP: ()/(56-91) 142/67     Weight: 80.7 kg (177 lb 14.6 oz)  Body mass index is 25.53 kg/m².    Intake/Output Summary (Last 24 hours) at 12/23/2022 1203  Last data filed at 12/23/2022 0647  Gross per 24 hour   Intake --   Output 900 ml   Net -900 ml        Physical Exam  Constitutional:       Appearance: Normal appearance. He is well-developed.   HENT:      Head: Normocephalic and atraumatic.   Cardiovascular:      Rate and Rhythm: Normal rate and regular rhythm.      Heart sounds: No murmur heard.  Pulmonary:      Effort: Pulmonary effort is normal. No respiratory distress.      Breath sounds: Normal breath sounds. No wheezing or rales.   Abdominal:      General: There is no distension.      Palpations: Abdomen is soft.      Tenderness: There is no abdominal tenderness.   Musculoskeletal:         General: No deformity.   Skin:     General: Skin is warm.   Neurological:      Mental Status: He is alert. Mental status is at baseline.       Significant Labs: All pertinent labs within the past 24 hours have been reviewed.  CBC:   Recent Labs   Lab 12/21/22  1920 12/22/22  0423 12/23/22  0239   WBC 8.45 6.24 6.54   HGB 9.9* 8.7* 8.3*   HCT 31.3*  27.9* 26.9*    133* 137*       CMP:   Recent Labs   Lab 12/21/22  1920 12/22/22  0423 12/23/22  0239    139 139   K 3.9 3.6 3.8    104 106   CO2 27 25 24   * 105 101   BUN 52* 48* 56*   CREATININE 4.2* 4.1* 4.3*   CALCIUM 8.8 7.9* 8.1*   PROT 6.9 5.7* 5.5*   ALBUMIN 2.2* 1.8* 1.7*   BILITOT 0.7 0.7 0.5   ALKPHOS 66 55 58   AST 11 10 8*   ALT 7* 5* 5*   ANIONGAP 10 10 9         Significant Imaging: I have reviewed all pertinent imaging results/findings within the past 24 hours.      Assessment/Plan:      * Sepsis  · Afebrile and without leukocytosis  · Source likely UTI/infected kidney stones and possible pneuomia  · Urine cx NGTD  · Blood cx NGTD  · Continue Vanc and Cefepime (start date 12/21)      Kidney stones  · See above      Bilateral hydronephrosis  · CT renal stone was ordered which showed a 5.7 cm right renal pelvis stone and additional 1.8 cm proximal ureteral stone with severe right hydronephrosis, large left renal pelvis stone 5cm with moderate left hydronephrosis, and bilateral renal cysts.  · Urology consulted - suggest IR for bilateral nephrostomy tubes  · IR placed bilateral nephrostomy tubes 12/23      CLEM (acute kidney injury)  · Creatinine 4.2 on admit, baseline around 1 - Currently 4.3  Estimated Creatinine Clearance: 14.1 mL/min (A) (based on SCr of 4.3 mg/dL (H)).  · Renal US with kidney stones  · Strict I&Os  · Gentle IVF  · Avoid Nephrotoxic agents    Pleural effusion  · Seen on CT scan  · 2D echo pending  · Consider Pulm consult for thora  · Continue to hold Eliquis for now in case of drainage      Chronic heart failure with preserved ejection fraction  · 2D echo with EF 70%, repeat 2D echo ordered  ·  on admit, given Lasix 40mg IV in the ER then IVF  · Hold on Lasix for now with CLEM      Persistent atrial fibrillation  · Chronic and stable  · Hold Metoprolol with softer BP  · Hold Eliquis with nephrostomy tubes    Alzheimer disease  · Chronic  issue  · Resides at a NH  · Continue home namenda and aricept  · Continue home seroquel    Essential hypertension  · Chronic issue  · Holding home benazepril, and metoprolol while monitoring hemodynamics        VTE Risk Mitigation (From admission, onward)    None          Discharge Planning   JACQUELINE: 12/27/2022     Code Status: Full Code   Is the patient medically ready for discharge?: No    Reason for patient still in hospital (select all that apply): Patient trending condition  Discharge Plan A:  (RETURNT TO PERISTYLE)                  Radha Gipson MD  Department of Hospital Medicine   [facility] - Intensive Care ([place])

## 2022-12-23 NOTE — ASSESSMENT & PLAN NOTE
Dennies Glaeser is an 83yo male with bilateral hydronephrosis, bilateral large renal pelvis stones, UTI and acute renal failure.     -UA concerning for infection. On vanc and cefepime. F/u culture and tailor antibiotics to sensitivity  -CTRSS with 5.7 cm right renal pelvis stone and additional 1.8 cm proximal ureteral stone with severe right hydronephrosis. Large left renal pelvis stone 5cm with moderate left hydronephrosis. Bilateral renal cysts.   -Cr 4.3 from 4.1 yesterday (1.0 baseline). Trend Cr  -Bladder scan with 404cc. Recommend brower placement  - IR consulted for bilateral nephrostomy tubes, planned for today. Appreciate assistance.  -NPO  -Dose all medications to patient's current GFR. Avoid nephrotoxic medications   - Rest of care per primary   - will arrange for definitive stone management outpatient.

## 2022-12-23 NOTE — ASSESSMENT & PLAN NOTE
· Creatinine 4.2 on admit, baseline around 1 - Currently 4.3  Estimated Creatinine Clearance: 14.1 mL/min (A) (based on SCr of 4.3 mg/dL (H)).  · Renal US with kidney stones  · Strict I&Os  · Gentle IVF  · Avoid Nephrotoxic agents

## 2022-12-23 NOTE — ASSESSMENT & PLAN NOTE
· Afebrile and without leukocytosis  · Source likely UTI/infected kidney stones and possible pneuomia  · Urine cx NGTD  · Blood cx NGTD  · Continue Vanc and Cefepime (start date 12/21)

## 2022-12-23 NOTE — SUBJECTIVE & OBJECTIVE
Interval History: NAEON. AFVSS. Cr essentially stable at 4.3. UOP adequate.     Review of Systems  Objective:     Temp:  [97.7 °F (36.5 °C)-98.6 °F (37 °C)] 97.7 °F (36.5 °C)  Pulse:  [78-99] 78  Resp:  [15-18] 17  SpO2:  [92 %-97 %] 97 %  BP: ()/(56-72) 113/66     Body mass index is 25.53 kg/m².      Bladder Scan Volume (mL): 404 mL (12/22/22 1637)    Drains       Drain  Duration                  Urethral Catheter 12/22/22 1816 16 Fr. <1 day                    Physical Exam  Constitutional:       General: He is not in acute distress.  HENT:      Head: Normocephalic.   Cardiovascular:      Rate and Rhythm: Normal rate.   Pulmonary:      Effort: Pulmonary effort is normal.   Abdominal:      General: There is no distension.      Palpations: Abdomen is soft.      Tenderness: There is no abdominal tenderness. There is no right CVA tenderness, left CVA tenderness or guarding.   Genitourinary:     Comments: Condom cath draining clear yellow urine  Skin:     General: Skin is warm and dry.   Neurological:      Coordination: Coordination normal.      Comments: Oriented x1       Significant Labs:    BMP:  Recent Labs   Lab 12/21/22 1920 12/22/22 0423 12/23/22 0239    139 139   K 3.9 3.6 3.8    104 106   CO2 27 25 24   BUN 52* 48* 56*   CREATININE 4.2* 4.1* 4.3*   CALCIUM 8.8 7.9* 8.1*       CBC:   Recent Labs   Lab 12/21/22 1920 12/22/22 0423 12/23/22  0239   WBC 8.45 6.24 6.54   HGB 9.9* 8.7* 8.3*   HCT 31.3* 27.9* 26.9*    133* 137*       Urine Culture: No results for input(s): LABURIN in the last 168 hours.    Significant Imaging:  All pertinent imaging results/findings from the past 24 hours have been reviewed.

## 2022-12-23 NOTE — PROGRESS NOTES
[facility] ([place] [address])  Urology  Progress Note    Patient Name: Timothy Roldan  MRN: [id number]  Admission Date: 12/21/2022  Hospital Length of Stay: 2 days  Code Status: Full Code   Attending Provider: Radha Gipson MD   Primary Care Physician: Martine Garces MD    Subjective:     HPI:  Leonardo Roldan is a 81yo male with a past medical history of HTN, Afib on Eliquis, alzheimer dementia, HFpEF, COVID pna, and BPH who presented to the ED with chief complaint of fever and altered mental status. Patient currently in nursing home facility and over last two days has reportedly had complaints of chills/cold as well as increased tiredness and some worsened confusion. Patient noted to be febrile at living facility today 101.4 and sent to the ED for further evaluation and management. In the ED patient Temp 100.8, tachycardic, hemodynamically stable saturating well on room air. WBC 8.45 with Lt shift. UA consistent with UTI. Creatinine 4.2 on presentation (baseline 1.0). . CXR with with evidence of bilateral effusions similar to prior studies as well as question of right lower lobe consolidation. Patient started on vancomycin and cefepime and admitted to the care of medicine for further evaluation and management.      Febrile last night to 100.8. Afebrile today. Mildly hypotensive. WBC 6.2. Cr 4.1 from 4.2 yesterday (1.0 baseline). UA nitrite negative. Microscopy with with many bacteria. 25RBCs, >100WBCs, 0 SECs. Urine culture in process. Blood culture NGTD. RAJAT with moderate right hydronephrosis. Mild left hydronephrosis. CTRSS with 5.7 cm right renal pelvis stone and additional 1.8 cm proximal ureteral stone with severe right hydronephrosis. Large left renal pelvis stone 5cm with moderate left hydronephrosis. Bilateral renal cysts.     Eliquis last dose was the morning of 12/22. Last ate 12pm 12/22.       Interval History: NAEON. AFVSS. Cr essentially stable at 4.3. UOP adequate.     Review of  Systems  Objective:     Temp:  [97.7 °F (36.5 °C)-98.6 °F (37 °C)] 97.7 °F (36.5 °C)  Pulse:  [78-99] 78  Resp:  [15-18] 17  SpO2:  [92 %-97 %] 97 %  BP: ()/(56-72) 113/66     Body mass index is 25.53 kg/m².      Bladder Scan Volume (mL): 404 mL (12/22/22 1637)    Drains       Drain  Duration                  Urethral Catheter 12/22/22 1816 16 Fr. <1 day                    Physical Exam  Constitutional:       General: He is not in acute distress.  HENT:      Head: Normocephalic.   Cardiovascular:      Rate and Rhythm: Normal rate.   Pulmonary:      Effort: Pulmonary effort is normal.   Abdominal:      General: There is no distension.      Palpations: Abdomen is soft.      Tenderness: There is no abdominal tenderness. There is no right CVA tenderness, left CVA tenderness or guarding.   Genitourinary:     Comments: Condom cath draining clear yellow urine  Skin:     General: Skin is warm and dry.   Neurological:      Coordination: Coordination normal.      Comments: Oriented x1       Significant Labs:    BMP:  Recent Labs   Lab 12/21/22 1920 12/22/22  0423 12/23/22  0239    139 139   K 3.9 3.6 3.8    104 106   CO2 27 25 24   BUN 52* 48* 56*   CREATININE 4.2* 4.1* 4.3*   CALCIUM 8.8 7.9* 8.1*       CBC:   Recent Labs   Lab 12/21/22 1920 12/22/22  0423 12/23/22  0239   WBC 8.45 6.24 6.54   HGB 9.9* 8.7* 8.3*   HCT 31.3* 27.9* 26.9*    133* 137*       Urine Culture: No results for input(s): LABURIN in the last 168 hours.    Significant Imaging:  All pertinent imaging results/findings from the past 24 hours have been reviewed.                    Assessment/Plan:     Kidney stones  Dennies Glaeser is an 83yo male with bilateral hydronephrosis, bilateral large renal pelvis stones, UTI and acute renal failure.     -UA concerning for infection. On vanc and cefepime. F/u culture and tailor antibiotics to sensitivity  -CTRSS with 5.7 cm right renal pelvis stone and additional 1.8 cm proximal ureteral  stone with severe right hydronephrosis. Large left renal pelvis stone 5cm with moderate left hydronephrosis. Bilateral renal cysts.   -Cr 4.3 from 4.1 yesterday (1.0 baseline). Trend Cr  -Bladder scan with 404cc. Recommend brower placement  - IR consulted for bilateral nephrostomy tubes, planned for today. Appreciate assistance.  -NPO  -Dose all medications to patient's current GFR. Avoid nephrotoxic medications   - Rest of care per primary   - will arrange for definitive stone management outpatient.            VTE Risk Mitigation (From admission, onward)    None          Min Felipe MD  Urology  [facility] ([facility]

## 2022-12-24 LAB
ALBUMIN SERPL BCP-MCNC: 1.6 G/DL (ref 3.5–5.2)
ALP SERPL-CCNC: 54 U/L (ref 55–135)
ALT SERPL W/O P-5'-P-CCNC: 6 U/L (ref 10–44)
ANION GAP SERPL CALC-SCNC: 13 MMOL/L (ref 8–16)
AST SERPL-CCNC: 15 U/L (ref 10–40)
BACTERIA UR CULT: NO GROWTH
BASOPHILS # BLD AUTO: 0.01 K/UL (ref 0–0.2)
BASOPHILS NFR BLD: 0.2 % (ref 0–1.9)
BILIRUB SERPL-MCNC: 0.4 MG/DL (ref 0.1–1)
BUN SERPL-MCNC: 60 MG/DL (ref 8–23)
CALCIUM SERPL-MCNC: 8.2 MG/DL (ref 8.7–10.5)
CHLORIDE SERPL-SCNC: 106 MMOL/L (ref 95–110)
CO2 SERPL-SCNC: 20 MMOL/L (ref 23–29)
CREAT SERPL-MCNC: 4.1 MG/DL (ref 0.5–1.4)
DIFFERENTIAL METHOD: ABNORMAL
EOSINOPHIL # BLD AUTO: 0 K/UL (ref 0–0.5)
EOSINOPHIL NFR BLD: 0.5 % (ref 0–8)
ERYTHROCYTE [DISTWIDTH] IN BLOOD BY AUTOMATED COUNT: 14.5 % (ref 11.5–14.5)
EST. GFR  (NO RACE VARIABLE): 14 ML/MIN/1.73 M^2
GLUCOSE SERPL-MCNC: 88 MG/DL (ref 70–110)
HCT VFR BLD AUTO: 28.3 % (ref 40–54)
HGB BLD-MCNC: 8.7 G/DL (ref 14–18)
IMM GRANULOCYTES # BLD AUTO: 0.04 K/UL (ref 0–0.04)
IMM GRANULOCYTES NFR BLD AUTO: 0.7 % (ref 0–0.5)
LYMPHOCYTES # BLD AUTO: 0.6 K/UL (ref 1–4.8)
LYMPHOCYTES NFR BLD: 10.5 % (ref 18–48)
MAGNESIUM SERPL-MCNC: 2 MG/DL (ref 1.6–2.6)
MCH RBC QN AUTO: 28.2 PG (ref 27–31)
MCHC RBC AUTO-ENTMCNC: 30.7 G/DL (ref 32–36)
MCV RBC AUTO: 92 FL (ref 82–98)
MONOCYTES # BLD AUTO: 0.4 K/UL (ref 0.3–1)
MONOCYTES NFR BLD: 6.2 % (ref 4–15)
NEUTROPHILS # BLD AUTO: 4.8 K/UL (ref 1.8–7.7)
NEUTROPHILS NFR BLD: 81.9 % (ref 38–73)
NRBC BLD-RTO: 0 /100 WBC
PHOSPHATE SERPL-MCNC: 4.2 MG/DL (ref 2.7–4.5)
PLATELET # BLD AUTO: 165 K/UL (ref 150–450)
PMV BLD AUTO: 10.7 FL (ref 9.2–12.9)
POTASSIUM SERPL-SCNC: 3.8 MMOL/L (ref 3.5–5.1)
PROT SERPL-MCNC: 5.4 G/DL (ref 6–8.4)
RBC # BLD AUTO: 3.08 M/UL (ref 4.6–6.2)
SODIUM SERPL-SCNC: 139 MMOL/L (ref 136–145)
VANCOMYCIN SERPL-MCNC: 18 UG/ML
WBC # BLD AUTO: 5.81 K/UL (ref 3.9–12.7)

## 2022-12-24 PROCEDURE — 80053 COMPREHEN METABOLIC PANEL: CPT | Performed by: FAMILY MEDICINE

## 2022-12-24 PROCEDURE — 99233 SBSQ HOSP IP/OBS HIGH 50: CPT | Mod: ,,, | Performed by: INTERNAL MEDICINE

## 2022-12-24 PROCEDURE — 63600175 PHARM REV CODE 636 W HCPCS: Performed by: INTERNAL MEDICINE

## 2022-12-24 PROCEDURE — 12000002 HC ACUTE/MED SURGE SEMI-PRIVATE ROOM

## 2022-12-24 PROCEDURE — 99233 PR SUBSEQUENT HOSPITAL CARE,LEVL III: ICD-10-PCS | Mod: ,,, | Performed by: INTERNAL MEDICINE

## 2022-12-24 PROCEDURE — 25000003 PHARM REV CODE 250: Performed by: INTERNAL MEDICINE

## 2022-12-24 PROCEDURE — 94761 N-INVAS EAR/PLS OXIMETRY MLT: CPT

## 2022-12-24 PROCEDURE — 83735 ASSAY OF MAGNESIUM: CPT | Performed by: FAMILY MEDICINE

## 2022-12-24 PROCEDURE — 85025 COMPLETE CBC W/AUTO DIFF WBC: CPT | Performed by: FAMILY MEDICINE

## 2022-12-24 PROCEDURE — 25000003 PHARM REV CODE 250: Performed by: FAMILY MEDICINE

## 2022-12-24 PROCEDURE — 84100 ASSAY OF PHOSPHORUS: CPT | Performed by: FAMILY MEDICINE

## 2022-12-24 PROCEDURE — 80202 ASSAY OF VANCOMYCIN: CPT | Performed by: HOSPITALIST

## 2022-12-24 RX ADMIN — DONEPEZIL HYDROCHLORIDE 10 MG: 10 TABLET ORAL at 09:12

## 2022-12-24 RX ADMIN — VANCOMYCIN HYDROCHLORIDE 250 MG: 500 INJECTION, POWDER, LYOPHILIZED, FOR SOLUTION INTRAVENOUS at 10:12

## 2022-12-24 RX ADMIN — OXYCODONE 5 MG: 5 TABLET ORAL at 10:12

## 2022-12-24 RX ADMIN — MEMANTINE 20 MG: 10 TABLET ORAL at 09:12

## 2022-12-24 RX ADMIN — CEFEPIME 1 G: 1 INJECTION, POWDER, FOR SOLUTION INTRAMUSCULAR; INTRAVENOUS at 11:12

## 2022-12-24 RX ADMIN — TAMSULOSIN HYDROCHLORIDE 0.4 MG: 0.4 CAPSULE ORAL at 09:12

## 2022-12-24 RX ADMIN — APIXABAN 2.5 MG: 2.5 TABLET, FILM COATED ORAL at 09:12

## 2022-12-24 RX ADMIN — QUETIAPINE FUMARATE 25 MG: 25 TABLET ORAL at 09:12

## 2022-12-24 RX ADMIN — ATORVASTATIN CALCIUM 10 MG: 10 TABLET, FILM COATED ORAL at 09:12

## 2022-12-24 NOTE — PROGRESS NOTES
Pharmacokinetic Assessment Follow Up: IV Vancomycin    Vancomycin serum concentration assessment(s):    -Random level 18 at goal between 15 to 20 for bacteremia  -Creatinine remains elevated at 4.1    Vancomycin Regimen Plan:    -Redose with vancomycin 250 mg IVPB x1 and recheck random level in AM    Drug levels (last 3 results):  Recent Labs   Lab Result Units 12/22/22  1758 12/24/22  0357   Vancomycin, Random ug/mL 12.9 18.0       Pharmacy will continue to follow and monitor vancomycin.    Please contact pharmacy at extension [phone] for questions regarding this assessment.    Thank you for the consult,   Javier Das       Patient brief summary:  Timothy Roldan is a 82 y.o. male initiated on antimicrobial therapy with IV Vancomycin for treatment of bacteremia    The patient's current regimen is vancomycin pulse dosing    Drug Allergies:   Review of patient's allergies indicates:  No Known Allergies    Actual Body Weight:   80 kg    Renal Function:   Estimated Creatinine Clearance: 14.8 mL/min (A) (based on SCr of 4.1 mg/dL (H)).,     Dialysis Method (if applicable):  N/A    CBC (last 72 hours):  Recent Labs   Lab Result Units 12/21/22 1920 12/22/22 0423 12/23/22  0239 12/24/22  0357   WBC K/uL 8.45 6.24 6.54 5.81   Hemoglobin g/dL 9.9* 8.7* 8.3* 8.7*   Hematocrit % 31.3* 27.9* 26.9* 28.3*   Platelets K/uL 164 133* 137* 165   Gran % % 88.5* 86.9* 83.8* 81.9*   Lymph % % 5.7* 8.7* 9.3* 10.5*   Mono % % 5.2 3.5* 6.1 6.2   Eosinophil % % 0.0 0.2 0.3 0.5   Basophil % % 0.1 0.2 0.2 0.2   Differential Method  Automated Automated Automated Automated       Metabolic Panel (last 72 hours):  Recent Labs   Lab Result Units 12/21/22 1920 12/21/22 2211 12/22/22  0423 12/23/22  0239 12/24/22  0357   Sodium mmol/L 142  --  139 139 139   Sodium, Urine mmol/L  --  67  --   --   --    Potassium mmol/L 3.9  --  3.6 3.8 3.8   Chloride mmol/L 105  --  104 106 106   CO2 mmol/L 27  --  25 24 20*   Glucose mg/dL 117*  --  105 101 88    Glucose, UA   --  Negative  --   --   --    BUN mg/dL 52*  --  48* 56* 60*   Creatinine mg/dL 4.2*  --  4.1* 4.3* 4.1*   Creatinine, Urine mg/dL  --  64.0  --   --   --    Albumin g/dL 2.2*  --  1.8* 1.7* 1.6*   Total Bilirubin mg/dL 0.7  --  0.7 0.5 0.4   Alkaline Phosphatase U/L 66  --  55 58 54*   AST U/L 11  --  10 8* 15   ALT U/L 7*  --  5* 5* 6*   Magnesium mg/dL  --   --  1.9 1.9 2.0   Phosphorus mg/dL  --   --  3.9 4.0 4.2       Vancomycin Administrations:  vancomycin given in the last 96 hours                     vancomycin (VANCOCIN) 1,000 mg in dextrose 5 % 250 mL IVPB (mg) 1,000 mg New Bag 12/23/22 0323    vancomycin 1,250 mg in dextrose 5 % 250 mL IVPB (mg) 1,250 mg New Bag 12/22/22 0023                    Microbiologic Results:  Microbiology Results (last 7 days)       Procedure Component Value Units Date/Time    Blood culture [[id number]] Collected: 12/21/22 2304    Order Status: Completed Specimen: Blood from Peripheral, Forearm, Right Updated: 12/24/22 0613     Blood Culture, Routine No Growth to date      No Growth to date      No Growth to date    Blood culture [[id number]] Collected: 12/21/22 2304    Order Status: Completed Specimen: Blood from Peripheral, Forearm, Left Updated: 12/24/22 0452     Blood Culture, Routine Gram stain tiffany bottle: Gram positive cocci      Results called to and read back by:Josh Bryant RN 12/24/2022  04:52    Gram stain [[id number]] Collected: 12/23/22 1032    Order Status: Completed Specimen: Urine from Kidney, Right Updated: 12/23/22 1505     Gram Stain Result Many WBC's      Many Gram positive cocci    Urine culture [[id number]] Collected: 12/21/22 2211    Order Status: Completed Specimen: Urine Updated: 12/23/22 1457     Urine Culture, Routine Multiple organisms isolated. None in predominance.  Repeat if      clinically necessary.    Narrative:      ADD-ON UUNR UNAR UROSM UPRCR TO ORDER #[id number]  UAR [facility] МАРИНА  PER ROXY JANE MD  [id number]    12/21/2022  23:33  Specimen Source->Urine    Fungus culture [[id number]] Collected: 12/23/22 1032    Order Status: Sent Specimen: Urine from Kidney, Right Updated: 12/23/22 1133    Urine Culture High Risk [[id number]] Collected: 12/23/22 1033    Order Status: Sent Specimen: Urine, Nephrostomy Tube, Right Updated: 12/23/22 1133

## 2022-12-24 NOTE — PROGRESS NOTES
[facility] ([place] [id number])  Urology  Progress Note    Patient Name: Timothy Roldan  MRN: [id number]  Admission Date: 12/21/2022  Hospital Length of Stay: 3 days  Code Status: Full Code   Attending Provider: Kalyan Vasques MD   Primary Care Physician: Martine Garces MD    Subjective:     HPI:  Leonardo Roldan is a 83yo male with a past medical history of HTN, Afib on Eliquis, alzheimer dementia, HFpEF, COVID pna, and BPH who presented to the ED with chief complaint of fever and altered mental status. Patient currently in nursing home facility and over last two days has reportedly had complaints of chills/cold as well as increased tiredness and some worsened confusion. Patient noted to be febrile at living facility today 101.4 and sent to the ED for further evaluation and management. In the ED patient Temp 100.8, tachycardic, hemodynamically stable saturating well on room air. WBC 8.45 with Lt shift. UA consistent with UTI. Creatinine 4.2 on presentation (baseline 1.0). . CXR with with evidence of bilateral effusions similar to prior studies as well as question of right lower lobe consolidation. Patient started on vancomycin and cefepime and admitted to the care of medicine for further evaluation and management.      Febrile last night to 100.8. Afebrile today. Mildly hypotensive. WBC 6.2. Cr 4.1 from 4.2 yesterday (1.0 baseline). UA nitrite negative. Microscopy with with many bacteria. 25RBCs, >100WBCs, 0 SECs. Urine culture in process. Blood culture NGTD. RAJAT with moderate right hydronephrosis. Mild left hydronephrosis. CTRSS with 5.7 cm right renal pelvis stone and additional 1.8 cm proximal ureteral stone with severe right hydronephrosis. Large left renal pelvis stone 5cm with moderate left hydronephrosis. Bilateral renal cysts.     Eliquis last dose was the morning of 12/22. Last ate 12pm 12/22.       Interval History: NAEO. AFVSS. Bilateral PCNT placed yesterday with output. Cr 4.1 from 4.3.  Right  neph tube yellow with sediment, left neph tube light red    Review of Systems  Objective:     Temp:  [97.4 °F (36.3 °C)-99 °F (37.2 °C)] 97.4 °F (36.3 °C)  Pulse:  [] 91  Resp:  [9-21] 18  SpO2:  [69 %-100 %] 95 %  BP: (101-153)/(56-91) 106/67     Body mass index is 25.4 kg/m².      Bladder Scan Volume (mL): 404 mL (12/22/22 1637)    Drains       Drain  Duration                  Urethral Catheter 12/22/22 1816 16 Fr. 1 day         Nephrostomy 12/23/22 1031 Right 8 Fr. <1 day         Nephrostomy 12/23/22 1047 Left 8 Fr. <1 day                    Physical Exam  Constitutional:       General: He is not in acute distress.  HENT:      Head: Normocephalic.   Cardiovascular:      Rate and Rhythm: Normal rate.   Pulmonary:      Effort: Pulmonary effort is normal.   Abdominal:      General: There is no distension.      Palpations: Abdomen is soft.      Tenderness: There is no abdominal tenderness. There is no right CVA tenderness, left CVA tenderness or guarding.   Genitourinary:     Comments: Indwelling Cain draining dark yellow urine  Left neph tube light red, right neph tube clear yellow with small sediment  Skin:     General: Skin is warm.      Findings: Bruising present.   Neurological:      General: No focal deficit present.      Mental Status: He is alert and oriented to person, place, and time.      Coordination: Coordination normal.      Comments: Oriented x1   Psychiatric:         Mood and Affect: Mood normal.         Behavior: Behavior normal.       Significant Labs:    BMP:  Recent Labs   Lab 12/22/22 0423 12/23/22 0239 12/24/22  0357    139 139   K 3.6 3.8 3.8    106 106   CO2 25 24 20*   BUN 48* 56* 60*   CREATININE 4.1* 4.3* 4.1*   CALCIUM 7.9* 8.1* 8.2*       CBC:   Recent Labs   Lab 12/22/22 0423 12/23/22 0239 12/24/22  0357   WBC 6.24 6.54 5.81   HGB 8.7* 8.3* 8.7*   HCT 27.9* 26.9* 28.3*   * 137* 165       Urine Studies:   Recent Labs   Lab 12/21/22  2211   COLORU Yellow    APPEARANCEUA Hazy*   PHUR 6.0   SPECGRAV 1.010   PROTEINUA 1+*   GLUCUA Negative   KETONESU Negative   BILIRUBINUA Negative   OCCULTUA 2+*   NITRITE Negative   LEUKOCYTESUR 3+*   RBCUA 25*   WBCUA >100*   BACTERIA Many*   SQUAMEPITHEL 0   HYALINECASTS 6*       Significant Imaging:  All pertinent imaging results/findings from the past 24 hours have been reviewed.        Assessment/Plan:     Kidney stones  Timothy Roldan is an 81yo male with bilateral hydronephrosis, bilateral large renal pelvis stones, UTI and acute renal failure.     - On vanc and cefepime for concerning UA. F/u culture and tailor antibiotics to sensitivity  - Urine culture with mult orgs 12/21. BCx with GPC  - CTRSS with 5.7 cm right renal pelvis stone and additional 1.8 cm proximal ureteral stone with severe right hydronephrosis. Large left renal pelvis stone 5cm with moderate left hydronephrosis. Bilateral renal cysts.   - Cr 4.1 from 4.3 yesterday (1.0 baseline). Trend Cr  - Cain placed 12/23/22 for bladder scan of 404 cc. Dark yellow output.  - Okay for diet from urologic perspective  -Dose all medications to patient's current GFR. Avoid nephrotoxic medications   - Rest of care per primary   - will arrange for definitive stone management outpatient.            VTE Risk Mitigation (From admission, onward)      None            Mckay Cordova MD  Urology  [facility] ([facility]

## 2022-12-24 NOTE — PLAN OF CARE
Problem: Bleeding (Sepsis/Septic Shock)  Goal: Absence of Bleeding  Outcome: Ongoing, Progressing     Problem: Infection Progression (Sepsis/Septic Shock)  Goal: Absence of Infection Signs and Symptoms  Outcome: Ongoing, Progressing     R. Nephrostomy tube drained 570. L. Nephrostomy tube emptied 70. Cain emptied 300. Bed locked and in lowest position. Call light in reach.

## 2022-12-24 NOTE — SUBJECTIVE & OBJECTIVE
Interval History: NAEO. AFVSS. Bilateral PCNT placed yesterday with output. Cr 4.1 from 4.3.  Right neph tube yellow with sediment, left neph tube light red    Review of Systems  Objective:     Temp:  [97.4 °F (36.3 °C)-99 °F (37.2 °C)] 97.4 °F (36.3 °C)  Pulse:  [] 91  Resp:  [9-21] 18  SpO2:  [69 %-100 %] 95 %  BP: (101-153)/(56-91) 106/67     Body mass index is 25.4 kg/m².      Bladder Scan Volume (mL): 404 mL (12/22/22 1637)    Drains       Drain  Duration                  Urethral Catheter 12/22/22 1816 16 Fr. 1 day         Nephrostomy 12/23/22 1031 Right 8 Fr. <1 day         Nephrostomy 12/23/22 1047 Left 8 Fr. <1 day                    Physical Exam  Constitutional:       General: He is not in acute distress.  HENT:      Head: Normocephalic.   Cardiovascular:      Rate and Rhythm: Normal rate.   Pulmonary:      Effort: Pulmonary effort is normal.   Abdominal:      General: There is no distension.      Palpations: Abdomen is soft.      Tenderness: There is no abdominal tenderness. There is no right CVA tenderness, left CVA tenderness or guarding.   Genitourinary:     Comments: Condom cath draining dark yellow urine  Left neph tube light red, right neph tube clear yellow with small sediment  Skin:     General: Skin is warm.      Findings: Bruising present.   Neurological:      General: No focal deficit present.      Mental Status: He is alert and oriented to person, place, and time.      Coordination: Coordination normal.      Comments: Oriented x1   Psychiatric:         Mood and Affect: Mood normal.         Behavior: Behavior normal.       Significant Labs:    BMP:  Recent Labs   Lab 12/22/22 0423 12/23/22 0239 12/24/22  0357    139 139   K 3.6 3.8 3.8    106 106   CO2 25 24 20*   BUN 48* 56* 60*   CREATININE 4.1* 4.3* 4.1*   CALCIUM 7.9* 8.1* 8.2*       CBC:   Recent Labs   Lab 12/22/22  0423 12/23/22  0239 12/24/22  0357   WBC 6.24 6.54 5.81   HGB 8.7* 8.3* 8.7*   HCT 27.9* 26.9* 28.3*    * 137* 165       Urine Studies:   Recent Labs   Lab 12/21/22  2211   COLORU Yellow   APPEARANCEUA Hazy*   PHUR 6.0   SPECGRAV 1.010   PROTEINUA 1+*   GLUCUA Negative   KETONESU Negative   BILIRUBINUA Negative   OCCULTUA 2+*   NITRITE Negative   LEUKOCYTESUR 3+*   RBCUA 25*   WBCUA >100*   BACTERIA Many*   SQUAMEPITHEL 0   HYALINECASTS 6*       Significant Imaging:  All pertinent imaging results/findings from the past 24 hours have been reviewed.

## 2022-12-24 NOTE — ASSESSMENT & PLAN NOTE
Timothy Roldan is an 83yo male with bilateral hydronephrosis, bilateral large renal pelvis stones, UTI and acute renal failure.     - On vanc and cefepime for concerning UA. F/u culture and tailor antibiotics to sensitivity  - Urine culture with mult orgs 12/21. BCx with GPC  - CTRSS with 5.7 cm right renal pelvis stone and additional 1.8 cm proximal ureteral stone with severe right hydronephrosis. Large left renal pelvis stone 5cm with moderate left hydronephrosis. Bilateral renal cysts.   - Cr 4.1 from 4.3 yesterday (1.0 baseline). Trend Cr  - Cain placed 12/23/22 for bladder scan of 404 cc. Dark yellow output.  - Okay for diet from urologic perspective  -Dose all medications to patient's current GFR. Avoid nephrotoxic medications   - Rest of care per primary   - will arrange for definitive stone management outpatient.

## 2022-12-24 NOTE — PROGRESS NOTES
[facility] ([address] Medicine  Progress Note    Patient Name: Timothy Roldan  MRN: [id number]  Patient Class: IP- Inpatient   Admission Date: 12/21/2022  Length of Stay: 3 days  Attending Physician: Kalyan Vasques MD  Primary Care Provider: Martine Garces MD        Subjective:     Principal Problem:Sepsis    Interval History: 83yo nursing home male with a past medical history of HTN, Afib on Eliquis, alzheimer dementia, HFpEF, COVID pna, and BPH who presents to the ED with chief complaint of fever and altered mental status. He was also found to have an acute kidney injury. He has no concrete source of infection. Blood cultures no growth thus far. Urinalysis significant for WBC, however Nitrates negative. CT chest not suggestive of Pneumonia, however bilateral pleural effusions which appears to be chronic and patient has no reports of respiratory distress and is doing well on room air. CT abdomen however reports of bilateral kidney stones with  bilateral hydronephrosis, Urology was consulted and patient underwent bilateral nephrostomy tube placement.    Nephrostomy tubes draining since this am Left 60cc Right 250cc and Urine via brower 300cc. Patient eating ok. No complaints at this time. Remains afebrile, WBC normal and cultures no growth. Denies any worsening SOB    Review of Systems   Unable to perform ROS: Dementia   Objective:     Vital Signs (Most Recent):  Temp: 97.4 °F (36.3 °C) (12/24/22 0745)  Pulse: 91 (12/24/22 0745)  Resp: 18 (12/24/22 1002)  BP: 106/67 (12/24/22 0745)  SpO2: 95 % (12/24/22 0745) Vital Signs (24h Range):  Temp:  [97.4 °F (36.3 °C)-99 °F (37.2 °C)] 97.4 °F (36.3 °C)  Pulse:  [91-97] 91  Resp:  [17-18] 18  SpO2:  [95 %-96 %] 95 %  BP: (101-110)/(61-67) 106/67     Weight: 80.3 kg (177 lb)  Body mass index is 25.4 kg/m².    Intake/Output Summary (Last 24 hours) at 12/24/2022 1540  Last data filed at 12/24/2022 0940  Gross per 24 hour   Intake 50.5 ml   Output  1250 ml   Net -1199.5 ml      Physical Exam  Constitutional:       Appearance: He is obese. He is ill-appearing.      Comments: Frail appearing male   HENT:      Head: Normocephalic.      Right Ear: External ear normal.      Left Ear: External ear normal.      Nose: Nose normal.   Eyes:      General: No scleral icterus.  Cardiovascular:      Rate and Rhythm: Normal rate.      Heart sounds: Normal heart sounds.   Pulmonary:      Comments: Decreased air entry at the base  Abdominal:      Palpations: Abdomen is soft.      Tenderness: There is no abdominal tenderness.   Genitourinary:     Comments: Bilateral Nephrostomy tube with urine noted. Cain catheter with dark urine  Skin:     General: Skin is warm.   Neurological:      Mental Status: He is alert. Mental status is at baseline.   Psychiatric:         Mood and Affect: Mood normal.             Significant Labs: All pertinent labs within the past 24 hours have been reviewed.        Assessment/Plan:      Active Diagnoses:    Diagnosis Date Noted POA    PRINCIPAL PROBLEM:  Sepsis [A41.9] 12/21/2022 Yes    Bilateral hydronephrosis [N13.30] 12/22/2022 Yes    Kidney stones [N20.0] 12/22/2022 Yes    Chronic heart failure with preserved ejection fraction [I50.32] 05/31/2022 Yes    Pleural effusion [J90] 05/31/2022 Yes    Persistent atrial fibrillation [I48.19] 12/31/2021 Yes    Essential hypertension [I10] 12/31/2021 Yes    Alzheimer disease [G30.9, F02.80] 12/31/2021 Yes    CLEM (acute kidney injury) [N17.9] 12/31/2021 Yes      Problems Resolved During this Admission:     VTE Risk Mitigation (From admission, onward)      None          Presumed Sepsis, likely secondary to infected kidney stones and bilateral hydronephrosis. Continue Vancomycin and Cefepime(Day 4 of abx)(dosing for renal impairment, Pharmacy on board). Urine culture report noted, blood culture No growth to date, one bottle likely a contaminant. Ostomy fluid no growth. F/u Urology  Post Renal CLEM. Should  improve with ostomy drainage. Patient eating ok. Continue to monitor Ostomies and Cain Output. F/u Urology  Bilateral pleural effusions. History of Diastolic Heart Failure. Chronic effusion, stable respiratory status. In view of significant CLEM, will Hold diuretics for now  History of atrial fibrillation. Rate controlled. Will resume apixaban, renally dosed. Transient Low normal BP seen and HR controlled, will monitor of Beta blocker for now.  Alzheimer disease  HTN    Fall precaution. Aspiration Precaution. Turn patient frequently    Kalyan Vasques MD  Department of Hospital Medicine   [facility] - Intensive Care ([place])

## 2022-12-25 LAB
ANION GAP SERPL CALC-SCNC: 12 MMOL/L (ref 8–16)
BASOPHILS # BLD AUTO: 0.01 K/UL (ref 0–0.2)
BASOPHILS NFR BLD: 0.2 % (ref 0–1.9)
BUN SERPL-MCNC: 60 MG/DL (ref 8–23)
CALCIUM SERPL-MCNC: 8.6 MG/DL (ref 8.7–10.5)
CHLORIDE SERPL-SCNC: 109 MMOL/L (ref 95–110)
CO2 SERPL-SCNC: 21 MMOL/L (ref 23–29)
CREAT SERPL-MCNC: 3.4 MG/DL (ref 0.5–1.4)
DIFFERENTIAL METHOD: ABNORMAL
EOSINOPHIL # BLD AUTO: 0.1 K/UL (ref 0–0.5)
EOSINOPHIL NFR BLD: 1.6 % (ref 0–8)
ERYTHROCYTE [DISTWIDTH] IN BLOOD BY AUTOMATED COUNT: 14.4 % (ref 11.5–14.5)
EST. GFR  (NO RACE VARIABLE): 17.5 ML/MIN/1.73 M^2
GLUCOSE SERPL-MCNC: 109 MG/DL (ref 70–110)
HCT VFR BLD AUTO: 30.1 % (ref 40–54)
HGB BLD-MCNC: 9.3 G/DL (ref 14–18)
IMM GRANULOCYTES # BLD AUTO: 0.02 K/UL (ref 0–0.04)
IMM GRANULOCYTES NFR BLD AUTO: 0.3 % (ref 0–0.5)
LYMPHOCYTES # BLD AUTO: 0.7 K/UL (ref 1–4.8)
LYMPHOCYTES NFR BLD: 11.5 % (ref 18–48)
MCH RBC QN AUTO: 29.1 PG (ref 27–31)
MCHC RBC AUTO-ENTMCNC: 30.9 G/DL (ref 32–36)
MCV RBC AUTO: 94 FL (ref 82–98)
MONOCYTES # BLD AUTO: 0.3 K/UL (ref 0.3–1)
MONOCYTES NFR BLD: 5.7 % (ref 4–15)
NEUTROPHILS # BLD AUTO: 4.6 K/UL (ref 1.8–7.7)
NEUTROPHILS NFR BLD: 80.7 % (ref 38–73)
NRBC BLD-RTO: 0 /100 WBC
PLATELET # BLD AUTO: 206 K/UL (ref 150–450)
PMV BLD AUTO: 10.4 FL (ref 9.2–12.9)
POTASSIUM SERPL-SCNC: 3.5 MMOL/L (ref 3.5–5.1)
RBC # BLD AUTO: 3.2 M/UL (ref 4.6–6.2)
SODIUM SERPL-SCNC: 142 MMOL/L (ref 136–145)
VANCOMYCIN SERPL-MCNC: 16.7 UG/ML
WBC # BLD AUTO: 5.74 K/UL (ref 3.9–12.7)

## 2022-12-25 PROCEDURE — 63600175 PHARM REV CODE 636 W HCPCS: Performed by: INTERNAL MEDICINE

## 2022-12-25 PROCEDURE — 25000003 PHARM REV CODE 250: Performed by: INTERNAL MEDICINE

## 2022-12-25 PROCEDURE — 12000002 HC ACUTE/MED SURGE SEMI-PRIVATE ROOM

## 2022-12-25 PROCEDURE — 25000003 PHARM REV CODE 250: Performed by: FAMILY MEDICINE

## 2022-12-25 PROCEDURE — 99232 SBSQ HOSP IP/OBS MODERATE 35: CPT | Mod: ,,, | Performed by: INTERNAL MEDICINE

## 2022-12-25 PROCEDURE — 80048 BASIC METABOLIC PNL TOTAL CA: CPT | Performed by: INTERNAL MEDICINE

## 2022-12-25 PROCEDURE — 80202 ASSAY OF VANCOMYCIN: CPT | Performed by: INTERNAL MEDICINE

## 2022-12-25 PROCEDURE — 85025 COMPLETE CBC W/AUTO DIFF WBC: CPT | Performed by: INTERNAL MEDICINE

## 2022-12-25 PROCEDURE — 99232 PR SUBSEQUENT HOSPITAL CARE,LEVL II: ICD-10-PCS | Mod: ,,, | Performed by: INTERNAL MEDICINE

## 2022-12-25 RX ORDER — METOPROLOL TARTRATE 25 MG/1
12.5 TABLET ORAL 2 TIMES DAILY
Status: DISCONTINUED | OUTPATIENT
Start: 2022-12-25 | End: 2022-12-28 | Stop reason: HOSPADM

## 2022-12-25 RX ADMIN — CEFEPIME 1 G: 1 INJECTION, POWDER, FOR SOLUTION INTRAMUSCULAR; INTRAVENOUS at 10:12

## 2022-12-25 RX ADMIN — DONEPEZIL HYDROCHLORIDE 10 MG: 10 TABLET ORAL at 10:12

## 2022-12-25 RX ADMIN — METOPROLOL TARTRATE 12.5 MG: 25 TABLET, FILM COATED ORAL at 11:12

## 2022-12-25 RX ADMIN — APIXABAN 2.5 MG: 2.5 TABLET, FILM COATED ORAL at 10:12

## 2022-12-25 RX ADMIN — QUETIAPINE FUMARATE 25 MG: 25 TABLET ORAL at 10:12

## 2022-12-25 RX ADMIN — APIXABAN 2.5 MG: 2.5 TABLET, FILM COATED ORAL at 09:12

## 2022-12-25 RX ADMIN — TAMSULOSIN HYDROCHLORIDE 0.4 MG: 0.4 CAPSULE ORAL at 09:12

## 2022-12-25 RX ADMIN — METOPROLOL TARTRATE 12.5 MG: 25 TABLET, FILM COATED ORAL at 10:12

## 2022-12-25 RX ADMIN — ATORVASTATIN CALCIUM 10 MG: 10 TABLET, FILM COATED ORAL at 09:12

## 2022-12-25 RX ADMIN — VANCOMYCIN HYDROCHLORIDE 500 MG: 500 INJECTION, POWDER, LYOPHILIZED, FOR SOLUTION INTRAVENOUS at 11:12

## 2022-12-25 RX ADMIN — MEMANTINE 20 MG: 10 TABLET ORAL at 09:12

## 2022-12-25 RX ADMIN — OXYCODONE 5 MG: 5 TABLET ORAL at 05:12

## 2022-12-25 NOTE — ASSESSMENT & PLAN NOTE
Timothy Roldan is an 81yo male with bilateral hydronephrosis, bilateral large renal pelvis stones, UTI and acute renal failure.     - Antibiotics per primary, currently on Vanc and Cefepime  - Urine culture with mult orgs 12/21. BCx with GPC  - Bilateral PCNTs placed by IR 12/23/22  - Cr 3.4 from 4.1 (1.0 baseline). Continue to trend  - Dose all medications to patient's current GFR. Avoid nephrotoxic meds  - Cain placed 12/23/22. Recommend voiding trial prior to discharge.    - CTRSS with 5.7 cm right renal pelvis stone and additional 1.8 cm proximal ureteral stone with severe right hydronephrosis. Large left renal pelvis stone 5cm with moderate left hydronephrosis. Bilateral renal cysts.   - will arrange for definitive stone management outpatient.  - Rest of care per primary

## 2022-12-25 NOTE — PROGRESS NOTES
[facility] ([place] [address])  Urology  Progress Note    Patient Name: Timothy Roldan  MRN: [id number]  Admission Date: 12/21/2022  Hospital Length of Stay: 4 days  Code Status: Full Code   Attending Provider: Kalyan aVsques MD   Primary Care Physician: Martine Garces MD    Subjective:     HPI:  Leonardo Roldan is a 83yo male with a past medical history of HTN, Afib on Eliquis, alzheimer dementia, HFpEF, COVID pna, and BPH who presented to the ED with chief complaint of fever and altered mental status. Patient currently in nursing home facility and over last two days has reportedly had complaints of chills/cold as well as increased tiredness and some worsened confusion. Patient noted to be febrile at living facility today 101.4 and sent to the ED for further evaluation and management. In the ED patient Temp 100.8, tachycardic, hemodynamically stable saturating well on room air. WBC 8.45 with Lt shift. UA consistent with UTI. Creatinine 4.2 on presentation (baseline 1.0). . CXR with with evidence of bilateral effusions similar to prior studies as well as question of right lower lobe consolidation. Patient started on vancomycin and cefepime and admitted to the care of medicine for further evaluation and management.      Febrile last night to 100.8. Afebrile today. Mildly hypotensive. WBC 6.2. Cr 4.1 from 4.2 yesterday (1.0 baseline). UA nitrite negative. Microscopy with with many bacteria. 25RBCs, >100WBCs, 0 SECs. Urine culture in process. Blood culture NGTD. RAJAT with moderate right hydronephrosis. Mild left hydronephrosis. CTRSS with 5.7 cm right renal pelvis stone and additional 1.8 cm proximal ureteral stone with severe right hydronephrosis. Large left renal pelvis stone 5cm with moderate left hydronephrosis. Bilateral renal cysts.     Eliquis last dose was the morning of 12/22. Last ate 12pm 12/22.       Interval History: NAEO. AFVSS. Output per both neph tubes, L less than R. Cr downtrending.    R  PCNT: 100/775/250 light pink  L PCNT: 50/30/50 red  Urethral: 150/350/325 pink tinged    Review of Systems  Objective:     Temp:  [97.9 °F (36.6 °C)-98.9 °F (37.2 °C)] 98.3 °F (36.8 °C)  Pulse:  [] 94  Resp:  [17-18] 18  SpO2:  [94 %-99 %] 99 %  BP: (121-143)/(71-91) 143/76     Body mass index is 25.4 kg/m².      Bladder Scan Volume (mL): 404 mL (12/22/22 1637)    Drains       Drain  Duration                  Urethral Catheter 12/22/22 1816 16 Fr. 2 days         Nephrostomy 12/23/22 1031 Right 8 Fr. 1 day         Nephrostomy 12/23/22 1047 Left 8 Fr. 1 day                    Physical Exam  Constitutional:       General: He is not in acute distress.  HENT:      Head: Normocephalic.   Cardiovascular:      Rate and Rhythm: Normal rate.   Pulmonary:      Effort: Pulmonary effort is normal.   Abdominal:      General: There is no distension.      Palpations: Abdomen is soft.      Tenderness: There is no abdominal tenderness. There is no right CVA tenderness, left CVA tenderness or guarding.   Genitourinary:     Comments: Indwelling Cain draining pink tinged yellow urine  Left neph tube light red, right neph tube light pink  Skin:     General: Skin is warm.      Findings: Bruising present.   Neurological:      General: No focal deficit present.      Mental Status: He is alert and oriented to person, place, and time.      Coordination: Coordination normal.      Comments: Oriented x1   Psychiatric:         Mood and Affect: Mood normal.         Behavior: Behavior normal.       Significant Labs:    BMP:  Recent Labs   Lab 12/23/22  0239 12/24/22  0357 12/25/22  0505    139 142   K 3.8 3.8 3.5    106 109   CO2 24 20* 21*   BUN 56* 60* 60*   CREATININE 4.3* 4.1* 3.4*   CALCIUM 8.1* 8.2* 8.6*       CBC:   Recent Labs   Lab 12/23/22  0239 12/24/22  0357 12/25/22  0505   WBC 6.54 5.81 5.74   HGB 8.3* 8.7* 9.3*   HCT 26.9* 28.3* 30.1*   * 165 206       Urine Studies:   Recent Labs   Lab 12/21/22  2213    COLORU Yellow   APPEARANCEUA Hazy*   PHUR 6.0   SPECGRAV 1.010   PROTEINUA 1+*   GLUCUA Negative   KETONESU Negative   BILIRUBINUA Negative   OCCULTUA 2+*   NITRITE Negative   LEUKOCYTESUR 3+*   RBCUA 25*   WBCUA >100*   BACTERIA Many*   SQUAMEPITHEL 0   HYALINECASTS 6*       Significant Imaging:  All pertinent imaging results/findings from the past 24 hours have been reviewed.  \      Assessment/Plan:     Kidney stones  Timothy Roldan is an 81yo male with bilateral hydronephrosis, bilateral large renal pelvis stones, UTI and acute renal failure.     - Antibiotics per primary, currently on Vanc and Cefepime  - Urine culture with mult orgs 12/21. BCx with GPC  - Bilateral PCNTs placed by IR 12/23/22  - Cr 3.4 from 4.1 (1.0 baseline). Continue to trend  - Dose all medications to patient's current GFR. Avoid nephrotoxic meds  - Cain placed 12/23/22. Recommend voiding trial prior to discharge.    - CTRSS with 5.7 cm right renal pelvis stone and additional 1.8 cm proximal ureteral stone with severe right hydronephrosis. Large left renal pelvis stone 5cm with moderate left hydronephrosis. Bilateral renal cysts.   - will arrange for definitive stone management outpatient.  - Rest of care per primary             VTE Risk Mitigation (From admission, onward)         Ordered     apixaban tablet 2.5 mg  2 times daily         12/24/22 8657                Mckay Cordova MD  Urology  [facility] ([place])

## 2022-12-25 NOTE — NURSING
Pt urine turned from dark yellow to now it being dark red. Pt doesn't c/o any pain or discomfort with brower at this time. Notified MD Sofya of findings.

## 2022-12-25 NOTE — PROGRESS NOTES
Pharmacokinetic Assessment Follow Up: IV Vancomycin    Vancomycin serum concentration assessment(s):    -Random level within goal range of 15 - 20  -Creatinine decreased to 3.4 today    Vancomycin Regimen Plan:    -Redose with vancomycin 500 mg IVPB and check random level in AM    Drug levels (last 3 results):  Recent Labs   Lab Result Units 12/22/22  1758 12/24/22  0357 12/25/22  0505   Vancomycin, Random ug/mL 12.9 18.0 16.7       Pharmacy will continue to follow and monitor vancomycin.    Please contact pharmacy at extension [phone] for questions regarding this assessment.    Thank you for the consult,   Javier Das       Patient brief summary:  Timothy Roldan is a 82 y.o. male initiated on antimicrobial therapy with IV Vancomycin for treatment of bacteremia    The patient's current regimen is vancomycin pulse dosing    Drug Allergies:   Review of patient's allergies indicates:  No Known Allergies    Actual Body Weight:   80 kg    Renal Function:   Estimated Creatinine Clearance: 17.9 mL/min (A) (based on SCr of 3.4 mg/dL (H)).,     Dialysis Method (if applicable):  N/A    CBC (last 72 hours):  Recent Labs   Lab Result Units 12/23/22 0239 12/24/22  0357 12/25/22  0505   WBC K/uL 6.54 5.81 5.74   Hemoglobin g/dL 8.3* 8.7* 9.3*   Hematocrit % 26.9* 28.3* 30.1*   Platelets K/uL 137* 165 206   Gran % % 83.8* 81.9* 80.7*   Lymph % % 9.3* 10.5* 11.5*   Mono % % 6.1 6.2 5.7   Eosinophil % % 0.3 0.5 1.6   Basophil % % 0.2 0.2 0.2   Differential Method  Automated Automated Automated       Metabolic Panel (last 72 hours):  Recent Labs   Lab Result Units 12/23/22  0239 12/24/22  0357 12/25/22  0505   Sodium mmol/L 139 139 142   Potassium mmol/L 3.8 3.8 3.5   Chloride mmol/L 106 106 109   CO2 mmol/L 24 20* 21*   Glucose mg/dL 101 88 109   BUN mg/dL 56* 60* 60*   Creatinine mg/dL 4.3* 4.1* 3.4*   Albumin g/dL 1.7* 1.6*  --    Total Bilirubin mg/dL 0.5 0.4  --    Alkaline Phosphatase U/L 58 54*  --    AST U/L 8* 15  --    ALT  U/L 5* 6*  --    Magnesium mg/dL 1.9 2.0  --    Phosphorus mg/dL 4.0 4.2  --        Vancomycin Administrations:  vancomycin given in the last 96 hours                     vancomycin (VANCOCIN) 250 mg in dextrose 5 % 100 mL IVPB (mg) 250 mg New Bag 12/24/22 1007    vancomycin (VANCOCIN) 1,000 mg in dextrose 5 % 250 mL IVPB (mg) 1,000 mg New Bag 12/23/22 0323    vancomycin 1,250 mg in dextrose 5 % 250 mL IVPB (mg) 1,250 mg New Bag 12/22/22 0023                    Microbiologic Results:  Microbiology Results (last 7 days)       Procedure Component Value Units Date/Time    Blood culture [[id number]] Collected: 12/21/22 2304    Order Status: Completed Specimen: Blood from Peripheral, Forearm, Left Updated: 12/25/22 0814     Blood Culture, Routine Gram stain tiffany bottle: Gram positive cocci      Results called to and read back by:Josh Bryant RN 12/24/2022  04:52      Gram stain aer bottle: Gram positive cocci      Positive results previously called    Blood culture [[id number]] Collected: 12/21/22 2304    Order Status: Completed Specimen: Blood from Peripheral, Forearm, Right Updated: 12/25/22 0612     Blood Culture, Routine No Growth to date      No Growth to date      No Growth to date      No Growth to date    Urine Culture High Risk [[id number]] Collected: 12/23/22 1033    Order Status: Completed Specimen: Urine, Nephrostomy Tube, Right Updated: 12/24/22 1320     Urine Culture, Routine No growth    Narrative:      Indicated criteria for high risk culture:->Other  Other (specify):->collected during nephrostomy tube  insertion, right kidney    Gram stain [[id number]] Collected: 12/23/22 1032    Order Status: Completed Specimen: Urine from Kidney, Right Updated: 12/23/22 1505     Gram Stain Result Many WBC's      Many Gram positive cocci    Urine culture [[id number]] Collected: 12/21/22 2211    Order Status: Completed Specimen: Urine Updated: 12/23/22 1457     Urine Culture, Routine Multiple organisms isolated. None in  predominance.  Repeat if      clinically necessary.    Narrative:      ADD-ON UUNR UNAR UROSM UPRCR TO ORDER #[id number]  [facility]  PER ROXY JANE MD  [id number]   12/21/2022  23:33  Specimen Source->Urine    Fungus culture [[id number]] Collected: 12/23/22 1032    Order Status: Sent Specimen: Urine from Kidney, Right Updated: 12/23/22 1133

## 2022-12-25 NOTE — NURSING
REPORT REC., CARE ASSUMED, VSS, NAD, ALERT TO SELF ONLY, B NEPHROSTOMY TUBES AND GARCIA CATH INTACT, DRAINING TEA COLORED URINE, MDS AWARE, NO NEW ORDERS AT THIS TIME, WILL MONITOR

## 2022-12-25 NOTE — PT/OT/SLP PROGRESS
Occupational Therapy      Patient Name:  Timothy Roldan   MRN:  [id number]    Patient not seen today secondary to pt unable to be aroused and not following commands. Will follow-up.    12/25/2022

## 2022-12-25 NOTE — SUBJECTIVE & OBJECTIVE
Interval History: NAEO. AFVSS. Output per both neph tubes, L less than R. Cr downtrending.    R PCNT: 100/775/250 light pink  L PCNT: 50/30/50 red  Urethral: 150/350/325 pink tinged    Review of Systems  Objective:     Temp:  [97.9 °F (36.6 °C)-98.9 °F (37.2 °C)] 98.3 °F (36.8 °C)  Pulse:  [] 94  Resp:  [17-18] 18  SpO2:  [94 %-99 %] 99 %  BP: (121-143)/(71-91) 143/76     Body mass index is 25.4 kg/m².      Bladder Scan Volume (mL): 404 mL (12/22/22 1637)    Drains       Drain  Duration                  Urethral Catheter 12/22/22 1816 16 Fr. 2 days         Nephrostomy 12/23/22 1031 Right 8 Fr. 1 day         Nephrostomy 12/23/22 1047 Left 8 Fr. 1 day                    Physical Exam  Constitutional:       General: He is not in acute distress.  HENT:      Head: Normocephalic.   Cardiovascular:      Rate and Rhythm: Normal rate.   Pulmonary:      Effort: Pulmonary effort is normal.   Abdominal:      General: There is no distension.      Palpations: Abdomen is soft.      Tenderness: There is no abdominal tenderness. There is no right CVA tenderness, left CVA tenderness or guarding.   Genitourinary:     Comments: Indwelling Cain draining pink tinged yellow urine  Left neph tube light red, right neph tube light pink  Skin:     General: Skin is warm.      Findings: Bruising present.   Neurological:      General: No focal deficit present.      Mental Status: He is alert and oriented to person, place, and time.      Coordination: Coordination normal.      Comments: Oriented x1   Psychiatric:         Mood and Affect: Mood normal.         Behavior: Behavior normal.       Significant Labs:    BMP:  Recent Labs   Lab 12/23/22 0239 12/24/22 0357 12/25/22  0505    139 142   K 3.8 3.8 3.5    106 109   CO2 24 20* 21*   BUN 56* 60* 60*   CREATININE 4.3* 4.1* 3.4*   CALCIUM 8.1* 8.2* 8.6*       CBC:   Recent Labs   Lab 12/23/22  0239 12/24/22  0357 12/25/22  0505   WBC 6.54 5.81 5.74   HGB 8.3* 8.7* 9.3*   HCT  26.9* 28.3* 30.1*   * 165 206       Urine Studies:   Recent Labs   Lab 12/21/22  2211   COLORU Yellow   APPEARANCEUA Hazy*   PHUR 6.0   SPECGRAV 1.010   PROTEINUA 1+*   GLUCUA Negative   KETONESU Negative   BILIRUBINUA Negative   OCCULTUA 2+*   NITRITE Negative   LEUKOCYTESUR 3+*   RBCUA 25*   WBCUA >100*   BACTERIA Many*   SQUAMEPITHEL 0   HYALINECASTS 6*       Significant Imaging:  All pertinent imaging results/findings from the past 24 hours have been reviewed.  \

## 2022-12-25 NOTE — PROGRESS NOTES
[facility] ([address] Medicine  Progress Note    Patient Name: Timothy Roldan  MRN: [id number]  Patient Class: IP- Inpatient   Admission Date: 12/21/2022  Length of Stay: 4 days  Attending Physician: Kalyan Vasques MD  Primary Care Provider: Martine Garces MD        Subjective:     Principal Problem:Sepsis    Interval History:  81yo nursing home male with a past medical history of HTN, Afib on Eliquis, alzheimer dementia, HFpEF, COVID pna, and BPH who presents to the ED with chief complaint of fever and altered mental status. He was also found to have an acute kidney injury. He has no concrete source of infection. Blood cultures no growth thus far. Urinalysis significant for WBC, however Nitrates negative. CT chest not suggestive of Pneumonia, however bilateral pleural effusions which appears to be chronic and patient has no reports of respiratory distress and is doing well on room air. CT abdomen however reports of bilateral kidney stones with  bilateral hydronephrosis, Urology was consulted and patient underwent bilateral nephrostomy tube placement.    Urine output noted. Patient's creatine improving. He was resting comfortable in bed. Left kidney drain with few clots/sediments noted, is slightly darker compared to right(though no major change since yesterday). Hb stable.       Niece updated yesterday by phone    Review of Systems   Unable to perform ROS: Dementia   Objective:     Vital Signs (Most Recent):  Temp: 98.3 °F (36.8 °C) (12/25/22 0900)  Pulse: 94 (12/25/22 0900)  Resp: 18 (12/25/22 0900)  BP: (!) 143/76 (12/25/22 0900)  SpO2: 99 % (12/25/22 0900)   Vital Signs (24h Range):  Temp:  [97.9 °F (36.6 °C)-98.9 °F (37.2 °C)] 98.3 °F (36.8 °C)  Pulse:  [] 94  Resp:  [17-18] 18  SpO2:  [94 %-99 %] 99 %  BP: (121-143)/(71-91) 143/76     Weight: 80.3 kg (177 lb)  Body mass index is 25.4 kg/m².    Intake/Output Summary (Last 24 hours) at 12/25/2022 1002  Last data filed at  12/25/2022 0530  Gross per 24 hour   Intake --   Output 1780 ml   Net -1780 ml      Physical Exam  Constitutional:       Appearance: He is obese. He is not ill-appearing.      Comments: Frail appearing male sleeping comfortably   HENT:      Head: Normocephalic.      Right Ear: External ear normal.      Left Ear: External ear normal.      Nose: Nose normal.   Cardiovascular:      Rate and Rhythm: Normal rate.   Pulmonary:      Effort: Pulmonary effort is normal.   Abdominal:      Palpations: Abdomen is soft.      Tenderness: There is no abdominal tenderness.   Genitourinary:     Comments: Nephrostomy tubes noted with urine. Left drainage is darker with few sediments/clots noted. Cain catheter urine still dark but more improved compare to yesterday  Neurological:      Mental Status: He is alert. Mental status is at baseline.             Significant Labs: All pertinent labs within the past 24 hours have been reviewed.      Assessment/Plan:      Active Diagnoses:    Diagnosis Date Noted POA    PRINCIPAL PROBLEM:  Sepsis [A41.9] 12/21/2022 Yes    Bilateral hydronephrosis [N13.30] 12/22/2022 Yes    Kidney stones [N20.0] 12/22/2022 Yes    Chronic heart failure with preserved ejection fraction [I50.32] 05/31/2022 Yes    Pleural effusion [J90] 05/31/2022 Yes    Persistent atrial fibrillation [I48.19] 12/31/2021 Yes    Essential hypertension [I10] 12/31/2021 Yes    Alzheimer disease [G30.9, F02.80] 12/31/2021 Yes    CLEM (acute kidney injury) [N17.9] 12/31/2021 Yes      Problems Resolved During this Admission:     VTE Risk Mitigation (From admission, onward)           Ordered     apixaban tablet 2.5 mg  2 times daily         12/24/22 1612                  Presumed Sepsis, likely secondary to infected kidney stones and bilateral hydronephrosis. Continue Vancomycin and Cefepime(Day 5 of abx)(dosing for renal impairment, Pharmacy on board). Urine culture report noted, blood culture No growth to date, one bottle likely a  contaminant. Ostomy fluid no growth. F/u Urology  Post Renal CLEM. Improving with nephrostomy drainage. Patient eating ok. Continue to monitor Ostomies and Cain Output. F/u Urology  Bilateral pleural effusions. History of Diastolic Heart Failure. Chronic effusion, stable respiratory status. In view of significant CLEM, continue to Hold diuretics for now  History of atrial fibrillation. Rate controlled. Continue apixaban, renally dosed. Will resume beta blocker.  Hb stable  Alzheimer disease  HTN  Therapy consult     Fall precaution. Aspiration Precaution. Turn patient frequently    Niece updated via phone    Kalyan Vasques MD  Department of Hospital Medicine   [facility] - Intensive Care ([place])

## 2022-12-25 NOTE — PLAN OF CARE
Problem: Adult Inpatient Plan of Care  Goal: Plan of Care Review  Outcome: Met  Goal: Patient-Specific Goal (Individualized)  Outcome: Met  Goal: Absence of Hospital-Acquired Illness or Injury  Outcome: Met  Goal: Optimal Comfort and Wellbeing  Outcome: Met  Goal: Readiness for Transition of Care  Outcome: Met     Problem: Adjustment to Illness (Sepsis/Septic Shock)  Goal: Optimal Coping  Outcome: Met     Problem: Bleeding (Sepsis/Septic Shock)  Goal: Absence of Bleeding  Outcome: Met

## 2022-12-26 LAB
ANION GAP SERPL CALC-SCNC: 10 MMOL/L (ref 8–16)
BACTERIA BLD CULT: ABNORMAL
BASOPHILS # BLD AUTO: 0.01 K/UL (ref 0–0.2)
BASOPHILS NFR BLD: 0.2 % (ref 0–1.9)
BUN SERPL-MCNC: 48 MG/DL (ref 8–23)
CALCIUM SERPL-MCNC: 8.8 MG/DL (ref 8.7–10.5)
CHLORIDE SERPL-SCNC: 111 MMOL/L (ref 95–110)
CO2 SERPL-SCNC: 23 MMOL/L (ref 23–29)
CREAT SERPL-MCNC: 2.3 MG/DL (ref 0.5–1.4)
DIFFERENTIAL METHOD: ABNORMAL
EOSINOPHIL # BLD AUTO: 0.2 K/UL (ref 0–0.5)
EOSINOPHIL NFR BLD: 3.6 % (ref 0–8)
ERYTHROCYTE [DISTWIDTH] IN BLOOD BY AUTOMATED COUNT: 14.7 % (ref 11.5–14.5)
EST. GFR  (NO RACE VARIABLE): 28 ML/MIN/1.73 M^2
GLUCOSE SERPL-MCNC: 96 MG/DL (ref 70–110)
HCT VFR BLD AUTO: 32.6 % (ref 40–54)
HGB BLD-MCNC: 9.8 G/DL (ref 14–18)
IMM GRANULOCYTES # BLD AUTO: 0.03 K/UL (ref 0–0.04)
IMM GRANULOCYTES NFR BLD AUTO: 0.5 % (ref 0–0.5)
LYMPHOCYTES # BLD AUTO: 0.7 K/UL (ref 1–4.8)
LYMPHOCYTES NFR BLD: 12.5 % (ref 18–48)
MCH RBC QN AUTO: 28.4 PG (ref 27–31)
MCHC RBC AUTO-ENTMCNC: 30.1 G/DL (ref 32–36)
MCV RBC AUTO: 95 FL (ref 82–98)
MONOCYTES # BLD AUTO: 0.3 K/UL (ref 0.3–1)
MONOCYTES NFR BLD: 5.2 % (ref 4–15)
NEUTROPHILS # BLD AUTO: 4.6 K/UL (ref 1.8–7.7)
NEUTROPHILS NFR BLD: 78 % (ref 38–73)
NRBC BLD-RTO: 0 /100 WBC
PLATELET # BLD AUTO: 228 K/UL (ref 150–450)
PMV BLD AUTO: 10.4 FL (ref 9.2–12.9)
POTASSIUM SERPL-SCNC: 4 MMOL/L (ref 3.5–5.1)
RBC # BLD AUTO: 3.45 M/UL (ref 4.6–6.2)
SODIUM SERPL-SCNC: 144 MMOL/L (ref 136–145)
VANCOMYCIN SERPL-MCNC: 16.8 UG/ML
WBC # BLD AUTO: 5.91 K/UL (ref 3.9–12.7)

## 2022-12-26 PROCEDURE — 80202 ASSAY OF VANCOMYCIN: CPT | Performed by: INTERNAL MEDICINE

## 2022-12-26 PROCEDURE — 97112 NEUROMUSCULAR REEDUCATION: CPT

## 2022-12-26 PROCEDURE — 25000003 PHARM REV CODE 250: Performed by: FAMILY MEDICINE

## 2022-12-26 PROCEDURE — 97162 PT EVAL MOD COMPLEX 30 MIN: CPT

## 2022-12-26 PROCEDURE — 99232 SBSQ HOSP IP/OBS MODERATE 35: CPT | Mod: ,,, | Performed by: INTERNAL MEDICINE

## 2022-12-26 PROCEDURE — 85025 COMPLETE CBC W/AUTO DIFF WBC: CPT | Performed by: INTERNAL MEDICINE

## 2022-12-26 PROCEDURE — 12000002 HC ACUTE/MED SURGE SEMI-PRIVATE ROOM

## 2022-12-26 PROCEDURE — 63600175 PHARM REV CODE 636 W HCPCS: Performed by: INTERNAL MEDICINE

## 2022-12-26 PROCEDURE — 97530 THERAPEUTIC ACTIVITIES: CPT

## 2022-12-26 PROCEDURE — 36415 COLL VENOUS BLD VENIPUNCTURE: CPT | Performed by: INTERNAL MEDICINE

## 2022-12-26 PROCEDURE — 25000003 PHARM REV CODE 250: Performed by: INTERNAL MEDICINE

## 2022-12-26 PROCEDURE — 97166 OT EVAL MOD COMPLEX 45 MIN: CPT

## 2022-12-26 PROCEDURE — 80048 BASIC METABOLIC PNL TOTAL CA: CPT | Performed by: INTERNAL MEDICINE

## 2022-12-26 PROCEDURE — 99232 PR SUBSEQUENT HOSPITAL CARE,LEVL II: ICD-10-PCS | Mod: ,,, | Performed by: INTERNAL MEDICINE

## 2022-12-26 RX ADMIN — APIXABAN 2.5 MG: 2.5 TABLET, FILM COATED ORAL at 08:12

## 2022-12-26 RX ADMIN — MEMANTINE 20 MG: 10 TABLET ORAL at 09:12

## 2022-12-26 RX ADMIN — METOPROLOL TARTRATE 12.5 MG: 25 TABLET, FILM COATED ORAL at 09:12

## 2022-12-26 RX ADMIN — APIXABAN 2.5 MG: 2.5 TABLET, FILM COATED ORAL at 09:12

## 2022-12-26 RX ADMIN — TAMSULOSIN HYDROCHLORIDE 0.4 MG: 0.4 CAPSULE ORAL at 09:12

## 2022-12-26 RX ADMIN — VANCOMYCIN HYDROCHLORIDE 500 MG: 500 INJECTION, POWDER, LYOPHILIZED, FOR SOLUTION INTRAVENOUS at 09:12

## 2022-12-26 RX ADMIN — METOPROLOL TARTRATE 12.5 MG: 25 TABLET, FILM COATED ORAL at 08:12

## 2022-12-26 RX ADMIN — ATORVASTATIN CALCIUM 10 MG: 10 TABLET, FILM COATED ORAL at 09:12

## 2022-12-26 RX ADMIN — SODIUM CHLORIDE 250 ML: 9 INJECTION, SOLUTION INTRAVENOUS at 04:12

## 2022-12-26 NOTE — PROGRESS NOTES
[facility] ([address] Medicine  Progress Note    Patient Name: Timothy Roldan  MRN: [id number]  Patient Class: IP- Inpatient   Admission Date: 12/21/2022  Length of Stay: 5 days  Attending Physician: Kalyan Vasques MD  Primary Care Provider: Martine Garces MD        Subjective:     Principal Problem:Sepsis    Interval History: 83yo nursing home male with a past medical history of HTN, Afib on Eliquis, alzheimer dementia, HFpEF, COVID pna, and BPH who presents to the ED with chief complaint of fever and altered mental status. He was also found to have an acute kidney injury. He has no concrete source of infection. Blood cultures no growth thus far. Urinalysis significant for WBC, however Nitrates negative. CT chest not suggestive of Pneumonia, however bilateral pleural effusions which appears to be chronic and patient has no reports of respiratory distress and is doing well on room air. CT abdomen however reports of bilateral kidney stones with  bilateral hydronephrosis, Urology was consulted and patient underwent bilateral nephrostomy tube placement.     Kidney functions improving. Adequate Urine output noted. Patient however is more sleepy today. Uncertain if he is sleeping at night. CT head on presentation was unremarkable.  Hb stable. Sister at bedside        Review of Systems   Unable to perform ROS: Other   Objective:     Vital Signs (Most Recent):  Temp: 97.9 °F (36.6 °C) (12/26/22 0754)  Pulse: 77 (12/26/22 0754)  Resp: 18 (12/26/22 0754)  BP: (!) 144/65 (12/26/22 0754)  SpO2: 95 % (12/26/22 0754)   Vital Signs (24h Range):  Temp:  [97.9 °F (36.6 °C)-98.1 °F (36.7 °C)] 97.9 °F (36.6 °C)  Pulse:  [77-93] 77  Resp:  [18] 18  SpO2:  [95 %-97 %] 95 %  BP: (120-144)/(65-88) 144/65     Weight: 80.3 kg (177 lb)  Body mass index is 25.4 kg/m².    Intake/Output Summary (Last 24 hours) at 12/26/2022 1207  Last data filed at 12/26/2022 0913  Gross per 24 hour   Intake 730 ml   Output 2475  ml   Net -1745 ml      Physical Exam  Constitutional:       Comments: Elderly frail male   HENT:      Head: Normocephalic.      Right Ear: External ear normal.      Left Ear: External ear normal.      Nose: Nose normal.   Pulmonary:      Effort: Pulmonary effort is normal. No respiratory distress.   Abdominal:      Palpations: Abdomen is soft.      Tenderness: There is no abdominal tenderness.   Genitourinary:     Comments: Cain and nephrostomy tubes   Neurological:      Comments: Sleepy but arousable             Significant Labs: All pertinent labs within the past 24 hours have been reviewed.        Assessment/Plan:      Active Diagnoses:    Diagnosis Date Noted POA    PRINCIPAL PROBLEM:  Sepsis [A41.9] 12/21/2022 Yes    Bilateral hydronephrosis [N13.30] 12/22/2022 Yes    Kidney stones [N20.0] 12/22/2022 Yes    Chronic heart failure with preserved ejection fraction [I50.32] 05/31/2022 Yes    Pleural effusion [J90] 05/31/2022 Yes    Persistent atrial fibrillation [I48.19] 12/31/2021 Yes    Essential hypertension [I10] 12/31/2021 Yes    Alzheimer disease [G30.9, F02.80] 12/31/2021 Yes    CLEM (acute kidney injury) [N17.9] 12/31/2021 Yes      Problems Resolved During this Admission:     VTE Risk Mitigation (From admission, onward)           Ordered     apixaban tablet 2.5 mg  2 times daily         12/24/22 1612                  Presumed Sepsis, likely secondary to infected kidney stones and bilateral hydronephrosis. Received 5 to 6 days of IV abx. Cultures No growth, will discontinue abx.. F/u Urology  Alzheimer disease. Daytime somnolence. I do believe patient's circadian rhythm has been altered due to a new environment and his dementia therefore allowing him to be more sleepy in the day. Will have nurse get report from night nurse to verify this and also have patient do therapy in the day to tire him out. Cefepime could also account for this and was discontinued  Post Renal CLEM. Improving with nephrostomy drainage.  Patient eating ok. Continue to monitor Ostomies and Cain Output. F/u Urology  Bilateral pleural effusions. History of Diastolic Heart Failure. Chronic effusion, stable respiratory status. In view of significant CLEM, continue to Hold diuretics for now  History of atrial fibrillation. Rate controlled. Continue apixaban, renally dosed and beta blocker.  Hb stable  HTN  Therapy      Fall precaution. Aspiration Precaution. Turn patient frequently    Sister updated at bedside    Kalyan Vasques MD  Department of Hospital Medicine   [facility] - Intensive Care ([place]

## 2022-12-26 NOTE — PLAN OF CARE
Problem: Fluid and Electrolyte Imbalance (Acute Kidney Injury/Impairment)  Goal: Fluid and Electrolyte Balance  12/26/2022 1407 by Jules Correa RN  Outcome: Ongoing, Progressing  Problem: Renal Function Impairment (Acute Kidney Injury/Impairment)  Goal: Effective Renal Function  12/26/2022 1407 by Jules Correa RN  Outcome: Ongoing, Progressing      Pt was lethargic throughout the day. Pt refused to eat and drink during dinner. 250cc iv bolus given to due to hypotension. Rechecked BP, now 110/62. Iv abx given. Cain drained 100cc. R. Nephrostomy tube emptied 450cc. L. Nephrostomy tube drained 175cc. Bed locked and in lowest position. Call light in reach.

## 2022-12-26 NOTE — PROGRESS NOTES
Addendum:  Vancomycin discontinued. Pharmacy to sign off at this time. Please reconsult or call with any questions.    Javier Das, PharmD  PGY2 Internal Medicine Pharmacy Resident  Spectra [phone]    Pharmacokinetic Assessment Follow Up: IV Vancomycin    Vancomycin serum concentration assessment(s):    -Random 16.8 within goal of 15 to 20  -Creatinine continues to improve and down to 2.3 today    Vancomycin Regimen Plan:    -Redose with vancomycin 500 mg IVPB x1 and continue pulse dosing  -CLEM recovering  -Random level in AM    Drug levels (last 3 results):  Recent Labs   Lab Result Units 12/24/22 0357 12/25/22 0505 12/26/22 0337   Vancomycin, Random ug/mL 18.0 16.7 16.8       Pharmacy will continue to follow and monitor vancomycin.    Please contact pharmacy at extension [phone] for questions regarding this assessment.    Thank you for the consult,   Javier Das       Patient brief summary:  Timothy Roldan is a 82 y.o. male initiated on antimicrobial therapy with IV Vancomycin for treatment of bacteremia    The patient's current regimen is vancomycin pulse dosing    Drug Allergies:   Review of patient's allergies indicates:  No Known Allergies    Actual Body Weight:   80 kg    Renal Function:   Estimated Creatinine Clearance: 26.4 mL/min (A) (based on SCr of 2.3 mg/dL (H)).,     Dialysis Method (if applicable):  N/A    CBC (last 72 hours):  Recent Labs   Lab Result Units 12/24/22 0357 12/25/22 0505 12/26/22  0337   WBC K/uL 5.81 5.74 5.91   Hemoglobin g/dL 8.7* 9.3* 9.8*   Hematocrit % 28.3* 30.1* 32.6*   Platelets K/uL 165 206 228   Gran % % 81.9* 80.7* 78.0*   Lymph % % 10.5* 11.5* 12.5*   Mono % % 6.2 5.7 5.2   Eosinophil % % 0.5 1.6 3.6   Basophil % % 0.2 0.2 0.2   Differential Method  Automated Automated Automated       Metabolic Panel (last 72 hours):  Recent Labs   Lab Result Units 12/24/22 0357 12/25/22 0505 12/26/22  0337   Sodium mmol/L 139 142 144   Potassium mmol/L 3.8 3.5 4.0   Chloride mmol/L  106 109 111*   CO2 mmol/L 20* 21* 23   Glucose mg/dL 88 109 96   BUN mg/dL 60* 60* 48*   Creatinine mg/dL 4.1* 3.4* 2.3*   Albumin g/dL 1.6*  --   --    Total Bilirubin mg/dL 0.4  --   --    Alkaline Phosphatase U/L 54*  --   --    AST U/L 15  --   --    ALT U/L 6*  --   --    Magnesium mg/dL 2.0  --   --    Phosphorus mg/dL 4.2  --   --        Vancomycin Administrations:  vancomycin given in the last 96 hours                     vancomycin (VANCOCIN) 500 mg in dextrose 5 % in water (D5W) 5 % 100 mL IVPB (MB+) (mg) 500 mg New Bag 12/25/22 1150    vancomycin (VANCOCIN) 250 mg in dextrose 5 % 100 mL IVPB (mg) 250 mg New Bag 12/24/22 1007    vancomycin (VANCOCIN) 1,000 mg in dextrose 5 % 250 mL IVPB (mg) 1,000 mg New Bag 12/23/22 0323                    Microbiologic Results:  Microbiology Results (last 7 days)       Procedure Component Value Units Date/Time    Blood culture [[id number]] Collected: 12/21/22 2304    Order Status: Completed Specimen: Blood from Peripheral, Forearm, Right Updated: 12/26/22 0612     Blood Culture, Routine No Growth to date      No Growth to date      No Growth to date      No Growth to date      No Growth to date    Blood culture [[id number]] Collected: 12/21/22 2304    Order Status: Completed Specimen: Blood from Peripheral, Forearm, Left Updated: 12/25/22 0814     Blood Culture, Routine Gram stain tiffany bottle: Gram positive cocci      Results called to and read back by:Josh Bryant RN 12/24/2022  04:52      Gram stain aer bottle: Gram positive cocci      Positive results previously called    Urine Culture High Risk [[id number]] Collected: 12/23/22 1033    Order Status: Completed Specimen: Urine, Nephrostomy Tube, Right Updated: 12/24/22 1320     Urine Culture, Routine No growth    Narrative:      Indicated criteria for high risk culture:->Other  Other (specify):->collected during nephrostomy tube  insertion, right kidney    Gram stain [[id number]] Collected: 12/23/22 1032    Order Status:  Completed Specimen: Urine from Kidney, Right Updated: 12/23/22 1505     Gram Stain Result Many WBC's      Many Gram positive cocci    Urine culture [[id number]] Collected: 12/21/22 2211    Order Status: Completed Specimen: Urine Updated: 12/23/22 1457     Urine Culture, Routine Multiple organisms isolated. None in predominance.  Repeat if      clinically necessary.    Narrative:      ADD-ON UUNR UNAR UROSM UPRCR TO ORDER #[id number]  [facility]  PER ROXY JANE MD  [id number]   12/21/2022  23:33  Specimen Source->Urine    Fungus culture [[id number]] Collected: 12/23/22 1032    Order Status: Sent Specimen: Urine from Kidney, Right Updated: 12/23/22 1133

## 2022-12-26 NOTE — PT/OT/SLP EVAL
Physical Therapy Co-Evaluation and Treatment    Patient Name:  Timothy Roldan   MRN:  [id number]  Admit Date: 12/21/2022  Admitting Diagnosis:  Sepsis  Recent Surgery: * No surgery found *    Length of Stay: 5 days    Recommendations:     Discharge Recommendations:  nursing facility, skilled (return to [facility])   Discharge Equipment Recommendations: none   Barriers to discharge: decline in functional mobility     Assessment:     Timothy Roldan is a 82 y.o. male admitted to [facility] on 12/21/2022 with a medical diagnosis of Sepsis. Today, patient is only oriented to self. Patient's sister, present at bedside, reported that patient can usually hold a conversation. Patient was unable to hold conversation due disorientation at this time. He required maximal assistance of 2 people for bed mobility. He sat edge of bed for 10 minutes with stand-by to contact guard assistance. He performed 1/2 stand with total assistance of 2 people. He presents with the following impairments/functional limitations: weakness, impaired functional mobility, impaired cognition, decreased safety awareness, pain, impaired endurance, impaired balance, impaired self care skills, decreased lower extremity function. Patient's sister reported that patient usually transfer from bed to wheelchair with 1 person assistance and sits in wheelchair daily. Patient is not at his baseline lvel of mobility. Patient will benefit from skilled acute physical therapy services to address the mentioned deficits in order to increase safety and independence with functional mobility.     Rehab Prognosis: Fair     Plan:     During this hospitalization, patient to be seen 2 x/week to address the identified rehab impairments via gait training, therapeutic activities, therapeutic exercises, neuromuscular re-education and progress towards the established goals.    Plan of Care Expires:  01/25/23    Social History   Patient is a poor historian. He has Alzheimer's and acute  "cystitis. Patient's sister present and provides history.      Living Environment:  Pt lives at [facility]      Prior Level of Function:   Transfers from bed to wheelchair daily via stand pivot of 1 person. He is dependent with wheelchair mobility and requires assistance with ADLs. He feeds himself.     DME used: wheelchair    Roles/Repsonsibilities:   Works: no.   Drives: no.   Managing Medicines/Managing Home: no.   Hobbies: none.    Upon discharge, patient will have 24/7 assistance from: [facility] staff    Subjective   Communicated with RN prior to session.  Patient found HOB elevated with  bed alarm, peripheral IV, nephrostomy, telemetry, brower catheter upon PT entry to room.  Patient's sister present during session.  Pt is agreeable to evaluation.     Additional staff present:OT  OT for co-evaluation due to suspected patient need for two skilled therapists to appropriately assess patient's functional deficits as well as ensure patient safety, accommodate for limited activity tolerance, and provide appropriate, skilled assistance to maximize functional potential during evaluation.    Pt's subjective: Patient reports low back pain with mobility. He is unable to hold conversation.     Pain/Comfort:  Pain Rating 1: 0/10  Location 1: back (with mobility)  Pain Addressed 1: Reposition  Pain Rating Post-Intervention 1: 0/10      Objective:   General Precautions: Standard, fall   Orthopedic Precautions:N/A   Braces: N/A   Oxygen Device: Room Air  Vitals: BP (!) 144/65 (BP Location: Left leg, Patient Position: Lying)   Pulse 77   Temp 97.9 °F (36.6 °C) (Oral)   Resp 18   Ht 5' 10" (1.778 m)   Wt 80.3 kg (177 lb)   SpO2 95%   BMI 25.40 kg/m²     Exams:  Cognition:   AxOx1 to self only  Skin Integrity: Visible skin intact  Edema: None noted   Range of Motion:  RLE: hamstring tightness with seated knee extension   LLE: hamstring tightness with seated knee extension   Strength Exam:  RLE Strength: grossly 2+/5  LLE " Strength: grossly 2+/5      Outcome Measures:  AM-PAC 6 CLICK MOBILITY:   Turning over in bed (including adjusting bedclothes, sheets and blankets)?: 2  Sitting down on and standing up from a chair with arms (e.g., wheelchair, bedside commode, etc.): 1  Moving from lying on back to sitting on the side of the bed?: 2  Moving to and from a bed to a chair (including a wheelchair)?: 2  Need to walk in hospital room?: 1  Climbing 3-5 steps with a railing?: 1  Basic Mobility Total Score: 9     Functional Mobility:    Bed Mobility:    Scooting supine: maximal assistance and of 2 persons   Left side lying to sit: maximal assistance and of 2 persons  Seated scooting towards EOB: total assistance and of 2 persons  Sit to Supine: maximal assistance and of 2 persons    Sitting Balance at Edge of Bed:  Assistance Level Required: Stand-by Assistance (briefly) and Contact Guard Assistance  Time: 10 minutes    Transfers:    Sit to 1/2 Stand:  total assistance and of 2 persons   1/2 Stand to Sit: total assistance and of 2 persons       Patient Education:  White board updated to include patient's safest level of mobility with staff assistance  Patient educated on bed mobility training and plan of care by explanation.  Patient was confusion limiting to education and needs further education.       Patient left HOB elevated with all lines intact, call button in reach, bed alarm on, and patient's sister present.      GOALS:   Multidisciplinary Problems       Physical Therapy Goals          Problem: Physical Therapy    Goal Priority Disciplines Outcome Goal Variances Interventions   Physical Therapy Goal     PT, PT/OT Ongoing, Progressing     Description: Goals to be met by: 2023     Patient will increase functional independence with mobility by performin. Supine to sit with Moderate Assistance  2. Sit to supine with MInimal Assistance  3. Sit to stand transfer with Maximum Assistance  4. Bed to chair transfer with Moderate  Assistance via squat or stand pivot   5. Sitting at edge of bed x 15 minutes with Stand-by Assistance                         History:     Past Medical History:   Diagnosis Date    Hyperlipidemia     Hypertension        Past Surgical History:   Procedure Laterality Date    TONSILLECTOMY      TOTAL KNEE ARTHROPLASTY         Time Tracking:     PT Received On: 12/26/22  PT Start Time: 0942     PT Stop Time: 1009  PT Total Time (min): 27 min     Billable Minutes: Evaluation 1 procedure and Therapeutic Activity 10 mins

## 2022-12-26 NOTE — PLAN OF CARE
Problem: Fluid and Electrolyte Imbalance (Acute Kidney Injury/Impairment)  Goal: Fluid and Electrolyte Balance  Outcome: Ongoing, Progressing     Problem: Renal Function Impairment (Acute Kidney Injury/Impairment)  Goal: Effective Renal Function  Outcome: Ongoing, Progressing   Cain d/c per MD order, condom cath placed. Monitoring pt's output. Pt was more alert and willing to to eat and drink. Bed locked and in lowest position. Call light in reach.

## 2022-12-26 NOTE — PT/OT/SLP EVAL
Occupational Therapy  Co- Evaluation and Treatment  Co-treat with PT due to medical complexity of pt and to optimize functional performance.      Name: Timothy Roldan  MRN: [id number]  Admitting Diagnosis:  Sepsis    Length of Stay: 5 days    Recommendations:     Discharge Recommendations: nursing facility, skilled (return to [facility])  Discharge Equipment Recommendations:  none  Barriers to discharge:  None    Plan:     Patient to be seen 2 x/week to address the above listed problems via self-care/home management, therapeutic activities, therapeutic exercises, neuromuscular re-education  Plan of Care Expires: 01/25/23  Plan of Care Reviewed with: patient    Assessment:     Timothy Roldan is a 82 y.o. male with a medical diagnosis of Sepsis.  He presents with the following performance deficits affecting function: weakness, impaired endurance, impaired self care skills, impaired functional mobility, gait instability, decreased lower extremity function, decreased upper extremity function, impaired coordination, impaired fine motor, decreased ROM, decreased coordination, pain, decreased safety awareness, impaired balance, impaired skin, impaired cognition.      Pt presenting with impaired cognition, increased weakness, increased fatigue, decreased activity tolerance, impaired endurance, impaired balance, poor trunk stability/postural control, decreased BUE ROM and decreased safety awareness upon evaluation this date, requiring increased assistance to complete functional tasks. Patient AxOx1, requiring increased cueing and redirecting for task completion, patient observed with impaired task initiation. Patient with increase eye closed behavior ~75% of session, cueing for alertness. Patient tolerated EOB sitting for ~10 minutes with CGA, able to maintain SBA for brief periods. Patient with increased jerking/tremor behavior observed at rest. Patient sister at bedside providing history as able to patient being poor historian at  "this time. Pt would benefit from continued acute skilled OT services at this time to increase functional performance, and improve quality of life. OT to recommend SNF/return to [facility] at discharge once medically appropriate for increased functional independence and to improve patient safety before returning home.    Rehab Prognosis: Fair; patient would benefit from acute skilled OT services to address these deficits and reach maximum level of function.       Subjective   Patient states: " Pretty good " -re how patient is feeling, increased cueing for response    Communicated with: Nurse prior to session.  Patient found left sidelying with wedge with bed alarm, peripheral IV, nephrostomy, telemetry, brower catheter upon OT entry to room.    Chief Complaint: Shaking (Pt coming from [facility], staff say he is at baseline. Pt shivering and complaining of being cold. Staff states this is not normal for him )    Patient/Family Comments/goals: to return home at PLOF    Pain/Comfort:  Pain Rating 1: 0/10  Pain Rating Post-Intervention 1: 0/10    Patients cultural, spiritual, [employer]ian conflicts given the current situation: no    Occupational Profile:    PLOF obtained from sister at bedside due to patient being a poor historian at this time    Living Environment: Resident of [facility]  Prior Level of Function: Patient is dependent of staff assist for stand transfers to<>from wheelchair, sister reports ability to take few steps but has not ambulated with RW in months as far as she is aware. Staff pushes patient within wheelchair. Patient is able to self feed per sister reports, otherwise requires assist for ADLs.  Patient uses DME as follows: wheelchair.   DME owned (not currently used): none.  Roles/Repsonsibilities:   Work: no.   Drive: no.   Managing Medicines/Managing Home: no.   Equipment Used at Home:  wheelchair    Patient reports they will have assistance from staff upon " "discharge.      Objective:     Patient found with: bed alarm, peripheral IV, nephrostomy, telemetry, brower catheter   General Precautions: Standard, Cardiac fall   Orthopedic Precautions:N/A   Braces: N/A   Oxygen Device: Room Air  Vitals: BP (!) 144/65 (BP Location: Left leg, Patient Position: Lying)   Pulse 77   Temp 97.9 °F (36.6 °C) (Oral)   Resp 18   Ht 5' 10" (1.778 m)   Wt 80.3 kg (177 lb)   SpO2 95%   BMI 25.40 kg/m²     Cognitive and Psychosocial Function:   AxOx1 -- Person   Follows Commands/attention:inattentive  Communication:  dysarthria  Memory: Impaired STM, Impaired LTM, and Poor immediate recall  Safety awareness/insight to disability: impaired   Mood/Affect/Coping skills/emotional control: Lethargic    Hearing: Intact    Vision:  Intact visual fields    Physical Exam:  Postural examination/scapula alignment:    -       Rounded shoulders  -       Forward head  Skin integrity: Bruising noted to BUE     Left UE Right UE   UE Edema absent absent   UE ROM AAROM 90 degs, hard end feel AAROM 90 degs, hard end feel   UE Strength 3/5 3/5    Strength   3/5 3/5   Sensation LUE INTACT:WFL RUE INTACT: WFL   Fine Motor Coordination:  LUE IMPAIRED:  RUE IMPAIRED:    Gross Motor Coordination: LUE IMPAIRED:  RUE IMPAIRED:     Occupational Performance:  Bed Mobility:    Patient completed Rolling/Turning to Left with  maximal assistance and 2 persons  Patient completed Supine to Sit with maximal assistance and 2 persons on L side of bed  Scooting anteriorly to EOB to have both feet planted on floor: maximal assistance  Patient completed Sit to Supine with maximal assistance and 2 persons on L side of bed  Scooting to HOB in supine: dependent, 2 persons, and drawsheet pull    Functional Mobility/Transfers:  Static Sitting EOB: CGA, brief SBA  Patient completed Sit <> Stand Transfer from EOB with total assistance and of 2 persons  with  hand-held assist ; unable to achieve full stance  Deferred additional " mobility trials    Activities of Daily Living:  Lower Body Dressing: maximal assistance to don socks      AMPAC 6 Click ADL:  AMPAC Total Score: 10    Treatment & Education:  -Pt education on OT role and POC.  -Importance of E/OOB activity with staff assistance, emphasis on daily participation  -Safety during functional transfer and mobility ensured  -Patient provided with education on importance of Bilateral UB/LB integration during functional tasks for improvement in functional performance.   -Education provided/reviewed, questions answered within OT scope of practice.   -Patient will continue to require reinforcement to demonstrate understanding and learning this date.         Patient left right sidelying with wedge with all lines intact, call button in reach, bed alarm on, nurse notified, and sister present    GOALS:   Multidisciplinary Problems       Occupational Therapy Goals          Problem: Occupational Therapy    Goal Priority Disciplines Outcome Interventions   Occupational Therapy Goal     OT, PT/OT Ongoing, Progressing    Description: Goals set on 12/26, with expiration date 1/9:  Patient will increase functional independence with ADLs by performing:    Bed mobility with Mod A  Grooming while seated EOB with Mod A  Patient will tolerate EOB sitting for ~10 minutes with CGA while engaged in functional tasks  Patient will demonstrate independence in self feeding for meals.  Pt will demonstrate [place] in HEP for BUE strengthening GM/FM exercises to improve functional performance.                              History:     Past Medical History:   Diagnosis Date    Hyperlipidemia     Hypertension          Past Surgical History:   Procedure Laterality Date    TONSILLECTOMY      TOTAL KNEE ARTHROPLASTY         Time Tracking:       OT Date of Treatment: 12/26/22  OT Start Time: 0942  OT Stop Time: 1008  OT Total Time (min): 26 min    Billable Minutes:Evaluation 10  Neuromuscular Re-education  16      12/26/2022

## 2022-12-26 NOTE — PLAN OF CARE
Problem: Physical Therapy  Goal: Physical Therapy Goal  Description: Goals to be met by: 2023     Patient will increase functional independence with mobility by performin. Supine to sit with Moderate Assistance  2. Sit to supine with MInimal Assistance  3. Sit to stand transfer with Maximum Assistance  4. Bed to chair transfer with Moderate Assistance via squat or stand pivot   5. Sitting at edge of bed x 15 minutes with Stand-by Assistance    Outcome: Ongoing, Progressing     PT evaluated patient and established goals for patient today.

## 2022-12-26 NOTE — PLAN OF CARE
OT evaluation completed, POC established as appropriate. OT recommending return to [facility] with continued staff assist once medically stable for discharge.    Problem: Occupational Therapy  Goal: Occupational Therapy Goal  Description: Goals set on 12/26, with expiration date 1/9:  Patient will increase functional independence with ADLs by performing:    Bed mobility with Mod A  Grooming while seated EOB with Mod A  Patient will tolerate EOB sitting for ~10 minutes with CGA while engaged in functional tasks  Patient will demonstrate independence in self feeding for meals.  Pt will demonstrate [place] in HEP for BUE strengthening GM/FM exercises to improve functional performance.         Outcome: Ongoing, Progressing

## 2022-12-27 LAB
ANION GAP SERPL CALC-SCNC: 8 MMOL/L (ref 8–16)
BACTERIA BLD CULT: NORMAL
BASOPHILS # BLD AUTO: 0.02 K/UL (ref 0–0.2)
BASOPHILS NFR BLD: 0.4 % (ref 0–1.9)
BUN SERPL-MCNC: 38 MG/DL (ref 8–23)
CALCIUM SERPL-MCNC: 8.5 MG/DL (ref 8.7–10.5)
CHLORIDE SERPL-SCNC: 118 MMOL/L (ref 95–110)
CO2 SERPL-SCNC: 24 MMOL/L (ref 23–29)
CREAT SERPL-MCNC: 1.8 MG/DL (ref 0.5–1.4)
DIFFERENTIAL METHOD: ABNORMAL
EOSINOPHIL # BLD AUTO: 0.3 K/UL (ref 0–0.5)
EOSINOPHIL NFR BLD: 4.8 % (ref 0–8)
ERYTHROCYTE [DISTWIDTH] IN BLOOD BY AUTOMATED COUNT: 14.6 % (ref 11.5–14.5)
EST. GFR  (NO RACE VARIABLE): 37.6 ML/MIN/1.73 M^2
GLUCOSE SERPL-MCNC: 90 MG/DL (ref 70–110)
HCT VFR BLD AUTO: 28.1 % (ref 40–54)
HGB BLD-MCNC: 8.5 G/DL (ref 14–18)
IMM GRANULOCYTES # BLD AUTO: 0.02 K/UL (ref 0–0.04)
IMM GRANULOCYTES NFR BLD AUTO: 0.4 % (ref 0–0.5)
LYMPHOCYTES # BLD AUTO: 0.9 K/UL (ref 1–4.8)
LYMPHOCYTES NFR BLD: 17.2 % (ref 18–48)
MCH RBC QN AUTO: 29.2 PG (ref 27–31)
MCHC RBC AUTO-ENTMCNC: 30.2 G/DL (ref 32–36)
MCV RBC AUTO: 97 FL (ref 82–98)
MONOCYTES # BLD AUTO: 0.3 K/UL (ref 0.3–1)
MONOCYTES NFR BLD: 5.7 % (ref 4–15)
NEUTROPHILS # BLD AUTO: 3.7 K/UL (ref 1.8–7.7)
NEUTROPHILS NFR BLD: 71.5 % (ref 38–73)
NRBC BLD-RTO: 0 /100 WBC
PLATELET # BLD AUTO: 211 K/UL (ref 150–450)
PMV BLD AUTO: 10.4 FL (ref 9.2–12.9)
POTASSIUM SERPL-SCNC: 3.6 MMOL/L (ref 3.5–5.1)
RBC # BLD AUTO: 2.91 M/UL (ref 4.6–6.2)
SODIUM SERPL-SCNC: 150 MMOL/L (ref 136–145)
WBC # BLD AUTO: 5.23 K/UL (ref 3.9–12.7)

## 2022-12-27 PROCEDURE — 25000003 PHARM REV CODE 250: Performed by: FAMILY MEDICINE

## 2022-12-27 PROCEDURE — 80048 BASIC METABOLIC PNL TOTAL CA: CPT | Performed by: INTERNAL MEDICINE

## 2022-12-27 PROCEDURE — 12000002 HC ACUTE/MED SURGE SEMI-PRIVATE ROOM

## 2022-12-27 PROCEDURE — 85025 COMPLETE CBC W/AUTO DIFF WBC: CPT | Performed by: INTERNAL MEDICINE

## 2022-12-27 PROCEDURE — 36415 COLL VENOUS BLD VENIPUNCTURE: CPT | Performed by: INTERNAL MEDICINE

## 2022-12-27 PROCEDURE — 99232 PR SUBSEQUENT HOSPITAL CARE,LEVL II: ICD-10-PCS | Mod: ,,, | Performed by: INTERNAL MEDICINE

## 2022-12-27 PROCEDURE — 99232 SBSQ HOSP IP/OBS MODERATE 35: CPT | Mod: ,,, | Performed by: INTERNAL MEDICINE

## 2022-12-27 PROCEDURE — 25000003 PHARM REV CODE 250: Performed by: INTERNAL MEDICINE

## 2022-12-27 RX ORDER — SULFAMETHOXAZOLE AND TRIMETHOPRIM 800; 160 MG/1; MG/1
1 TABLET ORAL 2 TIMES DAILY
Status: DISCONTINUED | OUTPATIENT
Start: 2022-12-27 | End: 2022-12-28 | Stop reason: HOSPADM

## 2022-12-27 RX ADMIN — DONEPEZIL HYDROCHLORIDE 10 MG: 10 TABLET ORAL at 10:12

## 2022-12-27 RX ADMIN — APIXABAN 2.5 MG: 2.5 TABLET, FILM COATED ORAL at 10:12

## 2022-12-27 RX ADMIN — METOPROLOL TARTRATE 12.5 MG: 25 TABLET, FILM COATED ORAL at 08:12

## 2022-12-27 RX ADMIN — METOPROLOL TARTRATE 12.5 MG: 25 TABLET, FILM COATED ORAL at 10:12

## 2022-12-27 RX ADMIN — ATORVASTATIN CALCIUM 10 MG: 10 TABLET, FILM COATED ORAL at 08:12

## 2022-12-27 RX ADMIN — APIXABAN 2.5 MG: 2.5 TABLET, FILM COATED ORAL at 08:12

## 2022-12-27 RX ADMIN — MEMANTINE 20 MG: 10 TABLET ORAL at 08:12

## 2022-12-27 RX ADMIN — SULFAMETHOXAZOLE AND TRIMETHOPRIM 1 TABLET: 800; 160 TABLET ORAL at 10:12

## 2022-12-27 NOTE — PROGRESS NOTES
[facility] ([address] Medicine  Progress Note    Patient Name: Timothy Roldan  MRN: [id number]  Patient Class: IP- Inpatient   Admission Date: 12/21/2022  Length of Stay: 6 days  Attending Physician: Kalyan Vasques MD  Primary Care Provider: Martine Garces MD        Subjective:     Principal Problem:Sepsis    Interval History: 81yo nursing home male with a past medical history of HTN, Afib on Eliquis, alzheimer dementia, HFpEF, COVID pna, and BPH who presents to the ED with chief complaint of fever and altered mental status. He was also found to have an acute kidney injury. He has no concrete source of infection. Blood cultures no growth thus far. Urinalysis significant for WBC, however Nitrates negative. CT chest not suggestive of Pneumonia, however bilateral pleural effusions which appears to be chronic and patient has no reports of respiratory distress and is doing well on room air. CT abdomen however reports of bilateral kidney stones with  bilateral hydronephrosis, Urology was consulted and patient underwent bilateral nephrostomy tube placement.    Patient is more alert and interactive today and has been tolerating oral feeds. Adequate Urine output.  I spoke with Urology, patient is to be discharged with nephrostomy tubes until stones are addressed as an outpatient.  He is to have a voiding trial before he is discharged.  Kidney functions continue to improve.  Seen by OT who recommends discharge back to assisted living.    Review of Systems   Unable to perform ROS: Other   Objective:     Vital Signs (Most Recent):  Temp: 98.6 °F (37 °C) (12/27/22 0800)  Pulse: 97 (12/27/22 0800)  Resp: 18 (12/27/22 0800)  BP: 126/87 (12/27/22 0800)  SpO2: 98 % (12/27/22 0800) Vital Signs (24h Range):  Temp:  [97.1 °F (36.2 °C)-98.6 °F (37 °C)] 98.6 °F (37 °C)  Pulse:  [83-97] 97  Resp:  [18-19] 18  SpO2:  [95 %-99 %] 98 %  BP: (110-126)/(56-87) 126/87     Weight: 80.3 kg (177 lb)  Body mass index  is 25.4 kg/m².    Intake/Output Summary (Last 24 hours) at 12/27/2022 1605  Last data filed at 12/27/2022 1437  Gross per 24 hour   Intake --   Output 1189 ml   Net -1189 ml      Physical Exam  Constitutional:       General: He is not in acute distress.     Comments: Elderly frail male   HENT:      Head: Normocephalic.      Right Ear: External ear normal.      Left Ear: External ear normal.      Nose: Nose normal.   Cardiovascular:      Rate and Rhythm: Normal rate.   Pulmonary:      Effort: Pulmonary effort is normal.   Genitourinary:     Comments: Cain catheter and nephrostomy tubes  Neurological:      Mental Status: He is alert. Mental status is at baseline.   Psychiatric:         Mood and Affect: Mood normal.      Comments: Patient more alert today             Significant Labs: All pertinent labs within the past 24 hours have been reviewed.      Assessment/Plan:      Active Diagnoses:    Diagnosis Date Noted POA    PRINCIPAL PROBLEM:  Sepsis [A41.9] 12/21/2022 Yes    Bilateral hydronephrosis [N13.30] 12/22/2022 Yes    Kidney stones [N20.0] 12/22/2022 Yes    Chronic heart failure with preserved ejection fraction [I50.32] 05/31/2022 Yes    Pleural effusion [J90] 05/31/2022 Yes    Persistent atrial fibrillation [I48.19] 12/31/2021 Yes    Essential hypertension [I10] 12/31/2021 Yes    Alzheimer disease [G30.9, F02.80] 12/31/2021 Yes    CLEM (acute kidney injury) [N17.9] 12/31/2021 Yes      Problems Resolved During this Admission:     VTE Risk Mitigation (From admission, onward)           Ordered     apixaban tablet 2.5 mg  2 times daily         12/24/22 1612                  Presumed infected kidney stones and bilateral hydronephrosis.  Completed IV antibiotics. Will start oral Bactrim. Cultures No growth.  Urology signed off and recommends a voiding trial prior to discharge and outpatient follow-up with nephrostomy tubes in place. Will do voiding trial  Alzheimer disease.  Mentation improved today, patient is from  assisted living, seen by OT who recommends patient to be discharged back to assisted living.  Will aim for discharge by tomorrow.  Post Renal CLEM. Improving  Bilateral pleural effusions. History of Diastolic Heart Failure. Chronic effusion, stable respiratory status. In view of significant CLEM, continue to Hold diuretics for now  History of atrial fibrillation. Rate controlled. Continue apixaban, renally dosed and beta blocker.    Hb stable  HTN  Therapy      Fall precaution. Aspiration Precaution. Turn patient frequently    Niece updated via phone    Kalyan Vasques MD  Department of Hospital Medicine   [facility] - Intensive Care ([place]

## 2022-12-27 NOTE — PLAN OF CARE
Spoke with Lakesha Gonzáles she states pt was able to feed himself prior to admit that he was assisted with transferring form bed to w/c and w/c to chair at table, he could only use walker if somebody was with him because of his fall risk, plan is for him to return there, but made her aware that if pt is not back to baseline then the [facility] may not accept back without going to SNF rehab prior, pt was at [facility] [phone] message left asking for a return call to see if pt could return

## 2022-12-28 VITALS
HEIGHT: 70 IN | WEIGHT: 177 LBS | BODY MASS INDEX: 25.34 KG/M2 | OXYGEN SATURATION: 99 % | HEART RATE: 80 BPM | TEMPERATURE: 97 F | SYSTOLIC BLOOD PRESSURE: 122 MMHG | RESPIRATION RATE: 18 BRPM | DIASTOLIC BLOOD PRESSURE: 76 MMHG

## 2022-12-28 PROBLEM — A41.9 SEPSIS: Status: RESOLVED | Noted: 2022-12-21 | Resolved: 2022-12-28

## 2022-12-28 LAB
ANION GAP SERPL CALC-SCNC: 11 MMOL/L (ref 8–16)
BUN SERPL-MCNC: 27 MG/DL (ref 8–23)
CALCIUM SERPL-MCNC: 8.4 MG/DL (ref 8.7–10.5)
CHLORIDE SERPL-SCNC: 114 MMOL/L (ref 95–110)
CO2 SERPL-SCNC: 22 MMOL/L (ref 23–29)
CREAT SERPL-MCNC: 1.5 MG/DL (ref 0.5–1.4)
EST. GFR  (NO RACE VARIABLE): 46.8 ML/MIN/1.73 M^2
GLUCOSE SERPL-MCNC: 110 MG/DL (ref 70–110)
POTASSIUM SERPL-SCNC: 3.4 MMOL/L (ref 3.5–5.1)
SODIUM SERPL-SCNC: 147 MMOL/L (ref 136–145)

## 2022-12-28 PROCEDURE — 80048 BASIC METABOLIC PNL TOTAL CA: CPT | Performed by: INTERNAL MEDICINE

## 2022-12-28 PROCEDURE — 25000003 PHARM REV CODE 250: Performed by: FAMILY MEDICINE

## 2022-12-28 PROCEDURE — 25000003 PHARM REV CODE 250: Performed by: INTERNAL MEDICINE

## 2022-12-28 PROCEDURE — 1111F PR DISCHARGE MEDS RECONCILED W/ CURRENT OUTPATIENT MED LIST: ICD-10-PCS | Mod: CPTII,,, | Performed by: INTERNAL MEDICINE

## 2022-12-28 PROCEDURE — 36415 COLL VENOUS BLD VENIPUNCTURE: CPT | Performed by: INTERNAL MEDICINE

## 2022-12-28 PROCEDURE — 1111F DSCHRG MED/CURRENT MED MERGE: CPT | Mod: CPTII,,, | Performed by: INTERNAL MEDICINE

## 2022-12-28 PROCEDURE — 99239 PR HOSPITAL DISCHARGE DAY,>30 MIN: ICD-10-PCS | Mod: ,,, | Performed by: INTERNAL MEDICINE

## 2022-12-28 PROCEDURE — 99239 HOSP IP/OBS DSCHRG MGMT >30: CPT | Mod: ,,, | Performed by: INTERNAL MEDICINE

## 2022-12-28 RX ORDER — SULFAMETHOXAZOLE AND TRIMETHOPRIM 800; 160 MG/1; MG/1
1 TABLET ORAL 2 TIMES DAILY
Qty: 5 TABLET | Refills: 0 | Status: ON HOLD | OUTPATIENT
Start: 2022-12-28 | End: 2023-01-27

## 2022-12-28 RX ADMIN — ATORVASTATIN CALCIUM 10 MG: 10 TABLET, FILM COATED ORAL at 08:12

## 2022-12-28 RX ADMIN — HYPROMELLOSE 2910 1 DROP: 5 SOLUTION/ DROPS OPHTHALMIC at 08:12

## 2022-12-28 RX ADMIN — MEMANTINE 20 MG: 10 TABLET ORAL at 08:12

## 2022-12-28 RX ADMIN — APIXABAN 2.5 MG: 2.5 TABLET, FILM COATED ORAL at 08:12

## 2022-12-28 RX ADMIN — SULFAMETHOXAZOLE AND TRIMETHOPRIM 1 TABLET: 800; 160 TABLET ORAL at 08:12

## 2022-12-28 RX ADMIN — METOPROLOL TARTRATE 12.5 MG: 25 TABLET, FILM COATED ORAL at 08:12

## 2022-12-28 NOTE — PLAN OF CARE
Pt will dc back to [facility] HH referral sent to [facility] called the retirement at [phone] opt 2 vm left, yesterday they had requested dc orders to be sent to them HH orders and DC summary faxed to [phone] requested a call back for where the nurse can call report and then will set up transport back

## 2022-12-28 NOTE — DISCHARGE SUMMARY
[facility] ([place] [address])  [facility] Medicine  Discharge Summary      Patient Name: Timothy Roldan  MRN: [id number]  Admission Date: 12/21/2022  Hospital Length of Stay: 7 days  Discharge Date and Time:  12/28/2022 9:18 AM  Attending Physician: Kalyan Vasques MD   Discharging Provider: Kalyan Vasques MD  Primary Care Provider: Martine Garces MD            * No surgery found *      Hospital Course:   81yo nursing home male with a past medical history of HTN, Afib on Eliquis, alzheimer dementia, HFpEF, COVID pna, and BPH who lives in assisted living, presented to the ED with reports of fever and altered mental status. He was also found to have an acute kidney injury. He had no concrete source of infection, however presumed infected kidney stones. Blood cultures were no growth. Urinalysis significant for WBC, however Nitrates negative, Urine cx, reported no predominant organism. CT chest not suggestive of Pneumonia, however bilateral pleural effusions which appears to be chronic and was stable during hospital stay.  Of note, CT abdomen reported bilateral kidney stones with  bilateral hydronephrosis, Urology was consulted and patient underwent bilateral nephrostomy tube placement.  Patient received 6 days of IV antibiotics.  He had adequate urine output from nephrostomy tubes in addition to his Cain catheter.  His kidney function gradually improved.  Urology signed off and recommended that patient be discharged with follow-up as an outpatient to formalize plan for stone treatment, in the interim leave the nephrostomy tubes intact, urology made appointments for his follow-up.  He was discharged on p.o. Bactrim.  During hospital stay patient had some somnolence, thought to be due to his Alzheimer's disease and new environment, cefepime could have also accounted for this in addition to his Seroquel, mentation improved after discontinuing cefepime and holding Seroquel. Seroquel was held upon discharge.  Apixaban was  adjusted for renal clearance dosing and patient is to follow up with his primary care within 2 days of discharge with an order for repeat BMP.  He is from assisted living and will require home health aide assistance for therapy in addition to monitoring his nephrostomy tubes.  Patient was stable, eating well and discharged.  On day before discharge, patient's Brower catheter was removed, he passed urine appropriately without any issues and was asymptomatic, 231-241ml of urine noted on bladder scan on day of discharge, Urology recommends NO brower catheter unless urine is >400ml and/or patient is symptomatic.      On physical examination.  Elderly frail male in no distress, comfortable.  Normal external left and right ear and normal nose.  Bilateral nephrostomy tubes noted with appropriate drainage, condom catheter noted.  Abdomen is soft and nontender.  Patient is at his mental baseline and is interactive today.  He has a normal respiratory effort.    Consults:   Consults (From admission, onward)          Status Ordering Provider     Inpatient consult to Interventional Radiology  Once        Provider:  (Not yet assigned)    Completed JEFFREY ALANIS     Inpatient consult to Urology  Once        Provider:  (Not yet assigned)    Completed MADELEINE BRADSHAW            Final Active Diagnoses:    Diagnosis Date Noted POA    Bilateral hydronephrosis [N13.30] 12/22/2022 Yes    Kidney stones [N20.0] 12/22/2022 Yes    Chronic heart failure with preserved ejection fraction [I50.32] 05/31/2022 Yes    Pleural effusion [J90] 05/31/2022 Yes    Persistent atrial fibrillation [I48.19] 12/31/2021 Yes    Essential hypertension [I10] 12/31/2021 Yes    Alzheimer disease [G30.9, F02.80] 12/31/2021 Yes    CLEM (acute kidney injury) [N17.9] 12/31/2021 Yes      Problems Resolved During this Admission:    Diagnosis Date Noted Date Resolved POA    PRINCIPAL PROBLEM:  Sepsis [A41.9] 12/21/2022 12/28/2022 Yes      Discharged Condition:  stable    Disposition:  Assisted living    Follow Up:   Follow-up Information       Martine Garces MD Follow up in 2 day(s).    Specialty: Internal Medicine  Why: BMP labs  Contact information:  [place] [address] [phone]                           Patient Instructions:      BASIC METABOLIC PANEL   Standing Status: Future Standing Exp. Date: 02/26/24     Notify your health care provider if you experience any of the following:  temperature >100.4     Notify your health care provider if you experience any of the following:  persistent nausea and vomiting or diarrhea     Notify your health care provider if you experience any of the following:  increased confusion or weakness     Activity as tolerated     Medications:  Reconciled Home Medications:      Medication List        START taking these medications      artificial tears 0.5 % ophthalmic solution  Commonly known as: ISOPTO TEARS  Place 1 drop into both eyes 3 (three) times daily as needed (dry eyes).     sulfamethoxazole-trimethoprim 800-160mg 800-160 mg Tab  Commonly known as: BACTRIM DS  Take 1 tablet by mouth 2 (two) times daily.            CHANGE how you take these medications      apixaban 2.5 mg Tab  Commonly known as: ELIQUIS  Take 1 tablet (2.5 mg total) by mouth 2 (two) times daily.  What changed:   medication strength  how much to take            CONTINUE taking these medications      donepeziL 10 MG tablet  Commonly known as: ARICEPT  Take 1 tablet (10 mg total) by mouth every evening.     lovastatin 20 MG tablet  Commonly known as: MEVACOR  Take 20 mg by mouth every evening.     memantine 10 MG Tab  Commonly known as: NAMENDA  Take 2 tablets by mouth once daily.     metoprolol tartrate 25 MG tablet  Commonly known as: LOPRESSOR  Take 0.5 tablets (12.5 mg total) by mouth 2 (two) times daily.     pulse oximeter device  Commonly known as: pulse oximeter  by Apply Externally route 2 (two) times a day. Use twice daily at 8 AM and 3 PM  and record the value in MyChart as directed.     tamsulosin 0.4 mg Cap  Commonly known as: FLOMAX  Take 1 capsule (0.4 mg total) by mouth once daily.            STOP taking these medications      benazepriL 10 MG tablet  Commonly known as: LOTENSIN     furosemide 40 MG tablet  Commonly known as: LASIX     QUEtiapine 25 MG Tab  Commonly known as: SEROQUEL                  Pending Diagnostic Studies:       None          Indwelling Lines/Drains at time of discharge:   Lines/Drains/Airways       Drain  Duration                  Nephrostomy 12/23/22 1031 Right 8 Fr. 4 days         Nephrostomy 12/23/22 1047 Left 8 Fr. 4 days    Male External Urinary Catheter 12/27/22 1655 <1 day                    Time spent on the discharge of patient: 45 minutes         Kalyan Vasques MD  Department of Hospital Medicine  [facility] - Intensive Care ([place])

## 2022-12-28 NOTE — PLAN OF CARE
[facility] ([place] [address])      HOME HEALTH ORDERS  FACE TO FACE ENCOUNTER    Patient Name: Timothy Roldan  YOB: 1940    PCP: Martine Garces MD   PCP Address: [address] [place], [place] [address] / [place] [address]  PCP Phone Number: [phone]  PCP Fax: [phone]    Encounter Date: 12/21/22    Admit to Home Health    Diagnoses:  Active Hospital Problems    Diagnosis  POA    *Sepsis [A41.9]  Yes    Bilateral hydronephrosis [N13.30]  Yes    Kidney stones [N20.0]  Yes    Chronic heart failure with preserved ejection fraction [I50.32]  Yes    Pleural effusion [J90]  Yes    Persistent atrial fibrillation [I48.19]  Yes    Essential hypertension [I10]  Yes    Alzheimer disease [G30.9, F02.80]  Yes    CLEM (acute kidney injury) [N17.9]  Yes      Resolved Hospital Problems   No resolved problems to display.       Follow Up Appointments:  No future appointments.    Allergies:Review of patient's allergies indicates:  No Known Allergies    Medications: Review discharge medications with patient and family and provide education.    Current Facility-Administered Medications   Medication Dose Route Frequency Provider Last Rate Last Admin    acetaminophen tablet 1,000 mg  1,000 mg Oral Q8H PRN Mickey Young MD   1,000 mg at 12/23/22 1833    albuterol-ipratropium 2.5 mg-0.5 mg/3 mL nebulizer solution 3 mL  3 mL Nebulization Q6H PRN Mickey Young MD        aluminum-magnesium hydroxide-simethicone 200-200-20 mg/5 mL suspension 30 mL  30 mL Oral QID PRN Mickey Young MD        apixaban tablet 2.5 mg  2.5 mg Oral BID Kalyan Vasques MD   2.5 mg at 12/27/22 2240    artificial tears 0.5 % ophthalmic solution 1 drop  1 drop Both Eyes TID PRN Kalyan Vasques MD        atorvastatin tablet 10 mg  10 mg Oral Daily Mickey Young MD   10 mg at 12/27/22 0814    dextrose 10% bolus 125 mL 125 mL  12.5 g Intravenous PRN Mickey Young MD        dextrose 10% bolus 250 mL 250 mL  25 g Intravenous PRN Mickey Young  MD        donepeziL tablet 10 mg  10 mg Oral QHS Mickey Young MD   10 mg at 12/27/22 2240    glucagon (human recombinant) injection 1 mg  1 mg Intramuscular PRN Mickey Young MD        glucose chewable tablet 16 g  16 g Oral PRN Mickey Young MD        glucose chewable tablet 24 g  24 g Oral PRN Mickey Young MD        melatonin tablet 6 mg  6 mg Oral Nightly PRN Mickey Young MD        memantine tablet 20 mg  20 mg Oral Daily Mickey Young MD   20 mg at 12/27/22 0813    metoprolol tartrate (LOPRESSOR) split tablet 12.5 mg  12.5 mg Oral BID Kalyan Vasques MD   12.5 mg at 12/27/22 2240    naloxone 0.4 mg/mL injection 0.02 mg  0.02 mg Intravenous PRN Mickey Young MD        ondansetron injection 4 mg  4 mg Intravenous Q8H PRN Mickey Young MD        oxyCODONE immediate release tablet 5 mg  5 mg Oral Q6H PRN Mickey Young MD   5 mg at 12/25/22 0503    QUEtiapine tablet 25 mg  25 mg Oral QHS Mickey Young MD   25 mg at 12/25/22 2231    senna-docusate 8.6-50 mg per tablet 1 tablet  1 tablet Oral BID PRN Mickey Young MD        sodium chloride 0.9% flush 10 mL  10 mL Intravenous Q12H PRN Mickey Young MD        sulfamethoxazole-trimethoprim 800-160mg per tablet 1 tablet  1 tablet Oral BID Kalyan Vasques MD   1 tablet at 12/27/22 2240     Current Discharge Medication List        CONTINUE these medications which have NOT CHANGED    Details   apixaban (ELIQUIS) 5 mg Tab Take 1 tablet (5 mg total) by mouth 2 (two) times daily.  Qty: 60 tablet, Refills: 11      benazepriL (LOTENSIN) 10 MG tablet Take 1 tablet (10 mg total) by mouth once daily.  Qty: 90 tablet, Refills: 3    Comments: .      donepeziL (ARICEPT) 10 MG tablet Take 1 tablet (10 mg total) by mouth every evening.  Qty: 30 tablet, Refills: 5      furosemide (LASIX) 40 MG tablet Take 1 tablet (40 mg total) by mouth 2 (two) times a day.  Qty: 60 tablet, Refills: 2      lovastatin (MEVACOR) 20 MG tablet Take 20 mg by  mouth every evening.      memantine (NAMENDA) 10 MG Tab Take 2 tablets by mouth once daily.      metoprolol tartrate (LOPRESSOR) 25 MG tablet Take 0.5 tablets (12.5 mg total) by mouth 2 (two) times daily.  Qty: 30 tablet, Refills: 11    Comments: .      pulse oximeter (PULSE OXIMETER) device by Apply Externally route 2 (two) times a day. Use twice daily at 8 AM and 3 PM and record the value in [employer] as directed.  Qty: 1 each, Refills: 0    Comments: This is a NO CHARGE item.  Please override price to zero.  DO NOT PRINT.  NORMAL MODE e-PRESCRIBE ONLY.      QUEtiapine (SEROQUEL) 25 MG Tab Take 1 tablet (25 mg total) by mouth every evening.  Qty: 30 tablet, Refills: 5      tamsulosin (FLOMAX) 0.4 mg Cap Take 1 capsule (0.4 mg total) by mouth once daily.  Qty: 30 capsule, Refills: 5               I have seen and examined this patient within the last 30 days. My clinical findings that support the need for the home health skilled services and home bound status are the following:no   Weakness/numbness causing balance and gait disturbance due to Debility making it taxing to leave home.     Diet:   Mechanical soft for dysphagia    Activities:   activity as tolerated    Nursing:   Agency to admit patient within 24 hours of hospital discharge unless specified on physician order or at patient request    SN to complete comprehensive assessment including routine vital signs. Instruct on disease process and s/s of complications to report to MD. Review/verify medication list sent home with the patient at time of discharge  and instruct patient/caregiver as needed. Frequency may be adjusted depending on start of care date.     Skilled nurse to perform up to 3 visits PRN for symptoms related to diagnosis    Notify MD if SBP > 160 or < 90; DBP > 90 or < 50; HR > 120 or < 50; Temp > 101; O2 < 88%; Other:       Ok to schedule additional visits based on staff availability and patient request on consecutive days within the home health  episode.    When multiple disciplines ordered:    Start of Care occurs on Sunday - Wednesday schedule remaining discipline evaluations as ordered on separate consecutive days following the start of care.    Thursday SOC -schedule subsequent evaluations Friday and Monday the following week.     Friday - Saturday SOC - schedule subsequent discipline evaluations on consecutive days starting Monday of the following week.    For all post-discharge communication and subsequent orders please contact patient's primary care physician. If unable to reach primary care physician or do not receive response within 30 minutes, please contact Dr Kalyan Vasques for clinical staff order clarification    Home Health Aide:  Physical Therapy and occupational therapy as much as possible    Wound Care Orders  Nephrostomy tubes management    I certify that this patient is confined to his home and needs physical therapy.

## 2022-12-28 NOTE — PLAN OF CARE
Problem: Pain Acute  Goal: Acceptable Pain Control and Functional Ability  Outcome: Ongoing, Progressing     Problem: Renal Function Impairment (Acute Kidney Injury/Impairment)  Goal: Effective Renal Function  Outcome: Ongoing, Progressing   Report given to nurse. Iv removed. Condom cath remains. Bed locked and lowest position. Call light in reach.

## 2022-12-28 NOTE — PLAN OF CARE
Spoke with Nurse Montiel at [facility] [phone] pt can return transport requested for 1500 nurse notified of number to call report to

## 2022-12-28 NOTE — PT/OT/SLP PROGRESS
Physical Therapy      Patient Name:  Timothy Roldan   MRN:  [id number]    Patient not seen today secondary to  (pt not seen due to being discharged).     12/28/2022

## 2022-12-28 NOTE — PLAN OF CARE
SSC met with patient/family at bedside. Patient experience rounding completed and reviewed the following.     Do you know your discharge plan? Yes     If yes, what is the plan? (Assisting Living)    If you are discharging home, do you have help at home? Yes   Do you think you will need help at home at discharge? No     Have you discussed your needs and preferences with your SW/CM? Yes    Assigned SW/CM notified of any patient/family needs or concerns. Lakesha stated No needs/ concerns: worried about draining tubes.

## 2022-12-28 NOTE — PLAN OF CARE
Problem: Oral Intake Inadequate (Acute Kidney Injury/Impairment)  Goal: Optimal Nutrition Intake  12/27/2022 1900 by Jules Correa RN  Outcome: Ongoing, Progressing  12/27/2022 1831 by Jules Correa RN  Outcome: Ongoing, Progressing     Problem: Renal Function Impairment (Acute Kidney Injury/Impairment)  Goal: Effective Renal Function  12/27/2022 1900 by Jules Correa RN  Outcome: Ongoing, Progressing  12/27/2022 1831 by Jules Correa RN  Outcome: Ongoing, Progressing   Cain d/c per MD order. Condom cath placed. Bed locked and in lowest position. Call light in reach.

## 2022-12-28 NOTE — NURSING
brower removal at 5pm, at this time patient has not voided, bladder scan shows >135, encouraging fluids and will reassess prior to end of shift.

## 2022-12-28 NOTE — PLAN OF CARE
[facility] - [facility] ([facility])  Discharge Final Note    Primary Care Provider: Martine Garces MD    Expected Discharge Date: 12/28/2022    Final Discharge Note (most recent)       Final Note - 12/28/22 1035          Final Note    Assessment Type Final Discharge Note (P)      Anticipated Discharge Disposition Home-Health Care Svc (P)         Post-Acute Status    Discharge Delays None known at this time (P)                      Important Message from Medicare             Contact Info       Martine Garces MD   Specialty: Internal Medicine   Relationship: PCP - General    [address] [place] [address]  [place] [address]   Phone: [phone]       Next Steps: Follow up in 2 day(s)    Instructions: BMP labs

## 2022-12-29 ENCOUNTER — PATIENT OUTREACH (OUTPATIENT)
Dept: ADMINISTRATIVE | Facility: OTHER | Age: 82
End: 2022-12-29
Payer: MEDICARE

## 2022-12-29 ENCOUNTER — TELEPHONE (OUTPATIENT)
Dept: ADMINISTRATIVE | Facility: CLINIC | Age: 82
End: 2022-12-29
Payer: MEDICARE

## 2022-12-29 NOTE — PLAN OF CARE
Problem: Pain Acute  Goal: Acceptable Pain Control and Functional Ability  12/28/2022 1819 by Jules Correa RN  Outcome: Ongoing, Progressing       Problem: Renal Function Impairment (Acute Kidney Injury/Impairment)  Goal: Effective Renal Function  12/28/2022 1819 by Jules Correa RN  Outcome: Ongoing, Progressing     Bladder scan done highest result was 241cc, MD notified, instructed to watch if scan showed 400cc or above or pt is experiencing ab pain for Cain placement. Discharge instructions given. Iv removed. Nephro tubes drained. Bed locked and in lowest position. Call light in reach.

## 2022-12-29 NOTE — PROGRESS NOTES
CHW - Case Closure    This Community Health Worker spoke to caregiver via telephone today.   Pt/Caregiver reported: Patient is in assisted living and has a caregiver. States she misunderstood the text. She thought it asked for his pain score.  Pt/Caregiver denied any additional needs at this time and agrees with episode closure at this time.  Provided caregiver with Community Health Worker's contact information and encouraged him/her to contact this Community Health Worker if additional needs arise.

## 2022-12-29 NOTE — PROGRESS NOTES
Patient is in assisted living and has a caregiver. States she misunderstood the text. She thought it asked for his pain score. No need to follow up.

## 2023-01-06 ENCOUNTER — OFFICE VISIT (OUTPATIENT)
Dept: UROLOGY | Facility: CLINIC | Age: 83
End: 2023-01-06
Payer: MEDICARE

## 2023-01-06 VITALS — WEIGHT: 177 LBS | BODY MASS INDEX: 25.34 KG/M2 | HEIGHT: 70 IN

## 2023-01-06 DIAGNOSIS — N20.0 STAGHORN CALCULUS: Primary | ICD-10-CM

## 2023-01-06 PROCEDURE — 1111F PR DISCHARGE MEDS RECONCILED W/ CURRENT OUTPATIENT MED LIST: ICD-10-PCS | Mod: CPTII,S$GLB,, | Performed by: UROLOGY

## 2023-01-06 PROCEDURE — 1157F PR ADVANCE CARE PLAN OR EQUIV PRESENT IN MEDICAL RECORD: ICD-10-PCS | Mod: CPTII,S$GLB,, | Performed by: UROLOGY

## 2023-01-06 PROCEDURE — 1157F ADVNC CARE PLAN IN RCRD: CPT | Mod: CPTII,S$GLB,, | Performed by: UROLOGY

## 2023-01-06 PROCEDURE — 1159F PR MEDICATION LIST DOCUMENTED IN MEDICAL RECORD: ICD-10-PCS | Mod: CPTII,S$GLB,, | Performed by: UROLOGY

## 2023-01-06 PROCEDURE — 99205 OFFICE O/P NEW HI 60 MIN: CPT | Mod: S$GLB,,, | Performed by: UROLOGY

## 2023-01-06 PROCEDURE — 99999 PR PBB SHADOW E&M-EST. PATIENT-LVL III: CPT | Mod: PBBFAC,,,

## 2023-01-06 PROCEDURE — 1126F AMNT PAIN NOTED NONE PRSNT: CPT | Mod: CPTII,S$GLB,, | Performed by: UROLOGY

## 2023-01-06 PROCEDURE — 99999 PR PBB SHADOW E&M-EST. PATIENT-LVL III: ICD-10-PCS | Mod: PBBFAC,,,

## 2023-01-06 PROCEDURE — 1111F DSCHRG MED/CURRENT MED MERGE: CPT | Mod: CPTII,S$GLB,, | Performed by: UROLOGY

## 2023-01-06 PROCEDURE — 1159F MED LIST DOCD IN RCRD: CPT | Mod: CPTII,S$GLB,, | Performed by: UROLOGY

## 2023-01-06 PROCEDURE — 1126F PR PAIN SEVERITY QUANTIFIED, NO PAIN PRESENT: ICD-10-PCS | Mod: CPTII,S$GLB,, | Performed by: UROLOGY

## 2023-01-06 PROCEDURE — 99205 PR OFFICE/OUTPT VISIT, NEW, LEVL V, 60-74 MIN: ICD-10-PCS | Mod: S$GLB,,, | Performed by: UROLOGY

## 2023-01-06 NOTE — PROGRESS NOTES
"[facility] - Urology [facility]   Clinic Note    SUBJECTIVE:     Chief Complaint: Bilateral staghorn calculi s/p bilateral nephrostomy tubes    History of Present Illness:  Timothy Roldan is a 82 y.o. male with past medical history of HTN, Afib on Eliquis, alzheimer dementia, HFpEF, COVID pna, and BPH.    Patient initially presented to the ED on 12/23 for fever and altered mental status. 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 Urine concerning for infection. Elevated . Cain was placed while inpatient due to elevated bladder scans (~400cc). He passed his voiding trial prior to leaving the hospital.    Urine culture 12/21/22 growing multiple organisms. Blood cultures negative. Repeat urine a blood culture no growth while inpatient.    CTRSS with 5.7 cm right renal pelvis stone and additional 1.8 cm proximal ureteral stone with severe right hydronephrosis. Large left renal pelvis stone 5cm with moderate left hydronephrosis. Bilateral renal cysts.      Bilateral nephrostomy tubes places placed 12/23. Gram stain at the time of neph tube placement revealed many gram positive cocci.    12/27/22 - Hgb 8.5, Wbc 5.2  12/28/22 - Cr 1.5 (baseline 1.0)    EF 70%        Anticoagulation:  Yes Eliquis    OBJECTIVE:     Estimated body mass index is 25.4 kg/m² as calculated from the following:    Height as of this encounter: 5' 10" (1.778 m).    Weight as of this encounter: 80.3 kg (177 lb 0.5 oz).    Vital Signs (Most Recent)       Physical Exam  Constitutional:       General: He is not in acute distress.  HENT:      Head: Normocephalic.   Eyes:      Extraocular Movements: Extraocular movements intact.   Cardiovascular:      Rate and Rhythm: Normal rate.   Pulmonary:      Effort: Pulmonary effort is normal. No respiratory distress.   Abdominal:      Palpations: Abdomen is soft. There is no mass.      Tenderness: There is no right CVA tenderness or left CVA tenderness.      Comments: Bilateral neph tubes in " place, draining clear urine    Musculoskeletal:         General: No swelling or deformity.      Cervical back: Normal range of motion.   Skin:     General: Skin is warm and dry.   Neurological:      General: No focal deficit present.      Mental Status: He is alert.   Psychiatric:         Mood and Affect: Mood normal.         Behavior: Behavior normal.     Lab Results   Component Value Date    BUN 27 (H) 12/28/2022    CREATININE 1.5 (H) 12/28/2022    WBC 5.23 12/27/2022    HGB 8.5 (L) 12/27/2022    HCT 28.1 (L) 12/27/2022     12/27/2022    AST 15 12/24/2022    ALT 6 (L) 12/24/2022    ALKPHOS 54 (L) 12/24/2022    ALBUMIN 1.6 (L) 12/24/2022 12/22/22 - CTRSS with 5.7 cm right renal pelvis stone and additional 1.8 cm proximal ureteral stone with severe right hydronephrosis. Large left renal pelvis stone 5cm with moderate left hydronephrosis. Bilateral renal cysts.      ASSESSMENT     1. Bilateral Staghorn Calculus      PLAN:   1. Bilateral Staghorn Calculus    Discussed surgical options at length with patient- staged PCNL vs. laparoscopic nephrolithotomy vs. no surgical intervention with bilateral neph tube exchanged for life.     - referral to IR for bilateral neph tube exchange in 3 months   - f/u in resident clinic in 3 mo to schedule bilateral exchanges       Sarah Galan MD

## 2023-01-17 ENCOUNTER — TELEPHONE (OUTPATIENT)
Dept: ADMINISTRATIVE | Facility: CLINIC | Age: 83
End: 2023-01-17
Payer: MEDICARE

## 2023-01-17 NOTE — TELEPHONE ENCOUNTER
Returned call to [facility] with home health. Wound care orders given for R and L elbow skin tears.  
- - -

## 2023-01-20 ENCOUNTER — LAB VISIT (OUTPATIENT)
Dept: LAB | Facility: HOSPITAL | Age: 83
End: 2023-01-20
Attending: INTERNAL MEDICINE
Payer: MEDICARE

## 2023-01-20 DIAGNOSIS — I11.0 HYPERTENSIVE HEART DISEASE WITH CONGESTIVE HEART FAILURE: Primary | ICD-10-CM

## 2023-01-20 PROCEDURE — 87086 URINE CULTURE/COLONY COUNT: CPT | Performed by: INTERNAL MEDICINE

## 2023-01-20 PROCEDURE — 81001 URINALYSIS AUTO W/SCOPE: CPT | Performed by: INTERNAL MEDICINE

## 2023-01-21 LAB
BILIRUB UR QL STRIP: NEGATIVE
CLARITY UR REFRACT.AUTO: CLEAR
COLOR UR AUTO: YELLOW
GLUCOSE UR QL STRIP: NEGATIVE
HGB UR QL STRIP: ABNORMAL
KETONES UR QL STRIP: NEGATIVE
LEUKOCYTE ESTERASE UR QL STRIP: ABNORMAL
MICROSCOPIC COMMENT: ABNORMAL
NITRITE UR QL STRIP: NEGATIVE
PH UR STRIP: 6 [PH] (ref 5–8)
PROT UR QL STRIP: ABNORMAL
RBC #/AREA URNS AUTO: 13 /HPF (ref 0–4)
SP GR UR STRIP: 1.01 (ref 1–1.03)
SQUAMOUS #/AREA URNS AUTO: 0 /HPF
URN SPEC COLLECT METH UR: ABNORMAL
WBC #/AREA URNS AUTO: 22 /HPF (ref 0–5)

## 2023-01-22 LAB
BACTERIA UR CULT: NORMAL
BACTERIA UR CULT: NORMAL

## 2023-01-23 LAB — FUNGUS SPEC CULT: NORMAL

## 2023-01-25 ENCOUNTER — HOSPITAL ENCOUNTER (INPATIENT)
Facility: HOSPITAL | Age: 83
LOS: 4 days | Discharge: HOSPICE | DRG: 698 | End: 2023-01-29
Attending: EMERGENCY MEDICINE | Admitting: HOSPITALIST
Payer: MEDICARE

## 2023-01-25 DIAGNOSIS — R53.1 WEAKNESS: ICD-10-CM

## 2023-01-25 DIAGNOSIS — N20.0 STAGHORN CALCULUS: ICD-10-CM

## 2023-01-25 DIAGNOSIS — A41.50 SEPSIS DUE TO GRAM NEGATIVE BACTERIA: ICD-10-CM

## 2023-01-25 DIAGNOSIS — R41.82 ALTERED MENTAL STATUS, UNSPECIFIED ALTERED MENTAL STATUS TYPE: ICD-10-CM

## 2023-01-25 DIAGNOSIS — N17.9 AKI (ACUTE KIDNEY INJURY): ICD-10-CM

## 2023-01-25 DIAGNOSIS — N39.0 URINARY TRACT INFECTION WITH HEMATURIA, SITE UNSPECIFIED: ICD-10-CM

## 2023-01-25 DIAGNOSIS — A41.9 SEPSIS, DUE TO UNSPECIFIED ORGANISM, UNSPECIFIED WHETHER ACUTE ORGAN DYSFUNCTION PRESENT: Primary | ICD-10-CM

## 2023-01-25 DIAGNOSIS — N13.30 HYDRONEPHROSIS OF RIGHT KIDNEY: ICD-10-CM

## 2023-01-25 DIAGNOSIS — R31.9 URINARY TRACT INFECTION WITH HEMATURIA, SITE UNSPECIFIED: ICD-10-CM

## 2023-01-25 PROBLEM — S31.000A SACRAL WOUND: Status: ACTIVE | Noted: 2023-01-25

## 2023-01-25 PROBLEM — Z71.89 ACP (ADVANCE CARE PLANNING): Status: ACTIVE | Noted: 2023-01-25

## 2023-01-25 LAB
ALBUMIN SERPL BCP-MCNC: 1.9 G/DL (ref 3.5–5.2)
ALP SERPL-CCNC: 66 U/L (ref 55–135)
ALT SERPL W/O P-5'-P-CCNC: 7 U/L (ref 10–44)
ANION GAP SERPL CALC-SCNC: 13 MMOL/L (ref 8–16)
AST SERPL-CCNC: 12 U/L (ref 10–40)
BACTERIA #/AREA URNS AUTO: ABNORMAL /HPF
BASOPHILS # BLD AUTO: 0.01 K/UL (ref 0–0.2)
BASOPHILS NFR BLD: 0.1 % (ref 0–1.9)
BILIRUB SERPL-MCNC: 0.5 MG/DL (ref 0.1–1)
BILIRUB UR QL STRIP: NEGATIVE
BNP SERPL-MCNC: 451 PG/ML (ref 0–99)
BUN SERPL-MCNC: 45 MG/DL (ref 8–23)
CALCIUM SERPL-MCNC: 8.7 MG/DL (ref 8.7–10.5)
CHLORIDE SERPL-SCNC: 110 MMOL/L (ref 95–110)
CLARITY UR REFRACT.AUTO: ABNORMAL
CO2 SERPL-SCNC: 19 MMOL/L (ref 23–29)
COLOR UR AUTO: ABNORMAL
CREAT SERPL-MCNC: 2.8 MG/DL (ref 0.5–1.4)
DIFFERENTIAL METHOD: ABNORMAL
EOSINOPHIL # BLD AUTO: 0 K/UL (ref 0–0.5)
EOSINOPHIL NFR BLD: 0 % (ref 0–8)
ERYTHROCYTE [DISTWIDTH] IN BLOOD BY AUTOMATED COUNT: 15.6 % (ref 11.5–14.5)
EST. GFR  (NO RACE VARIABLE): 22 ML/MIN/1.73 M^2
GLUCOSE SERPL-MCNC: 124 MG/DL (ref 70–110)
GLUCOSE UR QL STRIP: NEGATIVE
HCT VFR BLD AUTO: 30.6 % (ref 40–54)
HGB BLD-MCNC: 9.5 G/DL (ref 14–18)
HGB UR QL STRIP: ABNORMAL
HYALINE CASTS UR QL AUTO: 0 /LPF
IMM GRANULOCYTES # BLD AUTO: 0.04 K/UL (ref 0–0.04)
IMM GRANULOCYTES NFR BLD AUTO: 0.5 % (ref 0–0.5)
INFLUENZA A, MOLECULAR: NOT DETECTED
INFLUENZA B, MOLECULAR: NOT DETECTED
KETONES UR QL STRIP: NEGATIVE
LACTATE SERPL-SCNC: 0.9 MMOL/L (ref 0.5–2.2)
LACTATE SERPL-SCNC: 1.7 MMOL/L (ref 0.5–2.2)
LEUKOCYTE ESTERASE UR QL STRIP: ABNORMAL
LYMPHOCYTES # BLD AUTO: 0.2 K/UL (ref 1–4.8)
LYMPHOCYTES NFR BLD: 2.9 % (ref 18–48)
MCH RBC QN AUTO: 29.6 PG (ref 27–31)
MCHC RBC AUTO-ENTMCNC: 31 G/DL (ref 32–36)
MCV RBC AUTO: 95 FL (ref 82–98)
MICROSCOPIC COMMENT: ABNORMAL
MONOCYTES # BLD AUTO: 0.3 K/UL (ref 0.3–1)
MONOCYTES NFR BLD: 3.6 % (ref 4–15)
NEUTROPHILS # BLD AUTO: 7.6 K/UL (ref 1.8–7.7)
NEUTROPHILS NFR BLD: 92.9 % (ref 38–73)
NITRITE UR QL STRIP: NEGATIVE
NRBC BLD-RTO: 0 /100 WBC
PH UR STRIP: 7 [PH] (ref 5–8)
PLATELET # BLD AUTO: 166 K/UL (ref 150–450)
PMV BLD AUTO: 10.2 FL (ref 9.2–12.9)
POTASSIUM SERPL-SCNC: 4 MMOL/L (ref 3.5–5.1)
PROCALCITONIN SERPL IA-MCNC: 5.46 NG/ML
PROT SERPL-MCNC: 6.8 G/DL (ref 6–8.4)
PROT UR QL STRIP: ABNORMAL
RBC # BLD AUTO: 3.21 M/UL (ref 4.6–6.2)
RBC #/AREA URNS AUTO: 73 /HPF (ref 0–4)
RSV AG BY MOLECULAR METHOD: NOT DETECTED
SARS-COV-2 RNA RESP QL NAA+PROBE: NOT DETECTED
SODIUM SERPL-SCNC: 142 MMOL/L (ref 136–145)
SP GR UR STRIP: 1.01 (ref 1–1.03)
TROPONIN I SERPL DL<=0.01 NG/ML-MCNC: 0.01 NG/ML (ref 0–0.03)
URN SPEC COLLECT METH UR: ABNORMAL
WBC # BLD AUTO: 8.16 K/UL (ref 3.9–12.7)
WBC #/AREA URNS AUTO: >100 /HPF (ref 0–5)

## 2023-01-25 PROCEDURE — 96365 THER/PROPH/DIAG IV INF INIT: CPT

## 2023-01-25 PROCEDURE — 27000221 HC OXYGEN, UP TO 24 HOURS

## 2023-01-25 PROCEDURE — 84145 PROCALCITONIN (PCT): CPT | Performed by: EMERGENCY MEDICINE

## 2023-01-25 PROCEDURE — 25000003 PHARM REV CODE 250: Performed by: STUDENT IN AN ORGANIZED HEALTH CARE EDUCATION/TRAINING PROGRAM

## 2023-01-25 PROCEDURE — 93010 EKG 12-LEAD: ICD-10-PCS | Mod: ,,, | Performed by: INTERNAL MEDICINE

## 2023-01-25 PROCEDURE — 85025 COMPLETE CBC W/AUTO DIFF WBC: CPT | Performed by: EMERGENCY MEDICINE

## 2023-01-25 PROCEDURE — 87186 SC STD MICRODIL/AGAR DIL: CPT | Mod: 59 | Performed by: EMERGENCY MEDICINE

## 2023-01-25 PROCEDURE — 93010 ELECTROCARDIOGRAM REPORT: CPT | Mod: ,,, | Performed by: INTERNAL MEDICINE

## 2023-01-25 PROCEDURE — 0241U SARS-COV2 (COVID) WITH FLU/RSV BY PCR: CPT | Performed by: EMERGENCY MEDICINE

## 2023-01-25 PROCEDURE — 99285 PR EMERGENCY DEPT VISIT,LEVEL V: ICD-10-PCS | Mod: CS,,, | Performed by: EMERGENCY MEDICINE

## 2023-01-25 PROCEDURE — 87086 URINE CULTURE/COLONY COUNT: CPT | Performed by: EMERGENCY MEDICINE

## 2023-01-25 PROCEDURE — 80053 COMPREHEN METABOLIC PANEL: CPT | Performed by: EMERGENCY MEDICINE

## 2023-01-25 PROCEDURE — 87077 CULTURE AEROBIC IDENTIFY: CPT | Performed by: EMERGENCY MEDICINE

## 2023-01-25 PROCEDURE — 87088 URINE BACTERIA CULTURE: CPT | Performed by: EMERGENCY MEDICINE

## 2023-01-25 PROCEDURE — 63600175 PHARM REV CODE 636 W HCPCS: Performed by: HOSPITALIST

## 2023-01-25 PROCEDURE — 63600175 PHARM REV CODE 636 W HCPCS: Performed by: EMERGENCY MEDICINE

## 2023-01-25 PROCEDURE — 83605 ASSAY OF LACTIC ACID: CPT | Performed by: EMERGENCY MEDICINE

## 2023-01-25 PROCEDURE — 93005 ELECTROCARDIOGRAM TRACING: CPT

## 2023-01-25 PROCEDURE — 83880 ASSAY OF NATRIURETIC PEPTIDE: CPT | Performed by: EMERGENCY MEDICINE

## 2023-01-25 PROCEDURE — 25000003 PHARM REV CODE 250: Performed by: EMERGENCY MEDICINE

## 2023-01-25 PROCEDURE — 99285 EMERGENCY DEPT VISIT HI MDM: CPT | Mod: 25

## 2023-01-25 PROCEDURE — 25000003 PHARM REV CODE 250: Performed by: HOSPITALIST

## 2023-01-25 PROCEDURE — 87040 BLOOD CULTURE FOR BACTERIA: CPT | Mod: 59 | Performed by: EMERGENCY MEDICINE

## 2023-01-25 PROCEDURE — 84484 ASSAY OF TROPONIN QUANT: CPT | Performed by: EMERGENCY MEDICINE

## 2023-01-25 PROCEDURE — 63600175 PHARM REV CODE 636 W HCPCS: Performed by: STUDENT IN AN ORGANIZED HEALTH CARE EDUCATION/TRAINING PROGRAM

## 2023-01-25 PROCEDURE — 81001 URINALYSIS AUTO W/SCOPE: CPT | Performed by: EMERGENCY MEDICINE

## 2023-01-25 PROCEDURE — 96360 HYDRATION IV INFUSION INIT: CPT | Mod: 59

## 2023-01-25 PROCEDURE — 51701 INSERT BLADDER CATHETER: CPT

## 2023-01-25 PROCEDURE — 11000001 HC ACUTE MED/SURG PRIVATE ROOM

## 2023-01-25 PROCEDURE — 99285 EMERGENCY DEPT VISIT HI MDM: CPT | Mod: CS,,, | Performed by: EMERGENCY MEDICINE

## 2023-01-25 PROCEDURE — 94761 N-INVAS EAR/PLS OXIMETRY MLT: CPT

## 2023-01-25 PROCEDURE — 12000002 HC ACUTE/MED SURGE SEMI-PRIVATE ROOM

## 2023-01-25 RX ORDER — TALC
6 POWDER (GRAM) TOPICAL NIGHTLY PRN
Status: DISCONTINUED | OUTPATIENT
Start: 2023-01-25 | End: 2023-01-29 | Stop reason: HOSPADM

## 2023-01-25 RX ORDER — IBUPROFEN 200 MG
16 TABLET ORAL
Status: DISCONTINUED | OUTPATIENT
Start: 2023-01-25 | End: 2023-01-29 | Stop reason: HOSPADM

## 2023-01-25 RX ORDER — GLUCAGON 1 MG
1 KIT INJECTION
Status: DISCONTINUED | OUTPATIENT
Start: 2023-01-25 | End: 2023-01-29 | Stop reason: HOSPADM

## 2023-01-25 RX ORDER — ACETAMINOPHEN 325 MG/1
650 TABLET ORAL EVERY 4 HOURS PRN
Status: DISCONTINUED | OUTPATIENT
Start: 2023-01-25 | End: 2023-01-29 | Stop reason: HOSPADM

## 2023-01-25 RX ORDER — IBUPROFEN 200 MG
24 TABLET ORAL
Status: DISCONTINUED | OUTPATIENT
Start: 2023-01-25 | End: 2023-01-29 | Stop reason: HOSPADM

## 2023-01-25 RX ORDER — ACETAMINOPHEN 500 MG
1000 TABLET ORAL EVERY 6 HOURS
Status: DISPENSED | OUTPATIENT
Start: 2023-01-26 | End: 2023-01-27

## 2023-01-25 RX ORDER — DONEPEZIL HYDROCHLORIDE 10 MG/1
10 TABLET, FILM COATED ORAL NIGHTLY
Status: DISCONTINUED | OUTPATIENT
Start: 2023-01-25 | End: 2023-01-29 | Stop reason: HOSPADM

## 2023-01-25 RX ORDER — MEMANTINE HYDROCHLORIDE 10 MG/1
20 TABLET ORAL DAILY
Status: DISCONTINUED | OUTPATIENT
Start: 2023-01-25 | End: 2023-01-29 | Stop reason: HOSPADM

## 2023-01-25 RX ORDER — SODIUM CHLORIDE, SODIUM LACTATE, POTASSIUM CHLORIDE, CALCIUM CHLORIDE 600; 310; 30; 20 MG/100ML; MG/100ML; MG/100ML; MG/100ML
INJECTION, SOLUTION INTRAVENOUS CONTINUOUS
Status: ACTIVE | OUTPATIENT
Start: 2023-01-25 | End: 2023-01-25

## 2023-01-25 RX ORDER — SODIUM CHLORIDE 0.9 % (FLUSH) 0.9 %
10 SYRINGE (ML) INJECTION
Status: DISCONTINUED | OUTPATIENT
Start: 2023-01-25 | End: 2023-01-29 | Stop reason: HOSPADM

## 2023-01-25 RX ORDER — ACETAMINOPHEN 650 MG/1
650 SUPPOSITORY RECTAL
Status: COMPLETED | OUTPATIENT
Start: 2023-01-25 | End: 2023-01-25

## 2023-01-25 RX ORDER — METOPROLOL TARTRATE 25 MG/1
12.5 TABLET ORAL 2 TIMES DAILY
Status: DISCONTINUED | OUTPATIENT
Start: 2023-01-25 | End: 2023-01-29 | Stop reason: HOSPADM

## 2023-01-25 RX ORDER — PRAVASTATIN SODIUM 20 MG/1
20 TABLET ORAL DAILY
Status: DISCONTINUED | OUTPATIENT
Start: 2023-01-25 | End: 2023-01-29 | Stop reason: HOSPADM

## 2023-01-25 RX ADMIN — APIXABAN 2.5 MG: 2.5 TABLET, FILM COATED ORAL at 10:01

## 2023-01-25 RX ADMIN — SODIUM CHLORIDE, POTASSIUM CHLORIDE, SODIUM LACTATE AND CALCIUM CHLORIDE: 600; 310; 30; 20 INJECTION, SOLUTION INTRAVENOUS at 03:01

## 2023-01-25 RX ADMIN — PIPERACILLIN SODIUM AND TAZOBACTAM SODIUM 4.5 G: 4; .5 INJECTION, POWDER, LYOPHILIZED, FOR SOLUTION INTRAVENOUS at 11:01

## 2023-01-25 RX ADMIN — METOPROLOL TARTRATE 12.5 MG: 25 TABLET, FILM COATED ORAL at 10:01

## 2023-01-25 RX ADMIN — SODIUM CHLORIDE, POTASSIUM CHLORIDE, SODIUM LACTATE AND CALCIUM CHLORIDE 1000 ML: 600; 310; 30; 20 INJECTION, SOLUTION INTRAVENOUS at 11:01

## 2023-01-25 RX ADMIN — DONEPEZIL HYDROCHLORIDE 10 MG: 10 TABLET ORAL at 10:01

## 2023-01-25 RX ADMIN — CEFEPIME 2 G: 2 INJECTION, POWDER, FOR SOLUTION INTRAVENOUS at 03:01

## 2023-01-25 RX ADMIN — ACETAMINOPHEN 650 MG: 650 SUPPOSITORY RECTAL at 11:01

## 2023-01-25 RX ADMIN — VANCOMYCIN HYDROCHLORIDE 1500 MG: 1.5 INJECTION, POWDER, LYOPHILIZED, FOR SOLUTION INTRAVENOUS at 04:01

## 2023-01-25 NOTE — ASSESSMENT & PLAN NOTE
Patient with Paroxysmal (<7 days) atrial fibrillation which is controlled currently with Beta Blocker. Patient is currently in atrial fibrillation.[id number] Score: 2. HASBLED Score:. Anticoagulation indicated. Anticoagulation done with Eliquis.    - will continue home metoprolol as EKG showed afib on admission

## 2023-01-25 NOTE — HPI
Timothy Roldan is an 84 yo M with a history of HTN, AF (on eliquis), AD, CHF (EF 70%) presenting for AMS. Urology was consulted for nephrostomy tube management.     Per chart review, patient has been having some blood in his urine starting Friday as well as fevers starting Sunday. He has been on macrobid for a reported UTI though there are no present records of this UTI. He has been eating and drinking less over the last few days and today was confused / worsening mental status, was brought to ED.     Patient initially presented to the ED on 12/23 for fever and altered mental status. 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 CT at that time with 5.7 cm right renal pelvis stone and additional 1.8 cm proximal ureteral stone with severe right hydronephrosis. Large left renal pelvis stone 5cm with moderate left hydronephrosis. Bilateral renal cysts. Bilateral nephrostomy tubes placed at that time. Patient was seen in clinic on 1/6/23 and after discussions regarding methods of stone management and the associated risks, benefits, decision was made to exchange nephrostomy tubes every three months.     On assessment, patient is febrile (Tmax 102.4), low grade tachycardia, BP stable. WBC wnl, Cr 2.8 (aletha after NT placement 1.5). UA LE positive, nitrite negative. Microscopy with >100 WBC, many bacteria. CT scan shows interval resolution of left sided hydronephrosis, some residual lower pole hydronephrosis on the R with well positioned nephrostomy tubes bilaterally, stone burden stable as described above.

## 2023-01-25 NOTE — ASSESSMENT & PLAN NOTE
CTAP with improved right sided and resolved left sided hydronephrisis, b/l renal and proximal r. ureteral calculi    Plan  - consulted urology, appreciate recommendations

## 2023-01-25 NOTE — ED PROVIDER NOTES
"Encounter Date: 1/25/2023       History     Chief Complaint   Patient presents with    Weakness     Arrived via ems from home with c/o weakness and decreased appetite for a few days, recently diagnosed with kidney stone, on abx for a sacral wound, SpO2 80s on EMS arrival     HPI  83-year-old male with past medical history HTN, Afib on Eliquis, alzheimer dementia, HFpEF, COVID pna, and BPH, recent admission for kidney stones status post ureteral stents complicated by infection.  Coming in sent in by nursing home for reported weakness and decreased appetite.  Per EMS he was satting 80 on room air upon arrival.     Patient is unable to provide any history, he initially says he feels "all right", and denies any pain.  Later he endorses feeling "weak", but is not providing any further history.    Nursing home was called, patient has been having some blood in his urine starting Friday as well as fevers starting Sunday, apparently a urine was done at the nursing home which showed infection he was started on Macrobid yesterday.  Today he has been declining he is eating and drinking less over the last few days and today was confused / worsening mental status so sent to the emergency department.     Review of patient's allergies indicates:  No Known Allergies  Past Medical History:   Diagnosis Date    Bilateral Staghorn Calculus 1/6/2023    Hyperlipidemia     Hypertension     Persistent atrial fibrillation 12/31/2021    Pneumonia due to COVID-19 virus 5/21/2022     Past Surgical History:   Procedure Laterality Date    TONSILLECTOMY      TOTAL KNEE ARTHROPLASTY       History reviewed. No pertinent family history.  Social History     Tobacco Use    Smoking status: Never    Smokeless tobacco: Never     Review of Systems    Physical Exam     Initial Vitals [01/25/23 0914]   BP Pulse Resp Temp SpO2   138/76 102 20 98.6 °F (37 °C) 98 %      MAP       --         Physical Exam  Physical Exam:  CONSTITUTIONAL: Well developed, well " nourished, in no acute distress.  Nephrostomy tubes outputting dark urine bilaterally.  HENT: Normocephalic, atraumatic, dry mucous membranes   EYES: Sclerae anicteric, pupils are equal round and reactive.  NECK: Supple, no thyroid enlargement  CARDIOVASCULAR: Regular rate and rhythm without any murmurs, gallops, rubs.  RESPIRATORY: Breathing comfortably. Auscultation of the lungs revealed normal breath sounds b/l, no wheezing, no rales, no rhonchi.  ABDOMEN: Soft, some mild nonspecific lower abdominal tenderness, no masses, no rebound or guarding   NEUROLOGIC: Alert, follows basic commands, 1 word answers difficult to understand. No facial droop.  No pronator drift bilaterally  MSK: Moving all four extremities.  Skin: Warm and dry. No visible rash on exposed areas of skin.    Psych:  Flat affect    ED Course   Procedures  Labs Reviewed   CBC W/ AUTO DIFFERENTIAL - Abnormal; Notable for the following components:       Result Value    RBC 3.21 (*)     Hemoglobin 9.5 (*)     Hematocrit 30.6 (*)     MCHC 31.0 (*)     RDW 15.6 (*)     Lymph # 0.2 (*)     Gran % 92.9 (*)     Lymph % 2.9 (*)     Mono % 3.6 (*)     All other components within normal limits   COMPREHENSIVE METABOLIC PANEL - Abnormal; Notable for the following components:    CO2 19 (*)     Glucose 124 (*)     BUN 45 (*)     Creatinine 2.8 (*)     Albumin 1.9 (*)     ALT 7 (*)     eGFR 22.0 (*)     All other components within normal limits   URINALYSIS, REFLEX TO URINE CULTURE - Abnormal; Notable for the following components:    Appearance, UA Cloudy (*)     Protein, UA 2+ (*)     Occult Blood UA 1+ (*)     Leukocytes, UA 3+ (*)     All other components within normal limits    Narrative:     Specimen Source->Urine   PROCALCITONIN - Abnormal; Notable for the following components:    Procalcitonin 5.46 (*)     All other components within normal limits   URINALYSIS MICROSCOPIC - Abnormal; Notable for the following components:    RBC, UA 73 (*)     WBC, UA >100  (*)     Bacteria Many (*)     All other components within normal limits    Narrative:     Specimen Source->Urine   B-TYPE NATRIURETIC PEPTIDE - Abnormal; Notable for the following components:     (*)     All other components within normal limits    Narrative:     add on BNP #[id number] per nico Marte Md  01/25/2023  14:35    CULTURE, BLOOD    Narrative:     Aerobic and anaerobic   CULTURE, URINE   LACTIC ACID, PLASMA   TROPONIN I   SARS-COV2 (COVID) WITH FLU/RSV BY PCR   LACTIC ACID, PLASMA   B-TYPE NATRIURETIC PEPTIDE        ECG Results              EKG 12-lead (Final result)  Result time 01/25/23 11:24:45      Final result by Interface, Lab In [facility] (01/25/23 11:24:45)                   Narrative:    Test Reason : R53.1,    Vent. Rate : 111 BPM     Atrial Rate : 133 BPM     P-R Int : 000 ms          QRS Dur : 082 ms      QT Int : 306 ms       P-R-T Axes : 000 051 013 degrees     QTc Int : 416 ms    Atrial fibrillation with rapid ventricular response  Low voltage QRS  Low anterior forces  Abnormal ECG  When compared with ECG of 21-DEC-2022 19:07,  Nonspecific T wave abnormality now evident in Lateral leads  Confirmed by CAYETANO HELTON MD ([id number]) on 1/25/2023 11:24:39 AM    Referred By: System System           Confirmed By:CAYETANO HELTON MD                                  Imaging Results              CT Abdomen Pelvis  Without Contrast (Final result)  Result time 01/25/23 13:29:40      Final result by Bill Sauer MD (01/25/23 13:29:40)                   Impression:      1. When compared to prior CT imaging of 12/22/2022, improved right-sided hydronephrosis and essentially resolved left-sided hydronephrosis status post placement of bilateral percutaneous nephrostomy tubes.  2. Similar position of extensive bilateral renal and proximal right ureteral calculi, as described.  3. Bilateral pleural effusions.  4. Question rectal wall thickening with minor induration in the perirectal and  presacral fat planes.  Correlation for stercoral colitis/proctitis recommended.  5. Nonspecific circumferential urinary bladder wall thickening which could relate to partially nondistended state at the time of imaging and/or bladder outlet obstruction, however correlation for signs/symptoms of cystitis would be recommended.  6. Minor skin thickening induration of the subcutaneous fat planes at the level of the lower sacrum and coccyx.  Correlation for signs of developing pressure ulcer would be recommended.  7. Additional details, as provided in the body of report.      Electronically signed by: Bill Sauer  Date:    01/25/2023  Time:    13:29               Narrative:    EXAMINATION:  CT ABDOMEN PELVIS WITHOUT CONTRAST    CLINICAL HISTORY:  Abdominal pain, acute, nonlocalized;Recent kidney stone and nephrostomy tubes.  Here with fever, worsening mental status.;    TECHNIQUE:  Low dose axial images, sagittal and coronal reformations were obtained from the lung bases to the pubic symphysis.    COMPARISON:  CT of the abdomen pelvis performed 12/22/2022    FINDINGS:  This examination is limited due to lack of intravenous contrast.    Lower chest: Moderate right and large left pleural effusions, possibly increased since prior CT of the abdomen pelvis performed 12/22/2022.  Dependent atelectasis.  Areas of scarring suggested.  Superimposed infectious or inflammatory airspace consolidation in the dependent lungs difficult to entirely exclude.    Liver: Normal contour.    Gallbladder and bile ducts: Unremarkable.    Pancreas: Normal contour.    Spleen: Normal contour.    Adrenals: Unchanged mild thickening of the left adrenal gland without discrete nodule measuring at least 10 mm.    Kidneys:    Bilateral percutaneous tubes in situ, with pigtail loops appearing formed within the collecting systems.  Marked left renal cortical atrophy again noted.    Relative to prior CT imaging of 12/22/2022, essentially unchanged  appearance of bilateral large calculi within the renal pelves.  Nonobstructing bilateral renal calculi also appear essentially unchanged.    Degree right-sided hydronephrosis and proximal hydroureter appears reduced since 12/22/2022, now mild.  Small stones/fragments/gravel noted within the proximal right ureter in the vicinity of the UPJ, essentially unchanged.    Previously seen left-sided hydronephrosis appears essentially resolved.    Persistent bilateral perinephric stranding.  Redemonstrated low attenuating lesion arising from the interpolar right kidney, measuring on the order of 4-5 cm suggesting simple cyst.  Additional cyst arises from the interpolar region of the right kidney laterally.    Lymph nodes: No abdominal or pelvic lymphadenopathy.    Bowel and mesentery: Small hiatal hernia.  No definite evidence of acute bowel obstruction.  Appendix not definitely visualized.  Question wall thickening of the distal sigmoid colon and rectum with inflammatory changes in the adjoining perirectal presacral fat planes.    Abdominal aorta: Nonaneurysmal.  Moderate to heavy atherosclerosis.    Inferior vena cava: Unremarkable.    Free fluid or free air: Small volume unencapsulated free fluid noted in the lower pelvis    Pelvis: Prostate appears enlarged.    Urinary bladder: Circumferential urinary bladder wall thickening.    Body wall: Fat containing bilateral inguinal hernias, left larger than right.  Skin thickening superficial to the lower sacrum and coccyx with induration of the subcutaneous fat planes.    Bones: No acute change.  Query osseous demineralization.  Degenerative findings noted involving the spine, sacroiliac joints, pubic symphysis, and joints.  Remote bilateral rib fractures.  No further height loss of compression fracture of the L4 vertebral body relative to 12/22/2022 CT.  Grade 1 anterolisthesis of L5 on S1, as before.                                       CT Head Without Contrast (Final result)   Result time 01/25/23 12:23:47      Final result by Javier Lazar MD (01/25/23 12:23:47)                   Impression:      No acute intracranial process.      Electronically signed by: Javier Lazar  Date:    01/25/2023  Time:    12:23               Narrative:    EXAMINATION:  CT HEAD WITHOUT CONTRAST    CLINICAL HISTORY:  Delirium;AMS, fevers at NH;    TECHNIQUE:  Low dose axial images were obtained through the head.  Coronal and sagittal reformations were also performed. Contrast was not administered.    COMPARISON:  12/21/2022, 12/31/2021    FINDINGS:  There is diffuse volume loss similar to prior studies.  No overt hydrocephalus.    There is no evidence of intracranial hemorrhage or acute territorial infarct.    Atherosclerotic vascular calcifications are noted at the skull base.    The calvarium is intact.    The visualized sinuses and mastoid air cells are clear other than for mild chronic mucosal thickening within the right maxillary sinus markedly improved from the prior study.                                       X-Ray Chest AP Portable (Final result)  Result time 01/25/23 11:50:54      Final result by Angel Luis Johnson MD (01/25/23 11:50:54)                   Impression:      See above      Electronically signed by: Angel Luis Johnson MD  Date:    01/25/2023  Time:    11:50               Narrative:    EXAMINATION:  XR CHEST AP PORTABLE    CLINICAL HISTORY:  weakness;    TECHNIQUE:  Single frontal view of the chest was performed.    COMPARISON:  Non 12/22/2022 CT chest e    FINDINGS:  Heart size normal.  Opacification at the lung bases bilaterally more marked on the left side consistent with pleural effusion and associated atelectatic changes.  The lung apices are clear.                                               Medications   sodium chloride 0.9% flush 10 mL (has no administration in time range)   melatonin tablet 6 mg (6 mg Oral Not Given 1/25/23 6748)   apixaban tablet 2.5 mg (2.5 mg Oral Given 1/25/23  2206)   donepeziL tablet 10 mg (10 mg Oral Given 1/25/23 2206)   pravastatin tablet 20 mg (0 mg Oral Hold 1/25/23 1400)   memantine tablet 20 mg (0 mg Oral Hold 1/25/23 1400)   metoprolol tartrate (LOPRESSOR) split tablet 12.5 mg (12.5 mg Oral Given 1/25/23 2206)   sodium chloride 0.9% flush 10 mL (has no administration in time range)   acetaminophen tablet 650 mg (has no administration in time range)   glucose chewable tablet 16 g (has no administration in time range)   glucose chewable tablet 24 g (has no administration in time range)   glucagon (human recombinant) injection 1 mg (has no administration in time range)   dextrose 10% bolus 125 mL 125 mL (has no administration in time range)   dextrose 10% bolus 250 mL 250 mL (has no administration in time range)   vancomycin - pharmacy to dose (has no administration in time range)   ceFEPIme (MAXIPIME) 2 g in dextrose 5 % in water (D5W) 5 % 50 mL IVPB (MB+) (0 g Intravenous Stopped 1/25/23 1615)   lactated ringers infusion (0 mL/hr Intravenous Stopped 1/25/23 1943)   acetaminophen tablet 1,000 mg (1,000 mg Oral Not Given 1/26/23 0000)   piperacillin-tazobactam (ZOSYN) 4.5 g in dextrose 5 % in water (D5W) 5 % 100 mL IVPB (MB+) (0 g Intravenous Stopped 1/25/23 1314)   acetaminophen suppository 650 mg (650 mg Rectal Given 1/25/23 1142)   lactated ringers bolus 1,000 mL (0 mLs Intravenous Stopped 1/25/23 1314)   vancomycin 1,500 mg in dextrose 5 % (D5W) 250 mL IVPB (Vial-Mate) (0 mg Intravenous Stopped 1/25/23 1802)     Medical Decision Making:   History:   Old Medical Records: I decided to obtain old medical records.  Old Records Summarized: records from previous admission(s).       <> Summary of Records: Recent discharge summary from hospital admission:  Hospital Course:   83yo nursing home male with a past medical history of HTN, Afib on Eliquis, alzheimer dementia, HFpEF, COVID pna, and BPH who lives in assisted living, presented to the ED with reports of fever and  altered mental status. He was also found to have an acute kidney injury. He had no concrete source of infection, however presumed infected kidney stones. Blood cultures were no growth. Urinalysis significant for WBC, however Nitrates negative, Urine cx, reported no predominant organism. CT chest not suggestive of Pneumonia, however bilateral pleural effusions which appears to be chronic and was stable during hospital stay.  Of note, CT abdomen reported bilateral kidney stones with  bilateral hydronephrosis, Urology was consulted and patient underwent bilateral nephrostomy tube placement.  Patient received 6 days of IV antibiotics.  He had adequate urine output from nephrostomy tubes in addition to his Brower catheter.  His kidney function gradually improved.  Urology signed off and recommended that patient be discharged with follow-up as an outpatient to formalize plan for stone treatment, in the interim leave the nephrostomy tubes intact, urology made appointments for his follow-up.  He was discharged on p.o. Bactrim.  During hospital stay patient had some somnolence, thought to be due to his Alzheimer's disease and new environment, cefepime could have also accounted for this in addition to his Seroquel, mentation improved after discontinuing cefepime and holding Seroquel. Seroquel was held upon discharge.  Apixaban was adjusted for renal clearance dosing and patient is to follow up with his primary care within 2 days of discharge with an order for repeat BMP.  He is from assisted living and will require home health aide assistance for therapy in addition to monitoring his nephrostomy tubes.  Patient was stable, eating well and discharged.  On day before discharge, patient's Brower catheter was removed, he passed urine appropriately without any issues and was asymptomatic, 231-241ml of urine noted on bladder scan on day of discharge, Urology recommends NO brower catheter unless urine is >400ml and/or patient is  symptomatic.  Independently Interpreted Test(s):   I have ordered and independently interpreted EKG Reading(s) - see prior notes  Clinical Tests:   Lab Tests: Ordered and Reviewed  Radiological Study: Ordered and Reviewed  Medical Tests: Ordered and Reviewed  Other:   I have discussed this case with another health care provider.       <> Summary of the Discussion: Discussed with hospital medicine     83-year-old male with past medical history as noted coming in with fevers, hematuria, weakness, mental status change, decreased p.o. intake in the setting of recent known kidney stone status post ureteral stents.  Completed course of antibiotics and was restarted on Macrobid yesterday.  Patient is not providing any significant history.  On exam no acute distress, mild lower abdominal tenderness otherwise nonfocal exam.    Concern for recurrent infection, will undertake broad workup including labs, blood cultures, chest x-ray, CT abdomen pelvis, viral swabs to risk stratify for infectious source although high suspicion for urine as the source.  Given known fevers, recent infection will start on broad-spectrum antibiotics.  Given his mental status change will also undertake a CT of the head given that he is anticoagulated, although his altered mental status is more likely to be metabolic/infectious.  Unclear cause of his hypoxia, chest x-ray and viral swabs for further risk stratification.  Low suspicion for PE at this time    Patient will need to be admitted to hospital given his confusion.    Consistent with acute illness posing threat to life and bodily function.          ED Course as of 01/26/23 0115   Wed Jan 25, 2023   1055 EKG, independently interpreted, atrial fibrillation with rapid ventricular response at a rate of 114 with nonspecific ST abnormalities, normal axis, intervals are unremarkable. [BA]      ED Course User Index  [BA] Clayton Bloom MD            Update:  He remains hemodynamically stable in the  emergency department although he did spike fever.  Given rectal Tylenol.    Urine was collected from straight cath is concerning for infection.  Creatinine went from 1.5->2.8.  Given recent procedure with Urology as well as large stones, neurology consult for further recommendations.  Given patient's altered mental status, CLEM, complicated medical history, decision was made to hospitalized with hospital medicine.  Discussed with hospital medicine and discussed with Urology team.         Clinical Impression:   Final diagnoses:  [R53.1] Weakness  [A41.9] Sepsis, due to unspecified organism, unspecified whether acute organ dysfunction present (Primary)  [N39.0, R31.9] Urinary tract infection with hematuria, site unspecified  [R41.82] Altered mental status, unspecified altered mental status type        ED Disposition Condition    Admit Stable                Clayton Bloom MD  01/26/23 0123

## 2023-01-25 NOTE — ASSESSMENT & PLAN NOTE
Niece reports patient is not very verbose at baseline, sometimes will answer yes/no questions.  Not able to have full conversations at baseline.    - continue home namenda and aricept

## 2023-01-25 NOTE — ASSESSMENT & PLAN NOTE
Advance Care Planning     Date: 01/25/2023    Power of   Discussed patient's admission and current condition with patient's niece/HCPOA, Lakesha Gonzáles ([phone]), who re-confirmed that she is the HCPOA but makes decisions in conjunction with other family members.      Code Status  Discussed patient's code status with HCPOA, confirmed that patient is DNR/DNI.  Ms Gonzáles would like to further discuss GOC in light of recurrent UTIs, consider palliative care pending further plan of care.

## 2023-01-25 NOTE — ASSESSMENT & PLAN NOTE
Timothy Roldan is an 82 yo M with a history of HTN, AF (on eliquis), AD, CHF (EF 70%) presenting for AMS. Urology was consulted for nephrostomy tube management.     - Antibiotics per primary, GNR in blood  - nephrostomy tubes well placed on imaging, irrigate and aspirate without issue  - there is trace residual R sided hydronephrosis in the lower pole.  - low suspicion for post renal source of CLEM, recommend mIVF as tolerable and avoidance of nephrotoxins  - recommend empiric antibiotics for pyelonephritis, tailor as cultures permit  - remainder of care per primary    - Random bladder scan 228 ml  - Trend Cr, currently stable  - Recommend nephrology consult  - NPO for possible nephrostomy tube placement

## 2023-01-25 NOTE — ASSESSMENT & PLAN NOTE
Patient with acute kidney injury likely due to IVVD/dehydration and post-obstructive d/t obstructive stones CLEM is currently stable. Labs reviewed- Renal function/electrolytes with Estimated Creatinine Clearance: 21.4 mL/min (A) (based on SCr of 2.8 mg/dL (H)). according to latest data. Monitor urine output and serial BMP and adjust therapy as needed. Avoid nephrotoxins and renally dose meds for GFR listed above.     Lab Results   Component Value Date    CREATININE 2.8 (H) 01/25/2023     Creatinine 2.8 on admit, baseline around 1  - Likely combination of pre-renal from dehydration and volume losses vs sepsis and post-obstructive from hydronephrosis       Plan:   - Check urine lytes and renal ultrasound   - Check urine protein creatinine ratio.  - Strict I&Os and daily weights   - Gentle IVF  - Avoid nephrotoxic agents such as NSAIDs, gadolinium and IV radiocontrast.  - Renally dose meds to current GFR.  - Maintain MAP > 65.

## 2023-01-25 NOTE — ASSESSMENT & PLAN NOTE
CT AP showing skin thickening induration of subcutaneous fat at level of lower sacrum and coccyx concerning for pressure ulcer

## 2023-01-25 NOTE — ASSESSMENT & PLAN NOTE
HFpEF  - Most recent TTE on 12/23/22 demonstrates: LVef 70%,   - EKG : Afib  - Troponin wnl    - BNP wnl and CXR with pericardial effusion   - Fluid restriction at 1.5 cc with strict I/Os and daily weights   - Will hold diuresis in setting of CLEM   - Maintain on telemetry and daily EKGs   - Up to date risk stratification : TSH, Lipids, HbA1c with optimization of risk factors is necessary  - Check Electrolytes, keep Mag >2 & K+ >4  - SCDs, TEDs, Nursing communication to elevated LE   - Ambulate as tolerated

## 2023-01-25 NOTE — ED TRIAGE NOTES
Pt present to ED via EMS from home with c/o worsening weakness. Pt recent abx use for sacral wound and kidney stone. Per EMS, pt was 80s upon EMS arrival. 99% on 2L NC during check in.

## 2023-01-25 NOTE — CONSULTS
[facility] Emergency Dept  Urology  Consult Note    Patient Name: Timothy Roldan  MRN: [id number]  Admission Date: 1/25/2023  Hospital Length of Stay: 0   Code Status: DNR   Attending Provider: Robert Marte MD   Consulting Provider: Javier Tanner MD  Primary Care Physician: Martine Garces MD  Principal Problem:Sepsis    Inpatient consult to Urology  Consult performed by: Javier Tanner MD  Consult ordered by: Clayton Bloom MD        Subjective:     HPI:  Timothy Roldan is an 82 yo M with a history of HTN, AF (on eliquis), AD, CHF (EF 70%) presenting for AMS. Urology was consulted for nephrostomy tube management.     Per chart review, patient has been having some blood in his urine starting Friday as well as fevers starting Sunday. He has been on macrobid for a reported UTI though there are no present records of this UTI. He has been eating and drinking less over the last few days and today was confused / worsening mental status, was brought to ED.     Patient initially presented to the ED on 12/23 for fever and altered mental status. 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 CT at that time with 5.7 cm right renal pelvis stone and additional 1.8 cm proximal ureteral stone with severe right hydronephrosis. Large left renal pelvis stone 5cm with moderate left hydronephrosis. Bilateral renal cysts. Bilateral nephrostomy tubes placed at that time. Patient was seen in clinic on 1/6/23 and after discussions regarding methods of stone management and the associated risks, benefits, decision was made to exchange nephrostomy tubes every three months.     On assessment, patient is febrile (Tmax 102.4), low grade tachycardia, BP stable. WBC wnl, Cr 2.8 (aletha after NT placement 1.5). UA LE positive, nitrite negative. Microscopy with >100 WBC, many bacteria. CT scan shows interval resolution of left sided hydronephrosis, some residual lower pole hydronephrosis on the R with well positioned nephrostomy tubes  bilaterally, stone burden stable as described above.    Past Medical History:   Diagnosis Date    Bilateral Staghorn Calculus 1/6/2023    Hyperlipidemia     Hypertension     Persistent atrial fibrillation 12/31/2021    Pneumonia due to COVID-19 virus 5/21/2022       Past Surgical History:   Procedure Laterality Date    TONSILLECTOMY      TOTAL KNEE ARTHROPLASTY         Review of patient's allergies indicates:  No Known Allergies    Family History    None         Tobacco Use    Smoking status: Never    Smokeless tobacco: Never   Substance and Sexual Activity    Alcohol use: Not on file    Drug use: Not on file    Sexual activity: Not on file       Review of Systems   Unable to perform ROS    Objective:     Temp:  [98.6 °F (37 °C)-102.4 °F (39.1 °C)] 99.7 °F (37.6 °C)  Pulse:  [] 95  Resp:  [16-20] 16  SpO2:  [98 %-100 %] 100 %  BP: ()/(63-76) 124/65     Body mass index is 25.4 kg/m².    Date 01/25/23 0700 - 01/26/23 0659   Shift 7721-2927 9647-0771 8562-9567 24 Hour Total   INTAKE   IV Piggyback 1100   1100   Shift Total(mL/kg) 1100(13.7)   1100(13.7)   OUTPUT   Shift Total(mL/kg)       Weight (kg) 80.3 80.3 80.3 80.3          Drains       Drain  Duration                  Nephrostomy 12/23/22 1031 Right 8 Fr. 33 days         Nephrostomy 12/23/22 1047 Left 8 Fr. 33 days                    Physical Exam  Vitals reviewed.   Constitutional:       General: He is not in acute distress.     Appearance: He is well-developed. He is ill-appearing. He is not diaphoretic.   HENT:      Head: Normocephalic and atraumatic.   Eyes:      General: No scleral icterus.  Pulmonary:      Effort: No respiratory distress.      Breath sounds: No stridor.   Abdominal:      General: There is no distension.      Palpations: Abdomen is soft.      Tenderness: There is no abdominal tenderness. There is no right CVA tenderness or left CVA tenderness.      Comments: Bilateral nephrostomy tubes draining without issue, R NT irrigates and  aspirates without issue with return of turbid appearing urine, L NT irrigates and aspirates without issue with return of clear yellow urine   Genitourinary:     Comments: Circumcised male with orthotopic meatus, testicles normal in size and contour bilaterally, trace scrotal edema present without any induration, erythema, fluctuance  Musculoskeletal:      Cervical back: Normal range of motion.   Skin:     Coloration: Skin is pale.       Significant Labs:    BMP:  Recent Labs   Lab 01/25/23  1022      K 4.0      CO2 19*   BUN 45*   CREATININE 2.8*   CALCIUM 8.7       CBC:  Recent Labs   Lab 01/25/23  1022   WBC 8.16   HGB 9.5*   HCT 30.6*          Blood Culture: No results for input(s): LABBLOO in the last 168 hours.  Urine Culture:   Recent Labs   Lab 01/20/23  1626   LABURIN Multiple organisms isolated. None in predominance.  Repeat if  clinically necessary.     Urine Studies:   Recent Labs   Lab 01/20/23  1626 01/25/23  1049   COLORU Yellow Charley   APPEARANCEUA Clear Cloudy*   PHUR 6.0 7.0   SPECGRAV 1.010 1.010   PROTEINUA Trace* 2+*   GLUCUA Negative Negative   KETONESU Negative Negative   BILIRUBINUA Negative Negative   OCCULTUA 2+* 1+*   NITRITE Negative Negative   LEUKOCYTESUR 3+* 3+*   RBCUA 13* 73*   WBCUA 22* >100*   BACTERIA  --  Many*   SQUAMEPITHEL 0  --    HYALINECASTS  --  0       Significant Imaging:  Findings as above                    Assessment and Plan:     Bilateral staghorn calculus    Timothy Roldan is an 82 yo M with a history of HTN, AF (on eliquis), AD, CHF (EF 70%) presenting for AMS. Urology was consulted for nephrostomy tube management.     - nephrostomy tubes well placed on imaging, irrigate and aspirate without issue  - there is trace residual R sided hydronephrosis in the lower pole. Given the fact that Cr has improved in the past to 1.5 with the existing access, would favor trending Cr prior to consideration for additional NT  - low suspicion for post renal  source of CLEM, recommend mIVF as tolerable and avoidance of nephrotoxins  - recommend empiric antibiotics for pyelonephritis, tailor as cultures permit  - remainder of care per primary      VTE Risk Mitigation (From admission, onward)           Ordered     apixaban tablet 2.5 mg  2 times daily         01/25/23 1256     IP VTE HIGH RISK PATIENT  Once         01/25/23 1256     Place sequential compression device  Until discontinued         01/25/23 1256     IP VTE HIGH RISK PATIENT  Once         01/25/23 1256     Place sequential compression device  Until discontinued         01/25/23 1256                  Javier Tanner MD  Urology  [facility] Emergency Dept

## 2023-01-25 NOTE — PROGRESS NOTES
Pharmacokinetic Initial Assessment: IV Vancomycin    Assessment/Plan:    Initiate intravenous vancomycin with loading dose of 1500 mg once with subsequent doses when random concentrations are less than 20 mcg/mL  Desired empiric serum trough concentration is 15 to 20 mcg/mL  Patient currently in CLEM, will pulse dose.  Draw vancomycin random level on 1/27/2023 at 1530.  Pharmacy will continue to follow and monitor vancomycin.      Please contact pharmacy at extension [phone] with any questions regarding this assessment.     Thank you for the consult,   Lulu Herrera       Patient brief summary:  Timothy Roldan is a 83 y.o. male initiated on antimicrobial therapy with IV Vancomycin for treatment of suspected bacteremia    Drug Allergies:   Review of patient's allergies indicates:  No Known Allergies    Actual Body Weight:   80.3 kg    Renal Function:   Patient in CLEM  Estimated Creatinine Clearance: 21.4 mL/min (A) (based on SCr of 2.8 mg/dL (H)).,     Dialysis Method (if applicable):  N/A    CBC (last 72 hours):  Recent Labs   Lab Result Units 01/25/23  1022   WBC K/uL 8.16   Hemoglobin g/dL 9.5*   Hematocrit % 30.6*   Platelets K/uL 166   Gran % % 92.9*   Lymph % % 2.9*   Mono % % 3.6*   Eosinophil % % 0.0   Basophil % % 0.1   Differential Method  Automated       Metabolic Panel (last 72 hours):  Recent Labs   Lab Result Units 01/25/23  1022 01/25/23  1049   Sodium mmol/L 142  --    Potassium mmol/L 4.0  --    Chloride mmol/L 110  --    CO2 mmol/L 19*  --    Glucose mg/dL 124*  --    Glucose, UA   --  Negative   BUN mg/dL 45*  --    Creatinine mg/dL 2.8*  --    Albumin g/dL 1.9*  --    Total Bilirubin mg/dL 0.5  --    Alkaline Phosphatase U/L 66  --    AST U/L 12  --    ALT U/L 7*  --        Drug levels (last 3 results):  No results for input(s): VANCOMYCINRA, VANCORANDOM, VANCOMYCINPE, VANCOPEAK, VANCOMYCINTR, VANCOTROUGH in the last 72 hours.    Microbiologic Results:  Microbiology Results (last 7 days)        Procedure Component Value Units Date/Time    Blood culture x two cultures. Draw prior to antibiotics. [[id number]] Collected: 01/25/23 1023    Order Status: Completed Specimen: Blood from Peripheral, Forearm, Right Updated: 01/25/23 1715     Blood Culture, Routine No Growth to date    Narrative:      Aerobic and anaerobic    Blood culture x two cultures. Draw prior to antibiotics. [[id number]] Collected: 01/25/23 1023    Order Status: Completed Specimen: Blood from Peripheral, Forearm, Left Updated: 01/25/23 1715     Blood Culture, Routine No Growth to date    Narrative:      Aerobic and anaerobic    Urine culture [[id number]] Collected: 01/25/23 1049    Order Status: No result Specimen: Urine Updated: 01/25/23 1146

## 2023-01-25 NOTE — SUBJECTIVE & OBJECTIVE
Past Medical History:   Diagnosis Date    Bilateral Staghorn Calculus 1/6/2023    Hyperlipidemia     Hypertension     Persistent atrial fibrillation 12/31/2021    Pneumonia due to COVID-19 virus 5/21/2022       Past Surgical History:   Procedure Laterality Date    TONSILLECTOMY      TOTAL KNEE ARTHROPLASTY         Review of patient's allergies indicates:  No Known Allergies    No current facility-administered medications on file prior to encounter.     Current Outpatient Medications on File Prior to Encounter   Medication Sig    apixaban (ELIQUIS) 2.5 mg Tab Take 1 tablet (2.5 mg total) by mouth 2 (two) times daily.    artificial tears (ISOPTO TEARS) 0.5 % ophthalmic solution Place 1 drop into both eyes 3 (three) times daily as needed (dry eyes).    lovastatin (MEVACOR) 20 MG tablet Take 20 mg by mouth every evening.    memantine (NAMENDA) 10 MG Tab Take 2 tablets by mouth once daily.    metoprolol tartrate (LOPRESSOR) 25 MG tablet Take 0.5 tablets (12.5 mg total) by mouth 2 (two) times daily.    donepeziL (ARICEPT) 10 MG tablet Take 1 tablet (10 mg total) by mouth every evening.    pulse oximeter (PULSE OXIMETER) device by Apply Externally route 2 (two) times a day. Use twice daily at 8 AM and 3 PM and [facility] as directed.    sulfamethoxazole-trimethoprim 800-160mg (BACTRIM DS) 800-160 mg Tab Take 1 tablet by mouth 2 (two) times daily.    tamsulosin (FLOMAX) 0.4 mg Cap Take 1 capsule (0.4 mg total) by mouth once daily.    [DISCONTINUED] amlodipine-benazepril 5-10 mg (LOTREL) 5-10 mg per capsule Take 1 capsule by mouth every morning.    [DISCONTINUED] hydrALAZINE (APRESOLINE) 50 MG tablet Take 1 tablet (50 mg total) by mouth every 8 (eight) hours.    [DISCONTINUED] loratadine (CLARITIN) 10 mg tablet loratadine 10 mg tablet   TAKE 1 TABLET BY MOUTH DAILY     Family History    None       Tobacco Use    Smoking status: Never    Smokeless tobacco: Never   Substance and Sexual Activity    Alcohol use:  Not on file    Drug use: Not on file    Sexual activity: Not on file     Review of Systems   Unable to perform ROS: Dementia   Objective:     Vital Signs (Most Recent):  Temp: 99.7 °F (37.6 °C) (01/25/23 1343)  Pulse: 95 (01/25/23 1304)  Resp: 16 (01/25/23 1304)  BP: 124/65 (01/25/23 1304)  SpO2: 100 % (01/25/23 1304) Vital Signs (24h Range):  Temp:  [98.6 °F (37 °C)-102.4 °F (39.1 °C)] 99.7 °F (37.6 °C)  Pulse:  [] 95  Resp:  [16-20] 16  SpO2:  [98 %-100 %] 100 %  BP: ()/(63-76) 124/65     Weight: 80.3 kg (177 lb)  Body mass index is 25.4 kg/m².    Physical Exam  Constitutional:       General: He is not in acute distress.     Appearance: Normal appearance. He is ill-appearing. He is not toxic-appearing.   HENT:      Head: Normocephalic and atraumatic.      Mouth/Throat:      Mouth: Mucous membranes are dry.   Eyes:      Extraocular Movements: Extraocular movements intact.      Conjunctiva/sclera: Conjunctivae normal.      Pupils: Pupils are equal, round, and reactive to light.   Cardiovascular:      Rate and Rhythm: Regular rhythm. Tachycardia present.      Heart sounds: Murmur heard.   Pulmonary:      Effort: Pulmonary effort is normal. No respiratory distress.      Breath sounds: Normal breath sounds.   Abdominal:      General: Abdomen is flat. Bowel sounds are normal. There is no distension.      Palpations: Abdomen is soft.      Tenderness: There is generalized abdominal tenderness. There is no guarding.      Comments: B/l nephrostomy tubes draining, no signs of infection around insertion sites   Musculoskeletal:         General: No swelling.      Right lower leg: No edema.      Left lower leg: No edema.   Lymphadenopathy:      Cervical: No cervical adenopathy.   Skin:     General: Skin is warm.      Capillary Refill: Capillary refill takes less than 2 seconds.   Neurological:      General: No focal deficit present.      Mental Status: He is alert and oriented to person, place, and time.    Psychiatric:         Mood and Affect: Mood normal. Affect is flat.         Speech: Speech is delayed.         Behavior: Behavior normal.         CRANIAL NERVES     CN III, IV, VI   Pupils are equal, round, and reactive to light.     Significant Labs: All pertinent labs within the past 24 hours have been reviewed.  Recent Lab Results         01/25/23  1049   01/25/23  1023   01/25/23  1022        RSV Ag by Molecular Method Not Detected           Influenza A, Molecular Not Detected           Influenza B, Molecular Not Detected           Procalcitonin   5.46  Comment: A concentration < 0.25 ng/mL represents a low risk of bacterial   infection.  Procalcitonin may not be accurate among patients with localized   infection, recent trauma or major surgery, immunosuppressed state,   invasive fungal infection, renal dysfunction. Decisions regarding   initiation or continuation of antibiotic therapy should not be based   solely on procalcitonin levels.           Albumin     1.9       Alkaline Phosphatase     66       ALT     7       Anion Gap     13       Appearance, UA Cloudy           AST     12       Bacteria, UA Many           Baso #     0.01       Basophil %     0.1       Bilirubin (UA) Negative           BILIRUBIN TOTAL     0.5  Comment: For infants and newborns, interpretation of results should be based  on gestational age, weight and in agreement with clinical  observations.    Premature Infant recommended reference ranges:  Up to 24 hours.............<8.0 mg/dL  Up to 48 hours............<12.0 mg/dL  3-5 days..................<15.0 mg/dL  6-29 days.................<15.0 mg/dL         BUN     45       Calcium     8.7       Chloride     110       CO2     19       Color, UA Charley           Creatinine     2.8       Differential Method     Automated       eGFR     22.0       Eos #     0.0       Eosinophil %     0.0       Glucose     124       Glucose, UA Negative           Gran # (ANC)     7.6       Gran %     92.9        Hematocrit     30.6       Hemoglobin     9.5       Hyaline Casts, UA 0           Immature Grans (Abs)     0.04  Comment: Mild elevation in immature granulocytes is non specific and   can be seen in a variety of conditions including stress response,   acute inflammation, trauma and pregnancy. Correlation with other   laboratory and clinical findings is essential.         Immature Granulocytes     0.5       Ketones, UA Negative           Lactate, Louis     1.7  Comment: Falsely low lactic acid results can be found in samples   containing >=13.0 mg/dL total bilirubin and/or >=3.5 mg/dL   direct bilirubin.         Leukocytes, UA 3+           Lymph #     0.2       Lymph %     2.9       MCH     29.6       MCHC     31.0       MCV     95       Microscopic Comment SEE COMMENT  Comment: Other formed elements not mentioned in the report are not   present in the microscopic examination.              Mono #     0.3       Mono %     3.6       MPV     10.2       NITRITE UA Negative           nRBC     0       Occult Blood UA 1+           pH, UA 7.0           Platelets     166       Potassium     4.0       PROTEIN TOTAL     6.8       Protein, UA 2+  Comment: Recommend a 24 hour urine protein or a urine   protein/creatinine ratio if globulin induced proteinuria is  clinically suspected.             RBC     3.21       RBC, UA 73           RDW     15.6       SARS-CoV2 (COVID-19) Qualitative PCR Not Detected  Comment: This test utilizes a real-time reverse transcription  polymerase chain reaction procedure to amplify and   detect the SARS-CoV-2 and detect the SARS-CoV-2 N2 and E nucleic  acid targets. The analytical sensitivity (limit of detection) of   this assay is 250 copies/mL.    A Detected result implies that the patient is infected with the  SARS-CoV-2 virus and is presumed to be contagious.    A Not Detected result implies that the SARS-CoV-2 target nucleic  acids are not present above the limit of detection.  It does not  rule  out the possibility of COVID-19 and should not be the sole  basis for treatment decisions. If COVID-19 is strongly suspected  based on clinical and epidemiological history, re-testing should  be considered.    This test is only for use under Food and Drug   Administration s Emergency Use Authorization (EUA).   Commercial reagents are provided by [employer].  Performance characteristics of the EUA have been   independently verified by [facility]   Department of Pathology and Laboratory Medicine.               Sodium     142       Specific [place], UA 1.010           Specimen UA Urine, Catheterized           Troponin I     0.012  Comment: The reference interval for Troponin I represents the 99th percentile   cutoff   for our facility and is consistent with 3rd generation assay   performance.         WBC, UA >100           WBC     8.16               Significant Imaging: I have reviewed all pertinent imaging results/findings within the past 24 hours.  CT Abdomen Pelvis  Without Contrast   Final Result      1. When compared to prior CT imaging of 12/22/2022, improved right-sided hydronephrosis and essentially resolved left-sided hydronephrosis status post placement of bilateral percutaneous nephrostomy tubes.   2. Similar position of extensive bilateral renal and proximal right ureteral calculi, as described.   3. Bilateral pleural effusions.   4. Question rectal wall thickening with minor induration in the perirectal and presacral fat planes.  Correlation for stercoral colitis/proctitis recommended.   5. Nonspecific circumferential urinary bladder wall thickening which could relate to partially nondistended state at the time of imaging and/or bladder outlet obstruction, however correlation for signs/symptoms of cystitis would be recommended.   6. Minor skin thickening induration of the subcutaneous fat planes at the level of the lower sacrum and coccyx.  Correlation for signs of developing pressure ulcer would  be recommended.   7. Additional details, as provided in the body of report.         Electronically signed by: Bill Sauer   Date:    01/25/2023   Time:    13:29      CT Head Without Contrast   Final Result      No acute intracranial process.         Electronically signed by: Javier Lazar   Date:    01/25/2023   Time:    12:23      X-Ray Chest AP Portable   Final Result      See above         Electronically signed by: Angel Luis Johnson MD   Date:    01/25/2023   Time:    11:50

## 2023-01-25 NOTE — ASSESSMENT & PLAN NOTE
During previous hospitalization was found to have b/l obstructing kidney stones w/ b/l hydronephrosis and underwent b/l nephrostomy tube placement (on 12/23/22). Follows with urology outpatient. Repeat CT AP on admission shows improved right sided and resolved left sided hydronephrosis.     Outpatient urology note on 1/6/23 details referral to IR for b/l nephrostomy tube exchange in 3 months and to continue to follow with nephrology    - consulted urology, appreciate recommendations

## 2023-01-25 NOTE — SUBJECTIVE & OBJECTIVE
Past Medical History:   Diagnosis Date    Bilateral Staghorn Calculus 1/6/2023    Hyperlipidemia     Hypertension     Persistent atrial fibrillation 12/31/2021    Pneumonia due to COVID-19 virus 5/21/2022       Past Surgical History:   Procedure Laterality Date    TONSILLECTOMY      TOTAL KNEE ARTHROPLASTY         Review of patient's allergies indicates:  No Known Allergies    Family History    None         Tobacco Use    Smoking status: Never    Smokeless tobacco: Never   Substance and Sexual Activity    Alcohol use: Not on file    Drug use: Not on file    Sexual activity: Not on file       Review of Systems   Unable to perform ROS  Constitutional:  Negative for activity change, chills, diaphoresis and fever.   HENT: Negative.     Eyes: Negative.    Respiratory: Negative.  Negative for shortness of breath.    Cardiovascular: Negative.  Negative for chest pain.   Gastrointestinal: Negative.  Negative for constipation, diarrhea, nausea and vomiting.   Musculoskeletal: Negative.    Skin: Negative.    Neurological: Negative.    Psychiatric/Behavioral: Negative.       Objective:     Temp:  [98.6 °F (37 °C)-102.4 °F (39.1 °C)] 99.7 °F (37.6 °C)  Pulse:  [] 95  Resp:  [16-20] 16  SpO2:  [98 %-100 %] 100 %  BP: ()/(63-76) 124/65     Body mass index is 25.4 kg/m².    Date 01/25/23 0700 - 01/26/23 0659   Shift 1740-9502 1783-3000 8919-4869 24 Hour Total   INTAKE   IV Piggyback 1100   1100   Shift Total(mL/kg) 1100(13.7)   1100(13.7)   OUTPUT   Shift Total(mL/kg)       Weight (kg) 80.3 80.3 80.3 80.3          Drains       Drain  Duration                  Nephrostomy 12/23/22 1031 Right 8 Fr. 33 days         Nephrostomy 12/23/22 1047 Left 8 Fr. 33 days                    Physical Exam  Vitals reviewed.   Constitutional:       General: He is not in acute distress.     Appearance: He is well-developed. He is ill-appearing. He is not diaphoretic.   HENT:      Head: Normocephalic and atraumatic.   Eyes:      General:  No scleral icterus.  Pulmonary:      Effort: No respiratory distress.      Breath sounds: No stridor.   Abdominal:      General: There is no distension.      Palpations: Abdomen is soft.      Tenderness: There is no abdominal tenderness. There is no right CVA tenderness or left CVA tenderness.      Comments: Bilateral nephrostomy tubes draining without issue, R NT irrigates and aspirates without issue with return of turbid appearing urine, L NT irrigates and aspirates without issue with return of clear yellow urine   Genitourinary:     Comments: Circumcised male with orthotopic meatus, testicles normal in size and contour bilaterally, trace scrotal edema present without any induration, erythema, fluctuance  Musculoskeletal:      Cervical back: Normal range of motion.   Skin:     Coloration: Skin is pale.       Significant Labs:    BMP:  Recent Labs   Lab 01/25/23  1022      K 4.0      CO2 19*   BUN 45*   CREATININE 2.8*   CALCIUM 8.7       CBC:  Recent Labs   Lab 01/25/23  1022   WBC 8.16   HGB 9.5*   HCT 30.6*          Blood Culture: No results for input(s): LABBLOO in the last 168 hours.  Urine Culture:   Recent Labs   Lab 01/20/23  1626   LABURIN Multiple organisms isolated. None in predominance.  Repeat if  clinically necessary.     Urine Studies:   Recent Labs   Lab 01/20/23  1626 01/25/23  1049   COLORU Yellow Charley   APPEARANCEUA Clear Cloudy*   PHUR 6.0 7.0   SPECGRAV 1.010 1.010   PROTEINUA Trace* 2+*   GLUCUA Negative Negative   KETONESU Negative Negative   BILIRUBINUA Negative Negative   OCCULTUA 2+* 1+*   NITRITE Negative Negative   LEUKOCYTESUR 3+* 3+*   RBCUA 13* 73*   WBCUA 22* >100*   BACTERIA  --  Many*   SQUAMEPITHEL 0  --    HYALINECASTS  --  0       Significant Imaging:  Findings as above

## 2023-01-25 NOTE — ASSESSMENT & PLAN NOTE
- holding home benazepril in setting of sepsis to monitor hemodynamics  - will continue metoprolol

## 2023-01-26 ENCOUNTER — EXTERNAL HOME HEALTH (OUTPATIENT)
Dept: HOME HEALTH SERVICES | Facility: HOSPITAL | Age: 83
End: 2023-01-26
Payer: MEDICARE

## 2023-01-26 PROBLEM — A41.50 SEPSIS DUE TO GRAM NEGATIVE BACTERIA: Status: ACTIVE | Noted: 2023-01-26

## 2023-01-26 PROBLEM — Z51.5 PALLIATIVE CARE ENCOUNTER: Status: ACTIVE | Noted: 2023-01-26

## 2023-01-26 PROBLEM — N13.30 HYDRONEPHROSIS OF RIGHT KIDNEY: Status: ACTIVE | Noted: 2023-01-26

## 2023-01-26 LAB
ALBUMIN SERPL BCP-MCNC: 1.6 G/DL (ref 3.5–5.2)
ALP SERPL-CCNC: 55 U/L (ref 55–135)
ALT SERPL W/O P-5'-P-CCNC: <5 U/L (ref 10–44)
ANION GAP SERPL CALC-SCNC: 10 MMOL/L (ref 8–16)
AST SERPL-CCNC: 11 U/L (ref 10–40)
BASOPHILS # BLD AUTO: 0 K/UL (ref 0–0.2)
BASOPHILS NFR BLD: 0 % (ref 0–1.9)
BILIRUB SERPL-MCNC: 0.4 MG/DL (ref 0.1–1)
BUN SERPL-MCNC: 50 MG/DL (ref 8–23)
CALCIUM SERPL-MCNC: 8.4 MG/DL (ref 8.7–10.5)
CHLORIDE SERPL-SCNC: 114 MMOL/L (ref 95–110)
CO2 SERPL-SCNC: 19 MMOL/L (ref 23–29)
CREAT SERPL-MCNC: 2.9 MG/DL (ref 0.5–1.4)
DIFFERENTIAL METHOD: ABNORMAL
EOSINOPHIL # BLD AUTO: 0 K/UL (ref 0–0.5)
EOSINOPHIL NFR BLD: 0 % (ref 0–8)
ERYTHROCYTE [DISTWIDTH] IN BLOOD BY AUTOMATED COUNT: 15.7 % (ref 11.5–14.5)
EST. GFR  (NO RACE VARIABLE): 21.1 ML/MIN/1.73 M^2
GLUCOSE SERPL-MCNC: 113 MG/DL (ref 70–110)
HCT VFR BLD AUTO: 27.3 % (ref 40–54)
HGB BLD-MCNC: 8.1 G/DL (ref 14–18)
IMM GRANULOCYTES # BLD AUTO: 0.03 K/UL (ref 0–0.04)
IMM GRANULOCYTES NFR BLD AUTO: 0.4 % (ref 0–0.5)
INR PPP: 1.3 (ref 0.8–1.2)
LYMPHOCYTES # BLD AUTO: 0.4 K/UL (ref 1–4.8)
LYMPHOCYTES NFR BLD: 6.1 % (ref 18–48)
MAGNESIUM SERPL-MCNC: 1.9 MG/DL (ref 1.6–2.6)
MCH RBC QN AUTO: 29 PG (ref 27–31)
MCHC RBC AUTO-ENTMCNC: 29.7 G/DL (ref 32–36)
MCV RBC AUTO: 98 FL (ref 82–98)
MONOCYTES # BLD AUTO: 0.3 K/UL (ref 0.3–1)
MONOCYTES NFR BLD: 5.1 % (ref 4–15)
NEUTROPHILS # BLD AUTO: 5.9 K/UL (ref 1.8–7.7)
NEUTROPHILS NFR BLD: 88.4 % (ref 38–73)
NRBC BLD-RTO: 0 /100 WBC
PHOSPHATE SERPL-MCNC: 3.5 MG/DL (ref 2.7–4.5)
PLATELET # BLD AUTO: 139 K/UL (ref 150–450)
PMV BLD AUTO: 11 FL (ref 9.2–12.9)
POTASSIUM SERPL-SCNC: 3.9 MMOL/L (ref 3.5–5.1)
PROT SERPL-MCNC: 5.6 G/DL (ref 6–8.4)
PROTHROMBIN TIME: 13 SEC (ref 9–12.5)
RBC # BLD AUTO: 2.79 M/UL (ref 4.6–6.2)
SODIUM SERPL-SCNC: 143 MMOL/L (ref 136–145)
VANCOMYCIN SERPL-MCNC: 10.6 UG/ML
WBC # BLD AUTO: 6.69 K/UL (ref 3.9–12.7)

## 2023-01-26 PROCEDURE — 63600175 PHARM REV CODE 636 W HCPCS

## 2023-01-26 PROCEDURE — 25000003 PHARM REV CODE 250: Performed by: STUDENT IN AN ORGANIZED HEALTH CARE EDUCATION/TRAINING PROGRAM

## 2023-01-26 PROCEDURE — 99223 PR INITIAL HOSPITAL CARE,LEVL III: ICD-10-PCS | Mod: ,,, | Performed by: PHYSICIAN ASSISTANT

## 2023-01-26 PROCEDURE — 80053 COMPREHEN METABOLIC PANEL: CPT | Performed by: STUDENT IN AN ORGANIZED HEALTH CARE EDUCATION/TRAINING PROGRAM

## 2023-01-26 PROCEDURE — 36415 COLL VENOUS BLD VENIPUNCTURE: CPT | Performed by: STUDENT IN AN ORGANIZED HEALTH CARE EDUCATION/TRAINING PROGRAM

## 2023-01-26 PROCEDURE — 36415 COLL VENOUS BLD VENIPUNCTURE: CPT | Performed by: HOSPITALIST

## 2023-01-26 PROCEDURE — 83735 ASSAY OF MAGNESIUM: CPT | Performed by: STUDENT IN AN ORGANIZED HEALTH CARE EDUCATION/TRAINING PROGRAM

## 2023-01-26 PROCEDURE — 11000001 HC ACUTE MED/SURG PRIVATE ROOM

## 2023-01-26 PROCEDURE — 99223 PR INITIAL HOSPITAL CARE,LEVL III: ICD-10-PCS | Mod: GC,,, | Performed by: STUDENT IN AN ORGANIZED HEALTH CARE EDUCATION/TRAINING PROGRAM

## 2023-01-26 PROCEDURE — 99223 1ST HOSP IP/OBS HIGH 75: CPT | Mod: GC,,, | Performed by: STUDENT IN AN ORGANIZED HEALTH CARE EDUCATION/TRAINING PROGRAM

## 2023-01-26 PROCEDURE — 99223 1ST HOSP IP/OBS HIGH 75: CPT | Mod: ,,, | Performed by: INTERNAL MEDICINE

## 2023-01-26 PROCEDURE — 84100 ASSAY OF PHOSPHORUS: CPT | Performed by: STUDENT IN AN ORGANIZED HEALTH CARE EDUCATION/TRAINING PROGRAM

## 2023-01-26 PROCEDURE — 99223 1ST HOSP IP/OBS HIGH 75: CPT | Mod: ,,, | Performed by: PHYSICIAN ASSISTANT

## 2023-01-26 PROCEDURE — 25000003 PHARM REV CODE 250

## 2023-01-26 PROCEDURE — 87040 BLOOD CULTURE FOR BACTERIA: CPT | Performed by: STUDENT IN AN ORGANIZED HEALTH CARE EDUCATION/TRAINING PROGRAM

## 2023-01-26 PROCEDURE — 85025 COMPLETE CBC W/AUTO DIFF WBC: CPT | Performed by: STUDENT IN AN ORGANIZED HEALTH CARE EDUCATION/TRAINING PROGRAM

## 2023-01-26 PROCEDURE — 63600175 PHARM REV CODE 636 W HCPCS: Performed by: RADIOLOGY

## 2023-01-26 PROCEDURE — 99223 PR INITIAL HOSPITAL CARE,LEVL III: ICD-10-PCS | Mod: ,,, | Performed by: INTERNAL MEDICINE

## 2023-01-26 PROCEDURE — 80202 ASSAY OF VANCOMYCIN: CPT | Performed by: HOSPITALIST

## 2023-01-26 PROCEDURE — 85610 PROTHROMBIN TIME: CPT | Performed by: INTERNAL MEDICINE

## 2023-01-26 RX ORDER — FENTANYL CITRATE 50 UG/ML
INJECTION, SOLUTION INTRAMUSCULAR; INTRAVENOUS
Status: COMPLETED | OUTPATIENT
Start: 2023-01-26 | End: 2023-01-26

## 2023-01-26 RX ADMIN — ACETAMINOPHEN 1000 MG: 500 TABLET ORAL at 05:01

## 2023-01-26 RX ADMIN — FENTANYL CITRATE 50 MCG: 0.05 INJECTION, SOLUTION INTRAMUSCULAR; INTRAVENOUS at 03:01

## 2023-01-26 RX ADMIN — ACETAMINOPHEN 1000 MG: 500 TABLET ORAL at 06:01

## 2023-01-26 RX ADMIN — DONEPEZIL HYDROCHLORIDE 10 MG: 10 TABLET ORAL at 09:01

## 2023-01-26 RX ADMIN — METOPROLOL TARTRATE 12.5 MG: 25 TABLET, FILM COATED ORAL at 09:01

## 2023-01-26 RX ADMIN — PIPERACILLIN SODIUM AND TAZOBACTAM SODIUM 4.5 G: 4; .5 INJECTION, POWDER, LYOPHILIZED, FOR SOLUTION INTRAVENOUS at 11:01

## 2023-01-26 NOTE — CONSULTS
[facility] - Med Surg  Adult Nutrition  Consult Note    SUMMARY     Recommendations    1. ADAT-- Regular diet when medically stable--Texture/Consistency per SLP.   2. Add Boost Plus (chocolate) with all meals.   3. RD to monitor and follow-up.    Goals: Meet % EEN/EPN needs  Nutrition Goal Status: new  Communication of RD Recs:  (POC)    Assessment and Plan    Nutrition Problem:  Severe Protein-Calorie Malnutrition  Malnutrition in the context of Chronic Illness/Injury    Related to (etiology):  Decreased ability to consume sufficient energy, decreased appetite    Signs and Symptoms (as evidenced by):  NPO  Energy Intake: <75% of estimated energy requirement for 1 month  Body Fat Depletion: moderate depletion of orbitals, triceps, and thoracic and lumbar region   Muscle Mass Depletion: moderate and severe depletion of temples, clavicle region, scapular region, interosseous muscle, and lower extremities   Weight Loss: 14% x 1 month       Interventions(treatment strategy):  Collaboration of nutrition care with other providers  NPO  ONS    Nutrition Diagnosis Status:  New      Malnutrition Assessment  Malnutrition Type: chronic illness          Weight Loss (Malnutrition): greater than 5% in 1 month  Energy Intake (Malnutrition): less than 75% for greater than or equal to 1 month  Subcutaneous Fat (Malnutrition): moderate depletion   Orbital Region (Subcutaneous Fat Loss): moderate depletion   [place] Region (Muscle Loss): moderate depletion  Clavicle Bone Region (Muscle Loss): severe depletion  Clavicle and Acromion Bone Region (Muscle Loss): severe depletion       Subcutaneous Fat Loss (Final Summary): moderate protein-calorie malnutrition  Muscle Loss Evaluation (Final Summary): severe protein-calorie malnutrition    Severe Weight Loss (Malnutrition): greater than 7.5% in 3 months    Reason for Assessment    Reason For Assessment: consult (Albumin 1.9 in the setting of dementia)  Diagnosis:  (Sepsis)  Relevant  "Medical History: HTN, AF, CHF, HLD  Interdisciplinary Rounds: did not attend  General Information Comments: RD consulted for Albumin 1.9 in the setting of dementia. Pt is NPO. Difficulty chewing/swallowing noted. Pt is disoriented, RD able to speak with wife and daughter at beside. Reports decreased appetite over past month. RD suggested ONS--will likely be added. UBW ~175 per chart, #. Wt loss noted 25 lb (14%) x 1 month. Moderate/Severe wasting noted. Pt meets criteria for malnutrition. Please see PES for details.    Nutrition Discharge Planning: Pending Clinical Course, Adequate Nutriton    Nutrition Risk Screen    Nutrition Risk Screen: difficulty chewing/swallowing    Nutrition/Diet History    Food Allergies: NKFA  Factors Affecting Nutritional Intake: NPO, impaired cognitive status/motor control, chewing difficulties/inability to chew food, decreased appetite    Anthropometrics    Temp: 97.2 °F (36.2 °C)  Height Method: Stated  Height: 5' 10" (177.8 cm)  Height (inches): 70 in  Weight Method: Bed Scale  Weight: 68.9 kg (152 lb)  Weight (lb): 152 lb  Ideal Body Weight (IBW), Male: 166 lb  % Ideal Body Weight, Male (lb): 91.57 %  BMI (Calculated): 21.8  BMI Grade: 18.5-24.9 - normal       Lab/Procedures/Meds    Pertinent Labs Reviewed: reviewed  Pertinent Labs Comments: H/H: 8.1/27.3, MCHC 29.7, Cl 114, Gluc 113, BUN 50, Creat 2.9, GFR 21.1, [place] 8.4, Alb 1.6  Pertinent Medications Reviewed: reviewed  Pertinent Medications Comments: tylenol, donepezil, metoprolol, pravastatin      Estimated/Assessed Needs    Weight Used For Calorie Calculations: 68.9 kg (152 lb)  Energy Calorie Requirements (kcal): 0489-6786 (35-40 Kcals/kg)  Energy Need Method: Kcal/kg  Protein Requirements: 69-83 g/day (1.0-1.2 g/kg)  Weight Used For Protein Calculations: 68.9 kg (152 lb)        RDA Method (mL): 2413         Nutrition Prescription Ordered    Current Diet Order: NPO    Evaluation of Received Nutrient/Fluid Intake    I/O: " +815.8  Comments: LBM: 1/26  % Intake of Estimated Energy Needs: 0 - 25 %  % Meal Intake: NPO    Nutrition Risk    Level of Risk/Frequency of Follow-up:  (1x/ week)       Monitor and Evaluation    Food and Nutrient Intake: energy intake, food and beverage intake  Food and Nutrient Adminstration: diet order  Physical Activity and Function: nutrition-related ADLs and IADLs, factors affecting access to physical activity  Anthropometric Measurements: weight, weight change, body mass index  Biochemical Data, Medical Tests and Procedures: electrolyte and renal panel, gastrointestinal profile, glucose/endocrine profile, inflammatory profile, lipid profile  Nutrition-Focused Physical Findings: overall appearance, extremities, muscles and bones, head and eyes, skin       Nutrition Follow-Up    RD Follow-up?: Yes    Piyush Howard Registration Eligible, Provisional LDN

## 2023-01-26 NOTE — NURSING
Pt brought form ED by this nurse and charge nurse, O2 Per NC @ 2 L/min. Denies discomfort at rest. VSS.  Transferred to bed by staff assist X3. Call light in easy reach. Bed locked and lowest position. Will continue to monitor.

## 2023-01-26 NOTE — ED NOTES
Telemetry Verification   Patient placed on Telemetry Box  Verified with War Room  Box # [id number]   Monitor Tech    Rate 84   Rhythm

## 2023-01-26 NOTE — HOSPITAL COURSE
84 yo male with a PMHx of HTN, Afib (on eliquis), Alzheimer's dementia, HFpEF (EF 70%), and BPH who presents from nursing home for altered mental status and worsening weakness. He was found to be  febrile, tachycardic, and tachypneic concerning for sepsis. Urology consulted and recommend patient's nephrostomy tube be adjusted by IR, repeat CT AP showed improved right sided hydronephrosis. Bcx and Urine Cx growing Proteus, initially on vanc and zosyn, according to susceptibilities will change to IV CTX, will discharge with oral cefpodoxime. After goals of care discussion with family, patient will be discharged with hospice.

## 2023-01-26 NOTE — PLAN OF CARE
CM to pt's room to complete DCP assessment. Pt off of floor for IR procedure.     Courtney Johnson RN CM  Case Management  [phone]

## 2023-01-26 NOTE — PLAN OF CARE
Recommendations    1. ADAT-- Regular diet when medically stable--Texture/Consistency per SLP.   2. Add Boost Plus (chocolate) with all meals.   3. RD to monitor and follow-up.    Goals: Meet % EEN/EPN needs  Nutrition Goal Status: new  Communication of RD Recs:  (POC)

## 2023-01-26 NOTE — ED NOTES
Spoke with MD from [facility] and advised that pt right nephrostomy tube was leaking around insertion site. MD aware and states he will look at it and advise Urology.

## 2023-01-26 NOTE — PROCEDURES
Radiology Post-Procedure Note    Pre Op Diagnosis: Nephrolithaisis  Post Op Diagnosis: Same    Procedure: Right PCN to NUS conversion    Procedure performed by: Cyrus Solis MD    Written Informed Consent Obtained: Yes  Specimen Removed: NO  Estimated Blood Loss: None    Findings:   Large calculi in the bilateral renal pelves. 10 Fr x 26 cm right NUS placed with proximal loop formed in the renal pelvis and distal loop in the bladder.     Patient tolerated procedure well.    Cyrus Solis MD  Interventional Radiology  Department of Radiology

## 2023-01-26 NOTE — H&P
[facility] - Emergency Dept  [facility] Medicine  History & Physical    Patient Name: Timothy Roldan  MRN: [id number]  Patient Class: IP- Inpatient  Admission Date: 1/25/2023  Attending Physician: Robert Marte MD   Primary Care Provider: Martine Garces MD         Patient information was obtained from patient, relative(s), past medical records and ER records.     Subjective:     Principal Problem:Sepsis    Chief Complaint:   Chief Complaint   Patient presents with    Weakness     Arrived via ems from home with c/o weakness and decreased appetite for a few days, recently diagnosed with kidney stone, on abx for a sacral wound, SpO2 80s on EMS arrival        HPI: Mr. Timothy Roldan is a 84 yo male with a PMHx of HTN, Afib (on eliquis), Alzheimer's dementia, HFpEF (EF 70%), and BPH who presents from nursing home for altered mental status and worsening weakness.     As per nursing home, hematuria was noted and UA done on Sunday showed infection, thus Macrobid was started. Since then, patient has decreased appetite and worsening mental status. Upon evaluation, patient's speech is very slowed. He is AAOX2 (person, place, not time or reason for admission), however is unable to answer questions during exam. He does complain of generalized body soreness.     Of note, patient was [facility] from 12/21/22- 12/28/22 and presented for similar complaint with fever and AMS and found to have CLEM. He was found to have b/l obstructing kidney stones w/ b/l hydronephrosis and underwent b/l nephrostomy tube placement (on 12/23/22) along with brower catheter placement. His CLEM improved.  However, urology recommended NO brower catheter placement unless patient is symptomatic and bladder scan shows >400 cc. He also completed 6 day course of IV antibotics and d/c with PO Bactrim. Of note, patient did not reac well with cefepime and seroquel (decreased mentation).       ED Course:  Upon arrival to the ED, patient was febrile  with Tmax of 102.4, improved to 99.7 after receiving tyelnol in the ED, he was tachycardic , tachypneic 16-20, SBP , and saturating well on RA. Per EMS, on initial evaluation, saturating in the 80s, which improved after 2L NC. Initial labs notable for WBC and lactic acid wnl,elevated procal (5.46), increased creatine 2.7, decreased albumin 1.9, slight acidosis with HCO3 of 19, UA with +2 protein, +1 blood, +3 leukocytes, 73 RBC, >100 bacteria, and many hyaline casts. EKG showed afib with RVR () and Qtc wnl. CTAP with imrpoved right sided and resolved left sided hydronephrisis, continues to have b/l and proximal r. Ureteral calculi, b/l pleural effusions, circumferential urinary bladder wall thickening concerning of cystitis, rectal wall thicening concerning for colitis/proctatis,  skin thickening induration near lower sacrum and coccyx concerning for pressure ulcer. CTH unremarkable for acute intracranial process. CXR with left > R sided opacifications likely pleural effusion and possible atelectasis. Received x1 dose of tyelnol given for fever, s/p 2 bolus of LR, zosyn.      Past Medical History:   Diagnosis Date    Bilateral Staghorn Calculus 1/6/2023    Hyperlipidemia     Hypertension     Persistent atrial fibrillation 12/31/2021    Pneumonia due to COVID-19 virus 5/21/2022       Past Surgical History:   Procedure Laterality Date    TONSILLECTOMY      TOTAL KNEE ARTHROPLASTY         Review of patient's allergies indicates:  No Known Allergies    No current facility-administered medications on file prior to encounter.     Current Outpatient Medications on File Prior to Encounter   Medication Sig    apixaban (ELIQUIS) 2.5 mg Tab Take 1 tablet (2.5 mg total) by mouth 2 (two) times daily.    artificial tears (ISOPTO TEARS) 0.5 % ophthalmic solution Place 1 drop into both eyes 3 (three) times daily as needed (dry eyes).    lovastatin (MEVACOR) 20 MG tablet Take 20 mg by mouth every evening.     memantine (NAMENDA) 10 MG Tab Take 2 tablets by mouth once daily.    metoprolol tartrate (LOPRESSOR) 25 MG tablet Take 0.5 tablets (12.5 mg total) by mouth 2 (two) times daily.    donepeziL (ARICEPT) 10 MG tablet Take 1 tablet (10 mg total) by mouth every evening.    pulse oximeter (PULSE OXIMETER) device by Apply Externally route 2 (two) times a day. Use twice daily at 8 AM and 3 PM and [facility].    sulfamethoxazole-trimethoprim 800-160mg (BACTRIM DS) 800-160 mg Tab Take 1 tablet by mouth 2 (two) times daily.    tamsulosin (FLOMAX) 0.4 mg Cap Take 1 capsule (0.4 mg total) by mouth once daily.    [DISCONTINUED] amlodipine-benazepril 5-10 mg (LOTREL) 5-10 mg per capsule Take 1 capsule by mouth every morning.    [DISCONTINUED] hydrALAZINE (APRESOLINE) 50 MG tablet Take 1 tablet (50 mg total) by mouth every 8 (eight) hours.    [DISCONTINUED] loratadine (CLARITIN) 10 mg tablet loratadine 10 mg tablet   TAKE 1 TABLET BY MOUTH DAILY     Family History    None       Tobacco Use    Smoking status: Never    Smokeless tobacco: Never   Substance and Sexual Activity    Alcohol use: Not on file    Drug use: Not on file    Sexual activity: Not on file     Review of Systems   Unable to perform ROS: Dementia   Objective:     Vital Signs (Most Recent):  Temp: 99.7 °F (37.6 °C) (01/25/23 1343)  Pulse: 95 (01/25/23 1304)  Resp: 16 (01/25/23 1304)  BP: 124/65 (01/25/23 1304)  SpO2: 100 % (01/25/23 1304) Vital Signs (24h Range):  Temp:  [98.6 °F (37 °C)-102.4 °F (39.1 °C)] 99.7 °F (37.6 °C)  Pulse:  [] 95  Resp:  [16-20] 16  SpO2:  [98 %-100 %] 100 %  BP: ()/(63-76) 124/65     Weight: 80.3 kg (177 lb)  Body mass index is 25.4 kg/m².    Physical Exam  Constitutional:       General: He is not in acute distress.     Appearance: Normal appearance. He is ill-appearing. He is not toxic-appearing.   HENT:      Head: Normocephalic and atraumatic.      Mouth/Throat:      Mouth: Mucous  membranes are dry.   Eyes:      Extraocular Movements: Extraocular movements intact.      Conjunctiva/sclera: Conjunctivae normal.      Pupils: Pupils are equal, round, and reactive to light.   Cardiovascular:      Rate and Rhythm: Regular rhythm. Tachycardia present.      Heart sounds: Murmur heard.   Pulmonary:      Effort: Pulmonary effort is normal. No respiratory distress.      Breath sounds: Normal breath sounds.   Abdominal:      General: Abdomen is flat. Bowel sounds are normal. There is no distension.      Palpations: Abdomen is soft.      Tenderness: There is generalized abdominal tenderness. There is no guarding.      Comments: B/l nephrostomy tubes draining, no signs of infection around insertion sites   Musculoskeletal:         General: No swelling.      Right lower leg: No edema.      Left lower leg: No edema.   Lymphadenopathy:      Cervical: No cervical adenopathy.   Skin:     General: Skin is warm.      Capillary Refill: Capillary refill takes less than 2 seconds.   Neurological:      General: No focal deficit present.      Mental Status: He is alert and oriented to person, place, and time.   Psychiatric:         Mood and Affect: Mood normal. Affect is flat.         Speech: Speech is delayed.         Behavior: Behavior normal.         CRANIAL NERVES     CN III, IV, VI   Pupils are equal, round, and reactive to light.     Significant Labs: All pertinent labs within the past 24 hours have been reviewed.  Recent Lab Results         01/25/23  1049   01/25/23  1023   01/25/23  1022        RSV Ag by Molecular Method Not Detected           Influenza A, Molecular Not Detected           Influenza B, Molecular Not Detected           Procalcitonin   5.46  Comment: A concentration < 0.25 ng/mL represents a low risk of bacterial   infection.  Procalcitonin may not be accurate among patients with localized   infection, recent trauma or major surgery, immunosuppressed state,   invasive fungal infection, renal  dysfunction. Decisions regarding   initiation or continuation of antibiotic therapy should not be based   solely on procalcitonin levels.           Albumin     1.9       Alkaline Phosphatase     66       ALT     7       Anion Gap     13       Appearance, UA Cloudy           AST     12       Bacteria, UA Many           Baso #     0.01       Basophil %     0.1       Bilirubin (UA) Negative           BILIRUBIN TOTAL     0.5  Comment: For infants and newborns, interpretation of results should be based  on gestational age, weight and in agreement with clinical  observations.    Premature Infant recommended reference ranges:  Up to 24 hours.............<8.0 mg/dL  Up to 48 hours............<12.0 mg/dL  3-5 days..................<15.0 mg/dL  6-29 days.................<15.0 mg/dL         BUN     45       Calcium     8.7       Chloride     110       CO2     19       Color, UA Charley           Creatinine     2.8       Differential Method     Automated       eGFR     22.0       Eos #     0.0       Eosinophil %     0.0       Glucose     124       Glucose, UA Negative           Gran # (ANC)     7.6       Gran %     92.9       Hematocrit     30.6       Hemoglobin     9.5       Hyaline Casts, UA 0           Immature Grans (Abs)     0.04  Comment: Mild elevation in immature granulocytes is non specific and   can be seen in a variety of conditions including stress response,   acute inflammation, trauma and pregnancy. Correlation with other   laboratory and clinical findings is essential.         Immature Granulocytes     0.5       Ketones, UA Negative           Lactate, Louis     1.7  Comment: Falsely low lactic acid results can be found in samples   containing >=13.0 mg/dL total bilirubin and/or >=3.5 mg/dL   direct bilirubin.         Leukocytes, UA 3+           Lymph #     0.2       Lymph %     2.9       MCH     29.6       MCHC     31.0       MCV     95       Microscopic Comment SEE COMMENT  Comment: Other formed elements not  mentioned in the report are not   present in the microscopic examination.              Mono #     0.3       Mono %     3.6       MPV     10.2       NITRITE UA Negative           nRBC     0       Occult Blood UA 1+           pH, UA 7.0           Platelets     166       Potassium     4.0       PROTEIN TOTAL     6.8       Protein, UA 2+  Comment: Recommend a 24 hour urine protein or a urine   protein/creatinine ratio if globulin induced proteinuria is  clinically suspected.             RBC     3.21       RBC, UA 73           RDW     15.6       SARS-CoV2 (COVID-19) Qualitative PCR Not Detected  Comment: This test utilizes a real-time reverse transcription  polymerase chain reaction procedure to amplify and   detect the SARS-CoV-2 and detect the SARS-CoV-2 N2 and E nucleic  acid targets. The analytical sensitivity (limit of detection) of   this assay is 250 copies/mL.    A Detected result implies that the patient is infected with the  SARS-CoV-2 virus and is presumed to be contagious.    A Not Detected result implies that the SARS-CoV-2 target nucleic  acids are not present above the limit of detection.  It does not  rule out the possibility of COVID-19 and should not be the sole  basis for treatment decisions. If COVID-19 is strongly suspected  based on clinical and epidemiological history, re-testing should  be considered.    This test is only for use under Food and Drug   Administration s Emergency Use Authorization (EUA).   Commercial reagents are provided by [employer].  Performance characteristics of the EUA have been   independently verified by [facility]   Department of Pathology and Laboratory Medicine.               Sodium     142       Specific Newman, UA 1.010           Specimen UA Urine, Catheterized           Troponin I     0.012  Comment: The reference interval for Troponin I represents the 99th percentile   cutoff   for our facility and is consistent with 3rd generation assay   performance.          WBC, UA >100           WBC     8.16               Significant Imaging: I have reviewed all pertinent imaging results/findings within the past 24 hours.  CT Abdomen Pelvis  Without Contrast   Final Result      1. When compared to prior CT imaging of 12/22/2022, improved right-sided hydronephrosis and essentially resolved left-sided hydronephrosis status post placement of bilateral percutaneous nephrostomy tubes.   2. Similar position of extensive bilateral renal and proximal right ureteral calculi, as described.   3. Bilateral pleural effusions.   4. Question rectal wall thickening with minor induration in the perirectal and presacral fat planes.  Correlation for stercoral colitis/proctitis recommended.   5. Nonspecific circumferential urinary bladder wall thickening which could relate to partially nondistended state at the time of imaging and/or bladder outlet obstruction, however correlation for signs/symptoms of cystitis would be recommended.   6. Minor skin thickening induration of the subcutaneous fat planes at the level of the lower sacrum and coccyx.  Correlation for signs of developing pressure ulcer would be recommended.   7. Additional details, as provided in the body of report.         Electronically signed by: Bill Sauer   Date:    01/25/2023   Time:    13:29      CT Head Without Contrast   Final Result      No acute intracranial process.         Electronically signed by: Javier Lazar   Date:    01/25/2023   Time:    12:23      X-Ray Chest AP Portable   Final Result      See above         Electronically signed by: Angel Luis Johnson MD   Date:    01/25/2023   Time:    11:50            Assessment/Plan:     * Sepsis  This patient does have evidence of infective focus  My overall impression is sepsis.  Source: Respiratory, Urinary Tract and Skin and Soft Tissue (location sacral wound)   Organ dysfunction indicated by Acute kidney injury, Acute respiratory failure and Encephalopathy  Will Not start  Pressors- Levophed for MAP of 65  Source control achieved by: Pending Bcx, Urine Cx  Antibiotics given-   Antibiotics (72h ago, onward)    Start     Stop Route Frequency Ordered    01/25/23 1615  ceFEPIme (MAXIPIME) 2 g in dextrose 5 % in water (D5W) 5 % 50 mL IVPB (MB+)         -- IV Every 24 hours (non-standard times) 01/25/23 1507    01/25/23 1604  vancomycin - pharmacy to dose  (vancomycin IVPB)        See Hyperspace for full Linked Orders Report.    -- IV pharmacy to manage frequency 01/25/23 1507        Latest lactate reviewed-  Recent Labs   Lab 01/25/23  1022 01/25/23  1355   LACTATE 1.7 0.9     Plan  - continue vanc and cefepime      Sacral wound  CT AP showing skin thickening induration of subcutaneous fat at level of lower sacrum and coccyx concerning for pressure ulcer      ACP (advance care planning)  Advance Care Planning     Date: 01/25/2023    Power of   Discussed patient's admission and current condition with patient's niece/HCPOA, Lakesha Gonzáles ([phone]), who re-confirmed that she is the HCPOA but makes decisions in conjunction with other family members.      Code Status  Discussed patient's code status with HCPOA, confirmed that patient is DNR/DNI.  Ms Gonzáles would like to further discuss GOC in light of recurrent UTIs, consider palliative care pending further plan of care.           Bilateral Staghorn Calculus  CTAP with improved right sided and resolved left sided hydronephrisis, b/l renal and proximal r. ureteral calculi    Plan  - consulted urology, appreciate recommendations       Bilateral hydronephrosis  During previous hospitalization was found to have b/l obstructing kidney stones w/ b/l hydronephrosis and underwent b/l nephrostomy tube placement (on 12/23/22). Follows with urology outpatient. Repeat CT AP on admission shows improved right sided and resolved left sided hydronephrosis.     Outpatient urology note on 1/6/23 details referral to IR for b/l nephrostomy tube  exchange in 3 months and to continue to follow with nephrology    - consulted urology, appreciate recommendations    Chronic heart failure with preserved ejection fraction  HFpEF  - Most recent TTE on 12/23/22 demonstrates: LVef 70%,   - EKG : Afib  - Troponin wnl    - BNP wnl and CXR with pericardial effusion   - Fluid restriction at 1.5 cc with strict I/Os and daily weights   - Will hold diuresis in setting of CLEM   - Maintain on telemetry and daily EKGs   - Up to date risk stratification : TSH, Lipids, HbA1c with optimization of risk factors is necessary  - Check Electrolytes, keep Mag >2 & K+ >4  - SCDs, TEDs, Nursing communication to elevated LE   - Ambulate as tolerated                Acute hypoxemic respiratory failure  Patient with Hypoxic Respiratory failure which is Acute.  he is not on home oxygen. Supplemental oxygen was provided and noted-  .   Signs/symptoms of respiratory failure include- tachypnea. Contributing diagnoses includes - Aspiration, CHF and Pleural effusion Labs and images were reviewed. Patient Has not had a recent ABG. Will treat underlying causes and adjust management of respiratory failure as follows-     - continuous oxygen monitoring  - currently on 2L NC      Persistent atrial fibrillation  Patient with Paroxysmal (<7 days) atrial fibrillation which is controlled currently with Beta Blocker. Patient is currently in atrial fibrillation.[id number] Score: 2. HASBLED Score:. Anticoagulation indicated. Anticoagulation done with Eliquis.    - will continue home metoprolol as EKG showed afib on admission    Alzheimer disease  Niece reports patient is not very verbose at baseline, sometimes will answer yes/no questions.  Not able to have full conversations at baseline.    - continue home namenda and aricept      CLEM (acute kidney injury)  Patient with acute kidney injury likely due to IVVD/dehydration and post-obstructive d/t obstructive stones CLEM is currently stable. Labs reviewed- Renal  function/electrolytes with Estimated Creatinine Clearance: 21.4 mL/min (A) (based on SCr of 2.8 mg/dL (H)). according to latest data. Monitor urine output and serial BMP and adjust therapy as needed. Avoid nephrotoxins and renally dose meds for GFR listed above.     Lab Results   Component Value Date    CREATININE 2.8 (H) 01/25/2023     Creatinine 2.8 on admit, baseline around 1  - Likely combination of pre-renal from dehydration and volume losses vs sepsis and post-obstructive from hydronephrosis       Plan:   - Check urine lytes and renal ultrasound   - Check urine protein creatinine ratio.  - Strict I&Os and daily weights   - Gentle IVF  - Avoid nephrotoxic agents such as NSAIDs, gadolinium and IV radiocontrast.  - Renally dose meds to current GFR.  - Maintain MAP > 65.      Essential hypertension  - holding home benazepril in setting of sepsis to monitor hemodynamics  - will continue metoprolol         VTE Risk Mitigation (From admission, onward)         Ordered     apixaban tablet 2.5 mg  2 times daily         01/25/23 1256     IP VTE HIGH RISK PATIENT  Once         01/25/23 1256     Place sequential compression device  Until discontinued         01/25/23 1256     IP VTE HIGH RISK PATIENT  Once         01/25/23 1256     Place sequential compression device  Until discontinued         01/25/23 1256                   Naima Meza MD  Department of Hospital Medicine   [facility] - Emergency Dept

## 2023-01-26 NOTE — ASSESSMENT & PLAN NOTE
Advance Care Planning     Date: 01/25/2023    Power of   Discussed patient's admission and current condition with patient's niece/HCPOA, Lakesha Gonzáles ([phone]), who re-confirmed that she is the HCPOA but makes decisions in conjunction with other family members.      Code Status  Discussed patient's code status with HCPOA, confirmed that patient is DNR/DNI.  Ms Gonzáles would like to further discuss GOC in light of recurrent UTIs, consider palliative care pending further plan of care.

## 2023-01-26 NOTE — ASSESSMENT & PLAN NOTE
This patient does have evidence of infective focus  My overall impression is sepsis.  Source: Respiratory, Urinary Tract and Skin and Soft Tissue (location sacral wound)   Organ dysfunction indicated by Acute kidney injury, Acute respiratory failure and Encephalopathy  Will Not start Pressors- Levophed for MAP of 65  Source control achieved by: Pending Bcx, Urine Cx  Antibiotics given-   Antibiotics (72h ago, onward)    Start     Stop Route Frequency Ordered    01/25/23 1615  ceFEPIme (MAXIPIME) 2 g in dextrose 5 % in water (D5W) 5 % 50 mL IVPB (MB+)         -- IV Every 24 hours (non-standard times) 01/25/23 1507    01/25/23 1604  vancomycin - pharmacy to dose  (vancomycin IVPB)        See Hyperspace for full Linked Orders Report.    -- IV pharmacy to manage frequency 01/25/23 1507        Latest lactate reviewed-  Recent Labs   Lab 01/25/23  1022 01/25/23  1355   LACTATE 1.7 0.9     Patient febrile, tachycardic and tachypneic- fufilling 3/4 SIRS criteria. Likely infectious. Multiple etiologies in setting of recent pyelonephritis.Patient's lactic acid wnl.     Plan  - continue vanc and cefepime  - f/u repeat blood cx and urine cx

## 2023-01-26 NOTE — PROGRESS NOTES
[facility] Surg  Urology  Progress Note    Patient Name: Timothy Roldan  MRN: [id number]  Admission Date: 1/25/2023  Hospital Length of Stay: 1 days  Code Status: DNR   Attending Provider: Colin Mock MD   Primary Care Physician: Martine Garces MD    Subjective:     HPI:  Timothy Roldan is an 82 yo M with a history of HTN, AF (on eliquis), AD, CHF (EF 70%) presenting for AMS. Urology was consulted for nephrostomy tube management.     Per chart review, patient has been having some blood in his urine starting Friday as well as fevers starting Sunday. He has been on macrobid for a reported UTI though there are no present records of this UTI. He has been eating and drinking less over the last few days and today was confused / worsening mental status, was brought to ED.     Patient initially presented to the ED on 12/23 for fever and altered mental status. 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 CT at that time with 5.7 cm right renal pelvis stone and additional 1.8 cm proximal ureteral stone with severe right hydronephrosis. Large left renal pelvis stone 5cm with moderate left hydronephrosis. Bilateral renal cysts. Bilateral nephrostomy tubes placed at that time. Patient was seen in clinic on 1/6/23 and after discussions regarding methods of stone management and the associated risks, benefits, decision was made to exchange nephrostomy tubes every three months.     On assessment, patient is febrile (Tmax 102.4), low grade tachycardia, BP stable. WBC wnl, Cr 2.8 (aletha after NT placement 1.5). UA LE positive, nitrite negative. Microscopy with >100 WBC, many bacteria. CT scan shows interval resolution of left sided hydronephrosis, some residual lower pole hydronephrosis on the R with well positioned nephrostomy tubes bilaterally, stone burden stable as described above.      Past Medical History:   Diagnosis Date    Bilateral Staghorn Calculus 1/6/2023    Hyperlipidemia     Hypertension      Persistent atrial fibrillation 12/31/2021    Pneumonia due to COVID-19 virus 5/21/2022       Past Surgical History:   Procedure Laterality Date    TONSILLECTOMY      TOTAL KNEE ARTHROPLASTY         Review of patient's allergies indicates:  No Known Allergies    Family History    None         Tobacco Use    Smoking status: Never    Smokeless tobacco: Never   Substance and Sexual Activity    Alcohol use: Not on file    Drug use: Not on file    Sexual activity: Not on file       Review of Systems   Unable to perform ROS  Constitutional:  Negative for activity change, chills, diaphoresis and fever.   HENT: Negative.     Eyes: Negative.    Respiratory: Negative.  Negative for shortness of breath.    Cardiovascular: Negative.  Negative for chest pain.   Gastrointestinal: Negative.  Negative for constipation, diarrhea, nausea and vomiting.   Musculoskeletal: Negative.    Skin: Negative.    Neurological: Negative.    Psychiatric/Behavioral: Negative.       Objective:     Temp:  [98.6 °F (37 °C)-102.4 °F (39.1 °C)] 99.7 °F (37.6 °C)  Pulse:  [] 95  Resp:  [16-20] 16  SpO2:  [98 %-100 %] 100 %  BP: ()/(63-76) 124/65     Body mass index is 25.4 kg/m².    Date 01/25/23 0700 - 01/26/23 0659   Shift 7505-2470 5983-6383 5459-3810 24 Hour Total   INTAKE   IV Piggyback 1100   1100   Shift Total(mL/kg) 1100(13.7)   1100(13.7)   OUTPUT   Shift Total(mL/kg)       Weight (kg) 80.3 80.3 80.3 80.3          Drains       Drain  Duration                  Nephrostomy 12/23/22 1031 Right 8 Fr. 33 days         Nephrostomy 12/23/22 1047 Left 8 Fr. 33 days                    Physical Exam  Vitals reviewed.   Constitutional:       General: He is not in acute distress.     Appearance: He is well-developed. He is ill-appearing. He is not diaphoretic.   HENT:      Head: Normocephalic and atraumatic.   Eyes:      General: No scleral icterus.  Pulmonary:      Effort: No respiratory distress.      Breath sounds: No stridor.    Abdominal:      General: There is no distension.      Palpations: Abdomen is soft.      Tenderness: There is no abdominal tenderness. There is no right CVA tenderness or left CVA tenderness.      Comments: Bilateral nephrostomy tubes draining without issue, R NT irrigates and aspirates without issue with return of turbid appearing urine, L NT irrigates and aspirates without issue with return of clear yellow urine   Genitourinary:     Comments: Circumcised male with orthotopic meatus, testicles normal in size and contour bilaterally, trace scrotal edema present without any induration, erythema, fluctuance  Musculoskeletal:      Cervical back: Normal range of motion.   Skin:     Coloration: Skin is pale.       Significant Labs:    BMP:  Recent Labs   Lab 01/25/23  1022      K 4.0      CO2 19*   BUN 45*   CREATININE 2.8*   CALCIUM 8.7       CBC:  Recent Labs   Lab 01/25/23  1022   WBC 8.16   HGB 9.5*   HCT 30.6*          Blood Culture: No results for input(s): LABBLOO in the last 168 hours.  Urine Culture:   Recent Labs   Lab 01/20/23  1626   LABURIN Multiple organisms isolated. None in predominance.  Repeat if  clinically necessary.     Urine Studies:   Recent Labs   Lab 01/20/23  1626 01/25/23  1049   COLORU Yellow Charley   APPEARANCEUA Clear Cloudy*   PHUR 6.0 7.0   SPECGRAV 1.010 1.010   PROTEINUA Trace* 2+*   GLUCUA Negative Negative   KETONESU Negative Negative   BILIRUBINUA Negative Negative   OCCULTUA 2+* 1+*   NITRITE Negative Negative   LEUKOCYTESUR 3+* 3+*   RBCUA 13* 73*   WBCUA 22* >100*   BACTERIA  --  Many*   SQUAMEPITHEL 0  --    HYALINECASTS  --  0       Significant Imaging:  Findings as above        Assessment/Plan:     Bilateral Staghorn Calculus  Timothy Roldan is an 84 yo M with a history of HTN, AF (on eliquis), AD, CHF (EF 70%) presenting for AMS. Urology was consulted for nephrostomy tube management.     - Antibiotics per primary, GNR in blood  - nephrostomy tubes well  placed on imaging, irrigate and aspirate without issue  - there is trace residual R sided hydronephrosis in the lower pole.  - low suspicion for post renal source of CLEM, recommend mIVF as tolerable and avoidance of nephrotoxins  - recommend empiric antibiotics for pyelonephritis, tailor as cultures permit  - remainder of care per primary    - Random bladder scan 228 ml  - Trend Cr, currently stable  - Recommend nephrology consult  - NPO for possible nephrostomy tube placement        VTE Risk Mitigation (From admission, onward)         Ordered     apixaban tablet 2.5 mg  2 times daily         01/25/23 1256     IP VTE HIGH RISK PATIENT  Once         01/25/23 1256     Place sequential compression device  Until discontinued         01/25/23 1256     IP VTE HIGH RISK PATIENT  Once         01/25/23 1256     Place sequential compression device  Until discontinued         01/25/23 1256                Mckay Cordova MD  Urology  [facility] Surg

## 2023-01-26 NOTE — HPI
"Per hospital medicine H&P  "82 yo male with a PMHx of HTN, Afib (on eliquis), Alzheimer's dementia, HFpEF (EF 70%), and BPH who presents from nursing home for altered mental status and worsening weakness.      As per nursing home, hematuria was noted and UA done on Sunday showed infection, thus Macrobid was started. Since then, patient has decreased appetite and worsening mental status. Upon evaluation, patient's speech is very slowed. He is AAOX2 (person, place, not time or reason for admission), however is unable to answer questions during exam. He does complain of generalized body soreness.      Of note, patient was recently hospitalized at [facility] from 12/21/22- 12/28/22 and presented for similar complaint with fever and AMS and found to have CLEM. He was found to have b/l obstructing kidney stones w/ b/l hydronephrosis and underwent b/l nephrostomy tube placement (on 12/23/22) along with brower catheter placement. His CLEM improved.  However, urology recommended NO brower catheter placement unless patient is symptomatic and bladder scan shows >400 cc. He also completed 6 day course of IV antibotics and d/c with PO Bactrim. Of note, patient did not reac well with cefepime and seroquel (decreased mentation).         ED Course:  Upon arrival to the ED, patient was febrile with Tmax of 102.4, improved to 99.7 after receiving tyelnol in the ED, he was tachycardic , tachypneic 16-20, SBP , and saturating well on RA. Per EMS, on initial evaluation, saturating in the 80s, which improved after 2L NC. Initial labs notable for WBC and lactic acid wnl,elevated procal (5.46), increased creatine 2.7, decreased albumin 1.9, slight acidosis with HCO3 of 19, UA with +2 protein, +1 blood, +3 leukocytes, 73 RBC, >100 bacteria, and many hyaline casts. EKG showed afib with RVR () and Qtc wnl. CTAP with imrpoved right sided and resolved left sided hydronephrisis, continues to have b/l and proximal r. Ureteral calculi, b/l " "pleural effusions, circumferential urinary bladder wall thickening concerning of cystitis, rectal wall thicening concerning for colitis/proctatis,  skin thickening induration near lower sacrum and coccyx concerning for pressure ulcer. CTH unremarkable for acute intracranial process. CXR with left > R sided opacifications likely pleural effusion and possible atelectasis. Received x1 dose of tyelnol given for fever, s/p 2 bolus of LR, zosyn."    Patient unable to participate in GOC conversation      "

## 2023-01-26 NOTE — ASSESSMENT & PLAN NOTE
- IR consulted to adjust PCN past the obstructing R sided stone  - S/p nephrostogram with right sided PCN adjustment

## 2023-01-26 NOTE — SUBJECTIVE & OBJECTIVE
Interval History: No acute events overnight. Upon morning evaluation, patient's mentation appeared worse compared to yesterday. He was nonverbal today.     Family discussion was had with niece (who is power of ) and sister. They are interested in pursuing hospice upon discharge.     He remains HDS and afebrile.     Review of Systems   Unable to perform ROS: Mental status change   Objective:     Vital Signs (Most Recent):  Temp: 97.2 °F (36.2 °C) (01/26/23 1157)  Pulse: 99 (01/26/23 1530)  Resp: 16 (01/26/23 1530)  BP: (!) 96/58 (01/26/23 1530)  SpO2: 100 % (01/26/23 1530)   Vital Signs (24h Range):  Temp:  [97.2 °F (36.2 °C)-98.7 °F (37.1 °C)] 97.2 °F (36.2 °C)  Pulse:  [] 99  Resp:  [13-21] 16  SpO2:  [97 %-100 %] 100 %  BP: ()/(58-83) 96/58     Weight: 68.9 kg (152 lb)  Body mass index is 21.81 kg/m².    Intake/Output Summary (Last 24 hours) at 1/26/2023 1612  Last data filed at 1/26/2023 0653  Gross per 24 hour   Intake 235.75 ml   Output 520 ml   Net -284.25 ml      Physical Exam  Constitutional:       General: He is not in acute distress.     Appearance: Normal appearance. He is ill-appearing. He is not toxic-appearing.   HENT:      Head: Normocephalic and atraumatic.      Mouth/Throat:      Mouth: Mucous membranes are dry.   Eyes:      Extraocular Movements: Extraocular movements intact.      Conjunctiva/sclera: Conjunctivae normal.      Pupils: Pupils are equal, round, and reactive to light.   Cardiovascular:      Rate and Rhythm: Regular rhythm. Tachycardia present.      Heart sounds: Murmur heard.   Pulmonary:      Effort: Pulmonary effort is normal. No respiratory distress.      Breath sounds: Normal breath sounds.   Abdominal:      General: Abdomen is flat. Bowel sounds are normal. There is no distension.      Palpations: Abdomen is soft.      Tenderness: There is generalized abdominal tenderness. There is no guarding.      Comments: B/l nephrostomy tubes draining, no signs of  infection around insertion sites   Musculoskeletal:         General: No swelling.      Right lower leg: No edema.      Left lower leg: No edema.   Lymphadenopathy:      Cervical: No cervical adenopathy.   Skin:     General: Skin is warm.      Capillary Refill: Capillary refill takes less than 2 seconds.   Neurological:      General: No focal deficit present.      Mental Status: He is alert and oriented to person, place, and time.   Psychiatric:         Mood and Affect: Mood normal. Affect is flat.         Speech: Speech is delayed.         Behavior: Behavior normal.       Significant Labs: All pertinent labs within the past 24 hours have been reviewed.  CBC:   Recent Labs   Lab 01/25/23  1022 01/26/23  0332   WBC 8.16 6.69   HGB 9.5* 8.1*   HCT 30.6* 27.3*    139*     CMP:   Recent Labs   Lab 01/25/23  1022 01/26/23  0332    143   K 4.0 3.9    114*   CO2 19* 19*   * 113*   BUN 45* 50*   CREATININE 2.8* 2.9*   CALCIUM 8.7 8.4*   PROT 6.8 5.6*   ALBUMIN 1.9* 1.6*   BILITOT 0.5 0.4   ALKPHOS 66 55   AST 12 11   ALT 7* <5*   ANIONGAP 13 10     Recent Lab Results         01/26/23  1224   01/26/23  0332        Albumin   1.6       Alkaline Phosphatase   55       ALT   <5       Anion Gap   10       AST   11       Baso #   0.00       Basophil %   0.0       BILIRUBIN TOTAL   0.4  Comment: For infants and newborns, interpretation of results should be based  on gestational age, weight and in agreement with clinical  observations.    Premature Infant recommended reference ranges:  Up to 24 hours.............<8.0 mg/dL  Up to 48 hours............<12.0 mg/dL  3-5 days..................<15.0 mg/dL  6-29 days.................<15.0 mg/dL         BUN   50       Calcium   8.4       Chloride   114       CO2   19       Creatinine   2.9       Differential Method   Automated       eGFR   21.1       Eos #   0.0       Eosinophil %   0.0       Glucose   113       Gran # (ANC)   5.9       Gran %   88.4        Hematocrit   27.3       Hemoglobin   8.1       Immature Grans (Abs)   0.03  Comment: Mild elevation in immature granulocytes is non specific and   can be seen in a variety of conditions including stress response,   acute inflammation, trauma and pregnancy. Correlation with other   laboratory and clinical findings is essential.         Immature Granulocytes   0.4       INR 1.3  Comment: Coumadin Therapy:  2.0 - 3.0 for INR for all indicators except mechanical heart valves  and antiphospholipid syndromes which should use 2.5 - 3.5.           Lymph #   0.4       Lymph %   6.1       Magnesium   1.9       MCH   29.0       MCHC   29.7       MCV   98       Mono #   0.3       Mono %   5.1       MPV   11.0       nRBC   0       Phosphorus   3.5       Platelets   139       Potassium   3.9       PROTEIN TOTAL   5.6       Protime 13.0         RBC   2.79       RDW   15.7       Sodium   143       WBC   6.69               Significant Imaging: I have reviewed all pertinent imaging results/findings within the past 24 hours.

## 2023-01-26 NOTE — PLAN OF CARE
Pt arrived to room 89 for Right PCN to PCNU conversion. Pt oriented to room and introduced to staff. Pt.  positioned prone on table. monitors applied.

## 2023-01-26 NOTE — ASSESSMENT & PLAN NOTE
This patient does have evidence of infective focus  My overall impression is sepsis.  Source: Respiratory, Urinary Tract and Skin and Soft Tissue (location sacral wound)   Organ dysfunction indicated by Acute kidney injury, Acute respiratory failure and Encephalopathy  Will Not start Pressors- Levophed for MAP of 65  Source control achieved by: Pending Bcx, Urine Cx  Antibiotics given-   Antibiotics (72h ago, onward)    Start     Stop Route Frequency Ordered    01/26/23 1200  piperacillin-tazobactam (ZOSYN) 4.5 g in dextrose 5 % in water (D5W) 5 % 100 mL IVPB (MB+)         -- IV Every 12 hours (non-standard times) 01/26/23 1055    01/25/23 1604  vancomycin - pharmacy to dose  (vancomycin IVPB)        See Hyperspace for full Linked Orders Report.    -- IV pharmacy to manage frequency 01/25/23 1507        Latest lactate reviewed-  Recent Labs   Lab 01/25/23  1022 01/25/23  1355   LACTATE 1.7 0.9     Patient febrile, tachycardic and tachypneic- fufilling 3/4 SIRS criteria. Likely infectious. Multiple etiologies in setting of recent pyelonephritis.Patient's lactic acid wnl.     Plan  - Blood cx  And urine cx resulted with proteus    - switch from vanc and cefepime to vanc and zosyn at this time

## 2023-01-26 NOTE — ASSESSMENT & PLAN NOTE
Patient with acute kidney injury likely due to IVVD/dehydration and post-obstructive d/t obstructive stones CLEM is currently stable. Labs reviewed- Renal function/electrolytes with Estimated Creatinine Clearance: 19.5 mL/min (A) (based on SCr of 2.9 mg/dL (H)). according to latest data. Monitor urine output and serial BMP and adjust therapy as needed. Avoid nephrotoxins and renally dose meds for GFR listed above.     Lab Results   Component Value Date    CREATININE 2.9 (H) 01/26/2023     Creatinine 2.8 on admit, baseline around 1  - Likely combination of pre-renal from dehydration and volume losses vs sepsis and post-obstructive from hydronephrosis       Plan:   - Consulted nephrology as etiology of CLEM cannot be entirely attributed to obstructive also concerns for CRS, appreciate recommendations  - Strict I&Os and daily weights   - Gentle IVF  - Avoid nephrotoxic agents such as NSAIDs, gadolinium and IV radiocontrast.  - Renally dose meds to current GFR.  - Maintain MAP > 65.

## 2023-01-26 NOTE — CONSULTS
"[facility] S[facility]  Palliative Medicine  Consult Note    Patient Name: Timothy Roldan  MRN: [id number]  Admission Date: 1/25/2023  Hospital Length of Stay: 1 days  Code Status: DNR   Attending Provider: oClin Mock MD  Consulting Provider: Nancy Pandey MD  Primary Care Physician: Martine Garces MD  Principal Problem:Sepsis    Patient information was obtained from relative(s) and primary team.      Inpatient consult to Palliative Care  Consult performed by: Nancy Pandey MD  Consult ordered by: Naima Meza MD        Assessment/Plan:     Palliative care encounter  83 year old male with functional debility, dementia, bilateral staghorn calculus s/p nephrostomy tubes presents with worsening mental status, UTI and CLEM. Palliative consulted for GOC    Code status: DNR     HCPOA: Lakesha Gonzáles [phone]    GOC/ACP  -Patient unable to participate in conversation. Spoke with patient's niece/HCPOA. Discussed that patient is at risk for continued infection. HCPOA reports patient is losing weight, his quality of life is declining, his functional abilities are declining, and his sacral decub is progressing. She does not want patient to continue to come to the hospital.  She says that the group brought up hospice as a potential way to continue to care for her patient. Provided hospice education.     -NISHANT is interested in pursing hospice at patient's group home. She will discuss with patient and family. She will let primary  know their final decision. Appreciate CM setting up hospice if this is what family chooses     Dicussed with primary team. Palliative medicine will sign off         Thank you for your consult. I will sign off. Please contact us if you have any additional questions.    Subjective:     HPI:   Per hospital medicine H&P  "82 yo male with a PMHx of HTN, Afib (on eliquis), Alzheimer's dementia, HFpEF (EF 70%), and BPH who presents from nursing home for altered mental status and " worsening weakness.      As per nursing home, hematuria was noted and UA done on Sunday showed infection, thus Macrobid was started. Since then, patient has decreased appetite and worsening mental status. Upon evaluation, patient's speech is very slowed. He is AAOX2 (person, place, not time or reason for admission), however is unable to answer questions during exam. He does complain of generalized body soreness.      Of note, patient was recently hospitalized at [facility] from 12/21/22- 12/28/22 and presented for similar complaint with fever and AMS and found to have CLEM. He was found to have b/l obstructing kidney stones w/ b/l hydronephrosis and underwent b/l nephrostomy tube placement (on 12/23/22) along with brower catheter placement. His CLEM improved.  However, urology recommended NO brower catheter placement unless patient is symptomatic and bladder scan shows >400 cc. He also completed 6 day course of IV antibotics and d/c with PO Bactrim. Of note, patient did not reac well with cefepime and seroquel (decreased mentation).         ED Course:  Upon arrival to the ED, patient was febrile with Tmax of 102.4, improved to 99.7 after receiving tyelnol in the ED, he was tachycardic , tachypneic 16-20, SBP , and saturating well on RA. Per EMS, on initial evaluation, saturating in the 80s, which improved after 2L NC. Initial labs notable for WBC and lactic acid wnl,elevated procal (5.46), increased creatine 2.7, decreased albumin 1.9, slight acidosis with HCO3 of 19, UA with +2 protein, +1 blood, +3 leukocytes, 73 RBC, >100 bacteria, and many hyaline casts. EKG showed afib with RVR () and Qtc wnl. CTAP with imrpoved right sided and resolved left sided hydronephrisis, continues to have b/l and proximal r. Ureteral calculi, b/l pleural effusions, circumferential urinary bladder wall thickening concerning of cystitis, rectal wall thicening concerning for colitis/proctatis,  skin thickening induration near lower  "sacrum and coccyx concerning for pressure ulcer. CTH unremarkable for acute intracranial process. CXR with left > R sided opacifications likely pleural effusion and possible atelectasis. Received x1 dose of tyelnol given for fever, s/p 2 bolus of LR, zosyn."    Patient unable to participate in GOC conversation          Hospital Course:  No notes on file        Past Medical History:   Diagnosis Date    Bilateral Staghorn Calculus 1/6/2023    Hyperlipidemia     Hypertension     Persistent atrial fibrillation 12/31/2021    Pneumonia due to COVID-19 virus 5/21/2022       Past Surgical History:   Procedure Laterality Date    TONSILLECTOMY      TOTAL KNEE ARTHROPLASTY         Review of patient's allergies indicates:  No Known Allergies    Medications:  Continuous Infusions:  Scheduled Meds:   acetaminophen  1,000 mg Oral Q6H    donepeziL  10 mg Oral QHS    memantine  20 mg Oral Daily    metoprolol tartrate  12.5 mg Oral BID    piperacillin-tazobactam (ZOSYN) IVPB  4.5 g Intravenous Q12H    pravastatin  20 mg Oral Daily     PRN Meds:acetaminophen, dextrose 10%, dextrose 10%, glucagon (human recombinant), glucose, glucose, melatonin, sodium chloride 0.9%, sodium chloride 0.9%, Pharmacy to dose Vancomycin consult **AND** vancomycin - pharmacy to dose    Family History    None       Tobacco Use    Smoking status: Never    Smokeless tobacco: Never   Substance and Sexual Activity    Alcohol use: Not on file    Drug use: Not on file    Sexual activity: Not on file       Review of Systems   Unable to perform ROS: Mental status change   Objective:     Vital Signs (Most Recent):  Temp: 97.2 °F (36.2 °C) (01/26/23 1157)  Pulse: 102 (01/26/23 1157)  Resp: 18 (01/26/23 1157)  BP: 134/76 (01/26/23 1157)  SpO2: 100 % (01/26/23 1157) Vital Signs (24h Range):  Temp:  [97.2 °F (36.2 °C)-98.7 °F (37.1 °C)] 97.2 °F (36.2 °C)  Pulse:  [] 102  Resp:  [14-21] 18  SpO2:  [97 %-100 %] 100 %  BP: (110-155)/(62-84) 134/76 "     Weight: 68.9 kg (152 lb)  Body mass index is 21.81 kg/m².    Physical Exam  Vitals and nursing note reviewed.   Constitutional:       General: He is not in acute distress.  HENT:      Head: Normocephalic.      Right Ear: External ear normal.      Left Ear: External ear normal.      Nose: Nose normal.      Mouth/Throat:      Mouth: Mucous membranes are dry.   Eyes:      Pupils: Pupils are equal, round, and reactive to light.   Cardiovascular:      Rate and Rhythm: Tachycardia present.   Pulmonary:      Effort: Pulmonary effort is normal. No respiratory distress.   Abdominal:      General: There is no distension.   Musculoskeletal:      Cervical back: Normal range of motion.   Neurological:      Motor: Weakness present.   Psychiatric:         Cognition and Memory: Cognition is impaired.       Review of Symptoms      Symptom Assessment (ESAS 0-10 Scale)  Unable to complete assessment due to Mental status change     Constipation:  Negative  Diarrhea:  Negative      Bowel Management Plan (BMP):  Yes      Pain Assessment in Advanced Demential Scale (PAINAD)   Breathing - Independent of vocalization:  0  Negative vocalization:  0  Facial expression:  0  Body language:  0  Consolability:  0  Total:  0    Modified Robson Scale:  0    Performance Status:  50    Living Arrangement: Lives in group home.    Psychosocial/Cultural:   See Palliative Psychosocial Note: No  Patient lives in private group home. Patient's niece is HCPOA   **Primary  to Follow**  Palliative Care  Consult: No    Spiritual:  F - Fabi and Belief:  Not addressed       Advance Care Planning   Advance Directives:   Living Will: No    LaPOST: No    Do Not Resuscitate Status: Yes    Medical Power of : Yes    Agent's Name:  Lakesha Gonzáles   Agent's Contact Number:  [phone]    Decision Making:  Family answered questions  Goals of Care: The healthcare power of   endorses that what is most important right now is to  focus on comfort and QOL     Accordingly, we have decided that the best plan to meet the patient's goals includes enrolling in hospice care         Significant Labs: All pertinent labs within the past 24 hours have been reviewed.  CBC:   Recent Labs   Lab 01/26/23 0332   WBC 6.69   HGB 8.1*   HCT 27.3*   MCV 98   *     BMP:  Recent Labs   Lab 01/26/23 0332   *      K 3.9   *   CO2 19*   BUN 50*   CREATININE 2.9*   CALCIUM 8.4*   MG 1.9     LFT:  Lab Results   Component Value Date    AST 11 01/26/2023    ALKPHOS 55 01/26/2023    BILITOT 0.4 01/26/2023     Albumin:   Albumin   Date Value Ref Range Status   01/26/2023 1.6 (L) 3.5 - 5.2 g/dL Final     Protein:   Total Protein   Date Value Ref Range Status   01/26/2023 5.6 (L) 6.0 - 8.4 g/dL Final     Lactic acid:   Lab Results   Component Value Date    LACTATE 0.9 01/25/2023    LACTATE 1.7 01/25/2023       Significant Imaging: I have reviewed all pertinent imaging results/findings within the past 24 hours.        > 50% of 75 min visit spent in chart review, face to face discussion of goals of care,  symptom assessment, coordination of care and emotional support.    Nancy Pandey MD  Palliative Medicine  [facility] S[facility]

## 2023-01-26 NOTE — PLAN OF CARE
Procedure complete. Right PCN exchanged to 8 Fr / 26 cm PCNU. Pt tolerated well, vss, no c/o pain. Report called to loree Esquivel RN. Pt to trans to MPU 3 via stretcher in stable condition. Report given at bedside.

## 2023-01-26 NOTE — ASSESSMENT & PLAN NOTE
Patient with Paroxysmal (<7 days) atrial fibrillation which is controlled currently with Beta Blocker. Patient is currently in atrial fibrillation.[id number] Score: 2. HASBLED Score:. Anticoagulation indicated. Anticoagulation done with Eliquis.    - will continue home metoprolol as EKG showed afib on admission

## 2023-01-26 NOTE — ASSESSMENT & PLAN NOTE
83 year old male with functional debility, dementia, bilateral staghorn calculus s/p nephrostomy tubes presents with worsening mental status, UTI and CLEM. Palliative consulted for [facility]    Code status: DNR     HCPOA: Lakesha Gonzáles [phone]    GOC/ACP  -Patient unable to participate in conversation. Spoke with patient's niece/HCPOA. Discussed that patient is at risk for continued infection. HCPOA reports patient is losing weight, his quality of life is declining, his functional abilities are declining, and his sacral decub is progressing. She does not want patient to continue to come to the hospital.  She says that the group brought up hospice as a potential way to continue to care for her patient. Provided hospice education.     -HCPOA is interested in pursing hospice at patient's group home. She will discuss with patient and family. She will let primary  know their final decision. Appreciate CM setting up hospice if this is what family chooses     Dicussed with primary team. Palliative medicine will sign off

## 2023-01-26 NOTE — PLAN OF CARE
1hr IR recovery complete. VSS. Dressing CDI. Pt. Ready for transport back to floor. Report called to ROBBIN Will

## 2023-01-26 NOTE — CONSULTS
"Percutaneous Nephrostomy Tube Placement Consult Note  Interventional Radiology    Consult Requested By: Mckay Cordova MD  Reason for Consult: "R lower pole NT"    SUBJECTIVE:     Chief Complaint:  fever and R hydronephrosis despite R PCN placement    History of Present Illness:  Timothy Roldan is a 83 y.o. male with a PMHx of HTN, AF (on eliquis), alzheimer dementia, HFpEF (EF 70%), bilateral obstructing kidney stones with severe bilateral hydronephrosis s/p IR bilateral PCN placement on 12/23/22 who was admitted on 1/25/23 for fever, AMS, and CLEM. Interventional Radiology has been consulted for additional right sidedPCN placement into R lower pole for management of residual R hydronephrosis. Pt has had recent imaging including a CT a/p on 1/25/23 which revealed "improved right-sided hydronephrosis and essentially resolved left-sided hydronephrosis status post placement of bilateral percutaneous nephrostomy tubes". Nephrostomy tubes appear in good position on CT and are flushing without difficulty. Ureteral stent placement has not been attempted. The pt's Cr is currently 2.8 (aletha since PCN placement 1.7). The pt's WBC is 6.69 and is trending down. Lactate WNL, procalcitonin 5.46. Tmax over the past 24 hr is 102.4 F. Blood cultures are positive for GNR, speciation and susceptibilities pending. The pt is hemodynamically stable. Last dose of eliquis was on 1/25/23.    Review of Systems   Unable to perform ROS: Medical condition (AMS)     Scheduled Meds:   acetaminophen  1,000 mg Oral Q6H    ceFEPime (MAXIPIME) IVPB  2 g Intravenous Q24H    donepeziL  10 mg Oral QHS    memantine  20 mg Oral Daily    metoprolol tartrate  12.5 mg Oral BID    pravastatin  20 mg Oral Daily     Continuous Infusions:  PRN Meds:acetaminophen, dextrose 10%, dextrose 10%, glucagon (human recombinant), glucose, glucose, melatonin, sodium chloride 0.9%, sodium chloride 0.9%, Pharmacy to dose Vancomycin consult **AND** vancomycin - pharmacy " to dose    Review of patient's allergies indicates:  No Known Allergies    Past Medical History:   Diagnosis Date    Bilateral Staghorn Calculus 1/6/2023    Hyperlipidemia     Hypertension     Persistent atrial fibrillation 12/31/2021    Pneumonia due to COVID-19 virus 5/21/2022     Past Surgical History:   Procedure Laterality Date    TONSILLECTOMY      TOTAL KNEE ARTHROPLASTY       History reviewed. No pertinent family history.  Social History     Tobacco Use    Smoking status: Never    Smokeless tobacco: Never       OBJECTIVE:     Vital Signs (Most Recent)  Temp: 97.2 °F (36.2 °C) (01/26/23 0734)  Pulse: 88 (01/26/23 0734)  Resp: 14 (01/26/23 0734)  BP: (!) 141/83 (01/26/23 0734)  SpO2: 100 % (01/26/23 0734)    Physical Exam:  Physical Exam  Vitals and nursing note reviewed.   Constitutional:       General: He is not in acute distress.     Appearance: Normal appearance. He is not ill-appearing.   HENT:      Head: Normocephalic and atraumatic.      Nose:      Comments: NC in place  Eyes:      Extraocular Movements: Extraocular movements intact.      Conjunctiva/sclera: Conjunctivae normal.      Pupils: Pupils are equal, round, and reactive to light.   Pulmonary:      Effort: Pulmonary effort is normal. No respiratory distress.   Abdominal:      General: Abdomen is flat. There is no distension.      Comments: Bilateral PCNs in place with dressing c/d/I, no signs of hematoma, pericatheter leakage or retraction   Musculoskeletal:         General: No swelling. Normal range of motion.   Skin:     General: Skin is warm and dry.      Coloration: Skin is not jaundiced.   Neurological:      General: No focal deficit present.      Mental Status: He is alert and oriented to person, place, and time.   Psychiatric:         Mood and Affect: Mood normal.         Behavior: Behavior normal.         Thought Content: Thought content normal.         Judgment: Judgment normal.       Laboratory  I have reviewed all pertinent lab  results within the past 24 hours.  CBC:   Recent Labs   Lab 01/26/23 0332   WBC 6.69   RBC 2.79*   HGB 8.1*   HCT 27.3*   *   MCV 98   MCH 29.0   MCHC 29.7*     BMP:   Recent Labs   Lab 01/26/23 0332   *      K 3.9   *   CO2 19*   BUN 50*   CREATININE 2.9*   CALCIUM 8.4*   MG 1.9     CMP:   Recent Labs   Lab 01/26/23 0332   *   CALCIUM 8.4*   ALBUMIN 1.6*   PROT 5.6*      K 3.9   CO2 19*   *   BUN 50*   CREATININE 2.9*   ALKPHOS 55   ALT <5*   AST 11   BILITOT 0.4     LFTs:   Recent Labs   Lab 01/26/23 0332   ALT <5*   AST 11   ALKPHOS 55   BILITOT 0.4   PROT 5.6*   ALBUMIN 1.6*     Coagulation: No results for input(s): LABPROT, INR, APTT in the last 168 hours.  Microbiology Results (last 7 days)       Procedure Component Value Units Date/Time    Blood culture x two cultures. Draw prior to antibiotics. [[id number]]  (Abnormal) Collected: 01/25/23 1023    Order Status: Completed Specimen: Blood from Peripheral, Forearm, Right Updated: 01/26/23 0842     Blood Culture, Routine Gram stain tiffany bottle: Gram negative rods      Results called to and read back by:Rupinder Salgado RN 01/25/2023  23:37      Gram stain aer bottle: Gram negative rods      Positive results previously called 01/26/2023  06:18      GRAM NEGATIVE HAMMAD  Identification pending  For susceptibility see order #[id number]      Narrative:      Aerobic and anaerobic    Blood culture x two cultures. Draw prior to antibiotics. [[id number]]  (Abnormal) Collected: 01/25/23 1023    Order Status: Completed Specimen: Blood from Peripheral, Forearm, Left Updated: 01/26/23 0838     Blood Culture, Routine Gram stain tiffany bottle: Gram negative rods      Results called to and read back by:Rupinder Salgado RN 01/25/2023  23:37      Gram stain aer bottle: Gram negative rods      Positive results previously called 01/26/2023  06:09      GRAM NEGATIVE HAMMAD  Identification and susceptibility pending      Narrative:      Aerobic and  anaerobic    Urine culture [[id number]] Collected: 01/25/23 1049    Order Status: No result Specimen: Urine Updated: 01/25/23 1146            ASA/Mallampati  ASA: 3  Mallampati: 2    Imaging:  Recent imaging studies including CT a/p on 1/25/23 which was independently reviewed by Cyrus Solis.     EXAMINATION:  CT ABDOMEN PELVIS WITHOUT CONTRAST     CLINICAL HISTORY:  Abdominal pain, acute, nonlocalized;Recent kidney stone and nephrostomy tubes.  Here with fever, worsening mental status.;     TECHNIQUE:  Low dose axial images, sagittal and coronal reformations were obtained from the lung bases to the pubic symphysis.     COMPARISON:  CT of the abdomen pelvis performed 12/22/2022     FINDINGS:  This examination is limited due to lack of intravenous contrast.     Lower chest: Moderate right and large left pleural effusions, possibly increased since prior CT of the abdomen pelvis performed 12/22/2022.  Dependent atelectasis.  Areas of scarring suggested.  Superimposed infectious or inflammatory airspace consolidation in the dependent lungs difficult to entirely exclude.     Liver: Normal contour.     Gallbladder and bile ducts: Unremarkable.     Pancreas: Normal contour.     Spleen: Normal contour.     Adrenals: Unchanged mild thickening of the left adrenal gland without discrete nodule measuring at least 10 mm.     Kidneys:     Bilateral percutaneous tubes in situ, with pigtail loops appearing formed within the collecting systems.  Marked left renal cortical atrophy again noted.     Relative to prior CT imaging of 12/22/2022, essentially unchanged appearance of bilateral large calculi within the renal pelves.  Nonobstructing bilateral renal calculi also appear essentially unchanged.     Degree right-sided hydronephrosis and proximal hydroureter appears reduced since 12/22/2022, now mild.  Small stones/fragments/gravel noted within the proximal right ureter in the vicinity of the UPJ, essentially unchanged.      Previously seen left-sided hydronephrosis appears essentially resolved.     Persistent bilateral perinephric stranding.  Redemonstrated low attenuating lesion arising from the interpolar right kidney, measuring on the order of 4-5 cm suggesting simple cyst.  Additional cyst arises from the interpolar region of the right kidney laterally.     Lymph nodes: No abdominal or pelvic lymphadenopathy.     Bowel and mesentery: Small hiatal hernia.  No definite evidence of acute bowel obstruction.  Appendix not definitely visualized.  Question wall thickening of the distal sigmoid colon and rectum with inflammatory changes in the adjoining perirectal presacral fat planes.     Abdominal aorta: Nonaneurysmal.  Moderate to heavy atherosclerosis.     Inferior vena cava: Unremarkable.     Free fluid or free air: Small volume unencapsulated free fluid noted in the lower pelvis     Pelvis: Prostate appears enlarged.     Urinary bladder: Circumferential urinary bladder wall thickening.     Body wall: Fat containing bilateral inguinal hernias, left larger than right.  Skin thickening superficial to the lower sacrum and coccyx with induration of the subcutaneous fat planes.     Bones: No acute change.  Query osseous demineralization.  Degenerative findings noted involving the spine, sacroiliac joints, pubic symphysis, and joints.  Remote bilateral rib fractures.  No further height loss of compression fracture of the L4 vertebral body relative to 12/22/2022 CT.  Grade 1 anterolisthesis of L5 on S1, as before.     Impression:     1. When compared to prior CT imaging of 12/22/2022, improved right-sided hydronephrosis and essentially resolved left-sided hydronephrosis status post placement of bilateral percutaneous nephrostomy tubes.  2. Similar position of extensive bilateral renal and proximal right ureteral calculi, as described.  3. Bilateral pleural effusions.  4. Question rectal wall thickening with minor induration in the  perirectal and presacral fat planes.  Correlation for stercoral colitis/proctitis recommended.  5. Nonspecific circumferential urinary bladder wall thickening which could relate to partially nondistended state at the time of imaging and/or bladder outlet obstruction, however correlation for signs/symptoms of cystitis would be recommended.  6. Minor skin thickening induration of the subcutaneous fat planes at the level of the lower sacrum and coccyx.  Correlation for signs of developing pressure ulcer would be recommended.  7. Additional details, as provided in the body of report.        Electronically signed by: Bill Sauer  Date:                                            01/25/2023  Time:                                           13:29    ASSESSMENT/PLAN:     Assessment:  83 y.o. male with a PMHx of HTN, AF (on eliquis), alzheimer dementia, HFpEF (EF 70%), bilateral obstructing kidney stones with severe bilateral hydronephrosis s/p IR bilateral PCN placement on 12/23/22 who has been referred to IR for additional right sidedPCN placement for management of R residual hydronephrosis with associated CLEM and bacteremia. The procedure was discussed in great detail with the patient including thorough explanations of the potential risks and benefits of PCN placement. Risks include hematuria, pain, sepsis, injury to ureter or kidney, injury to adjacent organs, catheter dislodgement and inability to remove PCN due to cystallization. IR could adjust the existing PCN to move it past the obstructing R stone so that the PCN essentially becomes a PCNU and drains the entire collecting system which could potentially be completed today. We could also place an additional R PCN into the lower pole of the kidney, however we would have to hold eliquis for 2 days prior to procedure. Discussed with urology- plan to proceed with nephrostogram with PCN adjustment under moderate sedation today. If unsuccessful, will hold eliquis and  proceed with additional R PCN placement into lower pole on 1/29/23. Plan discussed with ordering physician.The pt verbalized understanding of the plan and would like to proceed.    Plan:  Will proceed with nephrostogram with right sidedPCN adjustment under moderate sedation on 1/26/23.   If PCN adjustment unsuccessful, will hold eliquis and proceed with additional R PCN placement into lower pole on 1/29/23  Please keep pt NPO   Anticoagulation history reviewed. Pt on eliquis- physician aware  Coagulation labs reviewed.  Thank you for the consult. Please contact with questions via [employer] secure chat.    Natali Corley PA-C  Interventional Radiology  Spectra: [phone]

## 2023-01-26 NOTE — CONSULTS
83 y.o. M with AF (on Eliquis), HFpEF, dementia, functional debility recently admitted on 12/22 after presenting with AMS found to have urosepsis and severe renal obstruction due to bilateral staghorn calculi with hydronephrosis s/p nephrostomy tube placements on 12/23 with marked improvement from 4.3 to 1.5 on discharge on 12/28.     Represented to ED yesterday for AMS, found to have Proteus bacteremia likely from urosepsis and CLEM to 2.8->2.9. CT abdomen with improved R hydronephrosis, essentially resolved L hydronephrosis, still with calculi (4.4 cm in R renal pelvis, 3.5 cm in L renal pelvis). Transient significant drop in MAP yesterday from 100s to 70s x two episodes.    CLEM likely multifactorial: pre-renal injury from urosepsis/bacteremia, partial R kidney obstruction/hydronephrosis, and possibly further nephrotoxic injury (pt on Vanc and Zosyn), possible component of heart failure with elevated BNP and HFpEF but fluid retention could also be from hydronephrosis.     IR and urology following.    Recs:  - IR to proceed with nephrostogram with right sided PCN adjustment under moderate sedation today  - continue treatment of Proteus bacteremia, titrate antibiotics to least nephrotoxic agents as possible   - condom cath ordered, if unable to accurately assess UOP, please consider placement of Cain  - monitor BMP daily, we will f/u in AM after IR intervention today     Full consult note to follow.

## 2023-01-26 NOTE — PT/OT/SLP PROGRESS
Speech Language Pathology      Timothy Roldan  MRN: [id number]    Patient not seen today secondary to NPO for possible IR interventions. Will follow-up for clinical swallow assessment at a later date.      1/26/2023

## 2023-01-26 NOTE — ASSESSMENT & PLAN NOTE
Patient with Hypoxic Respiratory failure which is Acute.  he is not on home oxygen. Supplemental oxygen was provided and noted-  .   Signs/symptoms of respiratory failure include- tachypnea. Contributing diagnoses includes - Aspiration, CHF and Pleural effusion Labs and images were reviewed. Patient Has not had a recent ABG. Will treat underlying causes and adjust management of respiratory failure as follows-     - continuous oxygen monitoring  - currently on 2L NC

## 2023-01-26 NOTE — ASSESSMENT & PLAN NOTE
During previous hospitalization was found to have b/l obstructing kidney stones w/ b/l hydronephrosis and underwent b/l nephrostomy tube placement (on 12/23/22). Follows with urology outpatient. Repeat CT AP on admission shows improved right sided and resolved left sided hydronephrosis.     Outpatient urology note on 1/6/23 details referral to IR for b/l nephrostomy tube exchange in 3 months and to continue to follow with nephrology    - consulted urology, appreciate recommendations  - S/p nephrostogram with right sided PCN adjustment done by IR

## 2023-01-26 NOTE — SUBJECTIVE & OBJECTIVE
Past Medical History:   Diagnosis Date    Bilateral Staghorn Calculus 1/6/2023    Hyperlipidemia     Hypertension     Persistent atrial fibrillation 12/31/2021    Pneumonia due to COVID-19 virus 5/21/2022       Past Surgical History:   Procedure Laterality Date    TONSILLECTOMY      TOTAL KNEE ARTHROPLASTY         Review of patient's allergies indicates:  No Known Allergies    Medications:  Continuous Infusions:  Scheduled Meds:   acetaminophen  1,000 mg Oral Q6H    donepeziL  10 mg Oral QHS    memantine  20 mg Oral Daily    metoprolol tartrate  12.5 mg Oral BID    piperacillin-tazobactam (ZOSYN) IVPB  4.5 g Intravenous Q12H    pravastatin  20 mg Oral Daily     PRN Meds:acetaminophen, dextrose 10%, dextrose 10%, glucagon (human recombinant), glucose, glucose, melatonin, sodium chloride 0.9%, sodium chloride 0.9%, Pharmacy to dose Vancomycin consult **AND** vancomycin - pharmacy to dose    Family History    None       Tobacco Use    Smoking status: Never    Smokeless tobacco: Never   Substance and Sexual Activity    Alcohol use: Not on file    Drug use: Not on file    Sexual activity: Not on file       Review of Systems   Unable to perform ROS: Mental status change   Objective:     Vital Signs (Most Recent):  Temp: 97.2 °F (36.2 °C) (01/26/23 1157)  Pulse: 102 (01/26/23 1157)  Resp: 18 (01/26/23 1157)  BP: 134/76 (01/26/23 1157)  SpO2: 100 % (01/26/23 1157) Vital Signs (24h Range):  Temp:  [97.2 °F (36.2 °C)-98.7 °F (37.1 °C)] 97.2 °F (36.2 °C)  Pulse:  [] 102  Resp:  [14-21] 18  SpO2:  [97 %-100 %] 100 %  BP: (110-155)/(62-84) 134/76     Weight: 68.9 kg (152 lb)  Body mass index is 21.81 kg/m².    Physical Exam  Vitals and nursing note reviewed.   Constitutional:       General: He is not in acute distress.  HENT:      Head: Normocephalic.      Right Ear: External ear normal.      Left Ear: External ear normal.      Nose: Nose normal.      Mouth/Throat:      Mouth: Mucous membranes are dry.   Eyes:       Pupils: Pupils are equal, round, and reactive to light.   Cardiovascular:      Rate and Rhythm: Tachycardia present.   Pulmonary:      Effort: Pulmonary effort is normal. No respiratory distress.   Abdominal:      General: There is no distension.   Musculoskeletal:      Cervical back: Normal range of motion.   Neurological:      Motor: Weakness present.   Psychiatric:         Cognition and Memory: Cognition is impaired.       Review of Symptoms      Symptom Assessment (ESAS 0-10 Scale)  Unable to complete assessment due to Mental status change     Constipation:  Negative  Diarrhea:  Negative      Bowel Management Plan (BMP):  Yes      Pain Assessment in Advanced Demential Scale (PAINAD)   Breathing - Independent of vocalization:  0  Negative vocalization:  0  Facial expression:  0  Body language:  0  Consolability:  0  Total:  0    Modified Robson Scale:  0    Performance Status:  50    Living Arrangement: Lives in group home.    Psychosocial/Cultural:   See Palliative Psychosocial Note: No  Patient lives in private group home. Patient's niece is HCPOA   **Primary  to Follow**  Palliative Care  Consult: No    Spiritual:  F - Fabi and Belief:  Not addressed       Advance Care Planning   Advance Directives:   Living Will: No    LaPOST: No    Do Not Resuscitate Status: Yes    Medical Power of : Yes    Agent's Name:  Lakesha Gonzáles   Agent's Contact Number:  [phone]    Decision Making:  Family answered questions  Goals of Care: The healthcare power of   endorses that what is most important right now is to focus on comfort and QOL     Accordingly, we have decided that the best plan to meet the patient's goals includes enrolling in hospice care         Significant Labs: All pertinent labs within the past 24 hours have been reviewed.  CBC:   Recent Labs   Lab 01/26/23  0332   WBC 6.69   HGB 8.1*   HCT 27.3*   MCV 98   *     BMP:  Recent Labs   Lab 01/26/23 0332   GLU  113*      K 3.9   *   CO2 19*   BUN 50*   CREATININE 2.9*   CALCIUM 8.4*   MG 1.9     LFT:  Lab Results   Component Value Date    AST 11 01/26/2023    ALKPHOS 55 01/26/2023    BILITOT 0.4 01/26/2023     Albumin:   Albumin   Date Value Ref Range Status   01/26/2023 1.6 (L) 3.5 - 5.2 g/dL Final     Protein:   Total Protein   Date Value Ref Range Status   01/26/2023 5.6 (L) 6.0 - 8.4 g/dL Final     Lactic acid:   Lab Results   Component Value Date    LACTATE 0.9 01/25/2023    LACTATE 1.7 01/25/2023       Significant Imaging: I have reviewed all pertinent imaging results/findings within the past 24 hours.

## 2023-01-27 LAB
ALBUMIN SERPL BCP-MCNC: 1.6 G/DL (ref 3.5–5.2)
ALP SERPL-CCNC: 60 U/L (ref 55–135)
ALT SERPL W/O P-5'-P-CCNC: 6 U/L (ref 10–44)
ANION GAP SERPL CALC-SCNC: 13 MMOL/L (ref 8–16)
AST SERPL-CCNC: 11 U/L (ref 10–40)
BACTERIA BLD CULT: ABNORMAL
BACTERIA UR CULT: ABNORMAL
BASOPHILS # BLD AUTO: 0 K/UL (ref 0–0.2)
BASOPHILS NFR BLD: 0 % (ref 0–1.9)
BILIRUB SERPL-MCNC: 0.3 MG/DL (ref 0.1–1)
BUN SERPL-MCNC: 55 MG/DL (ref 8–23)
CALCIUM SERPL-MCNC: 8.2 MG/DL (ref 8.7–10.5)
CHLORIDE SERPL-SCNC: 113 MMOL/L (ref 95–110)
CO2 SERPL-SCNC: 18 MMOL/L (ref 23–29)
CREAT SERPL-MCNC: 3.1 MG/DL (ref 0.5–1.4)
DIFFERENTIAL METHOD: ABNORMAL
EOSINOPHIL # BLD AUTO: 0 K/UL (ref 0–0.5)
EOSINOPHIL NFR BLD: 0.4 % (ref 0–8)
ERYTHROCYTE [DISTWIDTH] IN BLOOD BY AUTOMATED COUNT: 15.6 % (ref 11.5–14.5)
EST. GFR  (NO RACE VARIABLE): 19.5 ML/MIN/1.73 M^2
GLUCOSE SERPL-MCNC: 102 MG/DL (ref 70–110)
HCT VFR BLD AUTO: 29.2 % (ref 40–54)
HGB BLD-MCNC: 8.6 G/DL (ref 14–18)
IMM GRANULOCYTES # BLD AUTO: 0.01 K/UL (ref 0–0.04)
IMM GRANULOCYTES NFR BLD AUTO: 0.2 % (ref 0–0.5)
LACTATE SERPL-SCNC: 1.6 MMOL/L (ref 0.5–2.2)
LYMPHOCYTES # BLD AUTO: 0.6 K/UL (ref 1–4.8)
LYMPHOCYTES NFR BLD: 11 % (ref 18–48)
MAGNESIUM SERPL-MCNC: 2 MG/DL (ref 1.6–2.6)
MCH RBC QN AUTO: 29.2 PG (ref 27–31)
MCHC RBC AUTO-ENTMCNC: 29.5 G/DL (ref 32–36)
MCV RBC AUTO: 99 FL (ref 82–98)
MONOCYTES # BLD AUTO: 0.2 K/UL (ref 0.3–1)
MONOCYTES NFR BLD: 4.6 % (ref 4–15)
NEUTROPHILS # BLD AUTO: 4.2 K/UL (ref 1.8–7.7)
NEUTROPHILS NFR BLD: 83.8 % (ref 38–73)
NRBC BLD-RTO: 0 /100 WBC
PHOSPHATE SERPL-MCNC: 3.6 MG/DL (ref 2.7–4.5)
PLATELET # BLD AUTO: 159 K/UL (ref 150–450)
PMV BLD AUTO: 10.7 FL (ref 9.2–12.9)
POTASSIUM SERPL-SCNC: 3.6 MMOL/L (ref 3.5–5.1)
PROT SERPL-MCNC: 5.7 G/DL (ref 6–8.4)
RBC # BLD AUTO: 2.95 M/UL (ref 4.6–6.2)
SODIUM SERPL-SCNC: 144 MMOL/L (ref 136–145)
WBC # BLD AUTO: 5.01 K/UL (ref 3.9–12.7)

## 2023-01-27 PROCEDURE — 99232 SBSQ HOSP IP/OBS MODERATE 35: CPT | Mod: ,,, | Performed by: UROLOGY

## 2023-01-27 PROCEDURE — 99497 ADVNCD CARE PLAN 30 MIN: CPT | Mod: ,,, | Performed by: STUDENT IN AN ORGANIZED HEALTH CARE EDUCATION/TRAINING PROGRAM

## 2023-01-27 PROCEDURE — 99900035 HC TECH TIME PER 15 MIN (STAT)

## 2023-01-27 PROCEDURE — 99233 SBSQ HOSP IP/OBS HIGH 50: CPT | Mod: GC,,, | Performed by: STUDENT IN AN ORGANIZED HEALTH CARE EDUCATION/TRAINING PROGRAM

## 2023-01-27 PROCEDURE — 92610 EVALUATE SWALLOWING FUNCTION: CPT

## 2023-01-27 PROCEDURE — 99222 1ST HOSP IP/OBS MODERATE 55: CPT | Mod: ,,, | Performed by: HOSPITALIST

## 2023-01-27 PROCEDURE — 27000221 HC OXYGEN, UP TO 24 HOURS

## 2023-01-27 PROCEDURE — 36415 COLL VENOUS BLD VENIPUNCTURE: CPT | Performed by: PHYSICIAN ASSISTANT

## 2023-01-27 PROCEDURE — 63600175 PHARM REV CODE 636 W HCPCS: Performed by: STUDENT IN AN ORGANIZED HEALTH CARE EDUCATION/TRAINING PROGRAM

## 2023-01-27 PROCEDURE — 94761 N-INVAS EAR/PLS OXIMETRY MLT: CPT

## 2023-01-27 PROCEDURE — 83735 ASSAY OF MAGNESIUM: CPT | Performed by: STUDENT IN AN ORGANIZED HEALTH CARE EDUCATION/TRAINING PROGRAM

## 2023-01-27 PROCEDURE — 80053 COMPREHEN METABOLIC PANEL: CPT | Performed by: STUDENT IN AN ORGANIZED HEALTH CARE EDUCATION/TRAINING PROGRAM

## 2023-01-27 PROCEDURE — 11000001 HC ACUTE MED/SURG PRIVATE ROOM

## 2023-01-27 PROCEDURE — 83605 ASSAY OF LACTIC ACID: CPT | Performed by: PHYSICIAN ASSISTANT

## 2023-01-27 PROCEDURE — 99222 PR INITIAL HOSPITAL CARE,LEVL II: ICD-10-PCS | Mod: ,,, | Performed by: HOSPITALIST

## 2023-01-27 PROCEDURE — 25000003 PHARM REV CODE 250: Performed by: STUDENT IN AN ORGANIZED HEALTH CARE EDUCATION/TRAINING PROGRAM

## 2023-01-27 PROCEDURE — 99232 PR SUBSEQUENT HOSPITAL CARE,LEVL II: ICD-10-PCS | Mod: ,,, | Performed by: UROLOGY

## 2023-01-27 PROCEDURE — 85025 COMPLETE CBC W/AUTO DIFF WBC: CPT | Performed by: STUDENT IN AN ORGANIZED HEALTH CARE EDUCATION/TRAINING PROGRAM

## 2023-01-27 PROCEDURE — 99497 PR ADVNCD CARE PLAN 30 MIN: ICD-10-PCS | Mod: ,,, | Performed by: STUDENT IN AN ORGANIZED HEALTH CARE EDUCATION/TRAINING PROGRAM

## 2023-01-27 PROCEDURE — 87040 BLOOD CULTURE FOR BACTERIA: CPT | Mod: 59

## 2023-01-27 PROCEDURE — 25000003 PHARM REV CODE 250

## 2023-01-27 PROCEDURE — 36415 COLL VENOUS BLD VENIPUNCTURE: CPT

## 2023-01-27 PROCEDURE — 84100 ASSAY OF PHOSPHORUS: CPT | Performed by: STUDENT IN AN ORGANIZED HEALTH CARE EDUCATION/TRAINING PROGRAM

## 2023-01-27 PROCEDURE — 36415 COLL VENOUS BLD VENIPUNCTURE: CPT | Performed by: STUDENT IN AN ORGANIZED HEALTH CARE EDUCATION/TRAINING PROGRAM

## 2023-01-27 PROCEDURE — 63600175 PHARM REV CODE 636 W HCPCS

## 2023-01-27 PROCEDURE — 99233 PR SUBSEQUENT HOSPITAL CARE,LEVL III: ICD-10-PCS | Mod: GC,,, | Performed by: STUDENT IN AN ORGANIZED HEALTH CARE EDUCATION/TRAINING PROGRAM

## 2023-01-27 RX ORDER — POLYETHYLENE GLYCOL 3350 17 G/17G
17 POWDER, FOR SOLUTION ORAL EVERY OTHER DAY
Status: ON HOLD | COMMUNITY
End: 2023-01-28 | Stop reason: HOSPADM

## 2023-01-27 RX ORDER — BENAZEPRIL HYDROCHLORIDE 10 MG/1
10 TABLET ORAL DAILY
Status: ON HOLD | COMMUNITY
Start: 2023-01-09 | End: 2023-01-27

## 2023-01-27 RX ORDER — HYDROMORPHONE HYDROCHLORIDE 1 MG/ML
0.5 INJECTION, SOLUTION INTRAMUSCULAR; INTRAVENOUS; SUBCUTANEOUS EVERY 6 HOURS PRN
Status: DISCONTINUED | OUTPATIENT
Start: 2023-01-27 | End: 2023-01-29 | Stop reason: HOSPADM

## 2023-01-27 RX ORDER — MUPIROCIN 20 MG/G
OINTMENT TOPICAL
Status: ON HOLD | COMMUNITY
Start: 2023-01-09 | End: 2023-01-28 | Stop reason: HOSPADM

## 2023-01-27 RX ORDER — LORATADINE 10 MG/1
10 TABLET ORAL EVERY MORNING
Status: ON HOLD | COMMUNITY
End: 2023-01-28 | Stop reason: HOSPADM

## 2023-01-27 RX ORDER — APIXABAN 5 MG/1
5 TABLET, FILM COATED ORAL 2 TIMES DAILY
Status: ON HOLD | COMMUNITY
Start: 2023-01-09 | End: 2023-01-27

## 2023-01-27 RX ORDER — SODIUM CHLORIDE, SODIUM LACTATE, POTASSIUM CHLORIDE, CALCIUM CHLORIDE 600; 310; 30; 20 MG/100ML; MG/100ML; MG/100ML; MG/100ML
INJECTION, SOLUTION INTRAVENOUS CONTINUOUS
Status: DISCONTINUED | OUTPATIENT
Start: 2023-01-27 | End: 2023-01-27

## 2023-01-27 RX ORDER — QUETIAPINE FUMARATE 25 MG/1
25 TABLET, FILM COATED ORAL NIGHTLY
Status: ON HOLD | COMMUNITY
Start: 2023-01-09 | End: 2023-01-27

## 2023-01-27 RX ORDER — FUROSEMIDE 40 MG/1
40 TABLET ORAL 2 TIMES DAILY
Status: ON HOLD | COMMUNITY
Start: 2023-01-09 | End: 2023-01-27

## 2023-01-27 RX ORDER — TALC
3 POWDER (GRAM) TOPICAL NIGHTLY
COMMUNITY
Start: 2023-01-09

## 2023-01-27 RX ORDER — SODIUM CHLORIDE, SODIUM LACTATE, POTASSIUM CHLORIDE, CALCIUM CHLORIDE 600; 310; 30; 20 MG/100ML; MG/100ML; MG/100ML; MG/100ML
INJECTION, SOLUTION INTRAVENOUS CONTINUOUS
Status: ACTIVE | OUTPATIENT
Start: 2023-01-27 | End: 2023-01-28

## 2023-01-27 RX ORDER — NITROFURANTOIN 25; 75 MG/1; MG/1
100 CAPSULE ORAL 2 TIMES DAILY
Status: ON HOLD | COMMUNITY
Start: 2023-01-24 | End: 2023-01-28 | Stop reason: HOSPADM

## 2023-01-27 RX ORDER — ERGOCALCIFEROL 1.25 MG/1
50000 CAPSULE ORAL
Status: ON HOLD | COMMUNITY
End: 2023-01-28 | Stop reason: HOSPADM

## 2023-01-27 RX ADMIN — ACETAMINOPHEN 650 MG: 325 TABLET ORAL at 10:01

## 2023-01-27 RX ADMIN — HYDROMORPHONE HYDROCHLORIDE 0.5 MG: 1 INJECTION, SOLUTION INTRAMUSCULAR; INTRAVENOUS; SUBCUTANEOUS at 11:01

## 2023-01-27 RX ADMIN — METOPROLOL TARTRATE 12.5 MG: 25 TABLET, FILM COATED ORAL at 08:01

## 2023-01-27 RX ADMIN — MEMANTINE 20 MG: 10 TABLET ORAL at 09:01

## 2023-01-27 RX ADMIN — METOPROLOL TARTRATE 12.5 MG: 25 TABLET, FILM COATED ORAL at 09:01

## 2023-01-27 RX ADMIN — PRAVASTATIN SODIUM 20 MG: 20 TABLET ORAL at 09:01

## 2023-01-27 RX ADMIN — ACETAMINOPHEN 1000 MG: 500 TABLET ORAL at 12:01

## 2023-01-27 RX ADMIN — PIPERACILLIN SODIUM AND TAZOBACTAM SODIUM 4.5 G: 4; .5 INJECTION, POWDER, LYOPHILIZED, FOR SOLUTION INTRAVENOUS at 12:01

## 2023-01-27 RX ADMIN — SODIUM CHLORIDE, POTASSIUM CHLORIDE, SODIUM LACTATE AND CALCIUM CHLORIDE: 600; 310; 30; 20 INJECTION, SOLUTION INTRAVENOUS at 11:01

## 2023-01-27 RX ADMIN — ACETAMINOPHEN 1000 MG: 500 TABLET ORAL at 05:01

## 2023-01-27 RX ADMIN — DONEPEZIL HYDROCHLORIDE 10 MG: 10 TABLET ORAL at 08:01

## 2023-01-27 RX ADMIN — CEFTRIAXONE 2 G: 2 INJECTION, POWDER, FOR SOLUTION INTRAMUSCULAR; INTRAVENOUS at 12:01

## 2023-01-27 RX ADMIN — VANCOMYCIN HYDROCHLORIDE 1000 MG: 1 INJECTION, POWDER, LYOPHILIZED, FOR SOLUTION INTRAVENOUS at 12:01

## 2023-01-27 RX ADMIN — ACETAMINOPHEN 1000 MG: 500 TABLET ORAL at 03:01

## 2023-01-27 RX ADMIN — ACETAMINOPHEN 1000 MG: 500 TABLET ORAL at 06:01

## 2023-01-27 RX ADMIN — SODIUM BICARBONATE: 84 INJECTION, SOLUTION INTRAVENOUS at 01:01

## 2023-01-27 NOTE — HPI
83 y.o. M with AF (on Eliquis), HFpEF, dementia, functional debility recently admitted on 12/22 after presenting with AMS found to have urosepsis and severe renal obstruction due to bilateral staghorn calculi with hydronephrosis s/p nephrostomy tube placements on 12/23 with marked improvement from 4.3 to 1.5 on discharge on 12/28.      Represented to ED yesterday for AMS, found to have Proteus bacteremia likely from urosepsis and CLEM to 2.8->2.9. CT abdomen with improved R hydronephrosis, essentially resolved L hydronephrosis, still with calculi (4.4 cm in R renal pelvis, 3.5 cm in L renal pelvis). Transient significant drop in MAP yesterday from 100s to 70s x two episodes.

## 2023-01-27 NOTE — PROGRESS NOTES
[facility]  Progress Note    Patient Name: Timothy Roldan  MRN: [id number]  Patient Class: IP- Inpatient   Admission Date: 1/25/2023  Length of Stay: 1 days  Attending Physician: Colin Mock MD  Primary Care Provider: Martine Garces MD        Subjective:     Principal Problem:Sepsis        HPI:  Mr. Timothy Roldan is a 82 yo male with a PMHx of bilateral hydronephrosis 2/2 staghorn caliculi s/p b/l nephrostomy tubes,  HTN, Afib (on eliquis), Alzheimer's dementia, HFpEF (EF 70%), and BPH who presents from nursing home for altered mental status and worsening weakness.     As per nursing home, hematuria was noted and UA done on Sunday showed infection, thus Macrobid was started. Since then, patient has decreased appetite and worsening mental status. Upon evaluation, patient's speech is very slowed. He is AAOX2 (person, place, not time or reason for admission), however is unable to answer questions during exam. He does complain of generalized body soreness.     Of note, patient was recently hospitalized at [facility] [place], [place] from 12/21/22- 12/28/22 and presented for similar complaint with fever and AMS and found to have CLEM. He was found to have b/l obstructing kidney stones w/ b/l hydronephrosis and underwent b/l nephrostomy tube placement (on 12/23/22) along with brower catheter placement. His CLEM improved.  However, urology recommended NO brower catheter placement unless patient is symptomatic and bladder scan shows >400 cc. He also completed 6 day course of IV antibotics and d/c with PO Bactrim. Of note, patient did not reac well with cefepime and seroquel (decreased mentation).       ED Course:  Upon arrival to the ED, patient was febrile with Tmax of 102.4, improved to 99.7 after receiving tyelnol in the ED, he was tachycardic , tachypneic 16-20, SBP , and saturating well on RA. Per EMS, on initial evaluation, saturating in the 80s, which improved after 2L NC. Initial labs notable for  WBC and lactic acid wnl,elevated procal (5.46), increased creatine 2.7, decreased albumin 1.9, slight acidosis with HCO3 of 19, UA with +2 protein, +1 blood, +3 leukocytes, 73 RBC, >100 bacteria, and many hyaline casts. EKG showed afib with RVR () and Qtc wnl. CTAP with imrpoved right sided and resolved left sided hydronephrisis, continues to have b/l and proximal r. Ureteral calculi, b/l pleural effusions, circumferential urinary bladder wall thickening concerning of cystitis, rectal wall thicening concerning for colitis/proctatis,  skin thickening induration near lower sacrum and coccyx concerning for pressure ulcer. CTH unremarkable for acute intracranial process. CXR with left > R sided opacifications likely pleural effusion and possible atelectasis. Received x1 dose of tyelnol given for fever, s/p 2 bolus of LR, zosyn.      Overview/Hospital Course:  84 yo male with a PMHx of HTN, Afib (on eliquis), Alzheimer's dementia, HFpEF (EF 70%), and BPH who presents from nursing home for altered mental status and worsening weakness. He was found to be  febrile, tachycardic, and tachypneic concerning for sepsis. Urology consulted and recommend patient's nephrostomy tube be adjusted by IR. Bcx and Urine Cx growing Proteus, currently on vanc and zosyn. Awaiting sensitives.       Interval History: No acute events overnight. Upon morning evaluation, patient's mentation appeared worse compared to yesterday. He was nonverbal today.     Family discussion was had with niece (who is power of ) and sister. They are interested in pursuing hospice upon discharge.     He remains HDS and afebrile.     Review of Systems   Unable to perform ROS: Mental status change   Objective:     Vital Signs (Most Recent):  Temp: 97.2 °F (36.2 °C) (01/26/23 1157)  Pulse: 99 (01/26/23 1530)  Resp: 16 (01/26/23 1530)  BP: (!) 96/58 (01/26/23 1530)  SpO2: 100 % (01/26/23 1530)   Vital Signs (24h Range):  Temp:  [97.2 °F (36.2 °C)-98.7 °F  (37.1 °C)] 97.2 °F (36.2 °C)  Pulse:  [] 99  Resp:  [13-21] 16  SpO2:  [97 %-100 %] 100 %  BP: ()/(58-83) 96/58     Weight: 68.9 kg (152 lb)  Body mass index is 21.81 kg/m².    Intake/Output Summary (Last 24 hours) at 1/26/2023 1612  Last data filed at 1/26/2023 0653  Gross per 24 hour   Intake 235.75 ml   Output 520 ml   Net -284.25 ml      Physical Exam  Constitutional:       General: He is not in acute distress.     Appearance: Normal appearance. He is ill-appearing. He is not toxic-appearing.   HENT:      Head: Normocephalic and atraumatic.      Mouth/Throat:      Mouth: Mucous membranes are dry.   Eyes:      Extraocular Movements: Extraocular movements intact.      Conjunctiva/sclera: Conjunctivae normal.      Pupils: Pupils are equal, round, and reactive to light.   Cardiovascular:      Rate and Rhythm: Regular rhythm. Tachycardia present.      Heart sounds: Murmur heard.   Pulmonary:      Effort: Pulmonary effort is normal. No respiratory distress.      Breath sounds: Normal breath sounds.   Abdominal:      General: Abdomen is flat. Bowel sounds are normal. There is no distension.      Palpations: Abdomen is soft.      Tenderness: There is generalized abdominal tenderness. There is no guarding.      Comments: B/l nephrostomy tubes draining, no signs of infection around insertion sites   Musculoskeletal:         General: No swelling.      Right lower leg: No edema.      Left lower leg: No edema.   Lymphadenopathy:      Cervical: No cervical adenopathy.   Skin:     General: Skin is warm.      Capillary Refill: Capillary refill takes less than 2 seconds.   Neurological:      General: No focal deficit present.      Mental Status: He is alert and oriented to person, place, and time.   Psychiatric:         Mood and Affect: Mood normal. Affect is flat.         Speech: Speech is delayed.         Behavior: Behavior normal.       Significant Labs: All pertinent labs within the past 24 hours have been  reviewed.  CBC:   Recent Labs   Lab 01/25/23  1022 01/26/23  0332   WBC 8.16 6.69   HGB 9.5* 8.1*   HCT 30.6* 27.3*    139*     CMP:   Recent Labs   Lab 01/25/23  1022 01/26/23  0332    143   K 4.0 3.9    114*   CO2 19* 19*   * 113*   BUN 45* 50*   CREATININE 2.8* 2.9*   CALCIUM 8.7 8.4*   PROT 6.8 5.6*   ALBUMIN 1.9* 1.6*   BILITOT 0.5 0.4   ALKPHOS 66 55   AST 12 11   ALT 7* <5*   ANIONGAP 13 10     Recent Lab Results         01/26/23  1224   01/26/23  0332        Albumin   1.6       Alkaline Phosphatase   55       ALT   <5       Anion Gap   10       AST   11       Baso #   0.00       Basophil %   0.0       BILIRUBIN TOTAL   0.4  Comment: For infants and newborns, interpretation of results should be based  on gestational age, weight and in agreement with clinical  observations.    Premature Infant recommended reference ranges:  Up to 24 hours.............<8.0 mg/dL  Up to 48 hours............<12.0 mg/dL  3-5 days..................<15.0 mg/dL  6-29 days.................<15.0 mg/dL         BUN   50       Calcium   8.4       Chloride   114       CO2   19       Creatinine   2.9       Differential Method   Automated       eGFR   21.1       Eos #   0.0       Eosinophil %   0.0       Glucose   113       Gran # (ANC)   5.9       Gran %   88.4       Hematocrit   27.3       Hemoglobin   8.1       Immature Grans (Abs)   0.03  Comment: Mild elevation in immature granulocytes is non specific and   can be seen in a variety of conditions including stress response,   acute inflammation, trauma and pregnancy. Correlation with other   laboratory and clinical findings is essential.         Immature Granulocytes   0.4       INR 1.3  Comment: Coumadin Therapy:  2.0 - 3.0 for INR for all indicators except mechanical heart valves  and antiphospholipid syndromes which should use 2.5 - 3.5.           Lymph #   0.4       Lymph %   6.1       Magnesium   1.9       MCH   29.0       MCHC   29.7       MCV   98        Mono #   0.3       Mono %   5.1       MPV   11.0       nRBC   0       Phosphorus   3.5       Platelets   139       Potassium   3.9       PROTEIN TOTAL   5.6       Protime 13.0         RBC   2.79       RDW   15.7       Sodium   143       WBC   6.69               Significant Imaging: I have reviewed all pertinent imaging results/findings within the past 24 hours.      Assessment/Plan:      * Sepsis  This patient does have evidence of infective focus  My overall impression is sepsis.  Source: Respiratory, Urinary Tract and Skin and Soft Tissue (location sacral wound)   Organ dysfunction indicated by Acute kidney injury, Acute respiratory failure and Encephalopathy  Will Not start Pressors- Levophed for MAP of 65  Source control achieved by: Pending Bcx, Urine Cx  Antibiotics given-   Antibiotics (72h ago, onward)    Start     Stop Route Frequency Ordered    01/26/23 1200  piperacillin-tazobactam (ZOSYN) 4.5 g in dextrose 5 % in water (D5W) 5 % 100 mL IVPB (MB+)         -- IV Every 12 hours (non-standard times) 01/26/23 1055    01/25/23 1604  vancomycin - pharmacy to dose  (vancomycin IVPB)        See Hyperspace for full Linked Orders Report.    -- IV pharmacy to manage frequency 01/25/23 1507        Latest lactate reviewed-  Recent Labs   Lab 01/25/23  1022 01/25/23  1355   LACTATE 1.7 0.9     Patient febrile, tachycardic and tachypneic- fufilling 3/4 SIRS criteria. Likely infectious. Multiple etiologies in setting of recent pyelonephritis.Patient's lactic acid wnl.     Plan  - Blood cx  And urine cx resulted with proteus    - switch from vanc and cefepime to vanc and zosyn at this time    Palliative care encounter  - Family discussing pursuing home hospice upon discharge  - consulted palliative, appreciate recommendations       Hydronephrosis of right kidney  - IR consulted to adjust PCN past the obstructing R sided stone  - S/p nephrostogram with right sided PCN adjustment      Sacral wound  CT AP showing skin  thickening induration of subcutaneous fat at level of lower sacrum and coccyx concerning for pressure ulcer      ACP (advance care planning)  Advance Care Planning     Date: 01/25/2023    Power of   Discussed patient's admission and current condition with patient's niece/HCPOA, Lakesha Gonzáles ([phone]), who re-confirmed that she is the HCPOA but makes decisions in conjunction with other family members.      Code Status  Discussed patient's code status with HCPOA, confirmed that patient is DNR/DNI.  Ms Gonzáles would like to further discuss GOC in light of recurrent UTIs, consider palliative care pending further plan of care.           Bilateral Staghorn Calculus  CTAP with improved right sided and resolved left sided hydronephrisis, b/l renal and proximal r. ureteral calculi    Plan  - consulted urology, appreciate recommendations       Bilateral hydronephrosis  During previous hospitalization was found to have b/l obstructing kidney stones w/ b/l hydronephrosis and underwent b/l nephrostomy tube placement (on 12/23/22). Follows with urology outpatient. Repeat CT AP on admission shows improved right sided and resolved left sided hydronephrosis.     Outpatient urology note on 1/6/23 details referral to IR for b/l nephrostomy tube exchange in 3 months and to continue to follow with nephrology    - consulted urology, appreciate recommendations  - S/p nephrostogram with right sided PCN adjustment done by IR    Chronic heart failure with preserved ejection fraction  HFpEF  - Most recent TTE on 12/23/22 demonstrates: LVef 70%,   - EKG : Afib  - Troponin wnl    - BNP wnl and CXR with pericardial effusion   - Fluid restriction at 1.5 cc with strict I/Os and daily weights   - Will hold diuresis in setting of CLEM   - Maintain on telemetry and daily EKGs   - Up to date risk stratification : TSH, Lipids, HbA1c with optimization of risk factors is necessary  - Check Electrolytes, keep Mag >2 & K+ >4  - SCDs, TEDs,  Nursing communication to elevated LE   - Ambulate as tolerated                Acute hypoxemic respiratory failure  Patient with Hypoxic Respiratory failure which is Acute.  he is not on home oxygen. Supplemental oxygen was provided and noted-  .   Signs/symptoms of respiratory failure include- tachypnea. Contributing diagnoses includes - Aspiration, CHF and Pleural effusion Labs and images were reviewed. Patient Has not had a recent ABG. Will treat underlying causes and adjust management of respiratory failure as follows-     - continuous oxygen monitoring  - currently on 2L NC      Persistent atrial fibrillation  Patient with Paroxysmal (<7 days) atrial fibrillation which is controlled currently with Beta Blocker. Patient is currently in atrial fibrillation.[id number] Score: 2. HASBLED Score:. Anticoagulation indicated. Anticoagulation done with Eliquis.    - will continue home metoprolol as EKG showed afib on admission    Alzheimer disease  Niece reports patient is not very verbose at baseline, sometimes will answer yes/no questions.  Not able to have full conversations at baseline.    - continue home namenda and aricept      CLEM (acute kidney injury)  Patient with acute kidney injury likely due to IVVD/dehydration and post-obstructive d/t obstructive stones CLEM is currently stable. Labs reviewed- Renal function/electrolytes with Estimated Creatinine Clearance: 19.5 mL/min (A) (based on SCr of 2.9 mg/dL (H)). according to latest data. Monitor urine output and serial BMP and adjust therapy as needed. Avoid nephrotoxins and renally dose meds for GFR listed above.     Lab Results   Component Value Date    CREATININE 2.9 (H) 01/26/2023     Creatinine 2.8 on admit, baseline around 1  - Likely combination of pre-renal from dehydration and volume losses vs sepsis and post-obstructive from hydronephrosis       Plan:   - Consulted nephrology as etiology of CLEM cannot be entirely attributed to obstructive also concerns for  CRS, appreciate recommendations  - Strict I&Os and daily weights   - Gentle IVF  - Avoid nephrotoxic agents such as NSAIDs, gadolinium and IV radiocontrast.  - Renally dose meds to current GFR.  - Maintain MAP > 65.      Essential hypertension  - holding home benazepril in setting of sepsis to monitor hemodynamics  - will continue metoprolol       VTE Risk Mitigation (From admission, onward)         Ordered     IP VTE HIGH RISK PATIENT  Once         01/25/23 1256     Place sequential compression device  Until discontinued         01/25/23 1256     IP VTE HIGH RISK PATIENT  Once         01/25/23 1256     Place sequential compression device  Until discontinued         01/25/23 1256                Discharge Planning   JACQUELINE: 1/30/2023     Code Status: DNR   Is the patient medically ready for discharge?: No    Reason for patient still in hospital (select all that apply): Patient trending condition                     Naima Meza MD  Department of Hospital Medicine   [facility]

## 2023-01-27 NOTE — PROGRESS NOTES
Pharmacokinetic Assessment Follow Up: IV Vancomycin    Vancomycin serum concentration assessment(s):    The random level was drawn correctly (~24 hours post dose) and can be used to guide therapy at this time. The measurement is below the desired definitive target range of 15 to 20 mcg/mL.    Vancomycin Regimen Plan:    Patient still in CLEM, and will continue to pulse dose.  Will give a one time dose of 1000 mg (15 mg/kg) tonight and schedule and random trough ~24 hour post dose.  The next serum trough concentration will be measured at 2330 on 1/27/2023.    Drug levels (last 3 results):  Recent Labs   Lab Result Units 01/26/23  1810   Vancomycin, Random ug/mL 10.6       Pharmacy will continue to follow and monitor vancomycin.    Please contact pharmacy at extension [phone] for questions regarding this assessment.    Thank you for the consult,   Lulu Herrera       Patient brief summary:  Timothy Roldan is a 83 y.o. male initiated on antimicrobial therapy with IV Vancomycin for treatment of bacteremia.    Patient received a loading dose of 20 mg/kg (1500 mg) in the ED on 1/25/2023.    Drug Allergies:   Review of patient's allergies indicates:  No Known Allergies    Actual Body Weight:   68.9 kg     Renal Function:   Estimated Creatinine Clearance: 19.5 mL/min (A) (based on SCr of 2.9 mg/dL (H)).,     Dialysis Method (if applicable):  N/A    CBC (last 72 hours):  Recent Labs   Lab Result Units 01/25/23  1022 01/26/23  0332   WBC K/uL 8.16 6.69   Hemoglobin g/dL 9.5* 8.1*   Hematocrit % 30.6* 27.3*   Platelets K/uL 166 139*   Gran % % 92.9* 88.4*   Lymph % % 2.9* 6.1*   Mono % % 3.6* 5.1   Eosinophil % % 0.0 0.0   Basophil % % 0.1 0.0   Differential Method  Automated Automated       Metabolic Panel (last 72 hours):  Recent Labs   Lab Result Units 01/25/23  1022 01/25/23  1049 01/26/23  0332   Sodium mmol/L 142  --  143   Potassium mmol/L 4.0  --  3.9   Chloride mmol/L 110  --  114*   CO2 mmol/L 19*  --  19*   Glucose  mg/dL 124*  --  113*   Glucose, UA   --  Negative  --    BUN mg/dL 45*  --  50*   Creatinine mg/dL 2.8*  --  2.9*   Albumin g/dL 1.9*  --  1.6*   Total Bilirubin mg/dL 0.5  --  0.4   Alkaline Phosphatase U/L 66  --  55   AST U/L 12  --  11   ALT U/L 7*  --  <5*   Magnesium mg/dL  --   --  1.9   Phosphorus mg/dL  --   --  3.5       Vancomycin Administrations:  vancomycin given in the last 96 hours                     vancomycin 1,500 mg in dextrose 5 % (D5W) 250 mL IVPB (Vial-Mate) (mg) 1,500 mg New Bag 01/25/23 1627                    Microbiologic Results:  Microbiology Results (last 7 days)       Procedure Component Value Units Date/Time    Blood culture [[id number]] Collected: 01/26/23 1224    Order Status: Completed Specimen: Blood Updated: 01/26/23 1915     Blood Culture, Routine No Growth to date    Blood culture [[id number]] Collected: 01/26/23 1224    Order Status: Completed Specimen: Blood Updated: 01/26/23 1915     Blood Culture, Routine No Growth to date    Urine culture [[id number]]  (Abnormal) Collected: 01/25/23 1049    Order Status: Completed Specimen: Urine Updated: 01/26/23 1241     Urine Culture, Routine PRESUMPTIVE PROTEUS SPECIES  50,000 - 99,999 cfu/ml  Identification and susceptibility pending      Narrative:      Specimen Source->Urine    Blood culture [[id number]]     Order Status: Canceled Specimen: Blood     Blood culture [[id number]]     Order Status: Canceled Specimen: Blood     Blood culture x two cultures. Draw prior to antibiotics. [[id number]]  (Abnormal) Collected: 01/25/23 1023    Order Status: Completed Specimen: Blood from Peripheral, Forearm, Right Updated: 01/26/23 1114     Blood Culture, Routine Gram stain tiffany bottle: Gram negative rods      Results called to and read back by:Rupinder Salgado RN 01/25/2023  23:37      Gram stain aer bottle: Gram negative rods      Positive results previously called 01/26/2023  06:18      PROTEUS MIRABILIS  For susceptibility see order #[id number]       Narrative:      Aerobic and anaerobic    Blood culture x two cultures. Draw prior to antibiotics. [[id number]]  (Abnormal) Collected: 01/25/23 1023    Order Status: Completed Specimen: Blood from Peripheral, Forearm, Left Updated: 01/26/23 1113     Blood Culture, Routine Gram stain tiffany bottle: Gram negative rods      Results called to and read back by:Rupinder Salgado RN 01/25/2023  23:37      Gram stain aer bottle: Gram negative rods      Positive results previously called 01/26/2023  06:09      PROTEUS MIRABILIS  Susceptibility pending      Narrative:      Aerobic and anaerobic

## 2023-01-27 NOTE — CONSULTS
[facility] S[facility]  Nephrology  Consult Note    Patient Name: Timothy Roldan  MRN: [id number]  Admission Date: 1/25/2023  Hospital Length of Stay: 2 days  Attending Provider: Colin Mock MD   Primary Care Physician: Martine Garces MD  Principal Problem:Sepsis    Consults  Subjective:     HPI: 83 y.o. M with AF (on Eliquis), HFpEF, dementia, functional debility recently admitted on 12/22 after presenting with AMS found to have urosepsis and severe renal obstruction due to bilateral staghorn calculi with hydronephrosis s/p nephrostomy tube placements on 12/23 with marked improvement from 4.3 to 1.5 on discharge on 12/28.      Represented to ED yesterday for AMS, found to have Proteus bacteremia likely from urosepsis and CLEM to 2.8->2.9. CT abdomen with improved R hydronephrosis, essentially resolved L hydronephrosis, still with calculi (4.4 cm in R renal pelvis, 3.5 cm in L renal pelvis). Transient significant drop in MAP yesterday from 100s to 70s x two episodes.      Past Medical History:   Diagnosis Date    Bilateral Staghorn Calculus 1/6/2023    Hyperlipidemia     Hypertension     Persistent atrial fibrillation 12/31/2021    Pneumonia due to COVID-19 virus 5/21/2022       Past Surgical History:   Procedure Laterality Date    TONSILLECTOMY      TOTAL KNEE ARTHROPLASTY         Review of patient's allergies indicates:  No Known Allergies  Current Facility-Administered Medications   Medication Frequency    acetaminophen tablet 1,000 mg Q6H    acetaminophen tablet 650 mg Q4H PRN    cefTRIAXone (ROCEPHIN) 2 g in dextrose 5 % in water (D5W) 5 % 50 mL IVPB (MB+) Q24H    dextrose 10% bolus 125 mL 125 mL PRN    dextrose 10% bolus 250 mL 250 mL PRN    donepeziL tablet 10 mg QHS    glucagon (human recombinant) injection 1 mg PRN    glucose chewable tablet 16 g PRN    glucose chewable tablet 24 g PRN    lactated ringers infusion Continuous    melatonin tablet 6 mg Nightly PRN    memantine tablet 20 mg Daily     metoprolol tartrate (LOPRESSOR) split tablet 12.5 mg BID    pravastatin tablet 20 mg Daily    sodium bicarbonate 150 mEq in dextrose 5 % (D5W) 1,000 mL infusion Continuous    sodium chloride 0.9% flush 10 mL PRN    sodium chloride 0.9% flush 10 mL PRN     Family History    None       Tobacco Use    Smoking status: Never    Smokeless tobacco: Never   Substance and Sexual Activity    Alcohol use: Not on file    Drug use: Not on file    Sexual activity: Not on file     Review of Systems   Reason unable to perform ROS: patient disoriented, unable to answer questions.   Objective:     Vital Signs (Most Recent):  Temp: 98.2 °F (36.8 °C) (01/27/23 1128)  Pulse: 96 (01/27/23 1128)  Resp: 18 (01/27/23 1128)  BP: 138/79 (01/27/23 1128)  SpO2: 99 % (01/27/23 1128) Vital Signs (24h Range):  Temp:  [94.6 °F (34.8 °C)-98.2 °F (36.8 °C)] 98.2 °F (36.8 °C)  Pulse:  [] 96  Resp:  [12-18] 18  SpO2:  [98 %-100 %] 99 %  BP: ()/(57-82) 138/79     Weight: 68.9 kg (152 lb) (01/25/23 2332)  Body mass index is 21.81 kg/m².  Body surface area is 1.84 meters squared.    I/O last 3 completed shifts:  In: 580 [P.O.:230; IV Piggyback:350]  Out: 1310 [Urine:1310]    Physical Exam  Vitals and nursing note reviewed.   Constitutional:       General: He is not in acute distress.     Appearance: He is ill-appearing.   HENT:      Head: Normocephalic and atraumatic.      Mouth/Throat:      Mouth: Mucous membranes are dry.   Eyes:      Extraocular Movements: Extraocular movements intact.      Conjunctiva/sclera: Conjunctivae normal.   Cardiovascular:      Rate and Rhythm: Regular rhythm. Tachycardia present.      Heart sounds: Murmur heard.   Pulmonary:      Effort: Pulmonary effort is normal. No respiratory distress.      Breath sounds: No wheezing.   Abdominal:      General: Bowel sounds are normal.      Tenderness: There is abdominal tenderness. There is no guarding or rebound.   Genitourinary:     Comments: Bilateral nephrostomy tubes  draining red tinged urine  Musculoskeletal:      Cervical back: Normal range of motion and neck supple.   Skin:     General: Skin is dry.      Coloration: Skin is pale.      Findings: Bruising present.   Neurological:      Mental Status: He is disoriented.   Psychiatric:      Comments: Disoriented, unable to answer questions appropriately       Significant Labs:  CBC:   Recent Labs   Lab 01/27/23 0229   WBC 5.01   RBC 2.95*   HGB 8.6*   HCT 29.2*      MCV 99*   MCH 29.2   MCHC 29.5*     CMP:   Recent Labs   Lab 01/27/23 0229      CALCIUM 8.2*   ALBUMIN 1.6*   PROT 5.7*      K 3.6   CO2 18*   *   BUN 55*   CREATININE 3.1*   ALKPHOS 60   ALT 6*   AST 11   BILITOT 0.3     Recent Labs   Lab 01/25/23  1049   COLORU Charley   SPECGRAV 1.010   PHUR 7.0   PROTEINUA 2+*   BACTERIA Many*   NITRITE Negative   LEUKOCYTESUR 3+*   HYALINECASTS 0     All labs within the past 24 hours have been reviewed.    Significant Imaging:  Labs: Reviewed    Assessment/Plan:     Bilateral Staghorn Calculus  -see CLEM    Bilateral hydronephrosis  -see CLEM    CLEM (acute kidney injury)  83 y.o. M with AF (on Eliquis), HFpEF, dementia, functional debility s/p bilateral nephrostomy tubes for bilateral staghorn calculi and hydronephrosis (12/22) admitted for AMS, sepsis 2/2 proteus bacteremia and proteus + urine culture with elevated sCr 3.1 (sCr was 1.5 on discharge 12/28/22)    CT abdomen with improved R hydronephrosis, essentially resolved L hydronephrosis, still with calculi (4.4 cm in R renal pelvis, 3.5 cm in L renal pelvis). Transient significant drop in MAP yesterday from 100s to 70s x two episodes.    CLEM likely multifactorial: pre-renal injury from urosepsis/bacteremia, partial R kidney obstruction/hydronephrosis, and possibly further nephrotoxic injury (pt on Vanc and Zosyn), possible component of heart failure with elevated BNP and HFpEF but fluid retention could also be from hydronephrosis.      IR and urology  following. S/p nephrostogram with right sided PCN adjustment with IR 1/26/23.  -sCr worsened from 2.9 -> 3.1    Recs:  - will perform urine microscopy today  - give 1L isotonic bicarb 150 mEQ @ 100cc/hr  - continue treatment of Proteus bacteremia, titrate antibiotics to least nephrotoxic agents as possible   - condom cath ordered  - ongoing discussions with primary team and family regarding patient's goals of care. If patient's family decides to pursue further evaluation, please contact Nephrology with any further questions or concerns.         Thank you for your consult. I will sign off. Please contact us if you have any additional questions.    Valentino Taylor MD  Nephrology  [facility] - Med Surg

## 2023-01-27 NOTE — PHARMACY MED REC
"Admission Medication History     The home medication history was taken by Delaney Madrid.    You may go to "Admission" then "Reconcile Home Medications" tabs to review and/or act upon these items.     The home medication list has been updated by the Pharmacy department.   Please read ALL comments highlighted in yellow.   Please address this information as you see fit.    Feel free to contact us if you have any questions or require assistance.          Medications listed below were obtained from: Patient/family and Nursing home  PTA Medications   Medication Sig    apixaban (ELIQUIS) 2.5 mg Tab Take 1 tablet (2.5 mg total) by mouth 2 (two) times daily.    donepeziL (ARICEPT) 10 MG tablet Take 1 tablet (10 mg total) by mouth every evening.    ergocalciferol (VITAMIN D2) 50,000 unit Cap Take 50,000 Units by mouth every Mon, Wed, Fri.    loratadine (CLARITIN) 10 mg tablet Take 10 mg by mouth every morning.    lovastatin (MEVACOR) 20 MG tablet Take 20 mg by mouth every evening.    melatonin (MELATIN) 3 mg tablet Take 3 mg by mouth every evening.    memantine (NAMENDA) 10 MG Tab Take 2 tablets by mouth once daily.    metoprolol tartrate (LOPRESSOR) 25 MG tablet Take 0.5 tablets (12.5 mg total) by mouth 2 (two) times daily.    polyethylene glycol (GLYCOLAX) 17 gram/dose powder Take 17 g by mouth every other day.    tamsulosin (FLOMAX) 0.4 mg Cap Take 1 capsule (0.4 mg total) by mouth once daily.    artificial tears (ISOPTO TEARS) 0.5 % ophthalmic solution Place 1 drop into both eyes 3 (three) times daily as needed (dry eyes).    mupirocin (BACTROBAN) 2 % ointment apply TO WOUND AREA DAILY AS DIRECTED    nitrofurantoin, macrocrystal-monohydrate, (MACROBID) 100 MG capsule Take 100 mg by mouth 2 (two) times daily.    pulse oximeter (PULSE OXIMETER) device by Apply Externally route 2 (two) times a day. Use twice daily at 8 AM and 3 PM and record the value in [employer] as directed.           Delaney Madrid  EXT " [id number]                  .

## 2023-01-27 NOTE — SUBJECTIVE & OBJECTIVE
Interval History: NAEO. AFVSS. Neph tube exchanged for nephroureteral stent yesterday. Cr worsening. Seen by nephrology. Both PCNT irrigate well. Urine and blood culture 1/25 growing proteus.    Left NT: 420 light pink  Right NUT: 220 light red, increased urine output after flushed    Review of Systems  Objective:     Temp:  [97.2 °F (36.2 °C)-98.1 °F (36.7 °C)] 98 °F (36.7 °C)  Pulse:  [] 72  Resp:  [12-18] 18  SpO2:  [98 %-100 %] 99 %  BP: ()/(57-83) 128/74     Body mass index is 21.81 kg/m².      Bladder Scan Volume (mL): 228 mL (01/26/23 0611)    Drains       Drain  Duration                  Nephrostomy 12/23/22 1031 Right 8 Fr. 34 days         Nephrostomy 12/23/22 1047 Left 8 Fr. 34 days         Nephrostomy 01/26/23 1535 Right 8 Fr. <1 day                    Physical Exam  Vitals reviewed.   Constitutional:       General: He is not in acute distress.     Appearance: He is well-developed. He is ill-appearing. He is not diaphoretic.   HENT:      Head: Normocephalic and atraumatic.   Eyes:      General: No scleral icterus.  Pulmonary:      Effort: No respiratory distress.      Breath sounds: No stridor.   Abdominal:      General: There is no distension.      Palpations: Abdomen is soft.      Tenderness: There is no abdominal tenderness. There is no right CVA tenderness or left CVA tenderness.      Comments: Bilateral nephrostomy tubes draining without issue, R NT irrigates and aspirates without issue with return of light red urine, L NT irrigates and aspirates without issue with return of pink tinged urine   Genitourinary:     Comments: Circumcised male with orthotopic meatus, testicles normal in size and contour bilaterally, trace scrotal edema present without any induration, erythema, fluctuance  Musculoskeletal:      Cervical back: Normal range of motion.   Skin:     Coloration: Skin is pale.       Significant Labs:    BMP:  Recent Labs   Lab 01/25/23  1022 01/26/23  0332 01/27/23  0229    143  144   K 4.0 3.9 3.6    114* 113*   CO2 19* 19* 18*   BUN 45* 50* 55*   CREATININE 2.8* 2.9* 3.1*   CALCIUM 8.7 8.4* 8.2*       CBC:   Recent Labs   Lab 01/25/23  1022 01/26/23  0332 01/27/23  0229   WBC 8.16 6.69 5.01   HGB 9.5* 8.1* 8.6*   HCT 30.6* 27.3* 29.2*    139* 159       Blood Culture:   Recent Labs   Lab 01/25/23  1023 01/26/23  1224   LABBLOO Gram stain tiffany bottle: Gram negative rods  Results called to and read back by:Rupinder Salgado RN 01/25/2023  23:37  Gram stain aer bottle: Gram negative rods  Positive results previously called 01/26/2023  06:18  PROTEUS MIRABILIS  For susceptibility see order #[id number]  *  Gram stain tiffany bottle: Gram negative rods  Results called to and read back by:Rupinder Salgado RN 01/25/2023  23:37  Gram stain aer bottle: Gram negative rods  Positive results previously called 01/26/2023  06:09  PROTEUS MIRABILIS  Susceptibility pending  * No Growth to date  No Growth to date     Urine Culture:   Recent Labs   Lab 01/20/23  1626 01/25/23  1049   LABURIN Multiple organisms isolated. None in predominance.  Repeat if  clinically necessary. PRESUMPTIVE PROTEUS SPECIES  50,000 - 99,999 cfu/ml  Identification and susceptibility pending  *     Urine Studies:   Recent Labs   Lab 01/20/23  1626 01/25/23  1049   COLORU Yellow Charley   APPEARANCEUA Clear Cloudy*   PHUR 6.0 7.0   SPECGRAV 1.010 1.010   PROTEINUA Trace* 2+*   GLUCUA Negative Negative   KETONESU Negative Negative   BILIRUBINUA Negative Negative   OCCULTUA 2+* 1+*   NITRITE Negative Negative   LEUKOCYTESUR 3+* 3+*   RBCUA 13* 73*   WBCUA 22* >100*   BACTERIA  --  Many*   SQUAMEPITHEL 0  --    HYALINECASTS  --  0       Significant Imaging:  All pertinent imaging results/findings from the past 24 hours have been reviewed.

## 2023-01-27 NOTE — ASSESSMENT & PLAN NOTE
83 y.o. M with AF (on Eliquis), HFpEF, dementia, functional debility s/p bilateral nephrostomy tubes for bilateral staghorn calculi and hydronephrosis (12/22) admitted for AMS, sepsis 2/2 proteus bacteremia and proteus + urine culture with elevated sCr 3.1 (sCr was 1.5 on discharge 12/28/22)    CT abdomen with improved R hydronephrosis, essentially resolved L hydronephrosis, still with calculi (4.4 cm in R renal pelvis, 3.5 cm in L renal pelvis). Transient significant drop in MAP yesterday from 100s to 70s x two episodes.    CLEM likely multifactorial: pre-renal injury from urosepsis/bacteremia, partial R kidney obstruction/hydronephrosis, and possibly further nephrotoxic injury (pt on Vanc and Zosyn), possible component of heart failure with elevated BNP and HFpEF but fluid retention could also be from hydronephrosis.      IR and urology following. S/p nephrostogram with right sided PCN adjustment with IR 1/26/23.  -sCr worsened from 2.9 -> 3.1    Recs:  - will perform urine microscopy today  - give 1L isotonic bicarb 150 mEQ @ 100cc/hr  - continue treatment of Proteus bacteremia, titrate antibiotics to least nephrotoxic agents as possible   - condom cath ordered  - ongoing discussions with primary team and family regarding patient's goals of care. If patient's family decides to change from comfort care status, please contact Nephrology with any further questions of concerns.

## 2023-01-27 NOTE — PROGRESS NOTES
Pharmacokinetic Assessment Follow Up: IV Vancomycin    Therapy with vancomycin complete and/or consult discontinued by provider. Pharmacy will sign off, please re-consult as needed.    Kg Bear, PharmD, BCPS

## 2023-01-27 NOTE — ASSESSMENT & PLAN NOTE
Timothy Roldan is an 82 yo M with a history of HTN, AF (on eliquis), AD, CHF (EF 70%) presenting for AMS. Urology was consulted for nephrostomy tube management.     - Cr worsening despite neph tube exchange for PCNU  - Antibiotics per primary, urine and blood cultures growing proteus  - nephrostomy tubes well placed on imaging, irrigate and aspirate without issue  - there is trace residual R sided hydronephrosis in the lower pole.  - low suspicion for post renal source of CLEM, recommend mIVF as tolerable and avoidance of nephrotoxins  - recommend empiric antibiotics for pyelonephritis, tailor as cultures permit  - remainder of care per primary    - Continue to hold anticoagulation for possible second right nephrostomy tube placement  - Cr currently uptrending  - Follow up nephrology recommendations

## 2023-01-27 NOTE — PT/OT/SLP EVAL
Speech Language Pathology Evaluation  Bedside Swallow  Discharge    Patient Name:  Timothy Roldan   MRN:  [id number]  Admitting Diagnosis: Sepsis    Recommendations:                 General Recommendations:  Follow-up not indicated  Diet recommendations:  Regular, Thin   Aspiration Precautions: Standard aspiration precautions   General Precautions: Standard,    Communication strategies:  none    History:     Past Medical History:   Diagnosis Date    Bilateral Staghorn Calculus 1/6/2023    Hyperlipidemia     Hypertension     Persistent atrial fibrillation 12/31/2021    Pneumonia due to COVID-19 virus 5/21/2022       Past Surgical History:   Procedure Laterality Date    TONSILLECTOMY      TOTAL KNEE ARTHROPLASTY         Social History: Patient lives in a nursing home.      Chest X-Rays: 1/25 Heart size normal.  Opacification at the lung bases bilaterally more marked on the left side consistent with pleural effusion and associated atelectatic changes.  The lung apices are clear.    Prior diet: regular/thin.      Subjective     Awake/alert    Pain/Comfort:  Pain Rating 1: 0/10  Pain Rating Post-Intervention 1: 0/10    Respiratory Status: Room air    Objective:     Oral Musculature Evaluation  Oral Musculature: WFL  Dentition: edentulous  Oral Labial Strength and Mobility: WFL  Lingual Strength and Mobility: WFL  Voice Prior to PO Intake: clear    Bedside Swallow Eval:   Consistencies Assessed:  Thin liquids x6 oz straw  Solids x1/4 cracker, pills whole one at a time x4      Oral Phase:   WFL    Pharyngeal Phase:   no overt clinical signs/symptoms of aspiration    Assessment:     Timothy Roldan is a 83 y.o. male with oropharyngeal swallow deemed WFL. No further ST warranted.     Goals:   Multidisciplinary Problems       SLP Goals       Not on file                    Plan:       Plan of Care reviewed with:  patient   SLP Follow-Up:  No       Discharge recommendations:    no St f/u     Time Tracking:     SLP Treatment Date:    01/27/23  Speech Start Time:  0933  Speech Stop Time:  0940     Speech Total Time (min):  7 min    Billable Minutes: Eval Swallow and Oral Function 7    01/27/2023

## 2023-01-27 NOTE — PROGRESS NOTES
01/27/23 0800   WOCN Assessment   WOCN Total Time (mins) 20   Visit Date 01/27/23   Visit Time 0800   Consult Type New   WOCN Speciality Wound   Intervention assessed;changed;applied;chart review;coordination of care;orders        Altered Skin Integrity 12/22/22 0225 midline Sacral spine #1   Date First Assessed/Time First Assessed: 12/22/22 0225   Altered Skin Integrity Present on Admission: yes  Orientation: midline  Location: Sacral spine  Wound Number: #1  Is this injury device related?: No   Wound Image    Description of Altered Skin Integrity Full thickness tissue loss. Subcutaneous fat may be visible but bone, tendon or muscle are not exposed   Dressing Appearance Moist drainage   Drainage Amount Moderate   Drainage Characteristics/Odor Serosanguineous   Appearance Maroon   Tissue loss description Full thickness   Red (%), Wound Tissue Color 95 %   Yellow (%), Wound Tissue Color 5 %   Periwound Area Redness   Wound Edges Defined   Wound Length (cm) 16 cm   Wound Width (cm) 16 cm   Wound Depth (cm) 0.1 cm   Wound Volume (cm^3) 25.6 cm^3   Wound Surface Area (cm^2) 256 cm^2     [facility] Surg  Wound Care    Patient Name:  Timothy Roldan   MRN:  [id number]  Date: 1/27/2023  Diagnosis: Sepsis    History:     Past Medical History:   Diagnosis Date    Bilateral Staghorn Calculus 1/6/2023    Hyperlipidemia     Hypertension     Persistent atrial fibrillation 12/31/2021    Pneumonia due to COVID-19 virus 5/21/2022       Social History     Socioeconomic History    Marital status: Single   Tobacco Use    Smoking status: Never    Smokeless tobacco: Never     Social Determinants of Health     Financial Resource Strain: Unknown    Difficulty of Paying Living Expenses: Patient refused   Food Insecurity: Unknown    Worried About Running Out of Food in the Last Year: Patient refused    Ran Out of Food in the Last Year: Patient refused   Transportation Needs: No Transportation Needs    Lack of Transportation (Medical):  No    Lack of Transportation (Non-Medical): No   Physical Activity: Insufficiently Active    Days of Exercise per Week: 1 day    Minutes of Exercise per Session: 10 min   Stress: No Stress Concern Present    Feeling of Stress : Only a little   Social Connections: Unknown    Frequency of Communication with Friends and Family: More than three times a week    Frequency of Social Gatherings with Friends and Family: More than three times a week    Active Member of Clubs or Organizations: No    Attends Club or Organization Meetings: Never    Marital Status:    Housing Stability: Low Risk     Unable to Pay for Housing in the Last Year: No    Number of Places Lived in the Last Year: 1    Unstable Housing in the Last Year: No       Precautions:     Allergies as of 01/25/2023    (No Known Allergies)       [facility] Assessment Details/Treatment   Patient's consult for wound care to sacral area. The sacral area and buttocks  have a large maroon color with small skin opening of slough. A immerse bed is ordered the patient need assistance with turning from side to side, and with a purple wedge for support. Heel protector boots are also ordered. The treatment to cleanse sacral area with soap and water pat dry apply xeroform, cover with dry gauze apply a sacral foam sacral border for comfort and protection twice a day and prn for saturation.      Recommendations made to primary team for  the above  Orders placed.     01/27/2023

## 2023-01-27 NOTE — ACP (ADVANCE CARE PLANNING)
Advance Care Planning     Date: 01/27/2023    [facility]  I engaged the family in a conversation about advance care planning and we specifically addressed what the goals of care would be moving forward, in light of the patient's change in clinical status, specifically sepsis, acute on chronic renal failure, progressive dementia, failure to thrive.  We did specifically address the patient's likely prognosis, which is poor.  We explored the patient's values and preferences for future care.  The patient and family endorses that what is most important right now is to focus on improvement in condition but with limits to invasive therapies and comfort and QOL     Accordingly, we have decided that the best plan to meet the patient's goals includes enrolling in hospice care    I did explain the role for hospice care at this stage of the patient's illness, including its ability to help the patient live with the best quality of life possible.  We will be making a hospice referral.    I spent a total of 30 minutes engaging the patient in this advance care planning discussion.        Most of my discussion with the patient's niece and HCP, Lakesha, revolved around specific changes in goals of care now. Patient and family do not want him to receive any more procedures. They would like to continue IV antibiotics now but to leave with oral antibiotics if possible. They would like us to continue treating acute problems including renal failure. They are ok with continuing lab draws. Planning to discontinue all life prolonging therapies after the treatment of current infection.

## 2023-01-27 NOTE — PROGRESS NOTES
[facility]  Urology  Progress Note    Patient Name: Timothy Roldan  MRN: [id number]  Admission Date: 1/25/2023  Hospital Length of Stay: 2 days  Code Status: DNR   Attending Provider: Colin Mock MD   Primary Care Physician: Martine Garces MD    Subjective:     HPI:  Timothy Roldan is an 84 yo M with a history of HTN, AF (on eliquis), AD, CHF (EF 70%) presenting for AMS. Urology was consulted for nephrostomy tube management.     Per chart review, patient has been having some blood in his urine starting Friday as well as fevers starting Sunday. He has been on macrobid for a reported UTI though there are no present records of this UTI. He has been eating and drinking less over the last few days and today was confused / worsening mental status, was brought to ED.     Patient initially presented to the ED on 12/23 for fever and altered mental status. 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 CT at that time with 5.7 cm right renal pelvis stone and additional 1.8 cm proximal ureteral stone with severe right hydronephrosis. Large left renal pelvis stone 5cm with moderate left hydronephrosis. Bilateral renal cysts. Bilateral nephrostomy tubes placed at that time. Patient was seen in clinic on 1/6/23 and after discussions regarding methods of stone management and the associated risks, benefits, decision was made to exchange nephrostomy tubes every three months.     On assessment, patient is febrile (Tmax 102.4), low grade tachycardia, BP stable. WBC wnl, Cr 2.8 (aletha after NT placement 1.5). UA LE positive, nitrite negative. Microscopy with >100 WBC, many bacteria. CT scan shows interval resolution of left sided hydronephrosis, some residual lower pole hydronephrosis on the R with well positioned nephrostomy tubes bilaterally, stone burden stable as described above.      Interval History: NAEO. AFVSS. Neph tube exchanged for nephroureteral stent yesterday. Cr worsening. Seen by nephrology. Both  PCNT irrigate well. Urine and blood culture 1/25 growing proteus.    Left NT: 420 light pink  Right NUT: 220 light red, increased urine output after flushed    Review of Systems  Objective:     Temp:  [97.2 °F (36.2 °C)-98.1 °F (36.7 °C)] 98 °F (36.7 °C)  Pulse:  [] 72  Resp:  [12-18] 18  SpO2:  [98 %-100 %] 99 %  BP: ()/(57-83) 128/74     Body mass index is 21.81 kg/m².      Bladder Scan Volume (mL): 228 mL (01/26/23 0611)    Drains       Drain  Duration                  Nephrostomy 12/23/22 1031 Right 8 Fr. 34 days         Nephrostomy 12/23/22 1047 Left 8 Fr. 34 days         Nephrostomy 01/26/23 1535 Right 8 Fr. <1 day                    Physical Exam  Vitals reviewed.   Constitutional:       General: He is not in acute distress.     Appearance: He is well-developed. He is ill-appearing. He is not diaphoretic.   HENT:      Head: Normocephalic and atraumatic.   Eyes:      General: No scleral icterus.  Pulmonary:      Effort: No respiratory distress.      Breath sounds: No stridor.   Abdominal:      General: There is no distension.      Palpations: Abdomen is soft.      Tenderness: There is no abdominal tenderness. There is no right CVA tenderness or left CVA tenderness.      Comments: Bilateral nephrostomy tubes draining without issue, R NT irrigates and aspirates without issue with return of light red urine, L NT irrigates and aspirates without issue with return of pink tinged urine   Genitourinary:     Comments: Circumcised male with orthotopic meatus, testicles normal in size and contour bilaterally, trace scrotal edema present without any induration, erythema, fluctuance  Musculoskeletal:      Cervical back: Normal range of motion.   Skin:     Coloration: Skin is pale.       Significant Labs:    BMP:  Recent Labs   Lab 01/25/23  1022 01/26/23  0332 01/27/23  0229    143 144   K 4.0 3.9 3.6    114* 113*   CO2 19* 19* 18*   BUN 45* 50* 55*   CREATININE 2.8* 2.9* 3.1*   CALCIUM 8.7 8.4*  8.2*       CBC:   Recent Labs   Lab 01/25/23  1022 01/26/23  0332 01/27/23  0229   WBC 8.16 6.69 5.01   HGB 9.5* 8.1* 8.6*   HCT 30.6* 27.3* 29.2*    139* 159       Blood Culture:   Recent Labs   Lab 01/25/23  1023 01/26/23  1224   LABBLOO Gram stain tiffany bottle: Gram negative rods  Results called to and read back by:Rupinder Salgado RN 01/25/2023  23:37  Gram stain aer bottle: Gram negative rods  Positive results previously called 01/26/2023  06:18  PROTEUS MIRABILIS  For susceptibility see order #[id number]  *  Gram stain tiffany bottle: Gram negative rods  Results called to and read back by:Rupinder Salgado RN 01/25/2023  23:37  Gram stain aer bottle: Gram negative rods  Positive results previously called 01/26/2023  06:09  PROTEUS MIRABILIS  Susceptibility pending  * No Growth to date  No Growth to date     Urine Culture:   Recent Labs   Lab 01/20/23  1626 01/25/23  1049   LABURIN Multiple organisms isolated. None in predominance.  Repeat if  clinically necessary. PRESUMPTIVE PROTEUS SPECIES  50,000 - 99,999 cfu/ml  Identification and susceptibility pending  *     Urine Studies:   Recent Labs   Lab 01/20/23  1626 01/25/23  1049   COLORU Yellow Charley   APPEARANCEUA Clear Cloudy*   PHUR 6.0 7.0   SPECGRAV 1.010 1.010   PROTEINUA Trace* 2+*   GLUCUA Negative Negative   KETONESU Negative Negative   BILIRUBINUA Negative Negative   OCCULTUA 2+* 1+*   NITRITE Negative Negative   LEUKOCYTESUR 3+* 3+*   RBCUA 13* 73*   WBCUA 22* >100*   BACTERIA  --  Many*   SQUAMEPITHEL 0  --    HYALINECASTS  --  0       Significant Imaging:  All pertinent imaging results/findings from the past 24 hours have been reviewed.        Assessment/Plan:     Bilateral Staghorn Calculus  Timothy Roldan is an 84 yo M with a history of HTN, AF (on eliquis), AD, CHF (EF 70%) presenting for AMS. Urology was consulted for nephrostomy tube management.     - Cr worsening despite neph tube exchange for PCNU  - Antibiotics per primary, urine and  blood cultures growing proteus  - nephrostomy tubes well placed on imaging, irrigate and aspirate without issue  - there is trace residual R sided hydronephrosis in the lower pole.  - low suspicion for post renal source of CLEM, recommend mIVF as tolerable and avoidance of nephrotoxins  - recommend empiric antibiotics for pyelonephritis, tailor as cultures permit  - remainder of care per primary    - Continue to hold anticoagulation for possible second right nephrostomy tube placement  - Cr currently uptrending  - Follow up nephrology recommendations        VTE Risk Mitigation (From admission, onward)         Ordered     IP VTE HIGH RISK PATIENT  Once         01/25/23 1256     Place sequential compression device  Until discontinued         01/25/23 1256     IP VTE HIGH RISK PATIENT  Once         01/25/23 1256     Place sequential compression device  Until discontinued         01/25/23 1256                Mckay Cordova MD  Urology  [facility] Surg

## 2023-01-27 NOTE — PLAN OF CARE
Pt is AAOx2. Pt remain free from fall and injuries. VS remain stable. Questions and concerns voiced and answered. Medication compliance. Call light in reach. Bilat nephrostomy tubes are intact and patent. Sample of urine kept at the beside table as per day shift request from yesterday. Bed in low locked position. Side rails x2. Belongings at bedside.

## 2023-01-27 NOTE — PLAN OF CARE
01/27/23 1532   Post-Acute Status   Post-Acute Authorization Hospice   Hospice Status Referrals Sent   Coverage [employer] Man[employer]   Patient choice form signed by patient/caregiver List from System Post-Acute Care   Discharge Delays None known at this time   Discharge Plan   Discharge Plan A Assisted Living  (w/[facility])   Discharge Plan B Assisted Living     [facility]. Referral discussed w/Liadiana Johnson RN   Case Management  [phone]

## 2023-01-27 NOTE — PLAN OF CARE
[facility]  Initial Discharge Assessment       Primary Care Provider: Martine Garces MD    Admission Diagnosis: Weakness [R53.1]  Urinary tract infection with hematuria, site unspecified [N39.0, R31.9]  Altered mental status, unspecified altered mental status type [R41.82]  Sepsis, due to unspecified organism, unspecified whether acute organ dysfunction present [A41.9]    Admission Date: 1/25/2023  Expected Discharge Date: 1/30/2023    Discharge Barriers Identified: None    Payor: [employer] MANAGED MEDICARE / Plan: [employer] MEDICARE HMO / Product Type: Capitation /     Extended Emergency Contact Information  Primary Emergency Contact: beryl gerardo   [place]  Mobile Phone: [phone]  Relation: Healthcare Power of   Preferred language: English   needed? No  Secondary Emergency Contact: Kalyan Roldan   [place]  Mobile Phone: [phone]  Relation: Son    Discharge Plan A: Assisted Living (w/hospice, [facility])  Discharge Plan B: Assisted Living      [facility] - [address]  [address]  [place] [address]  Phone: [phone] Fax: [phone]      Initial Assessment (most recent)       Adult Discharge Assessment - 01/27/23 1430          Discharge Assessment    Assessment Type Discharge Planning Assessment     Confirmed/corrected address, phone number and insurance Yes     Confirmed Demographics Correct on Facesheet     Source of Information health care advocate     If unable to respond/provide information was family/caregiver contacted? Yes     Contact Name/Number Beryl Gerardo/MARTÍNEZ  [phone]     Communicated JACQUELINE with patient/caregiver Yes     Reason For Admission Sepsis     People in Home facility resident     Facility Arrived From: [facility]     Do you expect to return to your current living situation? Yes     Do you have help at home or someone to help you  manage your care at home? No     Prior to hospitilization cognitive status: Unable to Assess     Current cognitive status: Unable to Assess     Walking or Climbing Stairs ambulation difficulty, dependent     Dressing/Bathing bathing difficulty, dependent     Do you have any problems with: Needs other help     Home Accessibility wheelchair accessible     Home Layout Able to live on 1st floor     Equipment Currently Used at Home none     Readmission within 30 days? Yes     Patient currently being followed by outpatient case management? No     Do you currently have service(s) that help you manage your care at home? No     Do you take prescription medications? Yes     Do you have prescription coverage? Yes     Coverage [employer] Man[employer] Medicare     Do you have any problems affording any of your prescribed medications? No     Is the patient taking medications as prescribed? yes     Who is going to help you get home at discharge? Will require stretcher transport     Are you on dialysis? No     Do you take coumadin? No     Discharge Plan A Assisted Living   w/hospice, [facility]    Discharge Plan B Assisted Living     DME Needed Upon Discharge  --   TBT    Discharge Plan discussed with: POA     Name(s) and Number(s) Lakesha Gonzáles/MARTÍNEZ  [phone]     Discharge Barriers Identified None                   DCP return to [facility] w/[facility].     Courtney Johnson RN   Case Management  [phone]

## 2023-01-27 NOTE — SUBJECTIVE & OBJECTIVE
Past Medical History:   Diagnosis Date    Bilateral Staghorn Calculus 1/6/2023    Hyperlipidemia     Hypertension     Persistent atrial fibrillation 12/31/2021    Pneumonia due to COVID-19 virus 5/21/2022       Past Surgical History:   Procedure Laterality Date    TONSILLECTOMY      TOTAL KNEE ARTHROPLASTY         Review of patient's allergies indicates:  No Known Allergies  Current Facility-Administered Medications   Medication Frequency    acetaminophen tablet 1,000 mg Q6H    acetaminophen tablet 650 mg Q4H PRN    cefTRIAXone (ROCEPHIN) 2 g in dextrose 5 % in water (D5W) 5 % 50 mL IVPB (MB+) Q24H    dextrose 10% bolus 125 mL 125 mL PRN    dextrose 10% bolus 250 mL 250 mL PRN    donepeziL tablet 10 mg QHS    glucagon (human recombinant) injection 1 mg PRN    glucose chewable tablet 16 g PRN    glucose chewable tablet 24 g PRN    lactated ringers infusion Continuous    melatonin tablet 6 mg Nightly PRN    memantine tablet 20 mg Daily    metoprolol tartrate (LOPRESSOR) split tablet 12.5 mg BID    pravastatin tablet 20 mg Daily    sodium bicarbonate 150 mEq in dextrose 5 % (D5W) 1,000 mL infusion Continuous    sodium chloride 0.9% flush 10 mL PRN    sodium chloride 0.9% flush 10 mL PRN     Family History    None       Tobacco Use    Smoking status: Never    Smokeless tobacco: Never   Substance and Sexual Activity    Alcohol use: Not on file    Drug use: Not on file    Sexual activity: Not on file     Review of Systems   Reason unable to perform ROS: patient disoriented, unable to answer questions.   Objective:     Vital Signs (Most Recent):  Temp: 98.2 °F (36.8 °C) (01/27/23 1128)  Pulse: 96 (01/27/23 1128)  Resp: 18 (01/27/23 1128)  BP: 138/79 (01/27/23 1128)  SpO2: 99 % (01/27/23 1128) Vital Signs (24h Range):  Temp:  [94.6 °F (34.8 °C)-98.2 °F (36.8 °C)] 98.2 °F (36.8 °C)  Pulse:  [] 96  Resp:  [12-18] 18  SpO2:  [98 %-100 %] 99 %  BP: ()/(57-82) 138/79     Weight: 68.9 kg (152 lb) (01/25/23 2332)  Body  mass index is 21.81 kg/m².  Body surface area is 1.84 meters squared.    I/O last 3 completed shifts:  In: 580 [P.O.:230; IV Piggyback:350]  Out: 1310 [Urine:1310]    Physical Exam  Vitals and nursing note reviewed.   Constitutional:       General: He is not in acute distress.     Appearance: He is ill-appearing.   HENT:      Head: Normocephalic and atraumatic.      Mouth/Throat:      Mouth: Mucous membranes are dry.   Eyes:      Extraocular Movements: Extraocular movements intact.      Conjunctiva/sclera: Conjunctivae normal.   Cardiovascular:      Rate and Rhythm: Regular rhythm. Tachycardia present.      Heart sounds: Murmur heard.   Pulmonary:      Effort: Pulmonary effort is normal. No respiratory distress.      Breath sounds: No wheezing.   Abdominal:      General: Bowel sounds are normal.      Tenderness: There is abdominal tenderness. There is no guarding or rebound.   Genitourinary:     Comments: Bilateral nephrostomy tubes draining red tinged urine  Musculoskeletal:      Cervical back: Normal range of motion and neck supple.   Skin:     General: Skin is dry.      Coloration: Skin is pale.      Findings: Bruising present.   Neurological:      Mental Status: He is disoriented.   Psychiatric:      Comments: Disoriented, unable to answer questions appropriately       Significant Labs:  CBC:   Recent Labs   Lab 01/27/23 0229   WBC 5.01   RBC 2.95*   HGB 8.6*   HCT 29.2*      MCV 99*   MCH 29.2   MCHC 29.5*     CMP:   Recent Labs   Lab 01/27/23 0229      CALCIUM 8.2*   ALBUMIN 1.6*   PROT 5.7*      K 3.6   CO2 18*   *   BUN 55*   CREATININE 3.1*   ALKPHOS 60   ALT 6*   AST 11   BILITOT 0.3     Recent Labs   Lab 01/25/23  1049   COLORU Charley   SPECGRAV 1.010   PHUR 7.0   PROTEINUA 2+*   BACTERIA Many*   NITRITE Negative   LEUKOCYTESUR 3+*   HYALINECASTS 0     All labs within the past 24 hours have been reviewed.    Significant Imaging:  Labs: Reviewed

## 2023-01-28 PROBLEM — R41.82 ALTERED MENTAL STATUS: Status: ACTIVE | Noted: 2023-01-28

## 2023-01-28 PROBLEM — N39.0 URINARY TRACT INFECTION WITH HEMATURIA: Status: ACTIVE | Noted: 2023-01-28

## 2023-01-28 PROBLEM — R31.9 URINARY TRACT INFECTION WITH HEMATURIA: Status: ACTIVE | Noted: 2023-01-28

## 2023-01-28 LAB
ALBUMIN SERPL BCP-MCNC: 1.6 G/DL (ref 3.5–5.2)
ALP SERPL-CCNC: 52 U/L (ref 55–135)
ALT SERPL W/O P-5'-P-CCNC: 9 U/L (ref 10–44)
ANION GAP SERPL CALC-SCNC: 9 MMOL/L (ref 8–16)
AST SERPL-CCNC: 12 U/L (ref 10–40)
BASOPHILS # BLD AUTO: 0.01 K/UL (ref 0–0.2)
BASOPHILS NFR BLD: 0.2 % (ref 0–1.9)
BILIRUB SERPL-MCNC: 0.2 MG/DL (ref 0.1–1)
BUN SERPL-MCNC: 43 MG/DL (ref 8–23)
CALCIUM SERPL-MCNC: 8.3 MG/DL (ref 8.7–10.5)
CHLORIDE SERPL-SCNC: 110 MMOL/L (ref 95–110)
CO2 SERPL-SCNC: 24 MMOL/L (ref 23–29)
CREAT SERPL-MCNC: 2.1 MG/DL (ref 0.5–1.4)
DIFFERENTIAL METHOD: ABNORMAL
EOSINOPHIL # BLD AUTO: 0.1 K/UL (ref 0–0.5)
EOSINOPHIL NFR BLD: 1.3 % (ref 0–8)
ERYTHROCYTE [DISTWIDTH] IN BLOOD BY AUTOMATED COUNT: 15.7 % (ref 11.5–14.5)
EST. GFR  (NO RACE VARIABLE): 31 ML/MIN/1.73 M^2
GLUCOSE SERPL-MCNC: 110 MG/DL (ref 70–110)
HCT VFR BLD AUTO: 28.1 % (ref 40–54)
HGB BLD-MCNC: 8.5 G/DL (ref 14–18)
IMM GRANULOCYTES # BLD AUTO: 0.02 K/UL (ref 0–0.04)
IMM GRANULOCYTES NFR BLD AUTO: 0.4 % (ref 0–0.5)
LYMPHOCYTES # BLD AUTO: 0.6 K/UL (ref 1–4.8)
LYMPHOCYTES NFR BLD: 10.5 % (ref 18–48)
MAGNESIUM SERPL-MCNC: 2 MG/DL (ref 1.6–2.6)
MCH RBC QN AUTO: 28.1 PG (ref 27–31)
MCHC RBC AUTO-ENTMCNC: 30.2 G/DL (ref 32–36)
MCV RBC AUTO: 93 FL (ref 82–98)
MONOCYTES # BLD AUTO: 0.3 K/UL (ref 0.3–1)
MONOCYTES NFR BLD: 5.3 % (ref 4–15)
NEUTROPHILS # BLD AUTO: 4.6 K/UL (ref 1.8–7.7)
NEUTROPHILS NFR BLD: 82.3 % (ref 38–73)
NRBC BLD-RTO: 0 /100 WBC
PHOSPHATE SERPL-MCNC: 2.7 MG/DL (ref 2.7–4.5)
PLATELET # BLD AUTO: 162 K/UL (ref 150–450)
PMV BLD AUTO: 10.6 FL (ref 9.2–12.9)
POTASSIUM SERPL-SCNC: 2.9 MMOL/L (ref 3.5–5.1)
PROT SERPL-MCNC: 5.6 G/DL (ref 6–8.4)
RBC # BLD AUTO: 3.02 M/UL (ref 4.6–6.2)
SODIUM SERPL-SCNC: 143 MMOL/L (ref 136–145)
WBC # BLD AUTO: 5.52 K/UL (ref 3.9–12.7)

## 2023-01-28 PROCEDURE — 25000003 PHARM REV CODE 250: Performed by: STUDENT IN AN ORGANIZED HEALTH CARE EDUCATION/TRAINING PROGRAM

## 2023-01-28 PROCEDURE — 87040 BLOOD CULTURE FOR BACTERIA: CPT | Mod: 59

## 2023-01-28 PROCEDURE — 11000001 HC ACUTE MED/SURG PRIVATE ROOM

## 2023-01-28 PROCEDURE — 80053 COMPREHEN METABOLIC PANEL: CPT | Performed by: STUDENT IN AN ORGANIZED HEALTH CARE EDUCATION/TRAINING PROGRAM

## 2023-01-28 PROCEDURE — 85025 COMPLETE CBC W/AUTO DIFF WBC: CPT | Performed by: STUDENT IN AN ORGANIZED HEALTH CARE EDUCATION/TRAINING PROGRAM

## 2023-01-28 PROCEDURE — 84100 ASSAY OF PHOSPHORUS: CPT | Performed by: STUDENT IN AN ORGANIZED HEALTH CARE EDUCATION/TRAINING PROGRAM

## 2023-01-28 PROCEDURE — 99233 PR SUBSEQUENT HOSPITAL CARE,LEVL III: ICD-10-PCS | Mod: GC,,, | Performed by: STUDENT IN AN ORGANIZED HEALTH CARE EDUCATION/TRAINING PROGRAM

## 2023-01-28 PROCEDURE — 63600175 PHARM REV CODE 636 W HCPCS

## 2023-01-28 PROCEDURE — 63600175 PHARM REV CODE 636 W HCPCS: Performed by: STUDENT IN AN ORGANIZED HEALTH CARE EDUCATION/TRAINING PROGRAM

## 2023-01-28 PROCEDURE — 83735 ASSAY OF MAGNESIUM: CPT | Performed by: STUDENT IN AN ORGANIZED HEALTH CARE EDUCATION/TRAINING PROGRAM

## 2023-01-28 PROCEDURE — 99233 SBSQ HOSP IP/OBS HIGH 50: CPT | Mod: GC,,, | Performed by: STUDENT IN AN ORGANIZED HEALTH CARE EDUCATION/TRAINING PROGRAM

## 2023-01-28 RX ORDER — LIDOCAINE 50 MG/G
1 PATCH TOPICAL
Status: DISCONTINUED | OUTPATIENT
Start: 2023-01-28 | End: 2023-01-29 | Stop reason: HOSPADM

## 2023-01-28 RX ORDER — LEVOFLOXACIN 750 MG/1
750 TABLET ORAL DAILY
Qty: 10 TABLET | Refills: 0 | Status: SHIPPED | OUTPATIENT
Start: 2023-01-28 | End: 2023-01-28 | Stop reason: SDUPTHER

## 2023-01-28 RX ORDER — LEVOFLOXACIN 750 MG/1
750 TABLET ORAL DAILY
Qty: 10 TABLET | Refills: 0 | Status: SHIPPED | OUTPATIENT
Start: 2023-01-29 | End: 2023-01-28 | Stop reason: HOSPADM

## 2023-01-28 RX ORDER — CEFPODOXIME PROXETIL 100 MG/1
200 TABLET, FILM COATED ORAL EVERY 12 HOURS
Qty: 40 TABLET | Refills: 0 | Status: SHIPPED | OUTPATIENT
Start: 2023-01-29 | End: 2023-01-29 | Stop reason: SDUPTHER

## 2023-01-28 RX ORDER — ACETAMINOPHEN 325 MG/1
650 TABLET ORAL EVERY 4 HOURS PRN
Qty: 30 TABLET | Refills: 0 | Status: SHIPPED | OUTPATIENT
Start: 2023-01-28 | End: 2023-01-29 | Stop reason: SDUPTHER

## 2023-01-28 RX ADMIN — METOPROLOL TARTRATE 12.5 MG: 25 TABLET, FILM COATED ORAL at 09:01

## 2023-01-28 RX ADMIN — DONEPEZIL HYDROCHLORIDE 10 MG: 10 TABLET ORAL at 09:01

## 2023-01-28 RX ADMIN — CEFTRIAXONE 2 G: 2 INJECTION, POWDER, FOR SOLUTION INTRAMUSCULAR; INTRAVENOUS at 12:01

## 2023-01-28 RX ADMIN — LIDOCAINE 1 PATCH: 50 PATCH CUTANEOUS at 03:01

## 2023-01-28 RX ADMIN — POTASSIUM BICARBONATE 60 MEQ: 391 TABLET, EFFERVESCENT ORAL at 09:01

## 2023-01-28 RX ADMIN — MEMANTINE 20 MG: 10 TABLET ORAL at 09:01

## 2023-01-28 RX ADMIN — PRAVASTATIN SODIUM 20 MG: 20 TABLET ORAL at 09:01

## 2023-01-28 RX ADMIN — SODIUM CHLORIDE, POTASSIUM CHLORIDE, SODIUM LACTATE AND CALCIUM CHLORIDE: 600; 310; 30; 20 INJECTION, SOLUTION INTRAVENOUS at 10:01

## 2023-01-28 RX ADMIN — POTASSIUM BICARBONATE 60 MEQ: 391 TABLET, EFFERVESCENT ORAL at 12:01

## 2023-01-28 NOTE — ASSESSMENT & PLAN NOTE
This patient does have evidence of infective focus  My overall impression is sepsis.  Source: Respiratory, Urinary Tract and Skin and Soft Tissue (location sacral wound)   Organ dysfunction indicated by Acute kidney injury, Acute respiratory failure and Encephalopathy  Will Not start Pressors- Levophed for MAP of 65  Source control achieved by: Pending Bcx, Urine Cx  Antibiotics given-   Antibiotics (72h ago, onward)    Start     Stop Route Frequency Ordered    01/27/23 1200  cefTRIAXone (ROCEPHIN) 2 g in dextrose 5 % in water (D5W) 5 % 50 mL IVPB (MB+)         -- IV Every 24 hours (non-standard times) 01/27/23 1140        Latest lactate reviewed-  Recent Labs   Lab 01/25/23  1022 01/25/23  1355 01/27/23  0927   LACTATE 1.7 0.9 1.6     Patient febrile, tachycardic and tachypneic- fufilling 3/4 SIRS criteria. Likely infectious. Multiple etiologies in setting of recent pyelonephritis.Patient's lactic acid wnl.     Plan  - Blood cx and urine cx resulted with proteus    - initially on broad spectrum abx, with vanc and cefepime, then switched cefepime to zosyn, after suspecitibilites discontinued vanc and zosyn and started CTX  - will likely start oral Levaquin upon discharge.

## 2023-01-28 NOTE — ASSESSMENT & PLAN NOTE
Advance Care Planning     Date: 01/25/2023    Power of   Discussed patient's admission and current condition with patient's niece/HCPOA, Lakesha Gonzáles ([phone]), who re-confirmed that she is the HCPOA but makes decisions in conjunction with other family members.      Code Status  Discussed patient's code status with HCPOA, confirmed that patient is DNR/DNI.  Ms Gonzáles would like to further discuss GOC in light of recurrent UTIs, would like to transition to hospice after discharge.

## 2023-01-28 NOTE — SUBJECTIVE & OBJECTIVE
Interval History: naeon, patient reporting mild L chest pain, reproducible on exam.  Otherwise no complaints.  Will plan for d/c with hospice tomorrow.     Review of Systems   Unable to perform ROS: Mental status change   Objective:     Vital Signs (Most Recent):  Temp: 97.9 °F (36.6 °C) (01/28/23 1123)  Pulse: 89 (01/28/23 1123)  Resp: 18 (01/28/23 1123)  BP: (!) 145/84 (01/28/23 1123)  SpO2: 100 % (01/28/23 1123)   Vital Signs (24h Range):  Temp:  [97.2 °F (36.2 °C)-97.9 °F (36.6 °C)] 97.9 °F (36.6 °C)  Pulse:  [64-91] 89  Resp:  [16-18] 18  SpO2:  [96 %-100 %] 100 %  BP: (108-157)/(66-84) 145/84     Weight: 68.9 kg (152 lb)  Body mass index is 21.81 kg/m².    Intake/Output Summary (Last 24 hours) at 1/28/2023 1304  Last data filed at 1/28/2023 0626  Gross per 24 hour   Intake --   Output 1260 ml   Net -1260 ml      Physical Exam  Constitutional:       General: He is not in acute distress.     Appearance: Normal appearance. He is ill-appearing. He is not toxic-appearing.   HENT:      Head: Normocephalic and atraumatic.      Mouth/Throat:      Mouth: Mucous membranes are dry.   Eyes:      Extraocular Movements: Extraocular movements intact.      Conjunctiva/sclera: Conjunctivae normal.      Pupils: Pupils are equal, round, and reactive to light.   Cardiovascular:      Rate and Rhythm: Normal rate and regular rhythm.      Heart sounds: Murmur heard.   Pulmonary:      Effort: Pulmonary effort is normal. No respiratory distress.      Breath sounds: Normal breath sounds.   Abdominal:      General: Abdomen is flat. Bowel sounds are normal. There is no distension.      Palpations: Abdomen is soft.      Tenderness: There is generalized abdominal tenderness. There is no guarding.      Comments: B/l nephrostomy tubes draining, no signs of infection around insertion sites   Musculoskeletal:         General: No swelling.      Right lower leg: No edema.      Left lower leg: No edema.   Lymphadenopathy:      Cervical: No  cervical adenopathy.   Skin:     General: Skin is warm.      Capillary Refill: Capillary refill takes less than 2 seconds.   Neurological:      General: No focal deficit present.      Mental Status: He is alert. He is disoriented.   Psychiatric:         Mood and Affect: Mood normal. Affect is flat.         Speech: Speech is delayed.         Behavior: Behavior normal.       Significant Labs: All pertinent labs within the past 24 hours have been reviewed.    Significant Imaging: I have reviewed all pertinent imaging results/findings within the past 24 hours.

## 2023-01-28 NOTE — PROGRESS NOTES
Urology Note    CT imaging reviewed and shows that right lower pole hydronephrosis has resolved since replacement of nephrostomy tube for nephroureteral stent.     Creatinine also improving, now 2.1.    Will need an exchange of bilateral tubes in 2 months, if patient and family desire.     Agree with hospice care.     Urology will sign off. Please call urology with questions.     Alexandria Dumont MD

## 2023-01-28 NOTE — ASSESSMENT & PLAN NOTE
CTAP with improved right sided and resolved left sided hydronephrisis, b/l renal and proximal r. ureteral calculi    Plan  - consulted urology, appreciate recommendations   - see CLEM  - see bilateral hydronephrosis

## 2023-01-28 NOTE — PLAN OF CARE
"Gordy with [facility] informed [facility] "they have to swap out equipment in the home and that wont be available until tomorrow. IM team updated.   "

## 2023-01-28 NOTE — PROGRESS NOTES
[facility] Medicine  Progress Note    Patient Name: Timothy Roldan  MRN: [id number]  Patient Class: IP- Inpatient   Admission Date: 1/25/2023  Length of Stay: 3 days  Attending Physician: Colin Mock MD  Primary Care Provider: Martine Garces MD        Subjective:     Principal Problem:Sepsis        HPI:  Mr. Timohty Roldan is a 82 yo male with a PMHx of bilateral hydronephrosis 2/2 staghorn caliculi s/p b/l nephrostomy tubes,  HTN, Afib (on eliquis), Alzheimer's dementia, HFpEF (EF 70%), and BPH who presents from nursing home for altered mental status and worsening weakness.     As per nursing home, hematuria was noted and UA done on Sunday showed infection, thus Macrobid was started. Since then, patient has decreased appetite and worsening mental status. Upon evaluation, patient's speech is very slowed. He is AAOX2 (person, place, not time or reason for admission), however is unable to answer questions during exam. He does complain of generalized body soreness.     Of note, patient was recently hospitalized at [facility] from 12/21/22- 12/28/22 and presented for similar complaint with fever and AMS and found to have CLEM. He was found to have b/l obstructing kidney stones w/ b/l hydronephrosis and underwent b/l nephrostomy tube placement (on 12/23/22) along with brower catheter placement. His CLEM improved.  However, urology recommended NO brower catheter placement unless patient is symptomatic and bladder scan shows >400 cc. He also completed 6 day course of IV antibotics and d/c with PO Bactrim. Of note, patient did not reac well with cefepime and seroquel (decreased mentation).       ED Course:  Upon arrival to the ED, patient was febrile with Tmax of 102.4, improved to 99.7 after receiving tyelnol in the ED, he was tachycardic , tachypneic 16-20, SBP , and saturating well on RA. Per EMS, on initial evaluation, saturating in the 80s, which improved after 2L NC. Initial labs notable for  WBC and lactic acid wnl,elevated procal (5.46), increased creatine 2.7, decreased albumin 1.9, slight acidosis with HCO3 of 19, UA with +2 protein, +1 blood, +3 leukocytes, 73 RBC, >100 bacteria, and many hyaline casts. EKG showed afib with RVR () and Qtc wnl. CTAP with imrpoved right sided and resolved left sided hydronephrisis, continues to have b/l and proximal r. Ureteral calculi, b/l pleural effusions, circumferential urinary bladder wall thickening concerning of cystitis, rectal wall thicening concerning for colitis/proctatis,  skin thickening induration near lower sacrum and coccyx concerning for pressure ulcer. CTH unremarkable for acute intracranial process. CXR with left > R sided opacifications likely pleural effusion and possible atelectasis. Received x1 dose of tyelnol given for fever, s/p 2 bolus of LR, zosyn.      Overview/Hospital Course:  84 yo male with a PMHx of HTN, Afib (on eliquis), Alzheimer's dementia, HFpEF (EF 70%), and BPH who presents from nursing home for altered mental status and worsening weakness. He was found to be  febrile, tachycardic, and tachypneic concerning for sepsis. Urology consulted and recommend patient's nephrostomy tube be adjusted by IR, repeat CT AP showed improved right sided hydronephrosis. Bcx and Urine Cx growing Proteus, initially on vanc and zosyn, according to susceptibilities will change to IV CTX. Ongoing goals of care discussion with family with possible plans for discharge with hospice.       Interval History: naeon, patient reporting mild L chest pain, reproducible on exam.  Otherwise no complaints.  Will plan for d/c with hospice tomorrow.     Review of Systems   Unable to perform ROS: Mental status change   Objective:     Vital Signs (Most Recent):  Temp: 97.9 °F (36.6 °C) (01/28/23 1123)  Pulse: 89 (01/28/23 1123)  Resp: 18 (01/28/23 1123)  BP: (!) 145/84 (01/28/23 1123)  SpO2: 100 % (01/28/23 1123)   Vital Signs (24h Range):  Temp:  [97.2 °F (36.2  °C)-97.9 °F (36.6 °C)] 97.9 °F (36.6 °C)  Pulse:  [64-91] 89  Resp:  [16-18] 18  SpO2:  [96 %-100 %] 100 %  BP: (108-157)/(66-84) 145/84     Weight: 68.9 kg (152 lb)  Body mass index is 21.81 kg/m².    Intake/Output Summary (Last 24 hours) at 1/28/2023 1304  Last data filed at 1/28/2023 0626  Gross per 24 hour   Intake --   Output 1260 ml   Net -1260 ml      Physical Exam  Constitutional:       General: He is not in acute distress.     Appearance: Normal appearance. He is ill-appearing. He is not toxic-appearing.   HENT:      Head: Normocephalic and atraumatic.      Mouth/Throat:      Mouth: Mucous membranes are dry.   Eyes:      Extraocular Movements: Extraocular movements intact.      Conjunctiva/sclera: Conjunctivae normal.      Pupils: Pupils are equal, round, and reactive to light.   Cardiovascular:      Rate and Rhythm: Normal rate and regular rhythm.      Heart sounds: Murmur heard.   Pulmonary:      Effort: Pulmonary effort is normal. No respiratory distress.      Breath sounds: Normal breath sounds.   Abdominal:      General: Abdomen is flat. Bowel sounds are normal. There is no distension.      Palpations: Abdomen is soft.      Tenderness: There is generalized abdominal tenderness. There is no guarding.      Comments: B/l nephrostomy tubes draining, no signs of infection around insertion sites   Musculoskeletal:         General: No swelling.      Right lower leg: No edema.      Left lower leg: No edema.   Lymphadenopathy:      Cervical: No cervical adenopathy.   Skin:     General: Skin is warm.      Capillary Refill: Capillary refill takes less than 2 seconds.   Neurological:      General: No focal deficit present.      Mental Status: He is alert. He is disoriented.   Psychiatric:         Mood and Affect: Mood normal. Affect is flat.         Speech: Speech is delayed.         Behavior: Behavior normal.       Significant Labs: All pertinent labs within the past 24 hours have been reviewed.    Significant  Imaging: I have reviewed all pertinent imaging results/findings within the past 24 hours.      Assessment/Plan:      * Sepsis  This patient does have evidence of infective focus  My overall impression is sepsis.  Source: Respiratory, Urinary Tract and Skin and Soft Tissue (location sacral wound)   Organ dysfunction indicated by Acute kidney injury, Acute respiratory failure and Encephalopathy  Will Not start Pressors- Levophed for MAP of 65  Source control achieved by: Pending Bcx, Urine Cx  Antibiotics given-   Antibiotics (72h ago, onward)    Start     Stop Route Frequency Ordered    01/27/23 1200  cefTRIAXone (ROCEPHIN) 2 g in dextrose 5 % in water (D5W) 5 % 50 mL IVPB (MB+)         -- IV Every 24 hours (non-standard times) 01/27/23 1140        Latest lactate reviewed-  Recent Labs   Lab 01/25/23  1355 01/27/23  0927   LACTATE 0.9 1.6     Patient febrile, tachycardic and tachypneic- fufilling 3/4 SIRS criteria. Likely infectious. Multiple etiologies in setting of recent pyelonephritis.Patient's lactic acid wnl.     Plan  - Blood cx and urine cx resulted with proteus    - initially on broad spectrum abx, with vanc and cefepime, then switched cefepime to zosyn, after suspecitibilites discontinued vanc and zosyn and started CTX  - will likely start oral Levaquin upon discharge, plan for 2w total abx course    Sepsis due to Gram negative bacteria  This patient does have evidence of infective focus  My overall impression is sepsis.  Source: Urinary Tract  Antibiotics given-   Antibiotics (72h ago, onward)    Start     Stop Route Frequency Ordered    01/27/23 1200  cefTRIAXone (ROCEPHIN) 2 g in dextrose 5 % in water (D5W) 5 % 50 mL IVPB (MB+)         -- IV Every 24 hours (non-standard times) 01/27/23 1140        Latest lactate reviewed-  Recent Labs   Lab 01/25/23  1355 01/27/23  0927   LACTATE 0.9 1.6     Organ dysfunction indicated by Acute kidney injury, Acute respiratory failure and Encephalopathy    Fluid  challenge Not needed - patient is not hypotensive      Post- resuscitation assessment No - Post resuscitation assessment not needed       Will Not start Pressors- Levophed for MAP of 65  Source control achieved by: Urine cx and blood cx    - see sepsis      Palliative care encounter  - consulted palliative, appreciate recommendations   - Family pursuing home hospice upon discharge    Hydronephrosis of right kidney  - IR consulted to adjust PCN past the obstructing R sided stone  - S/p nephrostogram with right sided PCN adjustment      Sacral wound  CT AP showing skin thickening induration of subcutaneous fat at level of lower sacrum and coccyx concerning for pressure ulcer    - wound care consulted, appreciate assitance    ACP (advance care planning)  Advance Care Planning     Date: 01/25/2023    Power of   Discussed patient's admission and current condition with patient's niece/HCPOA, Lakesha Gonzáles ([phone]), who re-confirmed that she is the HCPOA but makes decisions in conjunction with other family members.      Code Status  Discussed patient's code status with HCPOA, confirmed that patient is DNR/DNI.  Ms Gonzáles would like to further discuss GOC in light of recurrent UTIs, would like to transition to hospice after discharge.      Bilateral Staghorn Calculus  CTAP with improved right sided and resolved left sided hydronephrisis, b/l renal and proximal r. ureteral calculi    Plan  - consulted urology, appreciate recommendations   - see CLEM  - see bilateral hydronephrosis    Bilateral hydronephrosis  During previous hospitalization was found to have b/l obstructing kidney stones w/ b/l hydronephrosis and underwent b/l nephrostomy tube placement (on 12/23/22). Follows with urology outpatient. Repeat CT AP on admission shows improved right sided and resolved left sided hydronephrosis.     Outpatient urology note on 1/6/23 details referral to IR for b/l nephrostomy tube exchange in 3 months and to  continue to follow with nephrology    - consulted urology, appreciate recommendations  - S/p nephrostogram with right sided PCN adjustment done by IR    Chronic heart failure with preserved ejection fraction  HFpEF  - Most recent TTE on 12/23/22 demonstrates: LVef 70%,   - EKG : Afib  - Troponin wnl    - BNP wnl and CXR with pericardial effusion   - Fluid restriction at 1.5 cc with strict I/Os and daily weights   - Will hold diuresis in setting of CLEM   - Maintain on telemetry and daily EKGs   - Up to date risk stratification : TSH, Lipids, HbA1c with optimization of risk factors is necessary  - Check Electrolytes, keep Mag >2 & K+ >4  - SCDs, TEDs, Nursing communication to elevated LE   - Ambulate as tolerated                Acute hypoxemic respiratory failure  Patient with Hypoxic Respiratory failure which is Acute.  he is not on home oxygen. Supplemental oxygen was provided and noted-  .   Signs/symptoms of respiratory failure include- tachypnea. Contributing diagnoses includes - Aspiration, CHF and Pleural effusion Labs and images were reviewed. Patient Has not had a recent ABG. Will treat underlying causes and adjust management of respiratory failure as follows-     - continuous oxygen monitoring  - currently on on RA    Persistent atrial fibrillation  Patient with Paroxysmal (<7 days) atrial fibrillation which is controlled currently with Beta Blocker. Patient is currently in atrial fibrillation.[id number] Score: 2. HASBLED Score:. Anticoagulation indicated. Anticoagulation done with Eliquis.    - will continue home metoprolol as EKG showed afib on admission  - holding eliquis    Alzheimer disease  Niece reports patient is not very verbose at baseline, sometimes will answer yes/no questions.  Not able to have full conversations at baseline.    - continue home namenda and aricept      CLEM (acute kidney injury)  Patient with acute kidney injury likely due to IVVD/dehydration and post-obstructive d/t obstructive  stones CLEM is currently stable. Labs reviewed- Renal function/electrolytes with Estimated Creatinine Clearance: 26.9 mL/min (A) (based on SCr of 2.1 mg/dL (H)). according to latest data. Monitor urine output and serial BMP and adjust therapy as needed. Avoid nephrotoxins and renally dose meds for GFR listed above.     Lab Results   Component Value Date    CREATININE 2.1 (H) 01/28/2023     Creatinine 2.8 on admit, baseline around 1  - Likely combination of pre-renal from dehydration and volume losses vs sepsis and post-obstructive from hydronephrosis       Plan:   - Consulted nephrology as etiology of CLEM cannot be entirely attributed to obstructive also concerns for CRS, recommending brower catheter to better monitor UOP, IV sodium HCO3 + LR.  - Strict I&Os and daily weights   - Gentle IVF  - Avoid nephrotoxic agents such as NSAIDs, gadolinium and IV radiocontrast.  - Renally dose meds to current GFR.  - Maintain MAP > 65.  - Creatinine improving    Essential hypertension  - holding home benazepril in setting of sepsis to monitor hemodynamics  - will continue metoprolol         VTE Risk Mitigation (From admission, onward)         Ordered     IP VTE HIGH RISK PATIENT  Once         01/25/23 1256     Place sequential compression device  Until discontinued         01/25/23 1256     IP VTE HIGH RISK PATIENT  Once         01/25/23 1256     Place sequential compression device  Until discontinued         01/25/23 1256                Discharge Planning   JACQUELINE: 1/30/2023     Code Status: DNR   Is the patient medically ready for discharge?: No    Reason for patient still in hospital (select all that apply): Treatment and Imaging  Discharge Plan A: Assisted Living (w/[facility])   Discharge Delays: None known at this time              Anant Smith MD  Department of Hospital Medicine   [facility]

## 2023-01-28 NOTE — ASSESSMENT & PLAN NOTE
This patient does have evidence of infective focus  My overall impression is sepsis.  Source: Urinary Tract  Antibiotics given-   Antibiotics (72h ago, onward)    Start     Stop Route Frequency Ordered    01/27/23 1200  cefTRIAXone (ROCEPHIN) 2 g in dextrose 5 % in water (D5W) 5 % 50 mL IVPB (MB+)         -- IV Every 24 hours (non-standard times) 01/27/23 1140        Latest lactate reviewed-  Recent Labs   Lab 01/25/23  1022 01/25/23  1355 01/27/23  0927   LACTATE 1.7 0.9 1.6     Organ dysfunction indicated by Acute kidney injury, Acute respiratory failure and Encephalopathy    Fluid challenge Not needed - patient is not hypotensive      Post- resuscitation assessment No - Post resuscitation assessment not needed       Will Not start Pressors- Levophed for MAP of 65  Source control achieved by: Urine cx and blood cx    - see sepsis

## 2023-01-28 NOTE — ASSESSMENT & PLAN NOTE
- consulted palliative, appreciate recommendations   - Family pursuing home hospice upon discharge

## 2023-01-28 NOTE — ASSESSMENT & PLAN NOTE
Patient with acute kidney injury likely due to IVVD/dehydration and post-obstructive d/t obstructive stones CLEM is currently stable. Labs reviewed- Renal function/electrolytes with Estimated Creatinine Clearance: 26.9 mL/min (A) (based on SCr of 2.1 mg/dL (H)). according to latest data. Monitor urine output and serial BMP and adjust therapy as needed. Avoid nephrotoxins and renally dose meds for GFR listed above.     Lab Results   Component Value Date    CREATININE 2.1 (H) 01/28/2023     Creatinine 2.8 on admit, baseline around 1  - Likely combination of pre-renal from dehydration and volume losses vs sepsis and post-obstructive from hydronephrosis       Plan:   - Consulted nephrology as etiology of CLEM cannot be entirely attributed to obstructive also concerns for CRS, recommending brower catheter to better monitor UOP, IV sodium HCO3 + LR.  - Strict I&Os and daily weights   - Gentle IVF  - Avoid nephrotoxic agents such as NSAIDs, gadolinium and IV radiocontrast.  - Renally dose meds to current GFR.  - Maintain MAP > 65.  - Creatinine improving

## 2023-01-28 NOTE — PLAN OF CARE
[facility]  Department of Hospital Medicine  [address] [place] [address]  [phone] after hours  [phone] fax    HOSPICE  ORDERS    01/29/2023    Admit to Hospice:  Home Service     Diagnoses:   Active Hospital Problems    Diagnosis  POA    *Sepsis [A41.9]  Yes    Altered mental status [R41.82]  Yes    Urinary tract infection with hematuria [N39.0, R31.9]  Yes    Hydronephrosis of right kidney [N13.30]  Yes    Palliative care encounter [Z51.5]  Not Applicable    Sepsis due to Gram negative bacteria [A41.50]  Yes    ACP (advance care planning) [Z71.89]  Not Applicable    Sacral wound [S31.000A]  Yes    Bilateral Staghorn Calculus [N20.0]  Yes    Bilateral hydronephrosis [N13.30]  Yes    Chronic heart failure with preserved ejection fraction [I50.32]  Yes    Acute hypoxemic respiratory failure [J96.01]  Yes    Essential hypertension [I10]  Yes    Persistent atrial fibrillation [I48.19]  Yes    CLEM (acute kidney injury) [N17.9]  Yes    Alzheimer disease [G30.9, F02.80]  Yes      Resolved Hospital Problems   No resolved problems to display.       Hospice Qualifying Diagnoses:        Patient has a life expectancy < 6 months due to:  Urosepsis  Bilateral obstructing nephrolithiasis  Dementia    Vital Signs: Routine per Hospice Protocol.    Code Status: DNR    Allergies: Review of patient's allergies indicates:  No Known Allergies    Diet: full as tolerated    Activities: As tolerated    Goals of Care Treatment Preferences:  Code Status: DNR    Health care agent: Lakesha Calagna  Health care agent number: [phone]          What is most important right now is to focus on improvement in condition but with limits to invasive therapies, comfort and QOL .  Accordingly, we have decided that the best plan to meet the patient's goals includes enrolling in hospice care.      Nursing: Per Hospice Routine.      Colostomy Care:  Empty bag every shift and prn                                              Change and clean site every 48 hours    PEG Care:  Clean site daily.  Monitor skin integrity.    Cain Care: Empty Cain bag Q shift and PRN.  Change Cain every month.    Routine Skin for Bedridden Patients: Apply moisture barrier cream to all skin folds and   wet areas in perineal area daily and after baths and all bowel movements.    Other Miscellaneous Care:     WOUND CARE:  Wound spray or saline for wound cleaning with all dressing changes.    All wounds to be measured with first dressing changes and every week.    Pressure Ulcer(s) Stage III   Location: sacrum         Consult ET nurse        Apply the following to wound:   cleanse sacral area with soap and water pat dry apply xeroform, cover with dry gauze apply a sacral foam sacral border for comfort and protection twice a day and prn for saturation    Medications:        Medication List        START taking these medications      acetaminophen 325 MG tablet  Commonly known as: TYLENOL  Take 2 tablets (650 mg total) by mouth every 4 (four) hours as needed for Pain.     cefpodoxime 100 MG tablet  Commonly known as: VANTIN  Take 2 tablets (200 mg total) by mouth every 12 (twelve) hours. for 10 days            CHANGE how you take these medications      donepeziL 5 MG tablet  Commonly known as: ARICEPT  Take 2 tablets (10 mg total) by mouth every evening.  What changed: medication strength     loratadine 10 mg tablet  Commonly known as: CLARITIN  Take 1 tablet (10 mg total) by mouth once daily.  What changed: when to take this     memantine 5 MG Tab  Commonly known as: NAMENDA  Take 2 tablets (10 mg total) by mouth once daily.  What changed:   medication strength  how much to take     metoprolol tartrate 25 MG tablet  Commonly known as: LOPRESSOR  Take 1 tablet (25 mg total) by mouth 2 (two) times daily.  What changed: how much to take            CONTINUE taking these medications      melatonin 3 mg tablet  Commonly known as: MELATIN  Take  3 mg by mouth every evening.            STOP taking these medications      artificial tears 0.5 % ophthalmic solution  Commonly known as: ISOPTO TEARS     ELIQUIS 2.5 mg Tab  Generic drug: apixaban     lovastatin 20 MG tablet  Commonly known as: MEVACOR     mupirocin 2 % ointment  Commonly known as: BACTROBAN     nitrofurantoin (macrocrystal-monohydrate) 100 MG capsule  Commonly known as: MACROBID     polyethylene glycol 17 gram/dose powder  Commonly known as: GLYCOLAX     pulse oximeter device  Commonly known as: pulse oximeter     tamsulosin 0.4 mg Cap  Commonly known as: FLOMAX     VITAMIN D2 50,000 unit Cap  Generic drug: ergocalciferol            Future Orders:  Hospice Medical Director may dictate new orders for comfortable care measures & sign death certificate.        _________________________________  Naima Meza MD  01/29/2023

## 2023-01-28 NOTE — ASSESSMENT & PLAN NOTE
- consulted palliative, appreciate recommendations   - Family discussing pursuing home hospice upon discharge   Unable to offer due to clinical condition

## 2023-01-28 NOTE — SUBJECTIVE & OBJECTIVE
Interval History: No acute events overnight. Urine and Bcx positive for Proteus. Patient more verbal today and AAOX2 (person, place, not to time). Reports general body soreness and pain near insertion sites.     He remains afebrile and HDS.     Review of Systems   Unable to perform ROS: Mental status change   Objective:     Vital Signs (Most Recent):  Temp: 97.2 °F (36.2 °C) (01/27/23 1622)  Pulse: 80 (01/27/23 1859)  Resp: 18 (01/27/23 1622)  BP: (!) 140/81 (01/27/23 1622)  SpO2: 96 % (01/27/23 1622)   Vital Signs (24h Range):  Temp:  [94.6 °F (34.8 °C)-98.2 °F (36.8 °C)] 97.2 °F (36.2 °C)  Pulse:  [72-96] 80  Resp:  [16-18] 18  SpO2:  [96 %-100 %] 96 %  BP: (114-140)/(74-81) 140/81     Weight: 68.9 kg (152 lb)  Body mass index is 21.81 kg/m².    Intake/Output Summary (Last 24 hours) at 1/27/2023 2006  Last data filed at 1/27/2023 1816  Gross per 24 hour   Intake 580 ml   Output 970 ml   Net -390 ml      Physical Exam  Constitutional:       General: He is not in acute distress.     Appearance: Normal appearance. He is ill-appearing. He is not toxic-appearing.   HENT:      Head: Normocephalic and atraumatic.      Mouth/Throat:      Mouth: Mucous membranes are dry.   Eyes:      Extraocular Movements: Extraocular movements intact.      Conjunctiva/sclera: Conjunctivae normal.      Pupils: Pupils are equal, round, and reactive to light.   Cardiovascular:      Rate and Rhythm: Regular rhythm. Tachycardia present.      Heart sounds: Murmur heard.   Pulmonary:      Effort: Pulmonary effort is normal. No respiratory distress.      Breath sounds: Normal breath sounds.   Abdominal:      General: Abdomen is flat. Bowel sounds are normal. There is no distension.      Palpations: Abdomen is soft.      Tenderness: There is generalized abdominal tenderness. There is no guarding.      Comments: B/l nephrostomy tubes draining, no signs of infection around insertion sites   Musculoskeletal:         General: No swelling.      Right  lower leg: No edema.      Left lower leg: No edema.   Lymphadenopathy:      Cervical: No cervical adenopathy.   Skin:     General: Skin is warm.      Capillary Refill: Capillary refill takes less than 2 seconds.   Neurological:      General: No focal deficit present.      Mental Status: He is alert. He is disoriented.   Psychiatric:         Mood and Affect: Mood normal. Affect is flat.         Speech: Speech is delayed.         Behavior: Behavior normal.       Significant Labs: All pertinent labs within the past 24 hours have been reviewed.  CBC:   Recent Labs   Lab 01/26/23 0332 01/27/23 0229   WBC 6.69 5.01   HGB 8.1* 8.6*   HCT 27.3* 29.2*   * 159     CMP:   Recent Labs   Lab 01/26/23 0332 01/27/23 0229    144   K 3.9 3.6   * 113*   CO2 19* 18*   * 102   BUN 50* 55*   CREATININE 2.9* 3.1*   CALCIUM 8.4* 8.2*   PROT 5.6* 5.7*   ALBUMIN 1.6* 1.6*   BILITOT 0.4 0.3   ALKPHOS 55 60   AST 11 11   ALT <5* 6*   ANIONGAP 10 13       Significant Imaging: I have reviewed all pertinent imaging results/findings within the past 24 hours.  CT Abdomen Pelvis  Without Contrast   Final Result      1. Since 01/25/2023, status post conversion of right percutaneous nephrostomy tube to nephroureteral tube.  Trace residual right-sided hydronephrosis at the level of the lower pole appears slightly improved since the prior study.   2. Otherwise, no substantial interval change since the prior examination.   3. Additional details, as provided in the body of report.         Electronically signed by: Bill Sauer   Date:    01/27/2023   Time:    09:00      IR Nephrostogram Conversion to Nephroureteral Cath_Existing Access   Final Result      Uncomplicated right percutaneous nephrostomy to nephroureteral stent conversion.      Plan:      Patient to return in 10-12 weeks for routine catheter exchange.         Electronically signed by: Cyrus Solis   Date:    01/27/2023   Time:    11:02      CT Abdomen Pelvis   Without Contrast   Final Result      1. When compared to prior CT imaging of 12/22/2022, improved right-sided hydronephrosis and essentially resolved left-sided hydronephrosis status post placement of bilateral percutaneous nephrostomy tubes.   2. Similar position of extensive bilateral renal and proximal right ureteral calculi, as described.   3. Bilateral pleural effusions.   4. Question rectal wall thickening with minor induration in the perirectal and presacral fat planes.  Correlation for stercoral colitis/proctitis recommended.   5. Nonspecific circumferential urinary bladder wall thickening which could relate to partially nondistended state at the time of imaging and/or bladder outlet obstruction, however correlation for signs/symptoms of cystitis would be recommended.   6. Minor skin thickening induration of the subcutaneous fat planes at the level of the lower sacrum and coccyx.  Correlation for signs of developing pressure ulcer would be recommended.   7. Additional details, as provided in the body of report.         Electronically signed by: Bill Sauer   Date:    01/25/2023   Time:    13:29      CT Head Without Contrast   Final Result      No acute intracranial process.         Electronically signed by: Javier Lazar   Date:    01/25/2023   Time:    12:23      X-Ray Chest AP Portable   Final Result      See above         Electronically signed by: Angel Luis Johnson MD   Date:    01/25/2023   Time:    11:50

## 2023-01-28 NOTE — PLAN OF CARE
Pt remains free from falls and injury. Provided with PRN analgesic with positive results. Bed low and locked. Call light within reach.

## 2023-01-28 NOTE — ASSESSMENT & PLAN NOTE
This patient does have evidence of infective focus  My overall impression is sepsis.  Source: Respiratory, Urinary Tract and Skin and Soft Tissue (location sacral wound)   Organ dysfunction indicated by Acute kidney injury, Acute respiratory failure and Encephalopathy  Will Not start Pressors- Levophed for MAP of 65  Source control achieved by: Pending Bcx, Urine Cx  Antibiotics given-   Antibiotics (72h ago, onward)    Start     Stop Route Frequency Ordered    01/27/23 1200  cefTRIAXone (ROCEPHIN) 2 g in dextrose 5 % in water (D5W) 5 % 50 mL IVPB (MB+)         -- IV Every 24 hours (non-standard times) 01/27/23 1140        Latest lactate reviewed-  Recent Labs   Lab 01/25/23  1355 01/27/23  0927   LACTATE 0.9 1.6     Patient febrile, tachycardic and tachypneic- fufilling 3/4 SIRS criteria. Likely infectious. Multiple etiologies in setting of recent pyelonephritis.Patient's lactic acid wnl.     Plan  - Blood cx and urine cx resulted with proteus    - initially on broad spectrum abx, with vanc and cefepime, then switched cefepime to zosyn, after suspecitibilites discontinued vanc and zosyn and started CTX  - will likely start oral Levaquin upon discharge, plan for 2w total abx course

## 2023-01-28 NOTE — ASSESSMENT & PLAN NOTE
This patient does have evidence of infective focus  My overall impression is sepsis.  Source: Urinary Tract  Antibiotics given-   Antibiotics (72h ago, onward)    Start     Stop Route Frequency Ordered    01/27/23 1200  cefTRIAXone (ROCEPHIN) 2 g in dextrose 5 % in water (D5W) 5 % 50 mL IVPB (MB+)         -- IV Every 24 hours (non-standard times) 01/27/23 1140        Latest lactate reviewed-  Recent Labs   Lab 01/25/23  1355 01/27/23  0927   LACTATE 0.9 1.6     Organ dysfunction indicated by Acute kidney injury, Acute respiratory failure and Encephalopathy    Fluid challenge Not needed - patient is not hypotensive      Post- resuscitation assessment No - Post resuscitation assessment not needed       Will Not start Pressors- Levophed for MAP of 65  Source control achieved by: Urine cx and blood cx    - see sepsis

## 2023-01-28 NOTE — PROGRESS NOTES
[facility]  Progress Note    Patient Name: Timothy Roldan  MRN: [id number]  Patient Class: IP- Inpatient   Admission Date: 1/25/2023  Length of Stay: 2 days  Attending Physician: Colin Mock MD  Primary Care Provider: Martine Garces MD        Subjective:     Principal Problem:Sepsis        HPI:  Mr. Timothy Roldan is a 84 yo male with a PMHx of bilateral hydronephrosis 2/2 staghorn caliculi s/p b/l nephrostomy tubes,  HTN, Afib (on eliquis), Alzheimer's dementia, HFpEF (EF 70%), and BPH who presents from nursing home for altered mental status and worsening weakness.     As per nursing home, hematuria was noted and UA done on Sunday showed infection, thus Macrobid was started. Since then, patient has decreased appetite and worsening mental status. Upon evaluation, patient's speech is very slowed. He is AAOX2 (person, place, not time or reason for admission), however is unable to answer questions during exam. He does complain of generalized body soreness.     Of note, patient was recently hospitalized at [facility] from 12/21/22- 12/28/22 and presented for similar complaint with fever and AMS and found to have CLEM. He was found to have b/l obstructing kidney stones w/ b/l hydronephrosis and underwent b/l nephrostomy tube placement (on 12/23/22) along with brower catheter placement. His CLEM improved.  However, urology recommended NO brower catheter placement unless patient is symptomatic and bladder scan shows >400 cc. He also completed 6 day course of IV antibotics and d/c with PO Bactrim. Of note, patient did not reac well with cefepime and seroquel (decreased mentation).       ED Course:  Upon arrival to the ED, patient was febrile with Tmax of 102.4, improved to 99.7 after receiving tyelnol in the ED, he was tachycardic , tachypneic 16-20, SBP , and saturating well on RA. Per EMS, on initial evaluation, saturating in the 80s, which improved after 2L NC. Initial labs notable for  WBC and lactic acid wnl,elevated procal (5.46), increased creatine 2.7, decreased albumin 1.9, slight acidosis with HCO3 of 19, UA with +2 protein, +1 blood, +3 leukocytes, 73 RBC, >100 bacteria, and many hyaline casts. EKG showed afib with RVR () and Qtc wnl. CTAP with imrpoved right sided and resolved left sided hydronephrisis, continues to have b/l and proximal r. Ureteral calculi, b/l pleural effusions, circumferential urinary bladder wall thickening concerning of cystitis, rectal wall thicening concerning for colitis/proctatis,  skin thickening induration near lower sacrum and coccyx concerning for pressure ulcer. CTH unremarkable for acute intracranial process. CXR with left > R sided opacifications likely pleural effusion and possible atelectasis. Received x1 dose of tyelnol given for fever, s/p 2 bolus of LR, zosyn.      Overview/Hospital Course:  82 yo male with a PMHx of HTN, Afib (on eliquis), Alzheimer's dementia, HFpEF (EF 70%), and BPH who presents from nursing home for altered mental status and worsening weakness. He was found to be  febrile, tachycardic, and tachypneic concerning for sepsis. Urology consulted and recommend patient's nephrostomy tube be adjusted by IR, repeat CT AP showed improved right sided hydronephrosis. Bcx and Urine Cx growing Proteus, initially on vanc and zosyn, according to susceptibilities will change to IV CTX. Ongoing goals of care discussion with family with possible plans for discharge with hospice      Interval History: No acute events overnight. Urine and Bcx positive for Proteus. Patient more verbal today and AAOX2 (person, place, not to time). Reports general body soreness and pain near insertion sites.     He remains afebrile and HDS.     Review of Systems   Unable to perform ROS: Mental status change   Objective:     Vital Signs (Most Recent):  Temp: 97.2 °F (36.2 °C) (01/27/23 1622)  Pulse: 80 (01/27/23 1859)  Resp: 18 (01/27/23 1622)  BP: (!) 140/81  (01/27/23 1622)  SpO2: 96 % (01/27/23 1622)   Vital Signs (24h Range):  Temp:  [94.6 °F (34.8 °C)-98.2 °F (36.8 °C)] 97.2 °F (36.2 °C)  Pulse:  [72-96] 80  Resp:  [16-18] 18  SpO2:  [96 %-100 %] 96 %  BP: (114-140)/(74-81) 140/81     Weight: 68.9 kg (152 lb)  Body mass index is 21.81 kg/m².    Intake/Output Summary (Last 24 hours) at 1/27/2023 2006  Last data filed at 1/27/2023 1816  Gross per 24 hour   Intake 580 ml   Output 970 ml   Net -390 ml      Physical Exam  Constitutional:       General: He is not in acute distress.     Appearance: Normal appearance. He is ill-appearing. He is not toxic-appearing.   HENT:      Head: Normocephalic and atraumatic.      Mouth/Throat:      Mouth: Mucous membranes are dry.   Eyes:      Extraocular Movements: Extraocular movements intact.      Conjunctiva/sclera: Conjunctivae normal.      Pupils: Pupils are equal, round, and reactive to light.   Cardiovascular:      Rate and Rhythm: Regular rhythm. Tachycardia present.      Heart sounds: Murmur heard.   Pulmonary:      Effort: Pulmonary effort is normal. No respiratory distress.      Breath sounds: Normal breath sounds.   Abdominal:      General: Abdomen is flat. Bowel sounds are normal. There is no distension.      Palpations: Abdomen is soft.      Tenderness: There is generalized abdominal tenderness. There is no guarding.      Comments: B/l nephrostomy tubes draining, no signs of infection around insertion sites   Musculoskeletal:         General: No swelling.      Right lower leg: No edema.      Left lower leg: No edema.   Lymphadenopathy:      Cervical: No cervical adenopathy.   Skin:     General: Skin is warm.      Capillary Refill: Capillary refill takes less than 2 seconds.   Neurological:      General: No focal deficit present.      Mental Status: He is alert. He is disoriented.   Psychiatric:         Mood and Affect: Mood normal. Affect is flat.         Speech: Speech is delayed.         Behavior: Behavior normal.        Significant Labs: All pertinent labs within the past 24 hours have been reviewed.  CBC:   Recent Labs   Lab 01/26/23 0332 01/27/23 0229   WBC 6.69 5.01   HGB 8.1* 8.6*   HCT 27.3* 29.2*   * 159     CMP:   Recent Labs   Lab 01/26/23 0332 01/27/23 0229    144   K 3.9 3.6   * 113*   CO2 19* 18*   * 102   BUN 50* 55*   CREATININE 2.9* 3.1*   CALCIUM 8.4* 8.2*   PROT 5.6* 5.7*   ALBUMIN 1.6* 1.6*   BILITOT 0.4 0.3   ALKPHOS 55 60   AST 11 11   ALT <5* 6*   ANIONGAP 10 13       Significant Imaging: I have reviewed all pertinent imaging results/findings within the past 24 hours.  CT Abdomen Pelvis  Without Contrast   Final Result      1. Since 01/25/2023, status post conversion of right percutaneous nephrostomy tube to nephroureteral tube.  Trace residual right-sided hydronephrosis at the level of the lower pole appears slightly improved since the prior study.   2. Otherwise, no substantial interval change since the prior examination.   3. Additional details, as provided in the body of report.         Electronically signed by: Bill Sauer   Date:    01/27/2023   Time:    09:00      IR Nephrostogram Conversion to Nephroureteral Cath_Existing Access   Final Result      Uncomplicated right percutaneous nephrostomy to nephroureteral stent conversion.      Plan:      Patient to return in 10-12 weeks for routine catheter exchange.         Electronically signed by: Cyrus Solis   Date:    01/27/2023   Time:    11:02      CT Abdomen Pelvis  Without Contrast   Final Result      1. When compared to prior CT imaging of 12/22/2022, improved right-sided hydronephrosis and essentially resolved left-sided hydronephrosis status post placement of bilateral percutaneous nephrostomy tubes.   2. Similar position of extensive bilateral renal and proximal right ureteral calculi, as described.   3. Bilateral pleural effusions.   4. Question rectal wall thickening with minor induration in the perirectal and  presacral fat planes.  Correlation for stercoral colitis/proctitis recommended.   5. Nonspecific circumferential urinary bladder wall thickening which could relate to partially nondistended state at the time of imaging and/or bladder outlet obstruction, however correlation for signs/symptoms of cystitis would be recommended.   6. Minor skin thickening induration of the subcutaneous fat planes at the level of the lower sacrum and coccyx.  Correlation for signs of developing pressure ulcer would be recommended.   7. Additional details, as provided in the body of report.         Electronically signed by: Bill Sauer   Date:    01/25/2023   Time:    13:29      CT Head Without Contrast   Final Result      No acute intracranial process.         Electronically signed by: Javier Lazar   Date:    01/25/2023   Time:    12:23      X-Ray Chest AP Portable   Final Result      See above         Electronically signed by: Angel Luis Johnson MD   Date:    01/25/2023   Time:    11:50              Assessment/Plan:      * Sepsis  This patient does have evidence of infective focus  My overall impression is sepsis.  Source: Respiratory, Urinary Tract and Skin and Soft Tissue (location sacral wound)   Organ dysfunction indicated by Acute kidney injury, Acute respiratory failure and Encephalopathy  Will Not start Pressors- Levophed for MAP of 65  Source control achieved by: Pending Bcx, Urine Cx  Antibiotics given-   Antibiotics (72h ago, onward)    Start     Stop Route Frequency Ordered    01/27/23 1200  cefTRIAXone (ROCEPHIN) 2 g in dextrose 5 % in water (D5W) 5 % 50 mL IVPB (MB+)         -- IV Every 24 hours (non-standard times) 01/27/23 1140        Latest lactate reviewed-  Recent Labs   Lab 01/25/23  1022 01/25/23  1355 01/27/23  0927   LACTATE 1.7 0.9 1.6     Patient febrile, tachycardic and tachypneic- fufilling 3/4 SIRS criteria. Likely infectious. Multiple etiologies in setting of recent pyelonephritis.Patient's lactic acid wnl.      Plan  - Blood cx and urine cx resulted with proteus    - initially on broad spectrum abx, with vanc and cefepime, then switched cefepime to zosyn, after suspecitibilites discontinued vanc and zosyn and started CTX  - will likely start oral Levaquin upon discharge.     Sepsis due to Gram negative bacteria  This patient does have evidence of infective focus  My overall impression is sepsis.  Source: Urinary Tract  Antibiotics given-   Antibiotics (72h ago, onward)    Start     Stop Route Frequency Ordered    01/27/23 1200  cefTRIAXone (ROCEPHIN) 2 g in dextrose 5 % in water (D5W) 5 % 50 mL IVPB (MB+)         -- IV Every 24 hours (non-standard times) 01/27/23 1140        Latest lactate reviewed-  Recent Labs   Lab 01/25/23  1022 01/25/23  1355 01/27/23  0927   LACTATE 1.7 0.9 1.6     Organ dysfunction indicated by Acute kidney injury, Acute respiratory failure and Encephalopathy    Fluid challenge Not needed - patient is not hypotensive      Post- resuscitation assessment No - Post resuscitation assessment not needed       Will Not start Pressors- Levophed for MAP of 65  Source control achieved by: Urine cx and blood cx    - see sepsis      Palliative care encounter  - consulted palliative, appreciate recommendations   - Family discussing pursuing home hospice upon discharge    Hydronephrosis of right kidney  - IR consulted to adjust PCN past the obstructing R sided stone  - S/p nephrostogram with right sided PCN adjustment      Sacral wound  CT AP showing skin thickening induration of subcutaneous fat at level of lower sacrum and coccyx concerning for pressure ulcer    - wound care consulted, appreciate assitance    ACP (advance care planning)  Advance Care Planning     Date: 01/25/2023    Power of   Discussed patient's admission and current condition with patient's niece/HCPOA, Lakesha Gonzáles ([phone]), who re-confirmed that she is the HCPOA but makes decisions in conjunction with other family  members.      Code Status  Discussed patient's code status with HCPOA, confirmed that patient is DNR/DNI.  Ms Gonzáles would like to further discuss GOC in light of recurrent UTIs, consider palliative care pending further plan of care.           Bilateral Staghorn Calculus  CTAP with improved right sided and resolved left sided hydronephrisis, b/l renal and proximal r. ureteral calculi    Plan  - consulted urology, appreciate recommendations   - see CLEM  - see bilateral hydronephrosis    Bilateral hydronephrosis  During previous hospitalization was found to have b/l obstructing kidney stones w/ b/l hydronephrosis and underwent b/l nephrostomy tube placement (on 12/23/22). Follows with urology outpatient. Repeat CT AP on admission shows improved right sided and resolved left sided hydronephrosis.     Outpatient urology note on 1/6/23 details referral to IR for b/l nephrostomy tube exchange in 3 months and to continue to follow with nephrology    - consulted urology, appreciate recommendations  - S/p nephrostogram with right sided PCN adjustment done by IR    Chronic heart failure with preserved ejection fraction  HFpEF  - Most recent TTE on 12/23/22 demonstrates: LVef 70%,   - EKG : Afib  - Troponin wnl    - BNP wnl and CXR with pericardial effusion   - Fluid restriction at 1.5 cc with strict I/Os and daily weights   - Will hold diuresis in setting of CLEM   - Maintain on telemetry and daily EKGs   - Up to date risk stratification : TSH, Lipids, HbA1c with optimization of risk factors is necessary  - Check Electrolytes, keep Mag >2 & K+ >4  - SCDs, TEDs, Nursing communication to elevated LE   - Ambulate as tolerated                Acute hypoxemic respiratory failure  Patient with Hypoxic Respiratory failure which is Acute.  he is not on home oxygen. Supplemental oxygen was provided and noted-  .   Signs/symptoms of respiratory failure include- tachypnea. Contributing diagnoses includes - Aspiration, CHF and Pleural  effusion Labs and images were reviewed. Patient Has not had a recent ABG. Will treat underlying causes and adjust management of respiratory failure as follows-     - continuous oxygen monitoring  - currently on 2L NC      Persistent atrial fibrillation  Patient with Paroxysmal (<7 days) atrial fibrillation which is controlled currently with Beta Blocker. Patient is currently in atrial fibrillation.[id number] Score: 2. HASBLED Score:. Anticoagulation indicated. Anticoagulation done with Eliquis.    - will continue home metoprolol as EKG showed afib on admission  - holding eliquis    Alzheimer disease  Niece reports patient is not very verbose at baseline, sometimes will answer yes/no questions.  Not able to have full conversations at baseline.    - continue home namenda and aricept      CLEM (acute kidney injury)  Patient with acute kidney injury likely due to IVVD/dehydration and post-obstructive d/t obstructive stones CLEM is currently stable. Labs reviewed- Renal function/electrolytes with Estimated Creatinine Clearance: 18.2 mL/min (A) (based on SCr of 3.1 mg/dL (H)). according to latest data. Monitor urine output and serial BMP and adjust therapy as needed. Avoid nephrotoxins and renally dose meds for GFR listed above.     Lab Results   Component Value Date    CREATININE 3.1 (H) 01/27/2023     Creatinine 2.8 on admit, baseline around 1  - Likely combination of pre-renal from dehydration and volume losses vs sepsis and post-obstructive from hydronephrosis       Plan:   - Consulted nephrology as etiology of CLEM cannot be entirely attributed to obstructive also concerns for CRS, recommending brower catheter to better monitor UOP, IV sodium HCO3 + LR.  - Strict I&Os and daily weights   - Gentle IVF  - Avoid nephrotoxic agents such as NSAIDs, gadolinium and IV radiocontrast.  - Renally dose meds to current GFR.  - Maintain MAP > 65.  - Creatine uptrending    Essential hypertension  - holding home benazepril in setting  of sepsis to monitor hemodynamics  - will continue metoprolol         VTE Risk Mitigation (From admission, onward)         Ordered     IP VTE HIGH RISK PATIENT  Once         01/25/23 1256     Place sequential compression device  Until discontinued         01/25/23 1256     IP VTE HIGH RISK PATIENT  Once         01/25/23 1256     Place sequential compression device  Until discontinued         01/25/23 1256                Discharge Planning   JACQUELINE: 1/30/2023     Code Status: DNR   Is the patient medically ready for discharge?: No    Reason for patient still in hospital (select all that apply): Patient trending condition  Discharge Plan A: Assisted Living (w/[facility])   Discharge Delays: None known at this time              Naima Meza MD  Department of Hospital Medicine   [facility]

## 2023-01-28 NOTE — ASSESSMENT & PLAN NOTE
Patient with Paroxysmal (<7 days) atrial fibrillation which is controlled currently with Beta Blocker. Patient is currently in atrial fibrillation.[id number] Score: 2. HASBLED Score:. Anticoagulation indicated. Anticoagulation done with Eliquis.    - will continue home metoprolol as EKG showed afib on admission  - holding eliquis

## 2023-01-28 NOTE — ASSESSMENT & PLAN NOTE
Patient with acute kidney injury likely due to IVVD/dehydration and post-obstructive d/t obstructive stones CLEM is currently stable. Labs reviewed- Renal function/electrolytes with Estimated Creatinine Clearance: 18.2 mL/min (A) (based on SCr of 3.1 mg/dL (H)). according to latest data. Monitor urine output and serial BMP and adjust therapy as needed. Avoid nephrotoxins and renally dose meds for GFR listed above.     Lab Results   Component Value Date    CREATININE 3.1 (H) 01/27/2023     Creatinine 2.8 on admit, baseline around 1  - Likely combination of pre-renal from dehydration and volume losses vs sepsis and post-obstructive from hydronephrosis       Plan:   - Consulted nephrology as etiology of CLEM cannot be entirely attributed to obstructive also concerns for CRS, recommending brower catheter to better monitor UOP, IV sodium HCO3 + LR.  - Strict I&Os and daily weights   - Gentle IVF  - Avoid nephrotoxic agents such as NSAIDs, gadolinium and IV radiocontrast.  - Renally dose meds to current GFR.  - Maintain MAP > 65.  - Creatine uptrending

## 2023-01-28 NOTE — ASSESSMENT & PLAN NOTE
Patient with Paroxysmal (<7 days) atrial fibrillation which is controlled currently with Beta Blocker. Patient is currently in atrial fibrillation.[id number] Score: 2. HASBLED Score:. Anticoagulation indicated. Anticoagulation done with Eliquis.    - will continue home metoprolol as EKG showed afib on admission  - holding eliquis

## 2023-01-28 NOTE — PLAN OF CARE
Pamela uploaded home hospice orders to [facility], will follow with dc and be sure all equipment is in the home prior to dc. Placed call to on call liaison at  and asked the nurse liaison to return Pamela's call. Pamela faxed information to . Rep liaison reaching out to MARISELA Crowder for HME and consents and she will update Pamela from there.

## 2023-01-28 NOTE — ASSESSMENT & PLAN NOTE
CT AP showing skin thickening induration of subcutaneous fat at level of lower sacrum and coccyx concerning for pressure ulcer    - wound care consulted, appreciate assitance

## 2023-01-29 VITALS
HEIGHT: 70 IN | RESPIRATION RATE: 18 BRPM | OXYGEN SATURATION: 100 % | SYSTOLIC BLOOD PRESSURE: 132 MMHG | HEART RATE: 72 BPM | DIASTOLIC BLOOD PRESSURE: 69 MMHG | TEMPERATURE: 98 F | WEIGHT: 152 LBS | BODY MASS INDEX: 21.76 KG/M2

## 2023-01-29 LAB
ALBUMIN SERPL BCP-MCNC: 1.5 G/DL (ref 3.5–5.2)
ALP SERPL-CCNC: 49 U/L (ref 55–135)
ALT SERPL W/O P-5'-P-CCNC: 7 U/L (ref 10–44)
ANION GAP SERPL CALC-SCNC: 10 MMOL/L (ref 8–16)
AST SERPL-CCNC: 11 U/L (ref 10–40)
BASOPHILS # BLD AUTO: 0.01 K/UL (ref 0–0.2)
BASOPHILS NFR BLD: 0.2 % (ref 0–1.9)
BILIRUB SERPL-MCNC: 0.2 MG/DL (ref 0.1–1)
BUN SERPL-MCNC: 38 MG/DL (ref 8–23)
CALCIUM SERPL-MCNC: 8.3 MG/DL (ref 8.7–10.5)
CHLORIDE SERPL-SCNC: 111 MMOL/L (ref 95–110)
CO2 SERPL-SCNC: 24 MMOL/L (ref 23–29)
CREAT SERPL-MCNC: 1.8 MG/DL (ref 0.5–1.4)
DIFFERENTIAL METHOD: ABNORMAL
EOSINOPHIL # BLD AUTO: 0.1 K/UL (ref 0–0.5)
EOSINOPHIL NFR BLD: 2.4 % (ref 0–8)
ERYTHROCYTE [DISTWIDTH] IN BLOOD BY AUTOMATED COUNT: 15.5 % (ref 11.5–14.5)
EST. GFR  (NO RACE VARIABLE): 37.3 ML/MIN/1.73 M^2
GLUCOSE SERPL-MCNC: 89 MG/DL (ref 70–110)
HCT VFR BLD AUTO: 29.6 % (ref 40–54)
HGB BLD-MCNC: 8.7 G/DL (ref 14–18)
IMM GRANULOCYTES # BLD AUTO: 0.03 K/UL (ref 0–0.04)
IMM GRANULOCYTES NFR BLD AUTO: 0.6 % (ref 0–0.5)
LYMPHOCYTES # BLD AUTO: 0.8 K/UL (ref 1–4.8)
LYMPHOCYTES NFR BLD: 16.1 % (ref 18–48)
MAGNESIUM SERPL-MCNC: 2 MG/DL (ref 1.6–2.6)
MCH RBC QN AUTO: 28.7 PG (ref 27–31)
MCHC RBC AUTO-ENTMCNC: 29.4 G/DL (ref 32–36)
MCV RBC AUTO: 98 FL (ref 82–98)
MONOCYTES # BLD AUTO: 0.3 K/UL (ref 0.3–1)
MONOCYTES NFR BLD: 6.3 % (ref 4–15)
NEUTROPHILS # BLD AUTO: 3.8 K/UL (ref 1.8–7.7)
NEUTROPHILS NFR BLD: 74.4 % (ref 38–73)
NRBC BLD-RTO: 0 /100 WBC
PHOSPHATE SERPL-MCNC: 2.3 MG/DL (ref 2.7–4.5)
PLATELET # BLD AUTO: 174 K/UL (ref 150–450)
PMV BLD AUTO: 10.4 FL (ref 9.2–12.9)
POTASSIUM SERPL-SCNC: 3.9 MMOL/L (ref 3.5–5.1)
PROT SERPL-MCNC: 5.3 G/DL (ref 6–8.4)
RBC # BLD AUTO: 3.03 M/UL (ref 4.6–6.2)
SODIUM SERPL-SCNC: 145 MMOL/L (ref 136–145)
WBC # BLD AUTO: 5.04 K/UL (ref 3.9–12.7)

## 2023-01-29 PROCEDURE — 1111F PR DISCHARGE MEDS RECONCILED W/ CURRENT OUTPATIENT MED LIST: ICD-10-PCS | Mod: CPTII,GC,, | Performed by: STUDENT IN AN ORGANIZED HEALTH CARE EDUCATION/TRAINING PROGRAM

## 2023-01-29 PROCEDURE — 1111F DSCHRG MED/CURRENT MED MERGE: CPT | Mod: CPTII,GC,, | Performed by: STUDENT IN AN ORGANIZED HEALTH CARE EDUCATION/TRAINING PROGRAM

## 2023-01-29 PROCEDURE — 63600175 PHARM REV CODE 636 W HCPCS: Performed by: STUDENT IN AN ORGANIZED HEALTH CARE EDUCATION/TRAINING PROGRAM

## 2023-01-29 PROCEDURE — 83735 ASSAY OF MAGNESIUM: CPT | Performed by: STUDENT IN AN ORGANIZED HEALTH CARE EDUCATION/TRAINING PROGRAM

## 2023-01-29 PROCEDURE — 85025 COMPLETE CBC W/AUTO DIFF WBC: CPT | Performed by: STUDENT IN AN ORGANIZED HEALTH CARE EDUCATION/TRAINING PROGRAM

## 2023-01-29 PROCEDURE — 84100 ASSAY OF PHOSPHORUS: CPT | Performed by: STUDENT IN AN ORGANIZED HEALTH CARE EDUCATION/TRAINING PROGRAM

## 2023-01-29 PROCEDURE — 80053 COMPREHEN METABOLIC PANEL: CPT | Performed by: STUDENT IN AN ORGANIZED HEALTH CARE EDUCATION/TRAINING PROGRAM

## 2023-01-29 PROCEDURE — 99239 PR HOSPITAL DISCHARGE DAY,>30 MIN: ICD-10-PCS | Mod: GC,,, | Performed by: STUDENT IN AN ORGANIZED HEALTH CARE EDUCATION/TRAINING PROGRAM

## 2023-01-29 PROCEDURE — 99239 HOSP IP/OBS DSCHRG MGMT >30: CPT | Mod: GC,,, | Performed by: STUDENT IN AN ORGANIZED HEALTH CARE EDUCATION/TRAINING PROGRAM

## 2023-01-29 PROCEDURE — 36415 COLL VENOUS BLD VENIPUNCTURE: CPT | Performed by: STUDENT IN AN ORGANIZED HEALTH CARE EDUCATION/TRAINING PROGRAM

## 2023-01-29 PROCEDURE — 25000003 PHARM REV CODE 250: Performed by: STUDENT IN AN ORGANIZED HEALTH CARE EDUCATION/TRAINING PROGRAM

## 2023-01-29 RX ORDER — LORATADINE 10 MG/1
10 TABLET ORAL DAILY
Qty: 30 TABLET | Refills: 0 | Status: SHIPPED | OUTPATIENT
Start: 2023-01-29 | End: 2024-01-29

## 2023-01-29 RX ORDER — DONEPEZIL HYDROCHLORIDE 5 MG/1
10 TABLET, FILM COATED ORAL NIGHTLY
Qty: 60 TABLET | Refills: 0 | Status: SHIPPED | OUTPATIENT
Start: 2023-01-29 | End: 2024-01-29

## 2023-01-29 RX ORDER — ACETAMINOPHEN 325 MG/1
650 TABLET ORAL EVERY 4 HOURS PRN
Qty: 30 TABLET | Refills: 0 | Status: SHIPPED | OUTPATIENT
Start: 2023-01-29

## 2023-01-29 RX ORDER — CEFPODOXIME PROXETIL 100 MG/1
200 TABLET, FILM COATED ORAL EVERY 12 HOURS
Qty: 40 TABLET | Refills: 0 | Status: SHIPPED | OUTPATIENT
Start: 2023-01-29 | End: 2023-02-08

## 2023-01-29 RX ORDER — METOPROLOL TARTRATE 25 MG/1
25 TABLET, FILM COATED ORAL 2 TIMES DAILY
Qty: 60 TABLET | Refills: 0 | Status: SHIPPED | OUTPATIENT
Start: 2023-01-29 | End: 2023-02-28

## 2023-01-29 RX ORDER — MEMANTINE HYDROCHLORIDE 5 MG/1
10 TABLET ORAL DAILY
Qty: 60 TABLET | Refills: 0 | Status: SHIPPED | OUTPATIENT
Start: 2023-01-29 | End: 2023-02-28

## 2023-01-29 RX ADMIN — METOPROLOL TARTRATE 12.5 MG: 25 TABLET, FILM COATED ORAL at 09:01

## 2023-01-29 RX ADMIN — MEMANTINE 20 MG: 10 TABLET ORAL at 09:01

## 2023-01-29 RX ADMIN — CEFTRIAXONE 2 G: 2 INJECTION, POWDER, FOR SOLUTION INTRAMUSCULAR; INTRAVENOUS at 12:01

## 2023-01-29 RX ADMIN — PRAVASTATIN SODIUM 20 MG: 20 TABLET ORAL at 09:01

## 2023-01-29 NOTE — PLAN OF CARE
Problem: Adult Inpatient Plan of Care  Goal: Plan of Care Review  Outcome: Met  Goal: Patient-Specific Goal (Individualized)  Outcome: Met  Goal: Absence of Hospital-Acquired Illness or Injury  Outcome: Met  Goal: Optimal Comfort and Wellbeing  Outcome: Met  Goal: Readiness for Transition of Care  Outcome: Met     Problem: Fall Injury Risk  Goal: Absence of Fall and Fall-Related Injury  Outcome: Met     Problem: Skin Injury Risk Increased  Goal: Skin Health and Integrity  Outcome: Met     Problem: Adjustment to Illness (Sepsis/Septic Shock)  Goal: Optimal Coping  Outcome: Met     Problem: Bleeding (Sepsis/Septic Shock)  Goal: Absence of Bleeding  Outcome: Met     Problem: Glycemic Control Impaired (Sepsis/Septic Shock)  Goal: Blood Glucose Level Within Desired Range  Outcome: Met     Problem: Infection Progression (Sepsis/Septic Shock)  Goal: Absence of Infection Signs and Symptoms  Outcome: Met     Problem: Nutrition Impaired (Sepsis/Septic Shock)  Goal: Optimal Nutrition Intake  Outcome: Met     Problem: Fluid and Electrolyte Imbalance (Acute Kidney Injury/Impairment)  Goal: Fluid and Electrolyte Balance  Outcome: Met     Problem: Oral Intake Inadequate (Acute Kidney Injury/Impairment)  Goal: Optimal Nutrition Intake  Outcome: Met     Problem: Renal Function Impairment (Acute Kidney Injury/Impairment)  Goal: Effective Renal Function  Outcome: Met     Problem: Impaired Wound Healing  Goal: Optimal Wound Healing  Outcome: Met     Problem: Coping Ineffective  Goal: Effective Coping  Outcome: Met     Problem: Infection  Goal: Absence of Infection Signs and Symptoms  Outcome: Met  D/C back to assisted living facility via [facility] stretcher under hospice care.  D/C medications delivered to bedside.  Sister at patient's bedside will follow transport.

## 2023-01-29 NOTE — PLAN OF CARE
Pamela received call from Gordy with [facility] informing Pamela that all DME is in place and Pt is ok to dc, updated hospice orders placed and Sw will follow with transport. Pamela setup for 2pm, nurse updated.

## 2023-01-29 NOTE — PLAN OF CARE
Problem: Adult Inpatient Plan of Care  Goal: Plan of Care Review  Outcome: Ongoing, Progressing  Goal: Patient-Specific Goal (Individualized)  Outcome: Ongoing, Progressing  Goal: Absence of Hospital-Acquired Illness or Injury  Outcome: Ongoing, Progressing  Goal: Optimal Comfort and Wellbeing  Outcome: Ongoing, Progressing  Goal: Readiness for Transition of Care  Outcome: Ongoing, Progressing     Problem: Fall Injury Risk  Goal: Absence of Fall and Fall-Related Injury  Outcome: Ongoing, Progressing     Problem: Skin Injury Risk Increased  Goal: Skin Health and Integrity  Outcome: Ongoing, Progressing     Problem: Adjustment to Illness (Sepsis/Septic Shock)  Goal: Optimal Coping  Outcome: Ongoing, Progressing     Problem: Bleeding (Sepsis/Septic Shock)  Goal: Absence of Bleeding  Outcome: Ongoing, Progressing     Problem: Glycemic Control Impaired (Sepsis/Septic Shock)  Goal: Blood Glucose Level Within Desired Range  Outcome: Ongoing, Progressing     Problem: Infection Progression (Sepsis/Septic Shock)  Goal: Absence of Infection Signs and Symptoms  Outcome: Ongoing, Progressing     Problem: Nutrition Impaired (Sepsis/Septic Shock)  Goal: Optimal Nutrition Intake  Outcome: Ongoing, Progressing     Problem: Fluid and Electrolyte Imbalance (Acute Kidney Injury/Impairment)  Goal: Fluid and Electrolyte Balance  Outcome: Ongoing, Progressing     Problem: Oral Intake Inadequate (Acute Kidney Injury/Impairment)  Goal: Optimal Nutrition Intake  Outcome: Ongoing, Progressing     Problem: Renal Function Impairment (Acute Kidney Injury/Impairment)  Goal: Effective Renal Function  Outcome: Ongoing, Progressing     Problem: Impaired Wound Healing  Goal: Optimal Wound Healing  Outcome: Ongoing, Progressing     Problem: Coping Ineffective  Goal: Effective Coping  Outcome: Ongoing, Progressing     Problem: Infection  Goal: Absence of Infection Signs and Symptoms  Outcome: Ongoing, Progressing

## 2023-01-30 NOTE — PLAN OF CARE
[facility]  Discharge Final Note    Primary Care Provider: Martine Garces MD    Expected Discharge Date: 1/29/2023    Final Discharge Note (most recent)       Final Note - 01/30/23 1642          Final Note    Assessment Type Final Discharge Note     Anticipated Discharge Disposition Hospice/Home     What phone number can be called within the next 1-3 days to see how you are doing after discharge? --   [phone]       Post-Acute Status    Hospice Status Set-up Complete/Auth obtained     Discharge Delays None known at this time                     Important Message from Medicare             Contact Info       [facility]   Specialty: Hospice Services, Hospice and Palliative Medicine    [place] [facility]   Phone: [phone]       Next Steps: Follow up today          Courtney Johnson RN   Case Management  [phone]

## 2023-01-31 LAB
BACTERIA BLD CULT: NORMAL
BACTERIA BLD CULT: NORMAL

## 2023-02-01 ENCOUNTER — EXTERNAL HOME HEALTH (OUTPATIENT)
Dept: HOME HEALTH SERVICES | Facility: HOSPITAL | Age: 83
End: 2023-02-01
Payer: MEDICARE

## 2023-02-01 LAB
BACTERIA BLD CULT: NORMAL
BACTERIA BLD CULT: NORMAL

## 2023-02-02 LAB
BACTERIA BLD CULT: NORMAL
BACTERIA BLD CULT: NORMAL

## 2023-02-03 NOTE — PHYSICIAN QUERY
PT Name: Timothy Roldan  MR #: [id number]    DOCUMENTATION CLARIFICATION     CDS/: Maryanne Devries RN CDS            Contact information: [email or web address]    This form is a permanent document in the medical record.     Query Date: February 3, 2023    By submitting this query, we are merely seeking further clarification of documentation.. Please utilize your independent clinical judgment when addressing the question(s) below.    The medical record contains the following:   Indicators  Supporting Clinical Findings Location in Medical Record     X Energy Intake Energy Intake (Malnutrition): less than 75% for greater than or equal to 1 month    Nutrition, K Howard, 1/26     X Weight Loss Weight Loss (Malnutrition): greater than 5% in 1 month   Nutrition, K Howard, 1/26     X Fat Loss Subcutaneous Fat Loss (Final Summary): moderate protein-calorie malnutrition   Nutrition, K Howard, 1/26     X Muscle Loss Muscle Loss Evaluation (Final Summary): severe protein-calorie malnutrition     Nutrition, K Howard, 1/26    Edema/Fluid Accumulation      Reduced  Strength (by dynamometer)       X Weight, BMI, Usual Body Weight Weight: 68.9 kg (152 lb)    BMI (Calculated): 21.8    Usual body wt 175 pounds-per chart       Nutrition, K Howard, 1/26     X Delayed Wound Healing Pressure Ulcer Stage III   Location: sacrum            Dr. Meza, 1/28     X Registered Dietician Diagnosis Severe Protein-Calorie Malnutrition   Nutrition, K Howard, 1/26     X Acute or Chronic Illness Past medical history, HTN, Afib on Eliquis, alzheimer dementia, HFpEF, COVID pna, and BPH, recent admission for kidney stones status post ureteral stents complicated by infection.      Sepsis, CLEM, Acute hypoxemic respiratory failure, chronic heart failure with preserved ejection fraction, persistent A Fib        EM, Dr. Bloom, 1/25              Dr. Meza/, 1/26    Social or Environmental Circumstances       X Treatment  Recommendations     1. ADAT-- Regular diet when medically stable--Texture/Consistency per SLP.   2. Add Boost Plus (chocolate) with all meals.   3. RD to monitor and follow-up.   BRANDAN Valles, 1/26    Other       Academy of Nutrition and Dietetics (Academy) and the American Society for Parenteral and Enteral Nutrition (A.S.P.E.N.) Clinical Characteristics to support Malnutrition   Malnutrition in the Context of Acute Illness or Injury Malnutrition in the Context of Chronic Illness or Injury Malnutrition in the Context of Social or Environmental Circumstances   Malnutrition Level Moderate Severe Moderate Severe   Moderate   Severe   Energy Intake <75%                   >7 days <50%                 >5 days <75%           >1 month <75%                      >1 month   <75% for >3 months   <50% for >1 month   Weight Loss   1-2% in 1 week >2% in 1 week 5% in 1 month >5% in 1 month 5% in 1 month >5% in 1 month    5% in 1 month >5% in 1 month 7.5% in 3 months >7.5% in 3 months 7.5% in 3 months >7.5% in 3 months    7.5% in 3 months >7.5% in 3 months 10% in 6 months >10% in 6 months 10% in 6 months >10% in 6 months        20% in 1 year                    >20% in 1 year                                                                  20% in 1 year                            >20% in 1 year                                                  Subcutaneous Fat Loss Mild  Moderate  Mild  Severe    Mild   Severe   Muscle Loss Mild  Moderate  Mild  Severe    Mild   Severe   Edema/Fluid Accumulation Mild Moderate to severe  Mild  Severe   Mild   Severe   Reduced  Strength         (based on standards supplied by  of dynamometer) N/A Measurably reduced N/A Measurably reduced N/A Measurably reduced     Criteria for mild malnutrition is defined as 1 characteristic outlined above within the established moderate or severe parameters.  A minimum of 2 out of the 6 characteristics noted above are recommended for a diagnosis  of moderate or severe malnutrition.  Chronic illness/injury is a disease/condition lasting 3 months or longer.    The noted clinical guidelines are only system guidelines and do not replace the providers clinical judgment.    Provider, please specify diagnosis or diagnoses associated with above clinical findings.    [ X ] Severe Malnutrition - a minimum of 2 of the 6 severe malnutrition characteristics noted above    [  ] Other Nutritional Diagnosis (please specify): _______   [  ] Clinically Undetermined         Please document in your progress notes daily for the duration of treatment until resolved and  include in your discharge summary.      References:    JORI Layne, & YE Nix (2022, April). Assessment and management of anorexia and cachexia in palliative care. Retrieved May 23, 2022, from https://www.[employer]/contents/assessment-and-management-of-anorexia-and-cachexia-in-palliative-care?enmdeTdv=5415&source=see_link     SD Anderson, PhD, RD, Pedrito BARAKAT P., PhD, RN, TYSHAWN Hope MD, PhD, Angela SANDERS A., MS, RD, [facility], SHANE Hassan, MS, RD, The Academy Malnutrition Work Group, The A.S.P.E.N. Board of Directors. (2012). Consensus Statement: Academy of Nutrition and Dietetics and American Society for Parenteral and Enteral Nutrition: Characteristics Recommended for the Identification and Documentation of Adult Malnutrition (Undernutrition). Journal of Parenteral and Enteral Nutrition, 36(3), 275-283. doi:10.1177/3824296601161086     Form No. [id number]

## 2023-02-03 NOTE — PHYSICIAN QUERY
PT Name: Timothy Roldan  MR #: [id number]     DOCUMENTATION CLARIFICATION      CDS/: Maryanne Devries RN CDS            Contact information:juan [email or web address]  This form is a permanent document in the medical record.     Query Date: February 3, 2023    By submitting this query, we are merely seeking further clarification of documentation to reflect the severity of illness of your patient. Please utilize your independent clinical judgment when addressing the question(s) below.    The Medical Record contains the following:     Indicators   Supporting Clinical Findings Location in Medical Record     X Documentation of condition Urinary Tract Infection    He was admitted for sepsis 2/2 proteus bacteremia.  Source appears to be urinary though Proteus grown from urine appears to have slightly different sensitivity profile.    Urinary Tract Infection   EM, Dr Bloom, 1/25    DCS, Dr. Meza/Dr. Mock, 1/29     X Urinary Device, Catheter Recent hospitalization for large bilateral obstructing renal stones s/p bilateral nephrostomy placement.      B/l nephrostomy tubes draining, no signs of infection around insertion sites    H&P, Dr. Meza/Dr. Mock, 1/26    Lab Value(s)       X UA Results UA LE positive, nitrite negative. Microscopy with >100 WBC, many bacteria    Urology, Dr. Cordova, 1/26      X Cultures Bcx and Urine Cx growing Proteus    HM, Dr. Smith/Dr. Mock, 1/28     X Treatment/Medication Currently on Vancomycin and zosyn. CT Abd/P demonstrates extensive renal calculi and some persistent R side hydronephrosis. Urology consulted. Recommending IR nephrostomy tube adjustment on R. Exchanged today.    Antibiotics narrowed to ceftriaxone. Underwent IR R nephrostomy tube exchange on 1/26.     Transitioned to oral cephalosporin for planned 2 week total course of antibiotics   H&P, Dr. Meza/Dr. Mock, 1/26              BRITTANY, Dr. Meza/Dr. Mock, 1/29    Other        Provider, please clarify  if there is any clinical correlation between UTI and Nephrostomy Tubes.  [ X  ] Due to or associated with each other   [   ] Unrelated to each other   [   ] Other explanation (please specify): _____________   [   ] Clinically undetermined       Please document in your progress notes daily for the duration of treatment until resolved, and include in your discharge summary.    Form No. [id number]

## 2023-02-06 ENCOUNTER — DOCUMENT SCAN (OUTPATIENT)
Dept: HOME HEALTH SERVICES | Facility: HOSPITAL | Age: 83
End: 2023-02-06
Payer: MEDICARE

## 2023-02-08 ENCOUNTER — DOCUMENT SCAN (OUTPATIENT)
Dept: HOME HEALTH SERVICES | Facility: HOSPITAL | Age: 83
End: 2023-02-08
Payer: MEDICARE

## 2023-02-09 ENCOUNTER — EXTERNAL HOME HEALTH (OUTPATIENT)
Dept: HOME HEALTH SERVICES | Facility: HOSPITAL | Age: 83
End: 2023-02-09
Payer: MEDICARE

## 2023-05-30 NOTE — DISCHARGE SUMMARY
[facility]  Discharge Summary      Patient Name: Timothy Roldan  MRN: [id number]  [facility]: [id number]  Patient Class: IP- Inpatient  Admission Date: 1/25/2023  Hospital Length of Stay: 4 days  Discharge Date and Time:  01/29/2023 3:13 PM  Attending Physician: Colin Mock MD   Discharging Provider: Naima Meza MD  Primary Care Provider: Martine Garces MD  Hospital Medicine Team: [facility] 1 Naima Meza MD  Primary Care Team: [facility] 1    HPI:   Mr. Timothy Roldan is a 84 yo male with a PMHx of bilateral hydronephrosis 2/2 staghorn caliculi s/p b/l nephrostomy tubes,  HTN, Afib (on eliquis), Alzheimer's dementia, HFpEF (EF 70%), and BPH who presents from nursing home for altered mental status and worsening weakness.     As per nursing home, hematuria was noted and UA done on Sunday showed infection, thus Macrobid was started. Since then, patient has decreased appetite and worsening mental status. Upon evaluation, patient's speech is very slowed. He is AAOX2 (person, place, not time or reason for admission), however is unable to answer questions during exam. He does complain of generalized body soreness.     Of note, patient was recently hospitalized at [facility] from 12/21/22- 12/28/22 and presented for similar complaint with fever and AMS and found to have CLEM. He was found to have b/l obstructing kidney stones w/ b/l hydronephrosis and underwent b/l nephrostomy tube placement (on 12/23/22) along with brower catheter placement. His CLEM improved.  However, urology recommended NO brower catheter placement unless patient is symptomatic and bladder scan shows >400 cc. He also completed 6 day course of IV antibotics and d/c with PO Bactrim. Of note, patient did not reac well with cefepime and seroquel (decreased mentation).       ED Course:  Upon arrival to the ED, patient was febrile with Tmax of 102.4, improved to 99.7 after receiving tyelnol in the ED, he was tachycardic ,  tachypneic 16-20, SBP , and saturating well on RA. Per EMS, on initial evaluation, saturating in the 80s, which improved after 2L NC. Initial labs notable for WBC and lactic acid wnl,elevated procal (5.46), increased creatine 2.7, decreased albumin 1.9, slight acidosis with HCO3 of 19, UA with +2 protein, +1 blood, +3 leukocytes, 73 RBC, >100 bacteria, and many hyaline casts. EKG showed afib with RVR () and Qtc wnl. CTAP with imrpoved right sided and resolved left sided hydronephrisis, continues to have b/l and proximal r. Ureteral calculi, b/l pleural effusions, circumferential urinary bladder wall thickening concerning of cystitis, rectal wall thicening concerning for colitis/proctatis,  skin thickening induration near lower sacrum and coccyx concerning for pressure ulcer. CTH unremarkable for acute intracranial process. CXR with left > R sided opacifications likely pleural effusion and possible atelectasis. Received x1 dose of tyelnol given for fever, s/p 2 bolus of LR, zosyn.      * No surgery found *      Hospital Course:   Mr. Roldan is an 83 gentleman with dementia, HTN, HFpEF, Afib on eliquis, BPH, recent hospitalization for large bilateral obstructing renal stones s/p bilateral nephrostomy placement.  He was admitted for sepsis 2/2 proteus bacteremia.  Source appears to be urinary though Proteus grown from urine appears to have slightly different sensitivity profile. Antibiotics narrowed to ceftriaxone. Underwent IR R nephrostomy tube exchange on 1/26.  Family interested in pursuing hospice care following recovery from this acute illness. Would like to continue treatment of infection but declining any additional procedures. Renal function now improving after fluids with bicarb.  Transitioned to oral cephalosporin for planned 2 week total course of antibiotics.  (Family preferred to avoid PICC placement). Planning discharge back to nursing facility with hospice.      Goals of Care Treatment  Preferences:  Code Status: DNR    Health care agent: Lakesha Gonzáles  [facility] agent number: [phone]          What is most important right now is to focus on improvement in condition but with limits to invasive therapies, comfort and QOL .  Accordingly, we have decided that the best plan to meet the patient's goals includes enrolling in hospice care.      Consults:   Consults (From admission, onward)          Status Ordering Provider     Inpatient consult to Nephrology  Once        Provider:  (Not yet assigned)    Completed ROSAURA MURRY     Inpatient consult to Palliative Care  Once        Provider:  (Not yet assigned)    Completed ROSAURA MURRY     Inpatient consult to Interventional Radiology  Once        Provider:  (Not yet assigned)    Completed KETAN OSHEA     Inpatient consult to Registered Dietitian/Nutritionist  Once        Provider:  (Not yet assigned)    Completed ROSAURA MURRY     Inpatient consult to Urology  Once        Provider:  (Not yet assigned)    Completed VALORIE GALLOWAY            No new Assessment & Plan notes have been filed under this hospital service since the last note was generated.  Service: Hospital Medicine    Final Active Diagnoses:    Diagnosis Date Noted POA    PRINCIPAL PROBLEM:  Sepsis [A41.9] 12/21/2022 Yes    Altered mental status [R41.82] 01/28/2023 Yes    Urinary tract infection with hematuria [N39.0, R31.9] 01/28/2023 Yes    Hydronephrosis of right kidney [N13.30] 01/26/2023 Yes    Palliative care encounter [Z51.5] 01/26/2023 Not Applicable    Sepsis due to Gram negative bacteria [A41.50] 01/26/2023 Yes    ACP (advance care planning) [Z71.89] 01/25/2023 Not Applicable    Sacral wound [S31.000A] 01/25/2023 Yes    Bilateral Staghorn Calculus [N20.0] 01/06/2023 Yes    Bilateral hydronephrosis [N13.30] 12/22/2022 Yes    Chronic heart failure with preserved ejection fraction [I50.32] 05/31/2022 Yes    Acute hypoxemic respiratory failure [J96.01] 05/31/2022 Yes     Essential hypertension [I10] 12/31/2021 Yes    Persistent atrial fibrillation [I48.19] 12/31/2021 Yes    CLEM (acute kidney injury) [N17.9] 12/31/2021 Yes    Alzheimer disease [G30.9, F02.80] 12/31/2021 Yes      Problems Resolved During this Admission:       Discharged Condition: stable    Disposition: Hospice/Medical Facility    Follow Up:   Follow-up Information       [facility] Follow up today.    Specialties: Hospice Services, Hospice and Palliative Medicine  Contact information:  [facility]  [phone]                           Patient Instructions:   No discharge procedures on file.    Significant Diagnostic Studies: Labs: All labs within the past 24 hours have been reviewed    Pending Diagnostic Studies:       None           Medications:  Reconciled Home Medications:      Medication List        START taking these medications      acetaminophen 325 MG tablet  Commonly known as: TYLENOL  Take 2 tablets (650 mg total) by mouth every 4 (four) hours as needed for Pain.     cefpodoxime 100 MG tablet  Commonly known as: VANTIN  Take 2 tablets (200 mg total) by mouth every 12 (twelve) hours. for 10 days            CHANGE how you take these medications      donepeziL 5 MG tablet  Commonly known as: ARICEPT  Take 2 tablets (10 mg total) by mouth every evening.  What changed: medication strength     loratadine 10 mg tablet  Commonly known as: CLARITIN  Take 1 tablet (10 mg total) by mouth once daily.  What changed: when to take this     memantine 5 MG Tab  Commonly known as: NAMENDA  Take 2 tablets (10 mg total) by mouth once daily.  What changed:   medication strength  how much to take     metoprolol tartrate 25 MG tablet  Commonly known as: LOPRESSOR  Take 1 tablet (25 mg total) by mouth 2 (two) times daily.  What changed: how much to take            CONTINUE taking these medications      melatonin 3 mg tablet  Commonly known as: MELATIN  Take 3 mg by  mouth every evening.            STOP taking these medications      artificial tears 0.5 % ophthalmic solution  Commonly known as: ISOPTO TEARS     ELIQUIS 2.5 mg Tab  Generic drug: apixaban     lovastatin 20 MG tablet  Commonly known as: MEVACOR     mupirocin 2 % ointment  Commonly known as: BACTROBAN     nitrofurantoin (macrocrystal-monohydrate) 100 MG capsule  Commonly known as: MACROBID     polyethylene glycol 17 gram/dose powder  Commonly known as: GLYCOLAX     pulse oximeter device  Commonly known as: pulse oximeter     tamsulosin 0.4 mg Cap  Commonly known as: FLOMAX     VITAMIN D2 50,000 unit Cap  Generic drug: ergocalciferol              Indwelling Lines/Drains at time of discharge:   Lines/Drains/Airways       Drain  Duration                  Nephrostomy 12/23/22 1047 Left 8 Fr. 37 days         Nephrostomy 01/26/23 1535 Right 8 Fr. 2 days         Urethral Catheter 01/27/23 1430 16 Fr. 2 days                    Time spent on the discharge of patient: 35 minutes         Naima Meza MD  Department of Hospital Medicine  [facility]   Tetracycline Counseling: Patient counseled regarding possible photosensitivity and increased risk for sunburn.  Patient instructed to avoid sunlight, if possible.  When exposed to sunlight, patients should wear protective clothing, sunglasses, and sunscreen.  The patient was instructed to call the office immediately if the following severe adverse effects occur:  hearing changes, easy bruising/bleeding, severe headache, or vision changes.  The patient verbalized understanding of the proper use and possible adverse effects of tetracycline.  All of the patient's questions and concerns were addressed. Patient understands to avoid pregnancy while on therapy due to potential birth defects.

## 2024-01-01 NOTE — HPI
"80 yo male with a PMHx of Alzheimer's disease (+/- Parkinson?), HTN, BPH with condom catheter who was brought here by his niece due to a ground level fall and significant weakness. Patient is a poor historian due to dementia. Was confused about "blood thinners" being a "type of animal". Per niece he has been progressively getting weaker over the last month. Went from walking without much help to using a cane to having multiple falls. He is now to the point that he needs almost full assistance to move. Otherwise, they haven't noticed much other symptoms. Confusion seems to be around his baseline.    In the ED, labs were remarkable for a UTI on U/A with extreme sediment in his urine and a mild CLEM (sCr 1.5 with BUN 27; baseline sCr is 1.2-1.3 per [facility] labs). CTH showing no acute abnormality but it was read as possible NPH. Neurology consulted and will see tomorrow. He was initially hypotensive but has become hypertensive with IVF boluses. Given IV Zosyn and admitted to floor.    "
EDITH Tavera RN/Nurse